# Patient Record
Sex: MALE | Race: WHITE | Employment: OTHER | URBAN - NONMETROPOLITAN AREA
[De-identification: names, ages, dates, MRNs, and addresses within clinical notes are randomized per-mention and may not be internally consistent; named-entity substitution may affect disease eponyms.]

---

## 2017-01-09 ENCOUNTER — OFFICE VISIT (OUTPATIENT)
Dept: CARDIOLOGY | Age: 72
End: 2017-01-09

## 2017-01-09 VITALS
HEIGHT: 62 IN | SYSTOLIC BLOOD PRESSURE: 120 MMHG | HEART RATE: 71 BPM | WEIGHT: 170 LBS | BODY MASS INDEX: 31.28 KG/M2 | DIASTOLIC BLOOD PRESSURE: 68 MMHG

## 2017-01-09 DIAGNOSIS — I10 ESSENTIAL HYPERTENSION: ICD-10-CM

## 2017-01-09 DIAGNOSIS — Z95.1 S/P CABG X 4: Primary | ICD-10-CM

## 2017-01-09 DIAGNOSIS — E78.01 FAMILIAL HYPERCHOLESTEROLEMIA: ICD-10-CM

## 2017-01-09 PROCEDURE — 99213 OFFICE O/P EST LOW 20 MIN: CPT | Performed by: NUCLEAR MEDICINE

## 2017-01-09 PROCEDURE — 93000 ELECTROCARDIOGRAM COMPLETE: CPT | Performed by: NUCLEAR MEDICINE

## 2017-02-07 ENCOUNTER — NURSE TRIAGE (OUTPATIENT)
Dept: ADMINISTRATIVE | Age: 72
End: 2017-02-07

## 2017-02-09 ENCOUNTER — OFFICE VISIT (OUTPATIENT)
Dept: UROLOGY | Age: 72
End: 2017-02-09

## 2017-02-09 ENCOUNTER — TELEPHONE (OUTPATIENT)
Dept: UROLOGY | Age: 72
End: 2017-02-09

## 2017-02-09 VITALS
SYSTOLIC BLOOD PRESSURE: 138 MMHG | DIASTOLIC BLOOD PRESSURE: 82 MMHG | BODY MASS INDEX: 29.02 KG/M2 | HEIGHT: 64 IN | WEIGHT: 170 LBS

## 2017-02-09 DIAGNOSIS — C61 PROSTATE CANCER (HCC): ICD-10-CM

## 2017-02-09 DIAGNOSIS — N35.9 URETHRAL STRICTURE, UNSPECIFIED STRICTURE TYPE: ICD-10-CM

## 2017-02-09 DIAGNOSIS — N39.41 URGENCY INCONTINENCE: Primary | ICD-10-CM

## 2017-02-09 DIAGNOSIS — R31.9 HEMATURIA: ICD-10-CM

## 2017-02-09 LAB
BILIRUBIN URINE: NEGATIVE
BLOOD URINE, POC: NEGATIVE
CHARACTER, URINE: CLEAR
COLOR, URINE: YELLOW
GLUCOSE URINE: NEGATIVE MG/DL
KETONES, URINE: NEGATIVE
LEUKOCYTE CLUMPS, URINE: NEGATIVE
NITRITE, URINE: NEGATIVE
PH, URINE: 7
POST VOID RESIDUAL (PVR): 153 ML
PROTEIN, URINE: NEGATIVE MG/DL
SPECIFIC GRAVITY, URINE: 1.02 (ref 1–1.03)
UROBILINOGEN, URINE: 0.2 EU/DL

## 2017-02-09 PROCEDURE — 99214 OFFICE O/P EST MOD 30 MIN: CPT | Performed by: UROLOGY

## 2017-02-09 PROCEDURE — 81003 URINALYSIS AUTO W/O SCOPE: CPT | Performed by: UROLOGY

## 2017-02-09 PROCEDURE — 51798 US URINE CAPACITY MEASURE: CPT | Performed by: UROLOGY

## 2017-02-09 ASSESSMENT — ENCOUNTER SYMPTOMS
VOMITING: 0
CHEST TIGHTNESS: 0
CONSTIPATION: 0
SHORTNESS OF BREATH: 0
BACK PAIN: 1
EYE PAIN: 0
COLOR CHANGE: 0
EYE REDNESS: 0
FACIAL SWELLING: 0

## 2017-02-09 ASSESSMENT — LIFESTYLE VARIABLES: TOBACCO_USE: 0

## 2017-02-13 PROBLEM — I21.4 NSTEMI (NON-ST ELEVATED MYOCARDIAL INFARCTION) (HCC): Status: ACTIVE | Noted: 2017-02-13

## 2017-02-15 PROBLEM — T82.218A FAILED CORONARY ARTERY BYPASS GRAFT: Status: ACTIVE | Noted: 2017-02-15

## 2017-03-06 ENCOUNTER — OFFICE VISIT (OUTPATIENT)
Dept: CARDIOLOGY | Age: 72
End: 2017-03-06

## 2017-03-06 VITALS
HEART RATE: 68 BPM | DIASTOLIC BLOOD PRESSURE: 70 MMHG | WEIGHT: 169.2 LBS | HEIGHT: 64 IN | BODY MASS INDEX: 28.89 KG/M2 | SYSTOLIC BLOOD PRESSURE: 130 MMHG

## 2017-03-06 DIAGNOSIS — Z98.61 S/P PTCA (PERCUTANEOUS TRANSLUMINAL CORONARY ANGIOPLASTY): ICD-10-CM

## 2017-03-06 DIAGNOSIS — I25.810 CORONARY ARTERY DISEASE INVOLVING CORONARY BYPASS GRAFT OF NATIVE HEART WITHOUT ANGINA PECTORIS: ICD-10-CM

## 2017-03-06 DIAGNOSIS — R06.02 SOB (SHORTNESS OF BREATH): Primary | ICD-10-CM

## 2017-03-06 DIAGNOSIS — I10 ESSENTIAL HYPERTENSION: ICD-10-CM

## 2017-03-06 PROCEDURE — 99214 OFFICE O/P EST MOD 30 MIN: CPT | Performed by: NUCLEAR MEDICINE

## 2017-03-06 PROCEDURE — 93000 ELECTROCARDIOGRAM COMPLETE: CPT | Performed by: NUCLEAR MEDICINE

## 2017-03-06 RX ORDER — ATORVASTATIN CALCIUM 80 MG/1
80 TABLET, FILM COATED ORAL DAILY
Qty: 90 TABLET | Refills: 0 | Status: SHIPPED | OUTPATIENT
Start: 2017-03-06

## 2017-03-07 ENCOUNTER — TELEPHONE (OUTPATIENT)
Dept: CARDIOLOGY | Age: 72
End: 2017-03-07

## 2017-03-22 ENCOUNTER — TELEPHONE (OUTPATIENT)
Dept: CARDIOLOGY | Age: 72
End: 2017-03-22

## 2017-03-31 ENCOUNTER — PROCEDURE VISIT (OUTPATIENT)
Dept: CARDIOLOGY | Age: 72
End: 2017-03-31

## 2017-03-31 DIAGNOSIS — I73.9 CLAUDICATION (HCC): Primary | ICD-10-CM

## 2017-04-05 ENCOUNTER — TELEPHONE (OUTPATIENT)
Dept: CARDIOLOGY | Age: 72
End: 2017-04-05

## 2017-04-05 DIAGNOSIS — I73.9 CLAUDICATION (HCC): Primary | ICD-10-CM

## 2017-04-10 RX ORDER — LISINOPRIL 2.5 MG/1
2.5 TABLET ORAL DAILY
Qty: 90 TABLET | Refills: 0 | Status: SHIPPED | OUTPATIENT
Start: 2017-04-10 | End: 2017-04-25 | Stop reason: SDUPTHER

## 2017-04-10 RX ORDER — FUROSEMIDE 40 MG/1
40 TABLET ORAL DAILY
Qty: 90 TABLET | Refills: 0 | Status: SHIPPED | OUTPATIENT
Start: 2017-04-10 | End: 2017-04-25 | Stop reason: SDUPTHER

## 2017-04-10 RX ORDER — PANTOPRAZOLE SODIUM 40 MG/1
40 TABLET, DELAYED RELEASE ORAL DAILY
Qty: 90 TABLET | Refills: 0 | Status: ON HOLD | OUTPATIENT
Start: 2017-04-10 | End: 2018-09-05 | Stop reason: ALTCHOICE

## 2017-04-11 ENCOUNTER — TELEPHONE (OUTPATIENT)
Dept: CARDIOLOGY | Age: 72
End: 2017-04-11

## 2017-04-20 ENCOUNTER — OFFICE VISIT (OUTPATIENT)
Dept: CARDIOLOGY | Age: 72
End: 2017-04-20

## 2017-04-20 VITALS
HEIGHT: 64 IN | DIASTOLIC BLOOD PRESSURE: 76 MMHG | SYSTOLIC BLOOD PRESSURE: 110 MMHG | HEART RATE: 66 BPM | BODY MASS INDEX: 28.34 KG/M2 | WEIGHT: 166 LBS

## 2017-04-20 DIAGNOSIS — I73.9 CLAUDICATION (HCC): Primary | ICD-10-CM

## 2017-04-20 DIAGNOSIS — Z91.89 CARDIOVASCULAR RISK FACTOR: ICD-10-CM

## 2017-04-20 DIAGNOSIS — R94.30 ABNORMAL CARDIOVASCULAR FUNCTION: ICD-10-CM

## 2017-04-20 PROCEDURE — 99213 OFFICE O/P EST LOW 20 MIN: CPT | Performed by: INTERNAL MEDICINE

## 2017-04-25 RX ORDER — LISINOPRIL 2.5 MG/1
2.5 TABLET ORAL DAILY
Qty: 90 TABLET | Refills: 3 | Status: SHIPPED | OUTPATIENT
Start: 2017-04-25 | End: 2017-07-02 | Stop reason: SDUPTHER

## 2017-04-25 RX ORDER — FUROSEMIDE 40 MG/1
40 TABLET ORAL DAILY
Qty: 90 TABLET | Refills: 3 | Status: ON HOLD | OUTPATIENT
Start: 2017-04-25 | End: 2017-05-02 | Stop reason: ALTCHOICE

## 2017-05-02 DIAGNOSIS — M47.26 OSTEOARTHRITIS OF SPINE WITH RADICULOPATHY, LUMBAR REGION: Primary | ICD-10-CM

## 2017-05-04 ENCOUNTER — TELEPHONE (OUTPATIENT)
Dept: CARDIOLOGY | Age: 72
End: 2017-05-04

## 2017-05-10 ENCOUNTER — TELEPHONE (OUTPATIENT)
Dept: CARDIOLOGY | Age: 72
End: 2017-05-10

## 2017-05-17 ENCOUNTER — OFFICE VISIT (OUTPATIENT)
Dept: CARDIOLOGY | Age: 72
End: 2017-05-17

## 2017-05-17 VITALS
DIASTOLIC BLOOD PRESSURE: 78 MMHG | WEIGHT: 168 LBS | HEART RATE: 64 BPM | SYSTOLIC BLOOD PRESSURE: 130 MMHG | BODY MASS INDEX: 28.84 KG/M2

## 2017-05-17 DIAGNOSIS — M47.26 OSTEOARTHRITIS OF SPINE WITH RADICULOPATHY, LUMBAR REGION: Primary | ICD-10-CM

## 2017-05-17 DIAGNOSIS — I73.9 PVD (PERIPHERAL VASCULAR DISEASE) (HCC): ICD-10-CM

## 2017-05-17 DIAGNOSIS — I20.8 STABLE ANGINA (HCC): ICD-10-CM

## 2017-05-17 PROCEDURE — 99213 OFFICE O/P EST LOW 20 MIN: CPT | Performed by: INTERNAL MEDICINE

## 2017-06-12 ENCOUNTER — OFFICE VISIT (OUTPATIENT)
Dept: CARDIOLOGY | Age: 72
End: 2017-06-12

## 2017-06-12 ENCOUNTER — TELEPHONE (OUTPATIENT)
Dept: CARDIOLOGY | Age: 72
End: 2017-06-12

## 2017-06-12 VITALS
DIASTOLIC BLOOD PRESSURE: 84 MMHG | HEIGHT: 64 IN | WEIGHT: 161 LBS | HEART RATE: 68 BPM | SYSTOLIC BLOOD PRESSURE: 136 MMHG | BODY MASS INDEX: 27.49 KG/M2

## 2017-06-12 DIAGNOSIS — E78.01 FAMILIAL HYPERCHOLESTEROLEMIA: ICD-10-CM

## 2017-06-12 DIAGNOSIS — R06.09 DYSPNEA ON EXERTION: ICD-10-CM

## 2017-06-12 DIAGNOSIS — I10 ESSENTIAL HYPERTENSION: Primary | ICD-10-CM

## 2017-06-12 DIAGNOSIS — I73.9 PVD (PERIPHERAL VASCULAR DISEASE) (HCC): ICD-10-CM

## 2017-06-12 PROCEDURE — 99214 OFFICE O/P EST MOD 30 MIN: CPT | Performed by: NUCLEAR MEDICINE

## 2017-07-03 RX ORDER — LISINOPRIL 2.5 MG/1
TABLET ORAL
Qty: 90 TABLET | Refills: 1 | Status: SHIPPED | OUTPATIENT
Start: 2017-07-03 | End: 2018-01-21 | Stop reason: SDUPTHER

## 2017-07-25 ENCOUNTER — TELEPHONE (OUTPATIENT)
Dept: CARDIOLOGY CLINIC | Age: 72
End: 2017-07-25

## 2017-08-09 ENCOUNTER — TELEPHONE (OUTPATIENT)
Dept: UROLOGY | Age: 72
End: 2017-08-09

## 2017-08-24 ENCOUNTER — APPOINTMENT (OUTPATIENT)
Dept: GENERAL RADIOLOGY | Age: 72
End: 2017-08-24
Payer: MEDICARE

## 2017-08-24 ENCOUNTER — HOSPITAL ENCOUNTER (EMERGENCY)
Age: 72
Discharge: HOME | End: 2017-08-24
Attending: FAMILY MEDICINE
Payer: MEDICARE

## 2017-08-24 VITALS
TEMPERATURE: 98 F | HEART RATE: 77 BPM | WEIGHT: 169 LBS | SYSTOLIC BLOOD PRESSURE: 108 MMHG | BODY MASS INDEX: 28.85 KG/M2 | RESPIRATION RATE: 18 BRPM | OXYGEN SATURATION: 98 % | HEIGHT: 64 IN | DIASTOLIC BLOOD PRESSURE: 63 MMHG

## 2017-08-24 DIAGNOSIS — R06.09 DYSPNEA ON EXERTION: Primary | ICD-10-CM

## 2017-08-24 LAB
ALBUMIN SERPL-MCNC: 4.3 G/DL (ref 3.5–5.1)
ALP BLD-CCNC: 97 U/L (ref 38–126)
ALT SERPL-CCNC: 21 U/L (ref 11–66)
ANION GAP SERPL CALCULATED.3IONS-SCNC: 15 MEQ/L (ref 8–16)
APTT: 31.5 SECONDS (ref 22–38)
AST SERPL-CCNC: 21 U/L (ref 5–40)
BASOPHILS # BLD: 0.8 %
BASOPHILS ABSOLUTE: 0 THOU/MM3 (ref 0–0.1)
BILIRUB SERPL-MCNC: 0.5 MG/DL (ref 0.3–1.2)
BILIRUBIN DIRECT: < 0.2 MG/DL (ref 0–0.3)
BUN BLDV-MCNC: 19 MG/DL (ref 7–22)
CALCIUM SERPL-MCNC: 9.3 MG/DL (ref 8.5–10.5)
CHLORIDE BLD-SCNC: 100 MEQ/L (ref 98–111)
CO2: 28 MEQ/L (ref 23–33)
CREAT SERPL-MCNC: 0.7 MG/DL (ref 0.4–1.2)
D-DIMER QUANTITATIVE: 350 NG/ML FEU (ref 0–500)
EKG ATRIAL RATE: 66 BPM
EKG P AXIS: 52 DEGREES
EKG P-R INTERVAL: 166 MS
EKG Q-T INTERVAL: 404 MS
EKG QRS DURATION: 84 MS
EKG QTC CALCULATION (BAZETT): 423 MS
EKG R AXIS: 26 DEGREES
EKG T AXIS: 36 DEGREES
EKG VENTRICULAR RATE: 66 BPM
EOSINOPHIL # BLD: 3.5 %
EOSINOPHILS ABSOLUTE: 0.2 THOU/MM3 (ref 0–0.4)
GFR SERPL CREATININE-BSD FRML MDRD: > 90 ML/MIN/1.73M2
GLUCOSE BLD-MCNC: 118 MG/DL (ref 70–108)
HCT VFR BLD CALC: 39.4 % (ref 42–52)
HEMOCCULT STL QL: NEGATIVE
HEMOGLOBIN: 13.5 GM/DL (ref 14–18)
INR BLD: 0.89 (ref 0.85–1.13)
LIPASE: 20.4 U/L (ref 5.6–51.3)
LYMPHOCYTES # BLD: 14.8 %
LYMPHOCYTES ABSOLUTE: 0.8 THOU/MM3 (ref 1–4.8)
MCH RBC QN AUTO: 31.6 PG (ref 27–31)
MCHC RBC AUTO-ENTMCNC: 34.2 GM/DL (ref 33–37)
MCV RBC AUTO: 92.5 FL (ref 80–94)
MONOCYTES # BLD: 9.9 %
MONOCYTES ABSOLUTE: 0.6 THOU/MM3 (ref 0.4–1.3)
NUCLEATED RED BLOOD CELLS: 0 /100 WBC
OSMOLALITY CALCULATION: 288.3 MOSMOL/KG (ref 275–300)
PDW BLD-RTO: 13.4 % (ref 11.5–14.5)
PLATELET # BLD: 195 THOU/MM3 (ref 130–400)
PMV BLD AUTO: 7.3 MCM (ref 7.4–10.4)
POTASSIUM SERPL-SCNC: 4.6 MEQ/L (ref 3.5–5.2)
PRO-BNP: 119.4 PG/ML (ref 0–900)
RBC # BLD: 4.26 MILL/MM3 (ref 4.7–6.1)
RBC # BLD: NORMAL 10*6/UL
SEG NEUTROPHILS: 71 %
SEGMENTED NEUTROPHILS ABSOLUTE COUNT: 4 THOU/MM3 (ref 1.8–7.7)
SODIUM BLD-SCNC: 143 MEQ/L (ref 135–145)
TOTAL PROTEIN: 6.8 G/DL (ref 6.1–8)
TROPONIN T: < 0.01 NG/ML
WBC # BLD: 5.7 THOU/MM3 (ref 4.8–10.8)

## 2017-08-24 PROCEDURE — 99285 EMERGENCY DEPT VISIT HI MDM: CPT

## 2017-08-24 PROCEDURE — 85025 COMPLETE CBC W/AUTO DIFF WBC: CPT

## 2017-08-24 PROCEDURE — 83690 ASSAY OF LIPASE: CPT

## 2017-08-24 PROCEDURE — 80053 COMPREHEN METABOLIC PANEL: CPT

## 2017-08-24 PROCEDURE — 71010 XR CHEST PORTABLE: CPT

## 2017-08-24 PROCEDURE — 85610 PROTHROMBIN TIME: CPT

## 2017-08-24 PROCEDURE — 84484 ASSAY OF TROPONIN QUANT: CPT

## 2017-08-24 PROCEDURE — 85730 THROMBOPLASTIN TIME PARTIAL: CPT

## 2017-08-24 PROCEDURE — 93005 ELECTROCARDIOGRAM TRACING: CPT

## 2017-08-24 PROCEDURE — 85379 FIBRIN DEGRADATION QUANT: CPT

## 2017-08-24 PROCEDURE — 82248 BILIRUBIN DIRECT: CPT

## 2017-08-24 PROCEDURE — 82272 OCCULT BLD FECES 1-3 TESTS: CPT

## 2017-08-24 PROCEDURE — 83880 ASSAY OF NATRIURETIC PEPTIDE: CPT

## 2017-08-24 PROCEDURE — 36415 COLL VENOUS BLD VENIPUNCTURE: CPT

## 2017-08-24 ASSESSMENT — ENCOUNTER SYMPTOMS
SHORTNESS OF BREATH: 1
BACK PAIN: 1
ABDOMINAL PAIN: 0
BLOOD IN STOOL: 0
COUGH: 0

## 2017-12-11 ENCOUNTER — OFFICE VISIT (OUTPATIENT)
Dept: CARDIOLOGY CLINIC | Age: 72
End: 2017-12-11
Payer: MEDICARE

## 2017-12-11 VITALS
SYSTOLIC BLOOD PRESSURE: 146 MMHG | BODY MASS INDEX: 29.19 KG/M2 | HEIGHT: 64 IN | HEART RATE: 84 BPM | DIASTOLIC BLOOD PRESSURE: 80 MMHG | WEIGHT: 171 LBS

## 2017-12-11 DIAGNOSIS — E78.01 FAMILIAL HYPERCHOLESTEROLEMIA: ICD-10-CM

## 2017-12-11 DIAGNOSIS — I25.810 CORONARY ARTERY DISEASE INVOLVING CORONARY BYPASS GRAFT OF NATIVE HEART WITHOUT ANGINA PECTORIS: Primary | ICD-10-CM

## 2017-12-11 DIAGNOSIS — I10 ESSENTIAL HYPERTENSION: ICD-10-CM

## 2017-12-11 PROCEDURE — G8417 CALC BMI ABV UP PARAM F/U: HCPCS | Performed by: NUCLEAR MEDICINE

## 2017-12-11 PROCEDURE — 1123F ACP DISCUSS/DSCN MKR DOCD: CPT | Performed by: NUCLEAR MEDICINE

## 2017-12-11 PROCEDURE — G8484 FLU IMMUNIZE NO ADMIN: HCPCS | Performed by: NUCLEAR MEDICINE

## 2017-12-11 PROCEDURE — 99213 OFFICE O/P EST LOW 20 MIN: CPT | Performed by: NUCLEAR MEDICINE

## 2017-12-11 PROCEDURE — 3017F COLORECTAL CA SCREEN DOC REV: CPT | Performed by: NUCLEAR MEDICINE

## 2017-12-11 PROCEDURE — 4040F PNEUMOC VAC/ADMIN/RCVD: CPT | Performed by: NUCLEAR MEDICINE

## 2017-12-11 PROCEDURE — 1036F TOBACCO NON-USER: CPT | Performed by: NUCLEAR MEDICINE

## 2017-12-11 PROCEDURE — G8427 DOCREV CUR MEDS BY ELIG CLIN: HCPCS | Performed by: NUCLEAR MEDICINE

## 2017-12-11 PROCEDURE — G8598 ASA/ANTIPLAT THER USED: HCPCS | Performed by: NUCLEAR MEDICINE

## 2017-12-11 NOTE — PROGRESS NOTES
3    pantoprazole (PROTONIX) 40 MG tablet Take 1 tablet by mouth daily With breakfast 90 tablet 0    ticagrelor (BRILINTA) 90 MG TABS tablet Take 1 tablet by mouth 2 times daily 180 tablet 3    aspirin 81 MG tablet Take 81 mg by mouth daily      atorvastatin (LIPITOR) 80 MG tablet Take 1 tablet by mouth daily 90 tablet 0    nitroGLYCERIN (NITROSTAT) 0.4 MG SL tablet Place 1 tablet under the tongue every 5 minutes as needed for Chest pain up to max of 3 total doses. If no relief after 1 dose, call 911. 25 tablet 3    Multiple Vitamin (MULTI VITAMIN PO) Take 1 tablet by mouth daily      sertraline (ZOLOFT) 100 MG tablet Take 1 tablet by mouth daily 90 tablet 3     No current facility-administered medications for this visit. Allergies   Allergen Reactions    Pcn [Penicillins] Shortness Of Breath    Tetanus Toxoids Shortness Of Breath    Franki-1 [Lidocaine Hcl]      New reaction today  jenn 07-02-16 pt states only reaction was to numbing drops at Dr Diana Yang office- swelling and trouble breathing. . But has numbing at Dentist without reaction after the eye drops without any reaction at all.     Pletal [Cilostazol]      Health Maintenance   Topic Date Due    Colon cancer screen colonoscopy  04/15/1993    Zostavax vaccine  04/15/2003    Pneumococcal low/med risk (2 of 2 - PPSV23) 12/11/2015    Flu vaccine (1) 09/01/2017    Lipid screen  02/15/2022       Subjective:  Review of Systems  General:   No fever, no chills, some fatigue or weight loss  Pulmonary:    some dyspnea, no wheezing  Cardiac:    Denies recent chest pain,   GI:     No nausea or vomiting, no abdominal pain  Neuro:     No dizziness or light headedness,   Musculoskeletal:  No recent active issues  Extremities:   No edema, good peripheral pulses      Objective:  Physical Exam  BP (!) 146/80   Pulse 84   Ht 5' 4\" (1.626 m)   Wt 171 lb (77.6 kg)   BMI 29.35 kg/m²   General:   Well developed, well nourished  Lungs:    Clear to

## 2017-12-22 ENCOUNTER — APPOINTMENT (OUTPATIENT)
Dept: GENERAL RADIOLOGY | Age: 72
End: 2017-12-22
Payer: MEDICARE

## 2017-12-22 ENCOUNTER — HOSPITAL ENCOUNTER (OUTPATIENT)
Age: 72
Setting detail: OBSERVATION
Discharge: HOME | End: 2017-12-23
Attending: FAMILY MEDICINE | Admitting: INTERNAL MEDICINE
Payer: MEDICARE

## 2017-12-22 ENCOUNTER — APPOINTMENT (OUTPATIENT)
Dept: CT IMAGING | Age: 72
End: 2017-12-22
Payer: MEDICARE

## 2017-12-22 DIAGNOSIS — R41.0 EPISODE OF CONFUSION: ICD-10-CM

## 2017-12-22 DIAGNOSIS — G45.9 TRANSIENT CEREBRAL ISCHEMIA, UNSPECIFIED TYPE: ICD-10-CM

## 2017-12-22 DIAGNOSIS — Z86.73 HISTORY OF CVA (CEREBROVASCULAR ACCIDENT): ICD-10-CM

## 2017-12-22 DIAGNOSIS — R07.9 CHEST PAIN, UNSPECIFIED TYPE: Primary | ICD-10-CM

## 2017-12-22 LAB
ALBUMIN SERPL-MCNC: 5 G/DL (ref 3.5–5.1)
ALP BLD-CCNC: 111 U/L (ref 38–126)
ALT SERPL-CCNC: 22 U/L (ref 11–66)
ANION GAP SERPL CALCULATED.3IONS-SCNC: 17 MEQ/L (ref 8–16)
AST SERPL-CCNC: 23 U/L (ref 5–40)
BASOPHILS # BLD: 0.2 %
BASOPHILS ABSOLUTE: 0 THOU/MM3 (ref 0–0.1)
BILIRUB SERPL-MCNC: 0.7 MG/DL (ref 0.3–1.2)
BILIRUBIN DIRECT: < 0.2 MG/DL (ref 0–0.3)
BUN BLDV-MCNC: 13 MG/DL (ref 7–22)
CALCIUM SERPL-MCNC: 9.8 MG/DL (ref 8.5–10.5)
CHLORIDE BLD-SCNC: 96 MEQ/L (ref 98–111)
CO2: 25 MEQ/L (ref 23–33)
CREAT SERPL-MCNC: 0.9 MG/DL (ref 0.4–1.2)
EKG ATRIAL RATE: 82 BPM
EKG P AXIS: 50 DEGREES
EKG P-R INTERVAL: 166 MS
EKG Q-T INTERVAL: 390 MS
EKG QRS DURATION: 82 MS
EKG QTC CALCULATION (BAZETT): 455 MS
EKG R AXIS: 26 DEGREES
EKG T AXIS: 21 DEGREES
EKG VENTRICULAR RATE: 82 BPM
EOSINOPHIL # BLD: 2.5 %
EOSINOPHILS ABSOLUTE: 0.2 THOU/MM3 (ref 0–0.4)
GFR SERPL CREATININE-BSD FRML MDRD: 82 ML/MIN/1.73M2
GLUCOSE BLD-MCNC: 129 MG/DL (ref 70–108)
GLUCOSE BLD-MCNC: 143 MG/DL (ref 70–108)
HCT VFR BLD CALC: 42.2 % (ref 42–52)
HEMOGLOBIN: 14.3 GM/DL (ref 14–18)
LYMPHOCYTES # BLD: 11.8 %
LYMPHOCYTES ABSOLUTE: 0.9 THOU/MM3 (ref 1–4.8)
MAGNESIUM: 2 MG/DL (ref 1.6–2.4)
MCH RBC QN AUTO: 31 PG (ref 27–31)
MCHC RBC AUTO-ENTMCNC: 33.9 GM/DL (ref 33–37)
MCV RBC AUTO: 91.3 FL (ref 80–94)
MONOCYTES # BLD: 4.9 %
MONOCYTES ABSOLUTE: 0.4 THOU/MM3 (ref 0.4–1.3)
NUCLEATED RED BLOOD CELLS: 0 /100 WBC
OSMOLALITY CALCULATION: 278.3 MOSMOL/KG (ref 275–300)
PDW BLD-RTO: 14.2 % (ref 11.5–14.5)
PLATELET # BLD: 269 THOU/MM3 (ref 130–400)
PMV BLD AUTO: 7.3 MCM (ref 7.4–10.4)
POTASSIUM SERPL-SCNC: 3.5 MEQ/L (ref 3.5–5.2)
PRO-BNP: 66.9 PG/ML (ref 0–900)
RBC # BLD: 4.62 MILL/MM3 (ref 4.7–6.1)
SEG NEUTROPHILS: 80.6 %
SEGMENTED NEUTROPHILS ABSOLUTE COUNT: 6.4 THOU/MM3 (ref 1.8–7.7)
SODIUM BLD-SCNC: 138 MEQ/L (ref 135–145)
TOTAL PROTEIN: 7.9 G/DL (ref 6.1–8)
TROPONIN T: < 0.01 NG/ML
WBC # BLD: 8 THOU/MM3 (ref 4.8–10.8)

## 2017-12-22 PROCEDURE — 71020 XR CHEST STANDARD TWO VW: CPT

## 2017-12-22 PROCEDURE — 83735 ASSAY OF MAGNESIUM: CPT

## 2017-12-22 PROCEDURE — G0378 HOSPITAL OBSERVATION PER HR: HCPCS

## 2017-12-22 PROCEDURE — 99222 1ST HOSP IP/OBS MODERATE 55: CPT | Performed by: INTERNAL MEDICINE

## 2017-12-22 PROCEDURE — 93005 ELECTROCARDIOGRAM TRACING: CPT

## 2017-12-22 PROCEDURE — 96374 THER/PROPH/DIAG INJ IV PUSH: CPT

## 2017-12-22 PROCEDURE — 2580000003 HC RX 258: Performed by: INTERNAL MEDICINE

## 2017-12-22 PROCEDURE — 82248 BILIRUBIN DIRECT: CPT

## 2017-12-22 PROCEDURE — 71275 CT ANGIOGRAPHY CHEST: CPT

## 2017-12-22 PROCEDURE — 82948 REAGENT STRIP/BLOOD GLUCOSE: CPT

## 2017-12-22 PROCEDURE — 83880 ASSAY OF NATRIURETIC PEPTIDE: CPT

## 2017-12-22 PROCEDURE — 70450 CT HEAD/BRAIN W/O DYE: CPT

## 2017-12-22 PROCEDURE — 85025 COMPLETE CBC W/AUTO DIFF WBC: CPT

## 2017-12-22 PROCEDURE — 84484 ASSAY OF TROPONIN QUANT: CPT

## 2017-12-22 PROCEDURE — S0028 INJECTION, FAMOTIDINE, 20 MG: HCPCS | Performed by: FAMILY MEDICINE

## 2017-12-22 PROCEDURE — 2500000003 HC RX 250 WO HCPCS: Performed by: FAMILY MEDICINE

## 2017-12-22 PROCEDURE — 6360000004 HC RX CONTRAST MEDICATION: Performed by: FAMILY MEDICINE

## 2017-12-22 PROCEDURE — 6370000000 HC RX 637 (ALT 250 FOR IP): Performed by: FAMILY MEDICINE

## 2017-12-22 PROCEDURE — 80053 COMPREHEN METABOLIC PANEL: CPT

## 2017-12-22 PROCEDURE — 36415 COLL VENOUS BLD VENIPUNCTURE: CPT

## 2017-12-22 PROCEDURE — 99285 EMERGENCY DEPT VISIT HI MDM: CPT

## 2017-12-22 RX ORDER — FUROSEMIDE 40 MG/1
40 TABLET ORAL DAILY
Status: DISCONTINUED | OUTPATIENT
Start: 2017-12-23 | End: 2017-12-23 | Stop reason: HOSPADM

## 2017-12-22 RX ORDER — SODIUM CHLORIDE 0.9 % (FLUSH) 0.9 %
10 SYRINGE (ML) INJECTION EVERY 12 HOURS SCHEDULED
Status: DISCONTINUED | OUTPATIENT
Start: 2017-12-22 | End: 2017-12-23 | Stop reason: HOSPADM

## 2017-12-22 RX ORDER — LISINOPRIL 2.5 MG/1
2.5 TABLET ORAL DAILY
Status: DISCONTINUED | OUTPATIENT
Start: 2017-12-22 | End: 2017-12-23 | Stop reason: HOSPADM

## 2017-12-22 RX ORDER — MAGNESIUM HYDROXIDE/ALUMINUM HYDROXICE/SIMETHICONE 120; 1200; 1200 MG/30ML; MG/30ML; MG/30ML
30 SUSPENSION ORAL ONCE
Status: DISCONTINUED | OUTPATIENT
Start: 2017-12-22 | End: 2017-12-23 | Stop reason: HOSPADM

## 2017-12-22 RX ORDER — ONDANSETRON 2 MG/ML
4 INJECTION INTRAMUSCULAR; INTRAVENOUS EVERY 6 HOURS PRN
Status: DISCONTINUED | OUTPATIENT
Start: 2017-12-22 | End: 2017-12-23 | Stop reason: HOSPADM

## 2017-12-22 RX ORDER — NITROGLYCERIN 0.4 MG/1
0.4 TABLET SUBLINGUAL EVERY 5 MIN PRN
Status: DISCONTINUED | OUTPATIENT
Start: 2017-12-22 | End: 2017-12-23 | Stop reason: HOSPADM

## 2017-12-22 RX ORDER — PANTOPRAZOLE SODIUM 40 MG/1
40 TABLET, DELAYED RELEASE ORAL DAILY
Status: DISCONTINUED | OUTPATIENT
Start: 2017-12-23 | End: 2017-12-23 | Stop reason: HOSPADM

## 2017-12-22 RX ORDER — ASPIRIN 81 MG/1
81 TABLET ORAL DAILY
Status: DISCONTINUED | OUTPATIENT
Start: 2017-12-23 | End: 2017-12-23 | Stop reason: HOSPADM

## 2017-12-22 RX ORDER — ASPIRIN 81 MG/1
324 TABLET, CHEWABLE ORAL ONCE
Status: COMPLETED | OUTPATIENT
Start: 2017-12-22 | End: 2017-12-22

## 2017-12-22 RX ORDER — NITROGLYCERIN 0.4 MG/1
0.4 TABLET SUBLINGUAL ONCE
Status: DISCONTINUED | OUTPATIENT
Start: 2017-12-22 | End: 2017-12-22

## 2017-12-22 RX ORDER — SERTRALINE HYDROCHLORIDE 100 MG/1
100 TABLET, FILM COATED ORAL DAILY
Status: DISCONTINUED | OUTPATIENT
Start: 2017-12-22 | End: 2017-12-23 | Stop reason: HOSPADM

## 2017-12-22 RX ORDER — SODIUM CHLORIDE 0.9 % (FLUSH) 0.9 %
10 SYRINGE (ML) INJECTION PRN
Status: DISCONTINUED | OUTPATIENT
Start: 2017-12-22 | End: 2017-12-23 | Stop reason: HOSPADM

## 2017-12-22 RX ORDER — ATORVASTATIN CALCIUM 80 MG/1
80 TABLET, FILM COATED ORAL NIGHTLY
Status: DISCONTINUED | OUTPATIENT
Start: 2017-12-22 | End: 2017-12-23 | Stop reason: HOSPADM

## 2017-12-22 RX ADMIN — ASPIRIN 81 MG 324 MG: 81 TABLET ORAL at 15:11

## 2017-12-22 RX ADMIN — IOPAMIDOL 80 ML: 755 INJECTION, SOLUTION INTRAVENOUS at 15:21

## 2017-12-22 RX ADMIN — FAMOTIDINE 20 MG: 10 INJECTION, SOLUTION INTRAVENOUS at 15:11

## 2017-12-22 RX ADMIN — Medication 10 ML: at 21:00

## 2017-12-22 ASSESSMENT — ENCOUNTER SYMPTOMS
NAUSEA: 0
ABDOMINAL PAIN: 0
VOMITING: 0
SORE THROAT: 0
DIARRHEA: 0
COUGH: 0
EYE DISCHARGE: 0
RHINORRHEA: 0
SHORTNESS OF BREATH: 1
EYE REDNESS: 0
BACK PAIN: 0
WHEEZING: 0

## 2017-12-22 ASSESSMENT — HEART SCORE: ECG: 0

## 2017-12-22 ASSESSMENT — PAIN DESCRIPTION - DESCRIPTORS: DESCRIPTORS: BURNING

## 2017-12-22 ASSESSMENT — PAIN DESCRIPTION - ORIENTATION: ORIENTATION: MID

## 2017-12-22 ASSESSMENT — PAIN SCALES - GENERAL: PAINLEVEL_OUTOF10: 5

## 2017-12-22 ASSESSMENT — PAIN DESCRIPTION - LOCATION: LOCATION: CHEST

## 2017-12-22 NOTE — ED PROVIDER NOTES
Skin: Negative for pallor and rash. Allergic/Immunologic: Negative for environmental allergies. Neurological: Positive for tremors. Negative for dizziness, syncope, weakness, light-headedness and headaches. Hematological: Negative for adenopathy. Psychiatric/Behavioral: Negative for agitation, confusion, dysphoric mood and suicidal ideas. The patient is not nervous/anxious. PAST MEDICAL HISTORY    has a past medical history of Alcohol abuse; Back pain; CAD (coronary artery disease); Cancer of prostate ([facility] Cerebral artery occlusion with cerebral infarction ([facility] History of blood transfusion; Hyperlipidemia; Hypertension; Major depression in remission ([facility] Other disorders of kidney and ureter in diseases classified elsewhere; S/P CABG x 4; S/P CABG x 4; Simple chronic bronchitis ([facility] TIA (transient ischemic attack); and Uncomplicated alcohol dependence ([facility] SURGICAL HISTORY      has a past surgical history that includes Appendectomy; ostate surgery; Arm Surgery; Coronary artery bypass graft (12/14/2016); Diagnostic Cardiac Cath Lab Procedure; Cardiac surgery; and fracture surgery.     CURRENT MEDICATIONS       Current Discharge Medication List      CONTINUE these medications which have NOT CHANGED    Details   lisinopril (PRINIVIL;ZESTRIL) 2.5 MG tablet TAKE 1 TABLET EVERY DAY  Qty: 90 tablet, Refills: 1      furosemide (LASIX) 40 MG tablet Take 40 mg by mouth daily      metoprolol tartrate (LOPRESSOR) 25 MG tablet Take 1 tablet by mouth 2 times daily  Qty: 180 tablet, Refills: 3      pantoprazole (PROTONIX) 40 MG tablet Take 1 tablet by mouth daily With breakfast  Qty: 90 tablet, Refills: 0      aspirin 81 MG tablet Take 81 mg by mouth daily      atorvastatin (LIPITOR) 80 MG tablet Take 1 tablet by mouth daily  Qty: 90 tablet, Refills: 0      Multiple Vitamin (MULTI VITAMIN PO) Take 1 tablet by mouth daily      sertraline (ZOLOFT) 100 MG tablet Take 1 tablet by mouth daily  Qty: 90 tablet, Refills: 3    Associated Diagnoses: Major depressive disorder, single episode, in full remission (HCC)      ticagrelor (BRILINTA) 90 MG TABS tablet Take 1 tablet by mouth 2 times daily  Qty: 180 tablet, Refills: 3      nitroGLYCERIN (NITROSTAT) 0.4 MG SL tablet Place 1 tablet under the tongue every 5 minutes as needed for Chest pain up to max of 3 total doses. If no relief after 1 dose, call 911. Qty: 25 tablet, Refills: 3             ALLERGIES     is allergic to pcn [penicillins]; tetanus toxoids; devon-1 [lidocaine hcl]; and pletal [cilostazol]. FAMILY HISTORY     indicated that his mother is . He indicated that his father is . He indicated that his sister is alive. family history includes Cancer in his father and sister; Heart Disease in his father; Other in his mother. SOCIAL HISTORY      reports that he has never smoked. He has never used smokeless tobacco. He reports that he drinks about 1.8 oz of alcohol per week . He reports that he does not use drugs. PHYSICAL EXAM     INITIAL VITALS:  height is 5' 4\" (1.626 m) and weight is 160 lb (72.6 kg). His oral temperature is 97.4 °F (36.3 °C). His blood pressure is 148/76 (abnormal) and his pulse is 70. His respiration is 12 and oxygen saturation is 96%. Physical Exam   Constitutional: He is oriented to person, place, and time. He appears well-developed and well-nourished. HENT:   Head: Normocephalic and atraumatic. Right Ear: External ear normal.   Left Ear: External ear normal.   Eyes: Conjunctivae are normal. Right eye exhibits no discharge. Left eye exhibits no discharge. No scleral icterus. Neck: Normal range of motion. Neck supple. No JVD present. Cardiovascular: Normal rate, regular rhythm and normal heart sounds. Pulmonary/Chest: Effort normal and breath sounds normal. No stridor. No respiratory distress. Abdominal: Soft. He exhibits no distension. Musculoskeletal: Normal range of motion.  He exhibits no edema. Neurological: He is alert and oriented to person, place, and time. He displays tremor. He exhibits normal muscle tone. GCS eye subscore is 4. GCS verbal subscore is 5. GCS motor subscore is 6. Skin: Skin is warm and dry. He is not diaphoretic. No erythema. Psychiatric: He has a normal mood and affect. His behavior is normal.   Nursing note and vitals reviewed. DIFFERENTIAL DIAGNOSIS:   Differential including, but not limited to ACS, PE, aortic dissection, pneumonia, bronchitis, costochondritis. DIAGNOSTIC RESULTS     EKG: All EKG's are interpreted by the Emergency Department Physician who either signs or Co-signs this chart in the absence of a cardiologist.  Sinus rhythm. No STEMI. RADIOLOGY: non-plain film images(s) such as CT, Ultrasound and MRI are read by the radiologist.    CTA Chest W WO Contrast   Final Result   1. No acute intrathoracic abnormalities. No CT evidence of pulmonary embolism. 2. Bilateral lower lobe pulmonary nodules are demonstrated. The left lower lobe pulmonary nodule appears new from the prior examination. However the right lower lobe pulmonary nodule is unchanged. In the absence of a known primary malignancy, recommend 3    month follow-up CT evaluation to document stability. **This report has been created using voice recognition software. It may contain minor errors which are inherent in voice recognition technology. **          Final report electronically signed by Dr. Roxanne Quinn on 12/22/2017 3:53 PM      CT HEAD WO CONTRAST   Final Result   1. No acute intracranial hemorrhage or mass effect. Chronic changes as above. **This report has been created using voice recognition software. It may contain minor errors which are inherent in voice recognition technology. **      Final report electronically signed by Dr. Roxanne Quinn on 12/22/2017 3:43 PM      XR CHEST STANDARD (2 VW)   Final Result   1. Poor inflation lungs.  Kyphotic positioning of negative. 4:15 PM: Dr. Jensen Jameson (Internal Medicine) was consulted about the patient's condition. He graciously agreed to admit the patient. She was seen and evaluated in the emergency for chest pain. Patient has history of CABG. Which was the bending in last year. CABG by 4 per cardiology note. Patient falls up with cardiology as an outpatient. Presented with chest pain. EKG normal, compared to previous EKG which did not show any STEMI. Troponin negative by one. Heart score 4.discussed case with hospitalist on call accepted patient for admission. CRITICAL CARE:   None     CONSULTS:  Dr. Martha Acevedo (Internal Medicine)  Dr. Jensen Jameson (Internal Medicine)    PROCEDURES:  None    FINAL IMPRESSION      1. Chest pain, unspecified type          DISPOSITION/PLAN   Admit    PATIENT REFERRED TO:  Shani Pinedo MD  [place] [address]          Shani Pinedo MD  [facility]  [phone]            DISCHARGE MEDICATIONS:  Current Discharge Medication List          (Please note that portions of this note were completed with a voice recognition program.  Efforts were made to edit the dictations but occasionally words are mis-transcribed.)    Scribe:  Signed by: Richard Mansfield, 12/22/17 2:40 PM Scribing for and in the presence of Vern Malik MD    Provider:  I personally performed the services described in the documentation, reviewed and edited the documentation which was dictated to the scribe in my presence, and it accurately records my words and actions.     Vern Malik MD 12/22/17 10:23 PM                      Vern Malik MD  12/22/17 1781

## 2017-12-22 NOTE — ED NOTES
Patient to room 18 per intake c/o left sided chest burning radiating to left arm, c/o left arm numbness and around mouth. patient shaking in pain, respirations easy and unlabored. Wife states patient was confused yesterday around noon and patient c/o blurred vision. Wife states it comes and goes, has been fine today. Extensive heart history, on cardiac monitor. Denies any nausea vomiting. C/o SOB for the past week.       Dinorah Ferrer RN  12/22/17 9117

## 2017-12-22 NOTE — H&P
History & Physical        Patient:  Key Du  YOB: 1945    MRN: [id number]     Acct: [de-identified]    PCP: Cate Al MD    Date of Admission: 12/22/2017    Date of Service: Pt seen/examined on 12/22/17 and Admitted to Inpatient with expected LOS greater than two midnights due to medical therapy. Placed in Observation. Chief Complaint:  Mid chest pain. History Of Present Illness:      74 y.o. male who has known coronary artery disease diagnosed in 1999 when he presented with chest pain, subsequently undergoing single vessel coronary artery bypass grafting. Last December 2016 patient suffered myocardial infarction (MI), and underwent reoperation, 4 vessels being bypassed. In February of this year patient develop symptoms and required further evaluation which revealed occlusion of the recently bypassed vessels and it seems a singular stent was placed. Yesterday, patient developed a burning chest pain which subsequently resolved. The pain returned this morning so patient came to the emergency department. The pain was similar to what he experienced when he had the myocardial infarction last December 2016. Patient's vital signs were stable in the emergency department but blood pressure was elevated 146/96. His oxygen saturation was normal as well. All other labs were also normal, including serum troponin. Chest x-ray revealed bilateral lower lobe pulmonary nodules. One of these nodules was considered a new finding. In the emergency department patient appeared anxious, and complained of being short of breath, experiencing some intermittent blurry vision, and some tremor. These symptoms subsequently resolved. Patient endorses a history of multiple TIAs. He is admitted to further trend the serum troponin. Cardiology, Dr. Thais Shah, has been consulted.     Past Medical History:          Diagnosis Date    Alcohol abuse     6-10 beers per day    Back pain  CAD (coronary artery disease)     Cancer [facility] prostate ([facility]     Cerebral artery occlusion with cerebral infarction ([facility]     History of blood transfusion     Hyperlipidemia     Hypertension     [facility] 12/11/2014    Other disorders of kidney and ureter in diseases classified elsewhere     S/P CABG x 4 12/14/2016    S/P CABG x 4 1999    Simple chronic bronchitis ([facility] 10/12/2016    TIA (transient ischemic attack)     Uncomplicated alcohol dependence ([facility] 7/5/2016       Past Surgical History:          Procedure Laterality Date    APPENDECTOMY      ARM SURGERY      CARDIAC SURGERY      CORONARY ARTERY BYPASS GRAFT  12/14/2016    Redo of prior CABG, Dr. Nikolas Mayfield.  DIAGNOSTIC CARDIAC CATH LAB PROCEDURE      FRACTURE SURGERY      PROSTATE SURGERY         Medications Prior to Admission:      Prior to Admission medications    Medication Sig Start Date End Date Taking?  Authorizing Provider   lisinopril (PRINIVIL;ZESTRIL) 2.5 MG tablet TAKE 1 TABLET EVERY DAY 7/3/17  Yes Edi Talavera MD   furosemide (LASIX) 40 MG tablet Take 40 mg by mouth daily   Yes Historical Provider, MD   metoprolol tartrate (LOPRESSOR) 25 MG tablet Take 1 tablet by mouth 2 times daily 4/25/17  Yes Edi Talavera MD   pantoprazole (PROTONIX) 40 MG tablet Take 1 tablet by mouth daily With breakfast 4/10/17  Yes Edi Talavera MD   aspirin 81 MG tablet Take 81 mg by mouth daily   Yes Historical Provider, MD   atorvastatin (LIPITOR) 80 MG tablet Take 1 tablet by mouth daily 3/6/17  Yes Edi Talavera MD   Multiple Vitamin (MULTI VITAMIN PO) Take 1 tablet by mouth daily   Yes Historical Provider, MD   sertraline (ZOLOFT) 100 MG tablet Take 1 tablet by mouth daily 1/4/16  Yes Kelli Quispe MD   ticagrelor (BRILINTA) 90 MG TABS tablet Take 1 tablet by mouth 2 times daily 4/4/17 12/11/17  Edi Talavera MD   nitroGLYCERIN (NITROSTAT) 0.4 MG SL tablet Place 1 tablet under the tongue every 5 minutes as needed for Chest pain up to max of 3 total doses. If no relief after 1 dose, call 911. 2/18/17   Yessica Luna MD       Allergies:  Pcn [penicillins]; Tetanus toxoids; Franki-1 [lidocaine hcl]; and Pletal [cilostazol]    Social History:      The patient currently lives at home. TOBACCO:   reports that he has never smoked. He has never used smokeless tobacco.  ETOH:   reports that he drinks about 1.8 oz of alcohol per week . Family History:      Positive as follows:        Problem Relation Age of Onset    Other Mother      artery disease    Heart Disease Father     Cancer Father     Cancer Sister      breast         Diet:  DIET GENERAL; No Added Salt (3-4 GM)    REVIEW OF SYSTEMS:   Pertinent positives as noted in the HPI. All other systems reviewed and negative. PHYSICAL EXAM:    BP (!) 148/76   Pulse 70   Temp 97.4 °F (36.3 °C) (Oral)   Resp 12   Ht 5' 4\" (1.626 m)   Wt 160 lb (72.6 kg)   SpO2 96%   BMI 27.46 kg/m²     General appearance:  No apparent distress, appears stated age and cooperative. HEENT:  Normal cephalic, atraumatic without obvious deformity. Pupils equal, round, and reactive to light. Extra ocular muscles intact. Conjunctivae/corneas clear. Neck: Supple, with full range of motion. No jugular venous distention. Trachea midline. Respiratory:  Normal respiratory effort. Clear to auscultation, bilaterally without Rales/Wheezes/Rhonchi. Cardiovascular:  Regular rate and rhythm with normal S1/S2 without murmurs, rubs or gallops. Healed median sternotomy scar. Abdomen: Soft, non-tender, non-distended with normal bowel sounds. Musculoskeletal:  No clubbing, cyanosis or edema bilaterally. Full range of motion without deformity. Skin: Skin color, texture, turgor normal.  No rashes or lesions. Neurologic:  Neurovascularly intact without any focal sensory/motor deficits.  Cranial nerves: II-XII intact, grossly non-focal.  Psychiatric:  Alert and Poor inflation lungs. Kyphotic positioning of the patient. Normal heart size. Metallic sternotomy sutures. 2. No acute infiltrates or effusions are seen. . Minimal fibrotic stranding left midlung. 3. There are a few old rib fractures on the left side which have been present since at least February 2017. **This report has been created using voice recognition software. It may contain minor errors which are inherent in voice recognition technology. **      Final report electronically signed by Dr. 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 He is admitted to trend his troponins, and undergo further evaluation by cardiology. Patient reporting symptoms suggestive of TIA, which have since resolved we will also consult neurology. Thank you Edmond Jimenez MD for the opportunity to be involved in this patient's care.     Electronically signed by Hannah Herrera MD on 12/22/2017 at 11:16 PM

## 2017-12-23 ENCOUNTER — APPOINTMENT (OUTPATIENT)
Dept: MRI IMAGING | Age: 72
End: 2017-12-23
Payer: MEDICARE

## 2017-12-23 VITALS
HEART RATE: 71 BPM | SYSTOLIC BLOOD PRESSURE: 132 MMHG | DIASTOLIC BLOOD PRESSURE: 64 MMHG | BODY MASS INDEX: 27.31 KG/M2 | TEMPERATURE: 98.2 F | WEIGHT: 160 LBS | HEIGHT: 64 IN | OXYGEN SATURATION: 96 % | RESPIRATION RATE: 16 BRPM

## 2017-12-23 LAB — TROPONIN T: < 0.01 NG/ML

## 2017-12-23 PROCEDURE — 99220 PR INITIAL OBSERVATION CARE/DAY 70 MINUTES: CPT | Performed by: PSYCHIATRY & NEUROLOGY

## 2017-12-23 PROCEDURE — 2580000003 HC RX 258: Performed by: INTERNAL MEDICINE

## 2017-12-23 PROCEDURE — G0378 HOSPITAL OBSERVATION PER HR: HCPCS

## 2017-12-23 PROCEDURE — 93225 XTRNL ECG REC<48 HRS REC: CPT

## 2017-12-23 PROCEDURE — 70551 MRI BRAIN STEM W/O DYE: CPT

## 2017-12-23 PROCEDURE — 99217 PR OBSERVATION CARE DISCHARGE MANAGEMENT: CPT | Performed by: FAMILY MEDICINE

## 2017-12-23 PROCEDURE — 99222 1ST HOSP IP/OBS MODERATE 55: CPT | Performed by: INTERNAL MEDICINE

## 2017-12-23 PROCEDURE — 93226 XTRNL ECG REC<48 HR SCAN A/R: CPT

## 2017-12-23 PROCEDURE — 6370000000 HC RX 637 (ALT 250 FOR IP): Performed by: INTERNAL MEDICINE

## 2017-12-23 RX ADMIN — FUROSEMIDE 40 MG: 40 TABLET ORAL at 09:19

## 2017-12-23 RX ADMIN — PANTOPRAZOLE SODIUM 40 MG: 40 TABLET, DELAYED RELEASE ORAL at 09:20

## 2017-12-23 RX ADMIN — Medication 10 ML: at 09:22

## 2017-12-23 RX ADMIN — ASPIRIN 81 MG: 81 TABLET ORAL at 09:17

## 2017-12-23 RX ADMIN — TICAGRELOR 90 MG: 90 TABLET ORAL at 09:21

## 2017-12-23 RX ADMIN — LISINOPRIL 2.5 MG: 2.5 TABLET ORAL at 09:18

## 2017-12-23 RX ADMIN — METOPROLOL TARTRATE 25 MG: 25 TABLET, FILM COATED ORAL at 09:20

## 2017-12-23 RX ADMIN — SERTRALINE 100 MG: 100 TABLET, FILM COATED ORAL at 09:21

## 2017-12-23 NOTE — PLAN OF CARE
Problem: Discharge Planning:  Goal: Discharged to appropriate level of care  Discharged to appropriate level of care  Outcome: Ongoing  Patient plans to return home at discharge. Problem: Tissue Perfusion - Cardiopulmonary, Altered:  Goal: Absence of angina  Absence of angina  Outcome: Ongoing  Monitoring for signs of chest pain. PRN medications given as ordered. JUAN CARLOS used as needed. Goal: Hemodynamic stability will improve  Hemodynamic stability will improve  Outcome: Ongoing  Vitals within normal limits and assessed frequently. Comments: Care plan reviewed with patient. Patient verbalize understanding of the plan of care and contribute to goal setting.

## 2017-12-23 NOTE — PROGRESS NOTES
Dr Ozzy Andino called and stated that the patient can be discharged from his stand point. He would like him to have an EEG as outpatient and have a follow up appointment. He would also like the patient to not drive until cleared. Dr Katelynn Amaya notified at [phone].

## 2017-12-23 NOTE — DISCHARGE SUMMARY
symptoms. He has history of TIA in the past. He is on antiplatelets. He denied any speech difficulty, no numbness, no balance trouble, no falls, no hemiparesis with the symptoms. CT of the head was performed through the emergency department that showed no evidence of a bleed, or subacute infarct. \"     MRI was done that did not show any acute process. Neurology was consulted and reviewed CT and MRI head. Dr. Saeed Choudhary recommended outpt EEG and no driving at d/c until he follows up in 2 weeks with EEG. Cardiology was also consulted with chest pain that was concerning for angina as he has hx of CABG x 2 with last being failed CABG. He had last heart cath 2/2017 as follows:   CABG -failed 2 times. RCA -   SVG to RCA is patent  LAD - 90% diffuse multi focal ds from proximal to distal segments. LIMA to LAD -   High risk PCI to LAD today. S/P successful PCI of the ostial to distal Left Anterior Descending Coronary Artery with 3.5-15 mm KAILASH, 3.5-38 mm  KAILASH and 3.5-38 mm drug eluting stent, post dilated from 3.5 to 3.7 mm. Dr. Sergio Tran consulted and recommended pt undergo a stress test - per pt and wife states \"it will kill him\" - they state he cannot do the exercise test and that he had a reaction to \"the medication that reverses the chemical kind. \"  His troponins were (-). CTA of chest was (-) for PE. There was a new small 5 mm lung nodule in LLL and stable 5 mm nodule RLL. Instructed pt to f/u with PCP for further monitoring. He was ambulating ruff, yonny po, no further abn chest pain, no further tremor. Thus he was cleared by d/c by cardiology and neurology with outpt EEG. A 48 hr holter was placed also at d/c. He was HD stable and back to baseline thus d/c home in stable condition with close follow up with Dr. Saeed Choudhary and Dr. Philip Javed. Pt instructed on no driving until f/u with neurology.       Discharge Medications:   Russell Arriaga Medication Instructions [facility]:[id number]    Printed on: 12/23/17 1608   Medication Information                      aspirin 81 MG tablet  Take 81 mg by mouth daily             atorvastatin (LIPITOR) 80 MG tablet  Take 1 tablet by mouth daily             furosemide (LASIX) 40 MG tablet  Take 40 mg by mouth daily             lisinopril (PRINIVIL;ZESTRIL) 2.5 MG tablet  TAKE 1 TABLET EVERY DAY             metoprolol tartrate (LOPRESSOR) 25 MG tablet  Take 1 tablet by mouth 2 times daily             Multiple Vitamin (MULTI VITAMIN PO)  Take 1 tablet by mouth daily             nitroGLYCERIN (NITROSTAT) 0.4 MG SL tablet  Place 1 tablet under the tongue every 5 minutes as needed for Chest pain up to max of 3 total doses. If no relief after 1 dose, call 911.              pantoprazole (PROTONIX) 40 MG tablet  Take 1 tablet by mouth daily With breakfast             sertraline (ZOLOFT) 100 MG tablet  Take 1 tablet by mouth daily             ticagrelor (BRILINTA) 90 MG TABS tablet  Take 1 tablet by mouth 2 times daily                 Patient Instructions:    Discharge lab work: none -- 48 hr holter and EEG as outpt  Activity: activity as tolerated and no driving until f/u with Dr. Denisha Hopson: DIET GENERAL; No Added Salt (3-4 GM)    Code Status: Full Code    Follow-up visits:   Vira Metzger MD  [address]  [phone]  -- 1 week    Dr. Maria Fernanda Blanco per his recs    Dr. Dipika Mitchell 2-3 weeks       Procedures: none    Consults:   IP CONSULT TO NEUROLOGY  IP CONSULT TO CARDIOLOGY      Examination:  Vitals:  Vitals:    12/23/17 0000 12/23/17 0430 12/23/17 0915 12/23/17 1143   BP: 116/68 130/66 (!) 156/79 114/62   Pulse: 66 67 72 67   Resp: 12 12 16 16   Temp: 98 °F (36.7 °C) 98.6 °F (37 °C) 98 °F (36.7 °C) 98.5 °F (36.9 °C)   TempSrc: Oral Oral Oral Oral   SpO2: 95% 96% 97% 95%   Weight:       Height:         Weight: Weight: 160 lb (72.6 kg)     24 hour intake/output:  Intake/Output Summary (Last 24 hours) at 12/23/17 1608  Last data filed at 12/23/17

## 2017-12-23 NOTE — FLOWSHEET NOTE
12/23/17 0902   Provider Notification   Reason for Communication New orders   Provider Name Dr Fausto Pena   Provider Notification Physician   Method of Communication Face to face   Response See orders   Notification Time 0902     Dr Fausto Pena discontinued Stress Test due to patient refusing.

## 2017-12-23 NOTE — CONSULTS
MAGNESIA) 400 MG/5ML suspension 30 mL Daily PRN   ondansetron (ZOFRAN) injection 4 mg Q6H PRN        Social History:  History   Smoking Status    Never Smoker   Smokeless Tobacco    Never Used     History   Alcohol Use    1.8 oz/week    3 Cans of beer per week     Comment: 3 cans per day     History   Drug Use No         Family History:       Problem Relation Age of Onset    Other Mother      artery disease    Heart Disease Father     Cancer Father     Cancer Sister      breast       Review of Systems:  All systems reviewed are negative except what is mentioned in history of present illness. Physical Exam:  /62   Pulse 67   Temp 98.5 °F (36.9 °C) (Oral)   Resp 16   Ht 5' 4\" (1.626 m)   Wt 160 lb (72.6 kg)   SpO2 95%   BMI 27.46 kg/m²  I Body mass index is 27.46 kg/m². I Wt Readings from Last 1 Encounters:   12/22/17 160 lb (72.6 kg)           General appearance - alert, well appearing, and in no distress, oriented to person, place, and time and normal weight, he is sitting at the side of bed watching TV. There is no injury noted on exam.   Mental status -Level of Alertness: awake  Orientation: person, place, time  Memory: normal  Fund of Knowledge: normal  Attention/Concentration: normal  Language: normal. Mood is normal  Neck - supple, no significant adenopathy, carotids upstroke normal bilaterally, no carotid bruits. There is no LAP in the neck. There is no thyroid enlargement.    Neurological -   Cranial Hkjwte-BK-VKO:   Cranial nerve II: Normal. There is full visual fields  Cranial nerve III: Pupils: equal, round, reactive to light   Cranial nerves III, IV, VI: Extraocular Movements: intact   Cranial nerve V: Facial sensation: intact   Cranial nerve VII:Facial strength: intact   Cranial nerve VIII: Hearing: intact   Cranial nerve IX: Palate Elevation intact bilaterally  Cranial nerve XI: Shoulder shrug intact bilaterally  Cranial nerve XII: Tongue midline   neck supple without rigidity  DTR's are decreased distal and symmetric  Babinski sign is negative on bilaterally. Motor exam is 5/5 in the upper and lower extremities. Normal muscle tone . There is no muscle atrophy. There is no muscle fasciculation . Drift  none. Sensory is intact forlight touch and cortical sensation. Coordination: finger to nose intact. Gait and station intact. Abnormal movement none. vibration normal, proprioception normal   Skin - no rashes or lesions  Superficial temporal artery pulses are normal.   Musculoskeletal: Has no hand arthritis, no limitation of ROM in any of the four extremities. no joint tenderness, deformity or swelling. There is no leg edema. The Heart was regular in rate and rhythm. No heart murmur  Chest- Clear  Abdomen: Soft, Intact bowel sounds. Labs:    CBC: Recent Labs      12/22/17   1430   WBC  8.0   HGB  14.3   PLT  269   MCV  91.3   MCH  31.0   MCHC  33.9   RDW  14.2     CMP:  Recent Labs      12/22/17   1430   NA  138   K  3.5   CL  96*   CO2  25   BUN  13   CREATININE  0.9   LABGLOM  82*   GLUCOSE  143*   CALCIUM  9.8     Liver: Recent Labs      12/22/17   1430   AST  23   ALT  22   ALKPHOS  111   PROT  7.9   LABALBU  5.0   BILITOT  0.7     MRI BRAIN:  12/23/2017, I reviewed the MRI brain and agree with interpretation. Impression        Senescent changes are present without acute intracranial abnormality identified. There is chronic mucosal thickening within the paranasal sinuses which appears slightly worse compared to the prior exam.             CT Head: 12/22/2017:   I reviewed the CT Brain and agree with interpretation. FINDINGS:         No acute intracranial hemorrhage or mass effect is seen. There is no midline shift.        There is normal differentiation of the gray-white matter junction. The basal cisterns appear within normal limits. The posterior fossa appears normal. No extra-axial fluid collections are seen.  The visualized globes and orbits appear grossly intact. No    abnormalities of the calvarium are identified.        There is moderate mucosal thickening within the right maxillary sinus along with moderate mucosal thickening within the ethmoid sinus region. There is also partial opacification of the left sphenoid cellule due to moderate thecal sac thickening. The    mastoid air cells appear clear. Parasellar carotid and vertebral artery calcification is noted. There is mild diffuse atrophy.           Impression   1. No acute intracranial hemorrhage or mass effect. Chronic changes as above.                 We reviewed the patient records and available information in the EHR       Impression:    Active Problems:    Hypertension    TIA (transient ischemic attack)    Mixed hyperlipidemia    S/P CABG (coronary artery bypass graft)    History of CVA (cerebrovascular accident)    Failed coronary artery bypass graft    Chest pain at rest    This is a 75-year-old male who presents with episode of syncope, confusion, tremor on the left side, headache. Symptoms were transient and resolved, there is no injury noted on exam.  His exam is nonfocal.  His mental status is intact currently. The patient reports chest pain, at onset of symptoms. He denies any anxiety. He does report feeling similar to this at the time when he had his heart attack 1 year ago. He denies any history of seizure, head trauma, or substance abuse history. His cardiac workup is pending with the internal medicine/cardiology services. Patient will need to have workup from neurologic standpoint include MRI brain, EEG, carotid ultrasound, he may also benefit from a tilt table test, or Holter monitor. Depending on his cardiac workup that is in progress. Case was discussed with the primary service, recommendations are as follows:    Recommendations:                                              Antiplatelets. MRI brain without contrast with diffusion sequence reviewed.   Fasting lipid

## 2017-12-23 NOTE — PROGRESS NOTES
MRI screen completed. Called MRI and spoke with Melania Jorgensen, notified of answers to MRI screen. Melania Jorgensen stated she will send for patient.

## 2017-12-23 NOTE — PROGRESS NOTES
Call placed to central scheduling and message left to call patient to schedule the EEG prior to his appointment with Dr Salima Reynaga in approximately 2 weeks. Order faxed to central scheduling.

## 2017-12-23 NOTE — CONSULTS
[facility]  Cardiology Consult      Patient:  Heidi Srinivasan  YOB: 1945    MRN: [id number]   Acct: [de-identified]     Primary Care Physician: Jj Bay MD        REASON FOR CONSULT: CP      CHIEF COMPLAINT:  Mid chest pain. Redo CABG 12/2016 . \"Failed\" PCI 2/2017 . Came in for 'confusion' which has resolved - h/o CVA. All labs, EKG's, diagnostic testing and images as well as cardiac cath, stress testing   were reviewed during this encounter      PREVIOUS CARDIAC TESTING    Ekg:     Echo:    Str. Test:    Cath:      Past Medical History:    Past Medical History:   Diagnosis Date    Alcohol abuse     6-10 beers per day    Back pain     CAD (coronary artery disease)     Cancer of prostate ([address]     Cerebral artery occlusion with cerebral infarction ([address]     History of blood transfusion     Hyperlipidemia     Hypertension     Major depression in remission ([address] 12/11/2014    Other disorders of kidney and ureter in diseases classified elsewhere     S/P CABG x 4 12/14/2016    S/P CABG x 4 1999    Simple chronic bronchitis ([address] 10/12/2016    TIA (transient ischemic attack)     Uncomplicated alcohol dependence ([address] 7/5/2016       Past Surgical History:    Past Surgical History:   Procedure Laterality Date    APPENDECTOMY      ARM SURGERY      CARDIAC SURGERY      CORONARY ARTERY BYPASS GRAFT  12/14/2016    Redo of prior CABG, Dr. Julia Charles.     DIAGNOSTIC CARDIAC CATH LAB PROCEDURE      FRACTURE SURGERY      PROSTATE SURGERY         Medications Prior to Admission:    Prescriptions Prior to Admission: lisinopril (PRINIVIL;ZESTRIL) 2.5 MG tablet, TAKE 1 TABLET EVERY DAY  furosemide (LASIX) 40 MG tablet, Take 40 mg by mouth daily  metoprolol tartrate (LOPRESSOR) 25 MG tablet, Take 1 tablet by mouth 2 times daily  pantoprazole (PROTONIX) 40 MG tablet, Take 1 tablet by mouth daily With breakfast  aspirin 81 MG tablet, Take 81 mg by mouth daily  atorvastatin (LIPITOR) 80 MG tablet, Take 1 tablet by mouth daily  Multiple Vitamin (MULTI VITAMIN PO), Take 1 tablet by mouth daily  sertraline (ZOLOFT) 100 MG tablet, Take 1 tablet by mouth daily  ticagrelor (BRILINTA) 90 MG TABS tablet, Take 1 tablet by mouth 2 times daily  nitroGLYCERIN (NITROSTAT) 0.4 MG SL tablet, Place 1 tablet under the tongue every 5 minutes as needed for Chest pain up to max of 3 total doses. If no relief after 1 dose, call 911. Allergies:    Pcn [penicillins]; Tetanus toxoids; Franki-1 [lidocaine hcl]; and Pletal [cilostazol]    Social History:    reports that he has never smoked. He has never used smokeless tobacco. He reports that he drinks about 1.8 oz of alcohol per week . He reports that he does not use drugs. Family History:   family history includes Cancer in his father and sister; Heart Disease in his father; Other in his mother. REVIEW OF SYSTEMS:    Constitutional: negative for anorexia, chills and fevers,weight change  Respiratory: negative for cough, dyspnea on exertion, hemoptysis, shortness of breath and wheezing  Cardiovascular: negative for chest pain, orthopnea, palpitations and syncope  Gastrointestinal: negative for abdominal pain,nausea , vomiting, constipation, diarrhea.   Hematologic/lymphatic: negative for bruising,prolonged bleeding,blood clots  Musculoskeletal:negative for muscle weakness, myalgias,wasting  Neurological: negative for coordination problems, dizziness, gait problems and vertigo  Behavioral/Psych:negative for mood/sleep disturbance      PHYSICAL EXAM:  Vitals:Patient Vitals for the past 24 hrs:   BP Temp Temp src Pulse Resp SpO2 Height Weight   12/23/17 0430 130/66 98.6 °F (37 °C) Oral 67 12 96 % - -   12/23/17 0000 116/68 98 °F (36.7 °C) Oral 66 12 95 % - -   12/22/17 2045 (!) 148/76 97.4 °F (36.3 °C) Oral 70 12 96 % - -   12/22/17 1753 (!) 149/71 98.2 °F (36.8 °C) Oral 74 18 98 % - 160 lb (72.6 kg)   12/22/17 1711 138/67 - - 64 17 100 Component Value Date     12/22/2017    K 3.5 12/22/2017    CL 96 12/22/2017    CO2 25 12/22/2017    BUN 13 12/22/2017    LABALBU 5.0 12/22/2017    CREATININE 0.9 12/22/2017    CALCIUM 9.8 12/22/2017    LABGLOM 82 12/22/2017    GLUCOSE 143 12/22/2017    GLUCOSE 105 07/25/2011     Hepatic Function Panel:  Lab Results   Component Value Date    ALKPHOS 111 12/22/2017    ALT 22 12/22/2017    AST 23 12/22/2017    PROT 7.9 12/22/2017    BILITOT 0.7 12/22/2017    BILIDIR <0.2 12/22/2017    LABALBU 5.0 12/22/2017     Magnesium:    Lab Results   Component Value Date    MG 2.0 12/22/2017     Warfarin PT/INR:  No components found for: PTPATWAR, PTINRWAR  HgBA1c:    Lab Results   Component Value Date    LABA1C 5.4 12/13/2016     FLP:  Lab Results   Component Value Date    TRIG 283 02/15/2017    HDL 45 02/15/2017    LDLCALC 81 02/15/2017     TSH:    Lab Results   Component Value Date    TSH 1.590 12/11/2014     BNP: No results found for: BNP      ASSESSMENT/PLAN:  1. \" Failed\" PCI  - 2/2017 - DECLINED stress test. NOT MI now. 2. CVA - adm. For confusion - pt.  Wants discharge      Alla Rivera MD FACRAHEEM,KOBE,FSVM,FSCAI,FASNC,BILLY,FACP.  8:44 AM  12/23/2017

## 2017-12-24 NOTE — PROGRESS NOTES
Discharge teaching and instructions for diagnosis/procedure of hypertension completed with patient using teachback method. AVS reviewed. Printed prescriptions given to patient. Patient voiced understanding regarding prescriptions, follow up appointments, and care of self at home.  Discharged ambulatory to  home with support per family

## 2018-01-09 ENCOUNTER — HOSPITAL ENCOUNTER (OUTPATIENT)
Dept: NEUROLOGY | Age: 73
Discharge: HOME | End: 2018-01-09
Payer: MEDICARE

## 2018-01-09 PROCEDURE — 95819 EEG AWAKE AND ASLEEP: CPT

## 2018-01-09 NOTE — PROGRESS NOTES
[facility] Laboratory Technician worksheet       EEG Date: 2018    Name: Mahamed Haji   :    Age: 74 y.o. SEX: male    Room: op    MRN: [id number]     CSN: [id number]    Ordering Provider: Andrey Rosales  EEG Number: [id number] Time of Test:  4220    Hand: Right   Sedation: No    H.V. Done: No NOT DONE Photic: Yes    Sleep: No   Drowsy: Yes   Sleep Deprived: No    Seizures observed: no    Technician impression:2    Mentality: alert       Clinical History: CONFUSION EPISODE LESS THEN 24 HOURS    Past Medical History:       Diagnosis Date    Alcohol abuse     6-10 beers per day    Back pain     CAD (coronary artery disease)     [facility]     Cerebral artery occlusion with cerebral infarction ([facility]     History of blood transfusion     Hyperlipidemia     Hypertension     Major depression in remission ([facility] 2014    Other disorders of kidney and ureter in diseases classified elsewhere     S/P CABG x 4 2016    S/P CABG x 4     Simple chronic bronchitis ([facility] 10/12/2016    TIA (transient ischemic attack)     Uncomplicated alcohol dependence ([facility] 2016         Prior to Admission medications    Medication Sig Start Date End Date Taking?  Authorizing Provider   lisinopril (PRINIVIL;ZESTRIL) 2.5 MG tablet TAKE 1 TABLET EVERY DAY 7/3/17   Betty Regalado MD   furosemide (LASIX) 40 MG tablet Take 40 mg by mouth daily    Historical Provider, MD   metoprolol tartrate (LOPRESSOR) 25 MG tablet Take 1 tablet by mouth 2 times daily 17   Martha Garcia MD   pantoprazole (PROTONIX) 40 MG tablet Take 1 tablet by mouth daily With breakfast 4/10/17   Martha Garcia MD   ticagrelor (BRILINTA) 90 MG TABS tablet Take 1 tablet by mouth 2 times daily 17  Martha Garcia MD   aspirin 81 MG tablet Take 81 mg by mouth daily    Historical Provider, MD   atorvastatin (LIPITOR) 80 MG tablet Take 1 tablet by mouth daily

## 2018-01-10 NOTE — PROCEDURES
[address] [place] [address]                            ELECTROENCEPHALOGRAM REPORT    PATIENT NAME: Topher Oneill                   :        1945  MED REC NO:   [id number]                           ROOM:  ACCOUNT NO:   [de-identified]                           ADMIT DATE: 2018  PROVIDER:     Dayron Bullock. Bob Herzog MD    DATE OF EE2018    REFERRING PROVIDER:  Jodi Laguerre M.D. CLINICAL HISTORY:  This is a 68-year-old male with confusion episodes. CLINICAL INTERPRETATION:  This is a 17-channel EEG performed without sleep  deprivation. Hyperventilation was not performed. Photic stimulation was  performed. The patient is described as alert. The background rhythm activity is noted to be 9 Hz in the posterior  parietal area, symmetric, attenuates with eye opening. The patient felt to  be drowsy during parts of recording. Hyperventilation was not performed. Photic stimulation was performed without abnormality. IMPRESSION:  This is a normal EEG. There was no evidence of epileptiform  activity appreciated.         Jenniffer Cortes MD    D: 01/10/2018 17:54:47       T: 01/10/2018 17:55:31     [email or web address]  Job#: [id number]     Doc#: [id number]    CC:

## 2018-01-22 RX ORDER — LISINOPRIL 2.5 MG/1
TABLET ORAL
Qty: 90 TABLET | Refills: 1 | Status: SHIPPED | OUTPATIENT
Start: 2018-01-22 | End: 2018-07-31 | Stop reason: SDUPTHER

## 2018-03-14 ENCOUNTER — OFFICE VISIT (OUTPATIENT)
Dept: NEUROLOGY | Age: 73
End: 2018-03-14
Payer: MEDICARE

## 2018-03-14 VITALS
HEIGHT: 64 IN | BODY MASS INDEX: 27.1 KG/M2 | HEART RATE: 62 BPM | SYSTOLIC BLOOD PRESSURE: 130 MMHG | WEIGHT: 158.73 LBS | DIASTOLIC BLOOD PRESSURE: 80 MMHG

## 2018-03-14 DIAGNOSIS — R41.0 EPISODE OF CONFUSION: Primary | ICD-10-CM

## 2018-03-14 DIAGNOSIS — R25.1 TREMOR OF LEFT HAND: ICD-10-CM

## 2018-03-14 PROCEDURE — 1123F ACP DISCUSS/DSCN MKR DOCD: CPT | Performed by: PSYCHIATRY & NEUROLOGY

## 2018-03-14 PROCEDURE — G8598 ASA/ANTIPLAT THER USED: HCPCS | Performed by: PSYCHIATRY & NEUROLOGY

## 2018-03-14 PROCEDURE — G8427 DOCREV CUR MEDS BY ELIG CLIN: HCPCS | Performed by: PSYCHIATRY & NEUROLOGY

## 2018-03-14 PROCEDURE — G8417 CALC BMI ABV UP PARAM F/U: HCPCS | Performed by: PSYCHIATRY & NEUROLOGY

## 2018-03-14 PROCEDURE — 99213 OFFICE O/P EST LOW 20 MIN: CPT | Performed by: PSYCHIATRY & NEUROLOGY

## 2018-03-14 PROCEDURE — 3017F COLORECTAL CA SCREEN DOC REV: CPT | Performed by: PSYCHIATRY & NEUROLOGY

## 2018-03-14 PROCEDURE — G8484 FLU IMMUNIZE NO ADMIN: HCPCS | Performed by: PSYCHIATRY & NEUROLOGY

## 2018-03-14 PROCEDURE — 4040F PNEUMOC VAC/ADMIN/RCVD: CPT | Performed by: PSYCHIATRY & NEUROLOGY

## 2018-03-14 PROCEDURE — 1036F TOBACCO NON-USER: CPT | Performed by: PSYCHIATRY & NEUROLOGY

## 2018-03-14 NOTE — PROGRESS NOTES
NEUROLOGY OUT PATIENT FOLLOW UP NOTE:  3/14/09849:06 PM    Farhat Konwles is here as hospital follow up for episode of confusion. He is doing well. No more episodes reported. He denies headache. He has mild left side action tremor. He denies any falls. No dysphagia. ROS:    Cardiac: no chest pain. No palpitations. Renal : no flank pain, no hematuria    Skin: no rash  Reviewed labs since last evaluation and discussed with patient. Allergies   Allergen Reactions    Pcn [Penicillins] Shortness Of Breath    Tetanus Toxoids Shortness Of Breath    Franki-1 [Lidocaine Hcl]      New reaction today  [facility] 07-02-16 pt states only reaction was to numbing drops at Dr Danita Castillo office- swelling and trouble breathing. . But has numbing at Dentist without reaction after the eye drops without any reaction at all.  Pletal [Cilostazol]        Current Outpatient Prescriptions:     lisinopril (PRINIVIL;ZESTRIL) 2.5 MG tablet, TAKE 1 TABLET EVERY DAY, Disp: 90 tablet, Rfl: 1    furosemide (LASIX) 40 MG tablet, Take 40 mg by mouth daily, Disp: , Rfl:     metoprolol tartrate (LOPRESSOR) 25 MG tablet, Take 1 tablet by mouth 2 times daily, Disp: 180 tablet, Rfl: 3    pantoprazole (PROTONIX) 40 MG tablet, Take 1 tablet by mouth daily With breakfast, Disp: 90 tablet, Rfl: 0    ticagrelor (BRILINTA) 90 MG TABS tablet, Take 1 tablet by mouth 2 times daily, Disp: 180 tablet, Rfl: 3    aspirin 81 MG tablet, Take 81 mg by mouth daily, Disp: , Rfl:     atorvastatin (LIPITOR) 80 MG tablet, Take 1 tablet by mouth daily, Disp: 90 tablet, Rfl: 0    nitroGLYCERIN (NITROSTAT) 0.4 MG SL tablet, Place 1 tablet under the tongue every 5 minutes as needed for Chest pain up to max of 3 total doses.  If no relief after 1 dose, call 911., Disp: 25 tablet, Rfl: 3    Multiple Vitamin (MULTI VITAMIN PO), Take 1 tablet by mouth daily, Disp: , Rfl:     sertraline (ZOLOFT) 100 MG tablet, Take 1 tablet by mouth daily, Disp: 90 tablet, Rfl: 3      PE:   Vitals:    03/14/18 1337   BP: 130/80   Site: Left Arm   Position: Sitting   Pulse: 62   Weight: 158 lb 11.7 oz (72 kg)   Height: 5' 4\" (1.626 m)     General Appearance:  alert and cooperative  Skin:  Skin color, texture, turgor normal. No rashes or lesions. Gen: AO3, NAD, Language is Intact. Head: PERRL, EOMI, no icterus  Neck: There is no carotid bruits. The Neck is supple. Neuro: CN 2-12 grossly intact with no focal deficits. Power 5/5 Throughout symmetric, Reflexes are decreased and symmetric. Long tracts are intact. Cerebellar exam is Intact. Sensory exam is intact to light touch. Gait is intact. There is mild action tremor on the left. No voice tremor, no bradykinesia. Musculoskeletal:  Has no hand arthritis, no limitation of ROM in any of the four extremities.   Lower extremities no edema        DATA:  Results for orders placed or performed during the hospital encounter of 12/22/17   CBC auto differential   Result Value Ref Range    WBC 8.0 4.8 - 10.8 thou/mm3    RBC 4.62 (L) 4.70 - 6.10 mill/mm3    Hemoglobin 14.3 14.0 - 18.0 gm/dl    Hematocrit 42.2 42.0 - 52.0 %    MCV 91.3 80.0 - 94.0 fL    MCH 31.0 27.0 - 31.0 pg    MCHC 33.9 33.0 - 37.0 gm/dl    RDW 14.2 11.5 - 14.5 %    Platelets 451 945 - 942 thou/mm3    MPV 7.3 (L) 7.4 - 10.4 mcm    Seg Neutrophils 80.6 %    Lymphocytes 11.8 %    Monocytes 4.9 %    Eosinophils 2.5 %    Basophils 0.2 %    nRBC 0 /100 wbc    Segs Absolute 6.4 1.8 - 7.7 thou/mm3    Lymphocytes # 0.9 (L) 1.0 - 4.8 thou/mm3    Monocytes # 0.4 0.4 - 1.3 thou/mm3    Eosinophils # 0.2 0.0 - 0.4 thou/mm3    Basophils # 0.0 0.0 - 0.1 thou/mm3   Comprehensive Metabolic Panel   Result Value Ref Range    Glucose 143 (H) 70 - 108 mg/dL    CREATININE 0.9 0.4 - 1.2 mg/dL    BUN 13 7 - 22 mg/dL    Sodium 138 135 - 145 meq/L    Potassium 3.5 3.5 - 5.2 meq/L    Chloride 96 (L) 98 - 111 meq/L    CO2 25 23 - 33 meq/L    Calcium 9.8 8.5 - 10.5 mg/dL    AST 23 5 - 40 U/L    Alkaline is not interested in medications. We reviewed his MRI brain, groove interpretation. The patient was counseled about the results of his studies. He asked questions reflecting understanding and agreed with the following plan        Plan:  1. Follow up as needed. 2. Call if any questions or concerns. Please call if any questions.      Claudio Ronquillo MD

## 2018-04-27 RX ORDER — TICAGRELOR 90 MG/1
TABLET ORAL
Qty: 180 TABLET | Refills: 0 | Status: SHIPPED | OUTPATIENT
Start: 2018-04-27 | End: 2018-07-31 | Stop reason: SDUPTHER

## 2018-06-11 ENCOUNTER — OFFICE VISIT (OUTPATIENT)
Dept: CARDIOLOGY CLINIC | Age: 73
End: 2018-06-11
Payer: MEDICARE

## 2018-06-11 VITALS
DIASTOLIC BLOOD PRESSURE: 84 MMHG | WEIGHT: 165 LBS | BODY MASS INDEX: 29.23 KG/M2 | HEART RATE: 78 BPM | SYSTOLIC BLOOD PRESSURE: 144 MMHG | HEIGHT: 63 IN

## 2018-06-11 DIAGNOSIS — I25.708 CORONARY ARTERY DISEASE OF BYPASS GRAFT OF NATIVE HEART WITH STABLE ANGINA PECTORIS (HCC): Primary | ICD-10-CM

## 2018-06-11 DIAGNOSIS — I10 ESSENTIAL HYPERTENSION: ICD-10-CM

## 2018-06-11 DIAGNOSIS — E78.01 FAMILIAL HYPERCHOLESTEROLEMIA: ICD-10-CM

## 2018-06-11 PROCEDURE — G8427 DOCREV CUR MEDS BY ELIG CLIN: HCPCS | Performed by: NUCLEAR MEDICINE

## 2018-06-11 PROCEDURE — 4040F PNEUMOC VAC/ADMIN/RCVD: CPT | Performed by: NUCLEAR MEDICINE

## 2018-06-11 PROCEDURE — 1123F ACP DISCUSS/DSCN MKR DOCD: CPT | Performed by: NUCLEAR MEDICINE

## 2018-06-11 PROCEDURE — 99214 OFFICE O/P EST MOD 30 MIN: CPT | Performed by: NUCLEAR MEDICINE

## 2018-06-11 PROCEDURE — 3017F COLORECTAL CA SCREEN DOC REV: CPT | Performed by: NUCLEAR MEDICINE

## 2018-06-11 PROCEDURE — G8417 CALC BMI ABV UP PARAM F/U: HCPCS | Performed by: NUCLEAR MEDICINE

## 2018-06-11 PROCEDURE — 1036F TOBACCO NON-USER: CPT | Performed by: NUCLEAR MEDICINE

## 2018-06-11 PROCEDURE — G8598 ASA/ANTIPLAT THER USED: HCPCS | Performed by: NUCLEAR MEDICINE

## 2018-06-13 ENCOUNTER — APPOINTMENT (OUTPATIENT)
Dept: GENERAL RADIOLOGY | Age: 73
End: 2018-06-13
Payer: MEDICARE

## 2018-06-13 ENCOUNTER — APPOINTMENT (OUTPATIENT)
Dept: CT IMAGING | Age: 73
End: 2018-06-13
Payer: MEDICARE

## 2018-06-13 ENCOUNTER — HOSPITAL ENCOUNTER (EMERGENCY)
Age: 73
Discharge: HOME | End: 2018-06-13
Payer: MEDICARE

## 2018-06-13 VITALS
HEIGHT: 63 IN | BODY MASS INDEX: 28.35 KG/M2 | RESPIRATION RATE: 20 BRPM | WEIGHT: 160 LBS | TEMPERATURE: 98.7 F | HEART RATE: 71 BPM | SYSTOLIC BLOOD PRESSURE: 116 MMHG | OXYGEN SATURATION: 96 % | DIASTOLIC BLOOD PRESSURE: 61 MMHG

## 2018-06-13 DIAGNOSIS — F10.920 ACUTE ALCOHOLIC INTOXICATION WITHOUT COMPLICATION (HCC): Primary | ICD-10-CM

## 2018-06-13 LAB
ALBUMIN SERPL-MCNC: 3.6 G/DL (ref 3.5–5.1)
ALP BLD-CCNC: 106 U/L (ref 38–126)
ALT SERPL-CCNC: 15 U/L (ref 11–66)
AMPHETAMINE+METHAMPHETAMINE URINE SCREEN: NEGATIVE
ANION GAP SERPL CALCULATED.3IONS-SCNC: 13 MEQ/L (ref 8–16)
AST SERPL-CCNC: 16 U/L (ref 5–40)
BARBITURATE QUANTITATIVE URINE: NEGATIVE
BASOPHILS # BLD: 0.7 %
BASOPHILS ABSOLUTE: 0 THOU/MM3 (ref 0–0.1)
BENZODIAZEPINE QUANTITATIVE URINE: NEGATIVE
BILIRUB SERPL-MCNC: 0.2 MG/DL (ref 0.3–1.2)
BILIRUBIN DIRECT: < 0.2 MG/DL (ref 0–0.3)
BILIRUBIN URINE: NEGATIVE
BLOOD, URINE: NEGATIVE
BUN BLDV-MCNC: 8 MG/DL (ref 7–22)
CALCIUM SERPL-MCNC: 8.4 MG/DL (ref 8.5–10.5)
CANNABINOID QUANTITATIVE URINE: NEGATIVE
CHARACTER, URINE: CLEAR
CHLORIDE BLD-SCNC: 104 MEQ/L (ref 98–111)
CO2: 23 MEQ/L (ref 23–33)
COCAINE METABOLITE QUANTITATIVE URINE: NEGATIVE
COLOR: YELLOW
CREAT SERPL-MCNC: 0.8 MG/DL (ref 0.4–1.2)
EKG ATRIAL RATE: 73 BPM
EKG P AXIS: 56 DEGREES
EKG P-R INTERVAL: 180 MS
EKG Q-T INTERVAL: 434 MS
EKG QRS DURATION: 140 MS
EKG QTC CALCULATION (BAZETT): 478 MS
EKG R AXIS: 58 DEGREES
EKG T AXIS: 36 DEGREES
EKG VENTRICULAR RATE: 73 BPM
EOSINOPHIL # BLD: 5.6 %
EOSINOPHILS ABSOLUTE: 0.4 THOU/MM3 (ref 0–0.4)
ETHYL ALCOHOL, SERUM: 0.24 %
GFR SERPL CREATININE-BSD FRML MDRD: > 90 ML/MIN/1.73M2
GLUCOSE BLD-MCNC: 82 MG/DL (ref 70–108)
GLUCOSE URINE: NEGATIVE MG/DL
HCT VFR BLD CALC: 33.6 % (ref 42–52)
HEMOGLOBIN: 11.6 GM/DL (ref 14–18)
KETONES, URINE: NEGATIVE
LACTIC ACID: 3 MMOL/L (ref 0.5–2.2)
LEUKOCYTE ESTERASE, URINE: NEGATIVE
LIPASE: 20.2 U/L (ref 5.6–51.3)
LYMPHOCYTES # BLD: 15.3 %
LYMPHOCYTES ABSOLUTE: 1 THOU/MM3 (ref 1–4.8)
MCH RBC QN AUTO: 32 PG (ref 27–31)
MCHC RBC AUTO-ENTMCNC: 34.7 GM/DL (ref 33–37)
MCV RBC AUTO: 92.3 FL (ref 80–94)
MONOCYTES # BLD: 9.2 %
MONOCYTES ABSOLUTE: 0.6 THOU/MM3 (ref 0.4–1.3)
NITRITE, URINE: NEGATIVE
NUCLEATED RED BLOOD CELLS: 0 /100 WBC
OPIATES, URINE: NEGATIVE
OSMOLALITY CALCULATION: 276.8 MOSMOL/KG (ref 275–300)
OXYCODONE: NEGATIVE
PDW BLD-RTO: 14.4 % (ref 11.5–14.5)
PH UA: 5.5
PHENCYCLIDINE QUANTITATIVE URINE: NEGATIVE
PLATELET # BLD: 313 THOU/MM3 (ref 130–400)
PMV BLD AUTO: 6.5 FL (ref 7.4–10.4)
POTASSIUM SERPL-SCNC: 4.3 MEQ/L (ref 3.5–5.2)
PRO-BNP: 185 PG/ML (ref 0–1800)
PROTEIN UA: NEGATIVE
RBC # BLD: 3.64 MILL/MM3 (ref 4.7–6.1)
SEG NEUTROPHILS: 69.2 %
SEGMENTED NEUTROPHILS ABSOLUTE COUNT: 4.4 THOU/MM3 (ref 1.8–7.7)
SODIUM BLD-SCNC: 140 MEQ/L (ref 135–145)
SPECIFIC GRAVITY, URINE: 1 (ref 1–1.03)
TOTAL PROTEIN: 6 G/DL (ref 6.1–8)
TROPONIN T: < 0.01 NG/ML
UROBILINOGEN, URINE: 0.2 EU/DL
WBC # BLD: 6.3 THOU/MM3 (ref 4.8–10.8)

## 2018-06-13 PROCEDURE — 93010 ELECTROCARDIOGRAM REPORT: CPT | Performed by: INTERNAL MEDICINE

## 2018-06-13 PROCEDURE — 81003 URINALYSIS AUTO W/O SCOPE: CPT

## 2018-06-13 PROCEDURE — 73010 X-RAY EXAM OF SHOULDER BLADE: CPT

## 2018-06-13 PROCEDURE — 80053 COMPREHEN METABOLIC PANEL: CPT

## 2018-06-13 PROCEDURE — 80307 DRUG TEST PRSMV CHEM ANLYZR: CPT

## 2018-06-13 PROCEDURE — 83605 ASSAY OF LACTIC ACID: CPT

## 2018-06-13 PROCEDURE — 84484 ASSAY OF TROPONIN QUANT: CPT

## 2018-06-13 PROCEDURE — 96365 THER/PROPH/DIAG IV INF INIT: CPT

## 2018-06-13 PROCEDURE — 82248 BILIRUBIN DIRECT: CPT

## 2018-06-13 PROCEDURE — 70450 CT HEAD/BRAIN W/O DYE: CPT

## 2018-06-13 PROCEDURE — 36415 COLL VENOUS BLD VENIPUNCTURE: CPT

## 2018-06-13 PROCEDURE — 72125 CT NECK SPINE W/O DYE: CPT

## 2018-06-13 PROCEDURE — G0480 DRUG TEST DEF 1-7 CLASSES: HCPCS

## 2018-06-13 PROCEDURE — 83690 ASSAY OF LIPASE: CPT

## 2018-06-13 PROCEDURE — 2580000003 HC RX 258: Performed by: PHYSICIAN ASSISTANT

## 2018-06-13 PROCEDURE — 2500000003 HC RX 250 WO HCPCS: Performed by: PHYSICIAN ASSISTANT

## 2018-06-13 PROCEDURE — 99285 EMERGENCY DEPT VISIT HI MDM: CPT

## 2018-06-13 PROCEDURE — 85025 COMPLETE CBC W/AUTO DIFF WBC: CPT

## 2018-06-13 PROCEDURE — 93005 ELECTROCARDIOGRAM TRACING: CPT | Performed by: PHYSICIAN ASSISTANT

## 2018-06-13 PROCEDURE — 96375 TX/PRO/DX INJ NEW DRUG ADDON: CPT

## 2018-06-13 PROCEDURE — 71045 X-RAY EXAM CHEST 1 VIEW: CPT

## 2018-06-13 PROCEDURE — 83880 ASSAY OF NATRIURETIC PEPTIDE: CPT

## 2018-06-13 PROCEDURE — 6360000002 HC RX W HCPCS: Performed by: PHYSICIAN ASSISTANT

## 2018-06-13 RX ORDER — THIAMINE HYDROCHLORIDE 100 MG/ML
100 INJECTION, SOLUTION INTRAMUSCULAR; INTRAVENOUS DAILY
Status: DISCONTINUED | OUTPATIENT
Start: 2018-06-13 | End: 2018-06-14 | Stop reason: HOSPADM

## 2018-06-13 RX ORDER — 0.9 % SODIUM CHLORIDE 0.9 %
500 INTRAVENOUS SOLUTION INTRAVENOUS ONCE
Status: COMPLETED | OUTPATIENT
Start: 2018-06-13 | End: 2018-06-13

## 2018-06-13 RX ADMIN — FOLIC ACID 1 MG: 5 INJECTION, SOLUTION INTRAMUSCULAR; INTRAVENOUS; SUBCUTANEOUS at 20:59

## 2018-06-13 RX ADMIN — THIAMINE HYDROCHLORIDE 100 MG: 100 INJECTION, SOLUTION INTRAMUSCULAR; INTRAVENOUS at 20:53

## 2018-06-13 RX ADMIN — SODIUM CHLORIDE 500 ML: 9 INJECTION, SOLUTION INTRAVENOUS at 19:30

## 2018-06-13 ASSESSMENT — ENCOUNTER SYMPTOMS
SORE THROAT: 0
COUGH: 0
BACK PAIN: 1
VOMITING: 0
SHORTNESS OF BREATH: 1
NAUSEA: 0
DIARRHEA: 0
WHEEZING: 0
RHINORRHEA: 0
ABDOMINAL PAIN: 0
EYE REDNESS: 0
EYE DISCHARGE: 0

## 2018-07-31 RX ORDER — LISINOPRIL 2.5 MG/1
TABLET ORAL
Qty: 90 TABLET | Refills: 3 | Status: SHIPPED | OUTPATIENT
Start: 2018-07-31 | End: 2019-09-17 | Stop reason: SDUPTHER

## 2018-07-31 RX ORDER — TICAGRELOR 90 MG/1
TABLET ORAL
Qty: 180 TABLET | Refills: 3 | Status: SHIPPED | OUTPATIENT
Start: 2018-07-31 | End: 2019-09-25 | Stop reason: SDUPTHER

## 2018-08-29 ENCOUNTER — APPOINTMENT (OUTPATIENT)
Dept: CT IMAGING | Age: 73
DRG: 041 | End: 2018-08-29
Payer: MEDICARE

## 2018-08-29 ENCOUNTER — HOSPITAL ENCOUNTER (INPATIENT)
Age: 73
LOS: 12 days | Discharge: NURSING, REHAB OR LONG-TERM CARE | DRG: 041 | End: 2018-09-10
Attending: EMERGENCY MEDICINE | Admitting: SURGERY
Payer: MEDICARE

## 2018-08-29 ENCOUNTER — ANESTHESIA EVENT (OUTPATIENT)
Dept: OPERATING ROOM | Age: 73
DRG: 041 | End: 2018-08-29
Payer: MEDICARE

## 2018-08-29 ENCOUNTER — APPOINTMENT (OUTPATIENT)
Dept: GENERAL RADIOLOGY | Age: 73
DRG: 041 | End: 2018-08-29
Payer: MEDICARE

## 2018-08-29 ENCOUNTER — ANESTHESIA (OUTPATIENT)
Dept: OPERATING ROOM | Age: 73
DRG: 041 | End: 2018-08-29
Payer: MEDICARE

## 2018-08-29 VITALS
RESPIRATION RATE: 2 BRPM | DIASTOLIC BLOOD PRESSURE: 85 MMHG | TEMPERATURE: 95 F | SYSTOLIC BLOOD PRESSURE: 135 MMHG | OXYGEN SATURATION: 100 %

## 2018-08-29 DIAGNOSIS — R56.9 SEIZURE (HCC): ICD-10-CM

## 2018-08-29 DIAGNOSIS — S09.8XXA BLUNT HEAD TRAUMA, INITIAL ENCOUNTER: Primary | ICD-10-CM

## 2018-08-29 DIAGNOSIS — S06.5X1A TRAUMATIC SUBDURAL HEMORRHAGE WITH LOSS OF CONSCIOUSNESS OF 30 MINUTES OR LESS, INITIAL ENCOUNTER (HCC): ICD-10-CM

## 2018-08-29 DIAGNOSIS — S06.6X9A SUBARACHNOID HEMATOMA WITH LOSS OF CONSCIOUSNESS, INITIAL ENCOUNTER (HCC): ICD-10-CM

## 2018-08-29 DIAGNOSIS — S01.01XA LACERATION OF SCALP, INITIAL ENCOUNTER: ICD-10-CM

## 2018-08-29 PROBLEM — Z79.02 ANTIPLATELET OR ANTITHROMBOTIC LONG-TERM USE: Status: ACTIVE | Noted: 2018-08-29

## 2018-08-29 PROBLEM — S06.5XAA SUBDURAL HEMATOMA, ACUTE (HCC): Status: ACTIVE | Noted: 2018-08-29

## 2018-08-29 PROBLEM — I60.9 SUBARACHNOID HEMORRHAGE (HCC): Status: ACTIVE | Noted: 2018-08-29

## 2018-08-29 PROBLEM — S22.42XA MULTIPLE FRACTURES OF RIBS, LEFT SIDE, INITIAL ENCOUNTER FOR CLOSED FRACTURE: Status: ACTIVE | Noted: 2018-08-29

## 2018-08-29 LAB
ABO: NORMAL
ALBUMIN SERPL-MCNC: 3.6 G/DL (ref 3.5–5.1)
ALP BLD-CCNC: 79 U/L (ref 38–126)
ALT SERPL-CCNC: 17 U/L (ref 11–66)
AMYLASE: 39 U/L (ref 20–104)
ANGLE TEG: 67.3 DEG (ref 53–72)
ANION GAP SERPL CALCULATED.3IONS-SCNC: 23 MEQ/L (ref 8–16)
ANTIBODY SCREEN: NORMAL
APTT: 28.2 SECONDS (ref 22–38)
AST SERPL-CCNC: 18 U/L (ref 5–40)
BASOPHILS # BLD: 0.8 %
BASOPHILS ABSOLUTE: 0.1 THOU/MM3 (ref 0–0.1)
BILIRUB SERPL-MCNC: 0.3 MG/DL (ref 0.3–1.2)
BILIRUBIN DIRECT: < 0.2 MG/DL (ref 0–0.3)
BUN BLDV-MCNC: 12 MG/DL (ref 7–22)
CALCIUM SERPL-MCNC: 8.1 MG/DL (ref 8.5–10.5)
CHLORIDE BLD-SCNC: 97 MEQ/L (ref 98–111)
CO2: 14 MEQ/L (ref 23–33)
CREAT SERPL-MCNC: 1.4 MG/DL (ref 0.4–1.2)
EOSINOPHIL # BLD: 3.8 %
EOSINOPHILS ABSOLUTE: 0.4 THOU/MM3 (ref 0–0.4)
EPL-TEG: 0.4 %
ERYTHROCYTE [DISTWIDTH] IN BLOOD BY AUTOMATED COUNT: 13.4 % (ref 11.5–14.5)
ERYTHROCYTE [DISTWIDTH] IN BLOOD BY AUTOMATED COUNT: 45.4 FL (ref 35–45)
ETHYL ALCOHOL, SERUM: 0.23 %
GFR SERPL CREATININE-BSD FRML MDRD: 49 ML/MIN/1.73M2
GLUCOSE BLD-MCNC: 153 MG/DL (ref 70–108)
HCT VFR BLD CALC: 31 % (ref 42–52)
HEMOGLOBIN FINGERSTICK, POC: 11.6 G/DL (ref 14–18)
HEMOGLOBIN: 10.3 GM/DL (ref 14–18)
HEPARIN THERAPY: NO
IMMATURE GRANS (ABS): 0.06 THOU/MM3 (ref 0–0.07)
IMMATURE GRANULOCYTES: 0.6 %
INHIBITION AA TEG: 98.6 %
INHIBITION ADP TEG: 66 %
INR BLD: 0.97 (ref 0.85–1.13)
KINETICS TEG: 1.5 MINUTES (ref 1–3)
LIPASE: 20.3 U/L (ref 5.6–51.3)
LY30 (LYSIS) TEG: 0.4 % (ref 0–7.5)
LYMPHOCYTES # BLD: 19.7 %
LYMPHOCYTES ABSOLUTE: 2 THOU/MM3 (ref 1–4.8)
MA (MAX CLOT) TEG: 65.4 MM (ref 50–70)
MA(AA) TEG: 9.7 MM
MA(ACTIVATED) TEG: 8.9 MM
MA(ADP) TEG: 28.1 MM
MAGNESIUM: 2 MG/DL (ref 1.6–2.4)
MCH RBC QN AUTO: 30.9 PG (ref 26–33)
MCHC RBC AUTO-ENTMCNC: 33.2 GM/DL (ref 32.2–35.5)
MCV RBC AUTO: 93.1 FL (ref 80–94)
MONOCYTES # BLD: 6.1 %
MONOCYTES ABSOLUTE: 0.6 THOU/MM3 (ref 0.4–1.3)
NUCLEATED RED BLOOD CELLS: 0 /100 WBC
OSMOLALITY CALCULATION: 271 MOSMOL/KG (ref 275–300)
PLATELET # BLD: 274 THOU/MM3 (ref 130–400)
PMV BLD AUTO: 9.4 FL (ref 9.4–12.4)
POTASSIUM SERPL-SCNC: 3.5 MEQ/L (ref 3.5–5.2)
RBC # BLD: 3.33 MILL/MM3 (ref 4.7–6.1)
REACTION TIME TEG: 4.8 MINUTES (ref 5–10)
RH FACTOR: NORMAL
SEG NEUTROPHILS: 69 %
SEGMENTED NEUTROPHILS ABSOLUTE COUNT: 6.9 THOU/MM3 (ref 1.8–7.7)
SODIUM BLD-SCNC: 134 MEQ/L (ref 135–145)
TOTAL PROTEIN: 5.6 G/DL (ref 6.1–8)
TROPONIN T: < 0.01 NG/ML
TSH SERPL DL<=0.05 MIU/L-ACNC: 4.2 UIU/ML (ref 0.4–4.2)
WBC # BLD: 10 THOU/MM3 (ref 4.8–10.8)

## 2018-08-29 PROCEDURE — 6360000002 HC RX W HCPCS: Performed by: EMERGENCY MEDICINE

## 2018-08-29 PROCEDURE — 2500000003 HC RX 250 WO HCPCS: Performed by: EMERGENCY MEDICINE

## 2018-08-29 PROCEDURE — 76705 ECHO EXAM OF ABDOMEN: CPT

## 2018-08-29 PROCEDURE — 52000 CYSTOURETHROSCOPY: CPT | Performed by: UROLOGY

## 2018-08-29 PROCEDURE — 6360000004 HC RX CONTRAST MEDICATION: Performed by: EMERGENCY MEDICINE

## 2018-08-29 PROCEDURE — 6360000002 HC RX W HCPCS

## 2018-08-29 PROCEDURE — 74177 CT ABD & PELVIS W/CONTRAST: CPT

## 2018-08-29 PROCEDURE — 36415 COLL VENOUS BLD VENIPUNCTURE: CPT

## 2018-08-29 PROCEDURE — 85025 COMPLETE CBC W/AUTO DIFF WBC: CPT

## 2018-08-29 PROCEDURE — 85576 BLOOD PLATELET AGGREGATION: CPT

## 2018-08-29 PROCEDURE — 71260 CT THORAX DX C+: CPT

## 2018-08-29 PROCEDURE — 6360000002 HC RX W HCPCS: Performed by: PHYSICIAN ASSISTANT

## 2018-08-29 PROCEDURE — 2580000003 HC RX 258: Performed by: ANESTHESIOLOGY

## 2018-08-29 PROCEDURE — 2500000003 HC RX 250 WO HCPCS: Performed by: ANESTHESIOLOGY

## 2018-08-29 PROCEDURE — 99222 1ST HOSP IP/OBS MODERATE 55: CPT | Performed by: NEUROLOGICAL SURGERY

## 2018-08-29 PROCEDURE — 5A1935Z RESPIRATORY VENTILATION, LESS THAN 24 CONSECUTIVE HOURS: ICD-10-PCS | Performed by: EMERGENCY MEDICINE

## 2018-08-29 PROCEDURE — P9016 RBC LEUKOCYTES REDUCED: HCPCS

## 2018-08-29 PROCEDURE — 2709999900 HC NON-CHARGEABLE SUPPLY

## 2018-08-29 PROCEDURE — 82150 ASSAY OF AMYLASE: CPT

## 2018-08-29 PROCEDURE — 2500000003 HC RX 250 WO HCPCS: Performed by: SURGERY

## 2018-08-29 PROCEDURE — 2709999900 HC NON-CHARGEABLE SUPPLY: Performed by: SURGERY

## 2018-08-29 PROCEDURE — 86901 BLOOD TYPING SEROLOGIC RH(D): CPT

## 2018-08-29 PROCEDURE — 83690 ASSAY OF LIPASE: CPT

## 2018-08-29 PROCEDURE — 0T9B80Z DRAINAGE OF BLADDER WITH DRAINAGE DEVICE, VIA NATURAL OR ARTIFICIAL OPENING ENDOSCOPIC: ICD-10-PCS | Performed by: UROLOGY

## 2018-08-29 PROCEDURE — 36430 TRANSFUSION BLD/BLD COMPNT: CPT

## 2018-08-29 PROCEDURE — 0BH17EZ INSERTION OF ENDOTRACHEAL AIRWAY INTO TRACHEA, VIA NATURAL OR ARTIFICIAL OPENING: ICD-10-PCS | Performed by: EMERGENCY MEDICINE

## 2018-08-29 PROCEDURE — 3700000000 HC ANESTHESIA ATTENDED CARE: Performed by: SURGERY

## 2018-08-29 PROCEDURE — 96365 THER/PROPH/DIAG IV INF INIT: CPT

## 2018-08-29 PROCEDURE — 99223 1ST HOSP IP/OBS HIGH 75: CPT | Performed by: UROLOGY

## 2018-08-29 PROCEDURE — 96375 TX/PRO/DX INJ NEW DRUG ADDON: CPT

## 2018-08-29 PROCEDURE — 6820000003 HC L2 TRAUMA ALERT ACTIVATION

## 2018-08-29 PROCEDURE — 84443 ASSAY THYROID STIM HORMONE: CPT

## 2018-08-29 PROCEDURE — 85610 PROTHROMBIN TIME: CPT

## 2018-08-29 PROCEDURE — 99285 EMERGENCY DEPT VISIT HI MDM: CPT

## 2018-08-29 PROCEDURE — 96376 TX/PRO/DX INJ SAME DRUG ADON: CPT

## 2018-08-29 PROCEDURE — 85730 THROMBOPLASTIN TIME PARTIAL: CPT

## 2018-08-29 PROCEDURE — 84484 ASSAY OF TROPONIN QUANT: CPT

## 2018-08-29 PROCEDURE — 3600000003 HC SURGERY LEVEL 3 BASE: Performed by: SURGERY

## 2018-08-29 PROCEDURE — 3600000013 HC SURGERY LEVEL 3 ADDTL 15MIN: Performed by: SURGERY

## 2018-08-29 PROCEDURE — 0JQ00ZZ REPAIR SCALP SUBCUTANEOUS TISSUE AND FASCIA, OPEN APPROACH: ICD-10-PCS | Performed by: SURGERY

## 2018-08-29 PROCEDURE — 82248 BILIRUBIN DIRECT: CPT

## 2018-08-29 PROCEDURE — 70450 CT HEAD/BRAIN W/O DYE: CPT

## 2018-08-29 PROCEDURE — 0JC00ZZ EXTIRPATION OF MATTER FROM SCALP SUBCUTANEOUS TISSUE AND FASCIA, OPEN APPROACH: ICD-10-PCS | Performed by: SURGERY

## 2018-08-29 PROCEDURE — 3700000001 HC ADD 15 MINUTES (ANESTHESIA): Performed by: SURGERY

## 2018-08-29 PROCEDURE — 99223 1ST HOSP IP/OBS HIGH 75: CPT | Performed by: SURGERY

## 2018-08-29 PROCEDURE — 2000000000 HC ICU R&B

## 2018-08-29 PROCEDURE — 83735 ASSAY OF MAGNESIUM: CPT

## 2018-08-29 PROCEDURE — 12034 INTMD RPR S/TR/EXT 7.6-12.5: CPT | Performed by: SURGERY

## 2018-08-29 PROCEDURE — 2700000000 HC OXYGEN THERAPY PER DAY

## 2018-08-29 PROCEDURE — 86923 COMPATIBILITY TEST ELECTRIC: CPT

## 2018-08-29 PROCEDURE — G0480 DRUG TEST DEF 1-7 CLASSES: HCPCS

## 2018-08-29 PROCEDURE — 94002 VENT MGMT INPAT INIT DAY: CPT

## 2018-08-29 PROCEDURE — APPSS180 APP SPLIT SHARED TIME > 60 MINUTES: Performed by: PHYSICIAN ASSISTANT

## 2018-08-29 PROCEDURE — 86900 BLOOD TYPING SEROLOGIC ABO: CPT

## 2018-08-29 PROCEDURE — 99999 PR OFFICE/OUTPT VISIT,PROCEDURE ONLY: CPT | Performed by: UROLOGY

## 2018-08-29 PROCEDURE — 31500 INSERT EMERGENCY AIRWAY: CPT

## 2018-08-29 PROCEDURE — 0W300ZZ CONTROL BLEEDING IN HEAD, OPEN APPROACH: ICD-10-PCS | Performed by: SURGERY

## 2018-08-29 PROCEDURE — 80053 COMPREHEN METABOLIC PANEL: CPT

## 2018-08-29 PROCEDURE — 86850 RBC ANTIBODY SCREEN: CPT

## 2018-08-29 PROCEDURE — 85018 HEMOGLOBIN: CPT

## 2018-08-29 PROCEDURE — 6360000002 HC RX W HCPCS: Performed by: ANESTHESIOLOGY

## 2018-08-29 RX ORDER — GINSENG 100 MG
CAPSULE ORAL 3 TIMES DAILY
Status: DISCONTINUED | OUTPATIENT
Start: 2018-08-30 | End: 2018-09-10 | Stop reason: HOSPADM

## 2018-08-29 RX ORDER — BISACODYL 10 MG
10 SUPPOSITORY, RECTAL RECTAL DAILY PRN
Status: DISCONTINUED | OUTPATIENT
Start: 2018-08-29 | End: 2018-09-10 | Stop reason: HOSPADM

## 2018-08-29 RX ORDER — SODIUM CHLORIDE 9 MG/ML
INJECTION, SOLUTION INTRAVENOUS CONTINUOUS
Status: DISCONTINUED | OUTPATIENT
Start: 2018-08-30 | End: 2018-08-31

## 2018-08-29 RX ORDER — FENTANYL CITRATE 50 UG/ML
INJECTION, SOLUTION INTRAMUSCULAR; INTRAVENOUS PRN
Status: DISCONTINUED | OUTPATIENT
Start: 2018-08-29 | End: 2018-08-30 | Stop reason: SDUPTHER

## 2018-08-29 RX ORDER — MANNITOL 20 G/100ML
INJECTION, SOLUTION INTRAVENOUS
Status: DISPENSED
Start: 2018-08-29 | End: 2018-08-30

## 2018-08-29 RX ORDER — FENTANYL CITRATE 50 UG/ML
100 INJECTION, SOLUTION INTRAMUSCULAR; INTRAVENOUS ONCE
Status: COMPLETED | OUTPATIENT
Start: 2018-08-29 | End: 2018-08-29

## 2018-08-29 RX ORDER — ONDANSETRON 2 MG/ML
4 INJECTION INTRAMUSCULAR; INTRAVENOUS EVERY 6 HOURS PRN
Status: DISCONTINUED | OUTPATIENT
Start: 2018-08-29 | End: 2018-09-10 | Stop reason: HOSPADM

## 2018-08-29 RX ORDER — PROPOFOL 10 MG/ML
INJECTION, EMULSION INTRAVENOUS
Status: COMPLETED
Start: 2018-08-29 | End: 2018-08-29

## 2018-08-29 RX ORDER — MIDAZOLAM HYDROCHLORIDE 1 MG/ML
INJECTION INTRAMUSCULAR; INTRAVENOUS
Status: COMPLETED
Start: 2018-08-29 | End: 2018-08-29

## 2018-08-29 RX ORDER — MORPHINE SULFATE 4 MG/ML
4 INJECTION, SOLUTION INTRAMUSCULAR; INTRAVENOUS ONCE
Status: COMPLETED | OUTPATIENT
Start: 2018-08-30 | End: 2018-08-29

## 2018-08-29 RX ORDER — FENTANYL CITRATE 50 UG/ML
INJECTION, SOLUTION INTRAMUSCULAR; INTRAVENOUS DAILY PRN
Status: COMPLETED | OUTPATIENT
Start: 2018-08-29 | End: 2018-08-29

## 2018-08-29 RX ORDER — FENTANYL CITRATE 50 UG/ML
INJECTION, SOLUTION INTRAMUSCULAR; INTRAVENOUS
Status: DISPENSED
Start: 2018-08-29 | End: 2018-08-30

## 2018-08-29 RX ORDER — SODIUM CHLORIDE 0.9 % (FLUSH) 0.9 %
10 SYRINGE (ML) INJECTION PRN
Status: DISCONTINUED | OUTPATIENT
Start: 2018-08-29 | End: 2018-09-10 | Stop reason: HOSPADM

## 2018-08-29 RX ORDER — ROCURONIUM BROMIDE 10 MG/ML
INJECTION, SOLUTION INTRAVENOUS DAILY PRN
Status: COMPLETED | OUTPATIENT
Start: 2018-08-29 | End: 2018-08-29

## 2018-08-29 RX ORDER — SODIUM CHLORIDE 0.9 % (FLUSH) 0.9 %
10 SYRINGE (ML) INJECTION EVERY 12 HOURS SCHEDULED
Status: DISCONTINUED | OUTPATIENT
Start: 2018-08-30 | End: 2018-09-10 | Stop reason: HOSPADM

## 2018-08-29 RX ORDER — MANNITOL 20 G/100ML
50 INJECTION, SOLUTION INTRAVENOUS ONCE
Status: COMPLETED | OUTPATIENT
Start: 2018-08-29 | End: 2018-08-29

## 2018-08-29 RX ORDER — ACETAMINOPHEN 325 MG/1
650 TABLET ORAL EVERY 4 HOURS PRN
Status: DISCONTINUED | OUTPATIENT
Start: 2018-08-29 | End: 2018-09-10 | Stop reason: HOSPADM

## 2018-08-29 RX ORDER — PROPOFOL 10 MG/ML
INJECTION, EMULSION INTRAVENOUS
Status: COMPLETED
Start: 2018-08-29 | End: 2018-08-30

## 2018-08-29 RX ORDER — PROPOFOL 10 MG/ML
INJECTION, EMULSION INTRAVENOUS CONTINUOUS PRN
Status: COMPLETED | OUTPATIENT
Start: 2018-08-29 | End: 2018-08-29

## 2018-08-29 RX ORDER — MORPHINE SULFATE 4 MG/ML
INJECTION, SOLUTION INTRAMUSCULAR; INTRAVENOUS
Status: COMPLETED
Start: 2018-08-29 | End: 2018-08-29

## 2018-08-29 RX ORDER — TRANEXAMIC ACID 100 MG/ML
INJECTION, SOLUTION INTRAVENOUS
Status: DISPENSED
Start: 2018-08-29 | End: 2018-08-30

## 2018-08-29 RX ORDER — MIDAZOLAM HYDROCHLORIDE 1 MG/ML
2 INJECTION INTRAMUSCULAR; INTRAVENOUS ONCE
Status: COMPLETED | OUTPATIENT
Start: 2018-08-30 | End: 2018-08-29

## 2018-08-29 RX ADMIN — FENTANYL CITRATE 100 MCG: 50 INJECTION INTRAMUSCULAR; INTRAVENOUS at 22:20

## 2018-08-29 RX ADMIN — MANNITOL 50 G: 20 INJECTION, SOLUTION INTRAVENOUS at 21:46

## 2018-08-29 RX ADMIN — MIDAZOLAM HYDROCHLORIDE 2 MG: 1 INJECTION INTRAMUSCULAR; INTRAVENOUS at 23:55

## 2018-08-29 RX ADMIN — MORPHINE SULFATE 4 MG: 4 INJECTION, SOLUTION INTRAMUSCULAR; INTRAVENOUS at 23:55

## 2018-08-29 RX ADMIN — PROPOFOL 50 MG: 10 INJECTION, EMULSION INTRAVENOUS at 21:24

## 2018-08-29 RX ADMIN — FENTANYL CITRATE 150 MCG: 50 INJECTION INTRAMUSCULAR; INTRAVENOUS at 23:25

## 2018-08-29 RX ADMIN — IOPAMIDOL 80 ML: 755 INJECTION, SOLUTION INTRAVENOUS at 22:18

## 2018-08-29 RX ADMIN — Medication 50 MEQ: at 23:37

## 2018-08-29 RX ADMIN — CEFAZOLIN 1 G: 1 INJECTION, POWDER, FOR SOLUTION INTRAMUSCULAR; INTRAVENOUS at 22:57

## 2018-08-29 RX ADMIN — MORPHINE SULFATE 4 MG: 4 INJECTION INTRAVENOUS at 23:55

## 2018-08-29 RX ADMIN — MIDAZOLAM 2 MG: 1 INJECTION INTRAMUSCULAR; INTRAVENOUS at 23:55

## 2018-08-29 RX ADMIN — PROPOFOL 10 MCG/KG/MIN: 10 INJECTION, EMULSION INTRAVENOUS at 21:36

## 2018-08-29 RX ADMIN — FENTANYL CITRATE 100 MCG: 50 INJECTION INTRAMUSCULAR; INTRAVENOUS at 23:11

## 2018-08-29 RX ADMIN — Medication 50 MEQ: at 23:35

## 2018-08-29 RX ADMIN — FENTANYL CITRATE 50 MCG: 50 INJECTION INTRAMUSCULAR; INTRAVENOUS at 21:21

## 2018-08-29 RX ADMIN — ROCURONIUM BROMIDE 20 MG: 10 INJECTION INTRAVENOUS at 21:25

## 2018-08-29 ASSESSMENT — PULMONARY FUNCTION TESTS
PIF_VALUE: 36
PIF_VALUE: 28
PIF_VALUE: 35
PIF_VALUE: 28
PIF_VALUE: 21
PIF_VALUE: 0
PIF_VALUE: 21
PIF_VALUE: 31
PIF_VALUE: 22
PIF_VALUE: 31
PIF_VALUE: 32
PIF_VALUE: 1
PIF_VALUE: 32
PIF_VALUE: 22
PIF_VALUE: 25
PIF_VALUE: 26
PIF_VALUE: 27
PIF_VALUE: 30
PIF_VALUE: 30
PIF_VALUE: 27
PIF_VALUE: 35
PIF_VALUE: 35
PIF_VALUE: 6
PIF_VALUE: 28
PIF_VALUE: 24
PIF_VALUE: 26
PIF_VALUE: 31
PIF_VALUE: 36
PIF_VALUE: 36
PIF_VALUE: 1
PIF_VALUE: 35
PIF_VALUE: 22
PIF_VALUE: 28
PIF_VALUE: 0
PIF_VALUE: 23
PIF_VALUE: 0
PIF_VALUE: 23
PIF_VALUE: 31
PIF_VALUE: 28
PIF_VALUE: 28
PIF_VALUE: 1
PIF_VALUE: 0
PIF_VALUE: 34
PIF_VALUE: 0
PIF_VALUE: 25
PIF_VALUE: 25
PIF_VALUE: 27
PIF_VALUE: 32
PIF_VALUE: 28
PIF_VALUE: 32
PIF_VALUE: 0
PIF_VALUE: 9
PIF_VALUE: 24
PIF_VALUE: 28
PIF_VALUE: 22
PIF_VALUE: 25
PIF_VALUE: 0
PIF_VALUE: 21
PIF_VALUE: 23
PIF_VALUE: 26
PIF_VALUE: 21
PIF_VALUE: 0
PIF_VALUE: 1
PIF_VALUE: 34
PIF_VALUE: 1
PIF_VALUE: 10
PIF_VALUE: 27
PIF_VALUE: 36
PIF_VALUE: 1
PIF_VALUE: 23
PIF_VALUE: 0
PIF_VALUE: 28
PIF_VALUE: 29
PIF_VALUE: 30
PIF_VALUE: 0
PIF_VALUE: 24
PIF_VALUE: 29
PIF_VALUE: 33
PIF_VALUE: 22
PIF_VALUE: 31
PIF_VALUE: 22

## 2018-08-30 ENCOUNTER — APPOINTMENT (OUTPATIENT)
Dept: CT IMAGING | Age: 73
DRG: 041 | End: 2018-08-30
Payer: MEDICARE

## 2018-08-30 ENCOUNTER — APPOINTMENT (OUTPATIENT)
Dept: GENERAL RADIOLOGY | Age: 73
DRG: 041 | End: 2018-08-30
Payer: MEDICARE

## 2018-08-30 LAB
ALBUMIN SERPL-MCNC: 2.6 G/DL (ref 3.5–5.1)
ALLEN TEST: ABNORMAL
ALLEN TEST: ABNORMAL
ALP BLD-CCNC: 54 U/L (ref 38–126)
ALT SERPL-CCNC: 13 U/L (ref 11–66)
AMORPHOUS: ABNORMAL
AMPHETAMINE+METHAMPHETAMINE URINE SCREEN: NEGATIVE
ANION GAP SERPL CALCULATED.3IONS-SCNC: 16 MEQ/L (ref 8–16)
APTT: 27.9 SECONDS (ref 22–38)
AST SERPL-CCNC: 14 U/L (ref 5–40)
BACTERIA: ABNORMAL /HPF
BARBITURATE QUANTITATIVE URINE: NEGATIVE
BASE EXCESS (CALCULATED): -7.2 MMOL/L (ref -2.5–2.5)
BASE EXCESS (CALCULATED): -7.5 MMOL/L (ref -2.5–2.5)
BASOPHILS # BLD: 0.2 %
BASOPHILS ABSOLUTE: 0 THOU/MM3 (ref 0–0.1)
BENZODIAZEPINE QUANTITATIVE URINE: POSITIVE
BILIRUB SERPL-MCNC: 0.5 MG/DL (ref 0.3–1.2)
BILIRUBIN URINE: NEGATIVE
BLOOD, URINE: ABNORMAL
BUN BLDV-MCNC: 10 MG/DL (ref 7–22)
CALCIUM IONIZED: 0.92 MMOL/L (ref 1.12–1.32)
CALCIUM IONIZED: 1.05 MMOL/L (ref 1.12–1.32)
CALCIUM SERPL-MCNC: 6.1 MG/DL (ref 8.5–10.5)
CANNABINOID QUANTITATIVE URINE: NEGATIVE
CASTS UA: ABNORMAL /LPF
CHARACTER, URINE: ABNORMAL
CHLORIDE BLD-SCNC: 108 MEQ/L (ref 98–111)
CO2: 14 MEQ/L (ref 23–33)
COCAINE METABOLITE QUANTITATIVE URINE: NEGATIVE
COLLECTED BY:: ABNORMAL
COLLECTED BY:: ABNORMAL
COLOR: ABNORMAL
CREAT SERPL-MCNC: 0.8 MG/DL (ref 0.4–1.2)
CRYSTALS, UA: ABNORMAL
DEVICE: ABNORMAL
DEVICE: ABNORMAL
EOSINOPHIL # BLD: 0.1 %
EOSINOPHILS ABSOLUTE: 0 THOU/MM3 (ref 0–0.4)
EPITHELIAL CELLS, UA: ABNORMAL /HPF
ERYTHROCYTE [DISTWIDTH] IN BLOOD BY AUTOMATED COUNT: 14.2 % (ref 11.5–14.5)
ERYTHROCYTE [DISTWIDTH] IN BLOOD BY AUTOMATED COUNT: 45.6 FL (ref 35–45)
GFR SERPL CREATININE-BSD FRML MDRD: > 90 ML/MIN/1.73M2
GLUCOSE BLD-MCNC: 107 MG/DL (ref 70–108)
GLUCOSE URINE: NEGATIVE MG/DL
HCO3: 15 MMOL/L (ref 23–28)
HCO3: 18 MMOL/L (ref 23–28)
HCT VFR BLD CALC: 28.7 % (ref 42–52)
HEMOGLOBIN: 10 GM/DL (ref 14–18)
IFIO2: 30
IFIO2: 60
IMMATURE GRANS (ABS): 0.05 THOU/MM3 (ref 0–0.07)
IMMATURE GRANULOCYTES: 0.5 %
INR BLD: 1.1 (ref 0.85–1.13)
KETONES, URINE: NEGATIVE
LEUKOCYTE ESTERASE, URINE: NEGATIVE
LYMPHOCYTES # BLD: 6.2 %
LYMPHOCYTES ABSOLUTE: 0.6 THOU/MM3 (ref 1–4.8)
MCH RBC QN AUTO: 30.6 PG (ref 26–33)
MCHC RBC AUTO-ENTMCNC: 34.8 GM/DL (ref 32.2–35.5)
MCV RBC AUTO: 87.8 FL (ref 80–94)
MODE: AC
MODE: AC
MONOCYTES # BLD: 6.3 %
MONOCYTES ABSOLUTE: 0.6 THOU/MM3 (ref 0.4–1.3)
MRSA SCREEN RT-PCR: NEGATIVE
MUCUS: ABNORMAL
NITRITE, URINE: NEGATIVE
NUCLEATED RED BLOOD CELLS: 0 /100 WBC
O2 SATURATION: 100 %
O2 SATURATION: 98 %
OPIATES, URINE: POSITIVE
OXYCODONE: NEGATIVE
PCO2: 21 MMHG (ref 35–45)
PCO2: 37 MMHG (ref 35–45)
PH BLOOD GAS: 7.31 (ref 7.35–7.45)
PH BLOOD GAS: 7.46 (ref 7.35–7.45)
PH UA: 5.5
PHENCYCLIDINE QUANTITATIVE URINE: NEGATIVE
PLATELET # BLD: 122 THOU/MM3 (ref 130–400)
PMV BLD AUTO: 9.5 FL (ref 9.4–12.4)
PO2: 280 MMHG (ref 71–104)
PO2: 94 MMHG (ref 71–104)
POTASSIUM REFLEX MAGNESIUM: 3.6 MEQ/L (ref 3.5–5.2)
POTASSIUM SERPL-SCNC: 3.6 MEQ/L (ref 3.5–5.2)
PROTEIN UA: NEGATIVE
RBC # BLD: 3.27 MILL/MM3 (ref 4.7–6.1)
RBC URINE: > 200 /HPF
RENAL EPITHELIAL, UA: ABNORMAL
SEG NEUTROPHILS: 86.7 %
SEGMENTED NEUTROPHILS ABSOLUTE COUNT: 8.5 THOU/MM3 (ref 1.8–7.7)
SET PEEP: 5 MMHG
SET PEEP: 5 MMHG
SET RESPIRATORY RATE: 18 BPM
SET RESPIRATORY RATE: 20 BPM
SET TIDAL VOLUME: 500 ML
SET TIDAL VOLUME: 500 ML
SODIUM BLD-SCNC: 138 MEQ/L (ref 135–145)
SOURCE, BLOOD GAS: ABNORMAL
SOURCE, BLOOD GAS: ABNORMAL
SPECIFIC GRAVITY, URINE: <= 1.005 (ref 1–1.03)
TOTAL PROTEIN: 3.9 G/DL (ref 6.1–8)
TROPONIN T: < 0.01 NG/ML
UROBILINOGEN, URINE: 0.2 EU/DL
WBC # BLD: 9.8 THOU/MM3 (ref 4.8–10.8)
WBC UA: ABNORMAL /HPF
YEAST: ABNORMAL

## 2018-08-30 PROCEDURE — 37799 UNLISTED PX VASCULAR SURGERY: CPT

## 2018-08-30 PROCEDURE — 36430 TRANSFUSION BLD/BLD COMPNT: CPT

## 2018-08-30 PROCEDURE — 80053 COMPREHEN METABOLIC PANEL: CPT

## 2018-08-30 PROCEDURE — 2700000000 HC OXYGEN THERAPY PER DAY

## 2018-08-30 PROCEDURE — 84484 ASSAY OF TROPONIN QUANT: CPT

## 2018-08-30 PROCEDURE — 36592 COLLECT BLOOD FROM PICC: CPT

## 2018-08-30 PROCEDURE — 82330 ASSAY OF CALCIUM: CPT

## 2018-08-30 PROCEDURE — 6360000002 HC RX W HCPCS

## 2018-08-30 PROCEDURE — 93005 ELECTROCARDIOGRAM TRACING: CPT | Performed by: INTERNAL MEDICINE

## 2018-08-30 PROCEDURE — P9035 PLATELET PHERES LEUKOREDUCED: HCPCS

## 2018-08-30 PROCEDURE — 87641 MR-STAPH DNA AMP PROBE: CPT

## 2018-08-30 PROCEDURE — 87081 CULTURE SCREEN ONLY: CPT

## 2018-08-30 PROCEDURE — 36415 COLL VENOUS BLD VENIPUNCTURE: CPT

## 2018-08-30 PROCEDURE — 2000000000 HC ICU R&B

## 2018-08-30 PROCEDURE — APPSS60 APP SPLIT SHARED TIME 46-60 MINUTES: Performed by: NURSE PRACTITIONER

## 2018-08-30 PROCEDURE — 6360000002 HC RX W HCPCS: Performed by: INTERNAL MEDICINE

## 2018-08-30 PROCEDURE — 80307 DRUG TEST PRSMV CHEM ANLYZR: CPT

## 2018-08-30 PROCEDURE — 99231 SBSQ HOSP IP/OBS SF/LOW 25: CPT | Performed by: NEUROLOGICAL SURGERY

## 2018-08-30 PROCEDURE — 70450 CT HEAD/BRAIN W/O DYE: CPT

## 2018-08-30 PROCEDURE — 82803 BLOOD GASES ANY COMBINATION: CPT

## 2018-08-30 PROCEDURE — 6370000000 HC RX 637 (ALT 250 FOR IP): Performed by: INTERNAL MEDICINE

## 2018-08-30 PROCEDURE — 85610 PROTHROMBIN TIME: CPT

## 2018-08-30 PROCEDURE — 2580000003 HC RX 258: Performed by: NEUROLOGICAL SURGERY

## 2018-08-30 PROCEDURE — 6360000002 HC RX W HCPCS: Performed by: NEUROLOGICAL SURGERY

## 2018-08-30 PROCEDURE — 2580000003 HC RX 258: Performed by: PHYSICIAN ASSISTANT

## 2018-08-30 PROCEDURE — 2580000003 HC RX 258: Performed by: INTERNAL MEDICINE

## 2018-08-30 PROCEDURE — C9113 INJ PANTOPRAZOLE SODIUM, VIA: HCPCS | Performed by: INTERNAL MEDICINE

## 2018-08-30 PROCEDURE — 80048 BASIC METABOLIC PNL TOTAL CA: CPT

## 2018-08-30 PROCEDURE — 99223 1ST HOSP IP/OBS HIGH 75: CPT | Performed by: INTERNAL MEDICINE

## 2018-08-30 PROCEDURE — 99291 CRITICAL CARE FIRST HOUR: CPT | Performed by: INTERNAL MEDICINE

## 2018-08-30 PROCEDURE — 81001 URINALYSIS AUTO W/SCOPE: CPT

## 2018-08-30 PROCEDURE — 70496 CT ANGIOGRAPHY HEAD: CPT

## 2018-08-30 PROCEDURE — 71045 X-RAY EXAM CHEST 1 VIEW: CPT

## 2018-08-30 PROCEDURE — 6370000000 HC RX 637 (ALT 250 FOR IP): Performed by: SURGERY

## 2018-08-30 PROCEDURE — 94003 VENT MGMT INPAT SUBQ DAY: CPT

## 2018-08-30 PROCEDURE — 70498 CT ANGIOGRAPHY NECK: CPT

## 2018-08-30 PROCEDURE — 6360000002 HC RX W HCPCS: Performed by: PHYSICIAN ASSISTANT

## 2018-08-30 PROCEDURE — 6370000000 HC RX 637 (ALT 250 FOR IP): Performed by: NURSE PRACTITIONER

## 2018-08-30 PROCEDURE — 6360000004 HC RX CONTRAST MEDICATION: Performed by: NEUROLOGICAL SURGERY

## 2018-08-30 PROCEDURE — 87086 URINE CULTURE/COLONY COUNT: CPT

## 2018-08-30 PROCEDURE — 85730 THROMBOPLASTIN TIME PARTIAL: CPT

## 2018-08-30 PROCEDURE — 85025 COMPLETE CBC W/AUTO DIFF WBC: CPT

## 2018-08-30 PROCEDURE — 6370000000 HC RX 637 (ALT 250 FOR IP): Performed by: PHYSICIAN ASSISTANT

## 2018-08-30 PROCEDURE — 2709999900 HC NON-CHARGEABLE SUPPLY

## 2018-08-30 RX ORDER — HYDROCODONE BITARTRATE AND ACETAMINOPHEN 5; 325 MG/1; MG/1
2 TABLET ORAL EVERY 4 HOURS PRN
Status: DISCONTINUED | OUTPATIENT
Start: 2018-08-30 | End: 2018-09-10 | Stop reason: HOSPADM

## 2018-08-30 RX ORDER — PANTOPRAZOLE SODIUM 40 MG/10ML
40 INJECTION, POWDER, LYOPHILIZED, FOR SOLUTION INTRAVENOUS DAILY
Status: DISCONTINUED | OUTPATIENT
Start: 2018-08-30 | End: 2018-08-30

## 2018-08-30 RX ORDER — CYCLOBENZAPRINE HCL 10 MG
10 TABLET ORAL 3 TIMES DAILY PRN
Status: DISCONTINUED | OUTPATIENT
Start: 2018-08-30 | End: 2018-09-10 | Stop reason: HOSPADM

## 2018-08-30 RX ORDER — HYDROCODONE BITARTRATE AND ACETAMINOPHEN 5; 325 MG/1; MG/1
1 TABLET ORAL EVERY 4 HOURS PRN
Status: DISCONTINUED | OUTPATIENT
Start: 2018-08-30 | End: 2018-09-10 | Stop reason: HOSPADM

## 2018-08-30 RX ORDER — ATORVASTATIN CALCIUM 80 MG/1
80 TABLET, FILM COATED ORAL NIGHTLY
Status: DISCONTINUED | OUTPATIENT
Start: 2018-08-30 | End: 2018-09-10 | Stop reason: HOSPADM

## 2018-08-30 RX ORDER — POTASSIUM CHLORIDE 20MEQ/15ML
20 LIQUID (ML) ORAL DAILY
Status: DISCONTINUED | OUTPATIENT
Start: 2018-08-30 | End: 2018-09-10 | Stop reason: HOSPADM

## 2018-08-30 RX ORDER — SERTRALINE HYDROCHLORIDE 100 MG/1
100 TABLET, FILM COATED ORAL DAILY
Status: DISCONTINUED | OUTPATIENT
Start: 2018-08-31 | End: 2018-09-10 | Stop reason: HOSPADM

## 2018-08-30 RX ORDER — FENTANYL CITRATE 50 UG/ML
50 INJECTION, SOLUTION INTRAMUSCULAR; INTRAVENOUS EVERY 4 HOURS PRN
Status: DISCONTINUED | OUTPATIENT
Start: 2018-08-30 | End: 2018-09-10 | Stop reason: HOSPADM

## 2018-08-30 RX ORDER — LISINOPRIL 5 MG/1
5 TABLET ORAL DAILY
Status: DISCONTINUED | OUTPATIENT
Start: 2018-08-31 | End: 2018-09-09

## 2018-08-30 RX ORDER — FOLIC ACID 1 MG/1
1 TABLET ORAL DAILY
Status: DISCONTINUED | OUTPATIENT
Start: 2018-08-30 | End: 2018-09-10 | Stop reason: HOSPADM

## 2018-08-30 RX ORDER — 0.9 % SODIUM CHLORIDE 0.9 %
2000 INTRAVENOUS SOLUTION INTRAVENOUS ONCE
Status: COMPLETED | OUTPATIENT
Start: 2018-08-30 | End: 2018-08-30

## 2018-08-30 RX ORDER — THIAMINE MONONITRATE (VIT B1) 100 MG
100 TABLET ORAL DAILY
Status: DISCONTINUED | OUTPATIENT
Start: 2018-08-30 | End: 2018-09-10 | Stop reason: HOSPADM

## 2018-08-30 RX ORDER — 0.9 % SODIUM CHLORIDE 0.9 %
250 INTRAVENOUS SOLUTION INTRAVENOUS ONCE
Status: DISCONTINUED | OUTPATIENT
Start: 2018-08-30 | End: 2018-09-10 | Stop reason: HOSPADM

## 2018-08-30 RX ORDER — 0.9 % SODIUM CHLORIDE 0.9 %
1000 INTRAVENOUS SOLUTION INTRAVENOUS ONCE
Status: COMPLETED | OUTPATIENT
Start: 2018-08-30 | End: 2018-08-30

## 2018-08-30 RX ORDER — PANTOPRAZOLE SODIUM 40 MG/1
40 TABLET, DELAYED RELEASE ORAL
Status: DISCONTINUED | OUTPATIENT
Start: 2018-08-31 | End: 2018-09-10 | Stop reason: HOSPADM

## 2018-08-30 RX ORDER — LIDOCAINE 50 MG/G
2 PATCH TOPICAL DAILY
Status: DISPENSED | OUTPATIENT
Start: 2018-08-30 | End: 2018-09-04

## 2018-08-30 RX ORDER — DOCUSATE SODIUM 100 MG/1
100 CAPSULE, LIQUID FILLED ORAL 2 TIMES DAILY
Status: DISCONTINUED | OUTPATIENT
Start: 2018-08-30 | End: 2018-09-10 | Stop reason: HOSPADM

## 2018-08-30 RX ORDER — MULTIVITAMIN WITH FOLIC ACID 400 MCG
1 TABLET ORAL DAILY
Status: DISCONTINUED | OUTPATIENT
Start: 2018-08-30 | End: 2018-09-10 | Stop reason: HOSPADM

## 2018-08-30 RX ORDER — PROPOFOL 10 MG/ML
10 INJECTION, EMULSION INTRAVENOUS
Status: DISCONTINUED | OUTPATIENT
Start: 2018-08-30 | End: 2018-08-30

## 2018-08-30 RX ADMIN — PANTOPRAZOLE SODIUM 40 MG: 40 INJECTION, POWDER, FOR SOLUTION INTRAVENOUS at 03:04

## 2018-08-30 RX ADMIN — Medication 1.5 G: at 08:58

## 2018-08-30 RX ADMIN — SODIUM CHLORIDE: 9 INJECTION, SOLUTION INTRAVENOUS at 19:01

## 2018-08-30 RX ADMIN — SODIUM CHLORIDE: 9 INJECTION, SOLUTION INTRAVENOUS at 01:25

## 2018-08-30 RX ADMIN — BACITRACIN: 500 OINTMENT TOPICAL at 09:03

## 2018-08-30 RX ADMIN — HYDROCODONE BITARTRATE AND ACETAMINOPHEN 1 TABLET: 5; 325 TABLET ORAL at 22:28

## 2018-08-30 RX ADMIN — SODIUM CHLORIDE 2000 ML: 9 INJECTION, SOLUTION INTRAVENOUS at 00:35

## 2018-08-30 RX ADMIN — LEVETIRACETAM 1000 MG: 100 INJECTION, SOLUTION INTRAVENOUS at 02:12

## 2018-08-30 RX ADMIN — PROPOFOL 15 MCG/KG/MIN: 10 INJECTION, EMULSION INTRAVENOUS at 00:35

## 2018-08-30 RX ADMIN — BACITRACIN: 500 OINTMENT TOPICAL at 20:53

## 2018-08-30 RX ADMIN — Medication 100 MG: at 14:25

## 2018-08-30 RX ADMIN — ACETAMINOPHEN 650 MG: 325 TABLET ORAL at 10:34

## 2018-08-30 RX ADMIN — THERA TABS 1 TABLET: TAB at 14:25

## 2018-08-30 RX ADMIN — LEVETIRACETAM 500 MG: 100 INJECTION, SOLUTION INTRAVENOUS at 14:20

## 2018-08-30 RX ADMIN — BACITRACIN: 500 OINTMENT TOPICAL at 14:20

## 2018-08-30 RX ADMIN — FOLIC ACID 1 MG: 1 TABLET ORAL at 14:25

## 2018-08-30 RX ADMIN — POTASSIUM CHLORIDE 20 MEQ: 40 SOLUTION ORAL at 02:43

## 2018-08-30 RX ADMIN — SODIUM CHLORIDE 1000 ML: 9 INJECTION, SOLUTION INTRAVENOUS at 02:03

## 2018-08-30 RX ADMIN — IOPAMIDOL 80 ML: 755 INJECTION, SOLUTION INTRAVENOUS at 08:58

## 2018-08-30 RX ADMIN — Medication 10 ML: at 09:03

## 2018-08-30 RX ADMIN — SODIUM CHLORIDE: 9 INJECTION, SOLUTION INTRAVENOUS at 08:57

## 2018-08-30 ASSESSMENT — PULMONARY FUNCTION TESTS
PIF_VALUE: 19
PIF_VALUE: 21

## 2018-08-30 ASSESSMENT — PAIN DESCRIPTION - DESCRIPTORS: DESCRIPTORS: ACHING

## 2018-08-30 ASSESSMENT — PAIN SCALES - GENERAL
PAINLEVEL_OUTOF10: 1
PAINLEVEL_OUTOF10: 0
PAINLEVEL_OUTOF10: 5
PAINLEVEL_OUTOF10: 3

## 2018-08-30 ASSESSMENT — PAIN DESCRIPTION - LOCATION: LOCATION: HEAD

## 2018-08-30 ASSESSMENT — PAIN DESCRIPTION - PAIN TYPE: TYPE: ACUTE PAIN
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
## 2018-08-30 NOTE — ED NOTES
Pt posturing decorticate, seizure like activity visualized. Pt incontinent of stool.        Rocklin Spatz, RN  08/29/18 [id number]

## 2018-08-30 NOTE — CONSULTS
(LOPRESSOR) 25 MG tablet Take 1 tablet by mouth 2 times daily 4/25/17   [address], MD   pantoprazole (PROTONIX) 40 MG tablet Take 1 tablet by mouth daily With breakfast 4/10/17   [address], MD   aspirin 81 MG tablet Take 81 mg by mouth daily    Historical Provider, MD   atorvastatin (LIPITOR) 80 MG tablet Take 1 tablet by mouth daily 3/6/17   [address], MD   nitroGLYCERIN (NITROSTAT) 0.4 MG SL tablet Place 1 tablet under the tongue every 5 minutes as needed for Chest pain up to max of 3 total doses.  If no relief after 1 dose, call 911. 2/18/17   Flori Lopez MD   Multiple Vitamin (MULTI VITAMIN PO) Take 1 tablet by mouth daily    Historical Provider, MD   sertraline (ZOLOFT) 100 MG tablet Take 1 tablet by mouth daily 1/4/16   Beau Rea MD       Current Facility-Administered Medications   Medication Dose Route Frequency Provider Last Rate Last Dose    fentaNYL (SUBLIMAZE) injection   Intravenous Daily PRN [place] Innocent, DO   50 mcg at 08/29/18 2121    fentaNYL (SUBLIMAZE) 100 MCG/2ML injection             propofol injection   Intravenous Continuous PRN Roxanne Innocent, DO   50 mg at 08/29/18 2124    rocuronium (ZEMURON) injection   Intravenous Daily PRN Roxanne Innocent, DO   20 mg at 08/29/18 2125    mannitol 20 % IVPB             tranexamic acid (CYKLOKAPRON) 1000 MG/10ML injection             iopamidol (ISOVUE-370) 76 % injection 80 mL  80 mL Intravenous ONCE PRN Roxanne Innocent, DO         Current Outpatient Prescriptions   Medication Sig Dispense Refill    lisinopril (PRINIVIL;ZESTRIL) 2.5 MG tablet TAKE 1 TABLET EVERY DAY 90 tablet 3    BRILINTA 90 MG TABS tablet TAKE 1 TABLET TWICE DAILY 180 tablet 3    metoprolol tartrate (LOPRESSOR) 25 MG tablet Take 1 tablet by mouth 2 times daily 180 tablet 3    pantoprazole (PROTONIX) 40 MG tablet Take 1 tablet by mouth daily With breakfast 90 tablet 0    aspirin 81 MG tablet Take 81 mg by mouth daily      atorvastatin
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
## 2018-08-30 NOTE — PROGRESS NOTES
abdominal surgery    [] Surgery in COPD patients  [] Atelectasis, reduced lung volume on X-ray    [] CPAP indications are seen  [] Restrictive pulmonary or neuromuscular disorder   [] No indications  Inhaled Medications Goal: Improve respiratory functions in patients with airway disease and decrease WOB  [] Wheezing, diminished, or absent breath sounds associated with a pulmonary disorder  [] Known COPD  [] Peak flow < 80% predicted or personal best for known asthma patients  [] Documented obstructive defect on pulmonary function testing which is reversible   [] With a mucolytic to prevent bronchospasm  [] Home regimen  [x] No indications  Oxygenation  Goal: Reverse hypoxemia and improve tissue oxygenation  [x] SpO2 < 92%        [] Home oxygen   [] Severe trauma        [] Acute MI / chest pain  [] Significant clinical signs of hypoxemia     [] Post anesthesia short term  [] Hgb disorder        [] No indications    THERAPIES SELECTED BASED ON ALGORITHMS  Bronchial Hygiene  []Vest  []PD&P []Suction or Paz cough   []Acapella []Metaneb [x]No therapy recommended  Volume expansion  [x]Incentive Spirometry  []EZPAP    []Metaneb  []CPAP   []No therapy recommended  Inhaled Medication  []HHN  []MDI  []Two hour continuous  []Metaneb   [x] No therapy recommended  Oxygenation  [x]Nasal cannula  []Mask    []CPAP  []High flow   [] No therapy recommended      BRONCHODILATOR ASSESSMENT SCORE  Score 0 1 2 3 4   Breath Sounds []  Normal or no wheezing with diminished bases []  End expiratory wheeze or slightly diminished []  Pronounced exp. wheeze []  Insp. & Exp. wheeze []  Absent or near absent   Dyspnea and level of distress   []  None, respiratory rate   12-20, regular pattern []  Only on exertion or increased respiratory rate 21-25 []  Mild dyspnea at rest, irregular breathing pattern respiratory rate 26-30 []  Moderate  use of accessory muscles, prolonged expiration respiratory rate 31-35 []  Severe    use of accessory muscles,
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
## 2018-08-31 ENCOUNTER — APPOINTMENT (OUTPATIENT)
Dept: GENERAL RADIOLOGY | Age: 73
DRG: 041 | End: 2018-08-31
Payer: MEDICARE

## 2018-08-31 LAB
ANGLE TEG: 68.7 DEG (ref 53–72)
ANION GAP SERPL CALCULATED.3IONS-SCNC: 9 MEQ/L (ref 8–16)
BUN BLDV-MCNC: 7 MG/DL (ref 7–22)
CALCIUM SERPL-MCNC: 7.6 MG/DL (ref 8.5–10.5)
CHLORIDE BLD-SCNC: 112 MEQ/L (ref 98–111)
CO2: 21 MEQ/L (ref 23–33)
CREAT SERPL-MCNC: 0.7 MG/DL (ref 0.4–1.2)
EKG ATRIAL RATE: 100 BPM
EKG P AXIS: 49 DEGREES
EKG P-R INTERVAL: 180 MS
EKG Q-T INTERVAL: 384 MS
EKG QRS DURATION: 138 MS
EKG QTC CALCULATION (BAZETT): 495 MS
EKG R AXIS: 23 DEGREES
EKG T AXIS: 29 DEGREES
EKG VENTRICULAR RATE: 100 BPM
EPL-TEG: 0.8 %
ERYTHROCYTE [DISTWIDTH] IN BLOOD BY AUTOMATED COUNT: 15.1 % (ref 11.5–14.5)
ERYTHROCYTE [DISTWIDTH] IN BLOOD BY AUTOMATED COUNT: 51.8 FL (ref 35–45)
GFR SERPL CREATININE-BSD FRML MDRD: > 90 ML/MIN/1.73M2
GLUCOSE BLD-MCNC: 116 MG/DL (ref 70–108)
HCT VFR BLD CALC: 27.6 % (ref 42–52)
HEMOGLOBIN: 9 GM/DL (ref 14–18)
HEPARIN THERAPY: NO
INHIBITION AA TEG: 66.7 %
INHIBITION ADP TEG: 23 %
KINETICS TEG: 1.5 MINUTES (ref 1–3)
LY30 (LYSIS) TEG: 0.8 % (ref 0–7.5)
MA (MAX CLOT) TEG: 60.1 MM (ref 50–70)
MA(AA) TEG: 33.1 MM
MA(ACTIVATED) TEG: 19.6 MM
MA(ADP) TEG: 50.8 MM
MAGNESIUM: 2 MG/DL (ref 1.6–2.4)
MCH RBC QN AUTO: 30.3 PG (ref 26–33)
MCHC RBC AUTO-ENTMCNC: 32.6 GM/DL (ref 32.2–35.5)
MCV RBC AUTO: 92.9 FL (ref 80–94)
PLATELET # BLD: 104 THOU/MM3 (ref 130–400)
PMV BLD AUTO: 9.5 FL (ref 9.4–12.4)
POTASSIUM SERPL-SCNC: 3.3 MEQ/L (ref 3.5–5.2)
RBC # BLD: 2.97 MILL/MM3 (ref 4.7–6.1)
REACTION TIME TEG: 4.4 MINUTES (ref 5–10)
SODIUM BLD-SCNC: 142 MEQ/L (ref 135–145)
TROPONIN T: < 0.01 NG/ML
WBC # BLD: 6.5 THOU/MM3 (ref 4.8–10.8)

## 2018-08-31 PROCEDURE — 6370000000 HC RX 637 (ALT 250 FOR IP): Performed by: SURGERY

## 2018-08-31 PROCEDURE — 99231 SBSQ HOSP IP/OBS SF/LOW 25: CPT | Performed by: NEUROLOGICAL SURGERY

## 2018-08-31 PROCEDURE — 2580000003 HC RX 258: Performed by: NEUROLOGICAL SURGERY

## 2018-08-31 PROCEDURE — 84484 ASSAY OF TROPONIN QUANT: CPT

## 2018-08-31 PROCEDURE — 6370000000 HC RX 637 (ALT 250 FOR IP): Performed by: NURSE PRACTITIONER

## 2018-08-31 PROCEDURE — 99232 SBSQ HOSP IP/OBS MODERATE 35: CPT | Performed by: SURGERY

## 2018-08-31 PROCEDURE — 6370000000 HC RX 637 (ALT 250 FOR IP): Performed by: INTERNAL MEDICINE

## 2018-08-31 PROCEDURE — 2580000003 HC RX 258: Performed by: PHYSICIAN ASSISTANT

## 2018-08-31 PROCEDURE — APPSS60 APP SPLIT SHARED TIME 46-60 MINUTES: Performed by: PHYSICIAN ASSISTANT

## 2018-08-31 PROCEDURE — 92610 EVALUATE SWALLOWING FUNCTION: CPT

## 2018-08-31 PROCEDURE — 83735 ASSAY OF MAGNESIUM: CPT

## 2018-08-31 PROCEDURE — 71045 X-RAY EXAM CHEST 1 VIEW: CPT

## 2018-08-31 PROCEDURE — 6360000002 HC RX W HCPCS: Performed by: NEUROLOGICAL SURGERY

## 2018-08-31 PROCEDURE — 93010 ELECTROCARDIOGRAM REPORT: CPT | Performed by: INTERNAL MEDICINE

## 2018-08-31 PROCEDURE — 36415 COLL VENOUS BLD VENIPUNCTURE: CPT

## 2018-08-31 PROCEDURE — 2060000000 HC ICU INTERMEDIATE R&B

## 2018-08-31 PROCEDURE — 85027 COMPLETE CBC AUTOMATED: CPT

## 2018-08-31 PROCEDURE — 2709999900 HC NON-CHARGEABLE SUPPLY

## 2018-08-31 PROCEDURE — 80048 BASIC METABOLIC PNL TOTAL CA: CPT

## 2018-08-31 PROCEDURE — 94010 BREATHING CAPACITY TEST: CPT

## 2018-08-31 PROCEDURE — 85576 BLOOD PLATELET AGGREGATION: CPT

## 2018-08-31 PROCEDURE — 73130 X-RAY EXAM OF HAND: CPT

## 2018-08-31 RX ORDER — POTASSIUM CHLORIDE 20 MEQ/1
20 TABLET, EXTENDED RELEASE ORAL ONCE
Status: COMPLETED | OUTPATIENT
Start: 2018-08-31 | End: 2018-08-31

## 2018-08-31 RX ADMIN — HYDROCODONE BITARTRATE AND ACETAMINOPHEN 1 TABLET: 5; 325 TABLET ORAL at 14:17

## 2018-08-31 RX ADMIN — ATORVASTATIN CALCIUM 80 MG: 80 TABLET, FILM COATED ORAL at 00:37

## 2018-08-31 RX ADMIN — BACITRACIN: 500 OINTMENT TOPICAL at 19:59

## 2018-08-31 RX ADMIN — BACITRACIN: 500 OINTMENT TOPICAL at 08:48

## 2018-08-31 RX ADMIN — FOLIC ACID 1 MG: 1 TABLET ORAL at 08:41

## 2018-08-31 RX ADMIN — LISINOPRIL 5 MG: 5 TABLET ORAL at 08:41

## 2018-08-31 RX ADMIN — DOCUSATE SODIUM 100 MG: 100 CAPSULE, LIQUID FILLED ORAL at 19:59

## 2018-08-31 RX ADMIN — Medication 100 MG: at 08:41

## 2018-08-31 RX ADMIN — Medication 10 ML: at 08:43

## 2018-08-31 RX ADMIN — LEVETIRACETAM 500 MG: 100 INJECTION, SOLUTION INTRAVENOUS at 16:00

## 2018-08-31 RX ADMIN — PANTOPRAZOLE SODIUM 40 MG: 40 TABLET, DELAYED RELEASE ORAL at 08:42

## 2018-08-31 RX ADMIN — POTASSIUM CHLORIDE 20 MEQ: 20 TABLET, EXTENDED RELEASE ORAL at 10:30

## 2018-08-31 RX ADMIN — POTASSIUM CHLORIDE 20 MEQ: 40 SOLUTION ORAL at 08:41

## 2018-08-31 RX ADMIN — METOPROLOL TARTRATE 25 MG: 25 TABLET ORAL at 08:41

## 2018-08-31 RX ADMIN — THERA TABS 1 TABLET: TAB at 08:41

## 2018-08-31 RX ADMIN — METOPROLOL TARTRATE 25 MG: 25 TABLET ORAL at 19:59

## 2018-08-31 RX ADMIN — HYDROCODONE BITARTRATE AND ACETAMINOPHEN 2 TABLET: 5; 325 TABLET ORAL at 19:29

## 2018-08-31 RX ADMIN — DOCUSATE SODIUM 100 MG: 100 CAPSULE, LIQUID FILLED ORAL at 08:42

## 2018-08-31 RX ADMIN — SODIUM CHLORIDE: 9 INJECTION, SOLUTION INTRAVENOUS at 03:17

## 2018-08-31 RX ADMIN — LEVETIRACETAM 500 MG: 100 INJECTION, SOLUTION INTRAVENOUS at 03:03

## 2018-08-31 RX ADMIN — METOPROLOL TARTRATE 25 MG: 25 TABLET ORAL at 00:37

## 2018-08-31 RX ADMIN — Medication 10 ML: at 20:00

## 2018-08-31 RX ADMIN — SERTRALINE 100 MG: 100 TABLET, FILM COATED ORAL at 08:41

## 2018-08-31 RX ADMIN — ATORVASTATIN CALCIUM 80 MG: 80 TABLET, FILM COATED ORAL at 19:59

## 2018-08-31 ASSESSMENT — PAIN DESCRIPTION - DESCRIPTORS: DESCRIPTORS: ACHING;DISCOMFORT

## 2018-08-31 ASSESSMENT — PAIN DESCRIPTION - LOCATION: LOCATION: HAND

## 2018-08-31 ASSESSMENT — PAIN DESCRIPTION - ORIENTATION: ORIENTATION: LEFT

## 2018-08-31 ASSESSMENT — PAIN DESCRIPTION - PAIN TYPE: TYPE: ACUTE PAIN

## 2018-08-31 ASSESSMENT — PAIN SCALES - GENERAL
PAINLEVEL_OUTOF10: 0
PAINLEVEL_OUTOF10: 5
PAINLEVEL_OUTOF10: 9
PAINLEVEL_OUTOF10: 5
PAINLEVEL_OUTOF10: 5

## 2018-08-31 NOTE — PROGRESS NOTES
Respiratory Care is following the rib fracture order set. Patient's position when testing was done was fowlers. A Negative Inspiratory Force (NIF) was performed with patient achieving a NIF of >-40 cm H2O. The NIF was greater than 25 cm H2O. A Forced Vital Capacity (FVC) was obtained with patient achieving an FVC of 1.71 liters. The patient's calculated ideal body weight, (IBW) is  56.9 kg. 0.020 liters/kg of the patient's IBW is 1.138 liters. The patient's FVC was greater than 0.020 liters/kg of IBW. Based on the spirometry measurement alone, patient does not meet ICU admission criteria. Previous FVC was 2.36 liters and previous NIF was -30 cm H2O. Patient's NIF and FVC show no change from previous screening(s). Last pain medication was given on  8/31 @ 1417. Physician was not called regarding spirometry measurement.

## 2018-08-31 NOTE — PROGRESS NOTES
Labs    See Adult Nutrition Doc Flowsheet for more detail.      Electronically signed by Joi Webb RD, DARCI on 8/31/18 at 12:11 PM    Contact Number: [phone]
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
## 2018-08-31 NOTE — PROGRESS NOTES
80- Dr. Tosin Arora at bedside to evaluate patient. Orders received for STAT head CT.    2020- To CT scan with this RN, Dr. Tosin Arora, and Fátima Martinez RN. Pt attached to potable monitor and emergency backpack at bedside. 2027- Assessment completed. Pt is slightly weaker than the right, and pt complained of doudle vision. Dr. Tosin Arora updated. Dr. Tosin Arora states, \"Ok. Let me call radiology and see what they say\"    2029- Also updated Dr. Tosin Arora of questionable seziure-like activity. Pt has jerking eye movements and twitching in bilat upper extremities. Neuro assessment completed. Pt completely alert and oriented, follows commands, not post-ictal.      2044- Received call from Dr. Hanna Schaeffer with CT scan results. Radiologist states that there is a small amount of subarachnoid blood on the right side, unchanged from previous. 2044- Updated Dr. Tosin Arora of updates from Dr. Hanna Schaeffer. 2102- This RN asked Dr. Antonio Thompson to restart home medications. 2130- Dr Tosin Arora states, Fabian Mauro". Also received orders from Dr. Tosin Arora to increase beer intake to 2 beers with meals and at bedtime. 2145- ST changes alarming on monitor. STAT EKG ordered. 2154- Received orders from Dr. Antonio Thompson to resume home medications aside from ASA and Brilinta. 2200- EKG received. ST depression observed on leads v2-v6 and t wave inversion in lead III.    2306- Received orders from Dr. Kristi Moreau for trops Q6 x3. Results for Linn Carlos (MRN [id number]) as of 8/31/2018 01:16   Ref. Range 8/30/2018 23:17   Troponin T Latest Units: ng/ml < 0.010     0528- Results for Linn Carlos (MRN [id number]) as of 8/31/2018 07:40   Ref. Range 8/31/2018 05:28   Troponin T Latest Units: ng/ml < 0.010     0645- Spoke with Dr. Antonio Thompson regarding potassium 3.3. Orders received potassium replacement protocol. 46- Updated Dr. Jl Shahid of AM TEG results. 46- Report given to MARIE Stark and Val Carlson RN.
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
## 2018-08-31 NOTE — PROGRESS NOTES
oxygenation  [] SpO2 < 92%        [] Home oxygen   [] Severe trauma        [] Acute MI / chest pain  [] Significant clinical signs of hypoxemia     [] Post anesthesia short term  [] Hgb disorder        [x] No indications    THERAPIES SELECTED BASED ON ALGORITHMS  Bronchial Hygiene  []Vest  []PD&P []Suction or Paz cough   []Acapella []Metaneb [x]No therapy recommended  Volume expansion  [x]Incentive Spirometry  []EZPAP    []Metaneb  []CPAP   []No therapy recommended  Inhaled Medication  []HHN  []MDI  []Two hour continuous  []Metaneb   [x] No therapy recommended  Oxygenation  []Nasal cannula  []Mask    []CPAP  []High flow   [x] No therapy recommended      BRONCHODILATOR ASSESSMENT SCORE  Score 0 1 2 3 4   Breath Sounds []  Normal or no wheezing with diminished bases []  End expiratory wheeze or slightly diminished []  Pronounced exp. wheeze []  Insp. & Exp. wheeze []  Absent or near absent   Dyspnea and level of distress   []  None, respiratory rate   12-20, regular pattern []  Only on exertion or increased respiratory rate 21-25 []  Mild dyspnea at rest, irregular breathing pattern respiratory rate 26-30 []  Moderate  use of accessory muscles, prolonged expiration respiratory rate 31-35 []  Severe    use of accessory muscles, respiratory rate   > 35   Peak flow []  > 80% []  70-80% []  60-69% []  50-59% []  <50%  or unable to perform   Total Score []  0-1 []  2-5 []  6-8 []  9-10 []  11-12   Frequency  Every 4 hours PRN for wheezing or increased respiratory distress Three times a day and Q4 PRN for wheezing or increased   respiratory distress Four times a day and Q 4 PRN  for wheezing or increased   respiratory distress Q 4 hours  Contact physician for a continuous treatment and  Iprotropium Bromide if indicated   Reassess Score No need  Every day Every day Every day  After treatment     ASSESSMENT OUTCOMES   Bronchial Hygiene   []Sputum production > 25 ml/day       [] Improved chest x-ray  []Patients subjective

## 2018-08-31 NOTE — PROGRESS NOTES
personally viewed and interpreted unless otherwise noted)              Labs were reviewed            CT of Head reviewed            Plan reviewed with Dr. Sosa Pereira  Patient will be transferred to     DEMOND Chandler MSN, APRN-CNP, CCRN-[facility]

## 2018-09-01 LAB
MRSA SCREEN: NORMAL
URINE CULTURE, ROUTINE: NORMAL
VRE CULTURE: NORMAL

## 2018-09-01 PROCEDURE — 2580000003 HC RX 258: Performed by: PHYSICIAN ASSISTANT

## 2018-09-01 PROCEDURE — 6370000000 HC RX 637 (ALT 250 FOR IP): Performed by: NURSE PRACTITIONER

## 2018-09-01 PROCEDURE — 6370000000 HC RX 637 (ALT 250 FOR IP): Performed by: SURGERY

## 2018-09-01 PROCEDURE — 97110 THERAPEUTIC EXERCISES: CPT

## 2018-09-01 PROCEDURE — 2580000003 HC RX 258: Performed by: NEUROLOGICAL SURGERY

## 2018-09-01 PROCEDURE — 99232 SBSQ HOSP IP/OBS MODERATE 35: CPT | Performed by: SURGERY

## 2018-09-01 PROCEDURE — G8987 SELF CARE CURRENT STATUS: HCPCS

## 2018-09-01 PROCEDURE — 94010 BREATHING CAPACITY TEST: CPT

## 2018-09-01 PROCEDURE — 99231 SBSQ HOSP IP/OBS SF/LOW 25: CPT | Performed by: NEUROLOGICAL SURGERY

## 2018-09-01 PROCEDURE — G8978 MOBILITY CURRENT STATUS: HCPCS

## 2018-09-01 PROCEDURE — G8988 SELF CARE GOAL STATUS: HCPCS

## 2018-09-01 PROCEDURE — 6360000002 HC RX W HCPCS: Performed by: NEUROLOGICAL SURGERY

## 2018-09-01 PROCEDURE — 6370000000 HC RX 637 (ALT 250 FOR IP): Performed by: INTERNAL MEDICINE

## 2018-09-01 PROCEDURE — 97162 PT EVAL MOD COMPLEX 30 MIN: CPT

## 2018-09-01 PROCEDURE — 2060000000 HC ICU INTERMEDIATE R&B

## 2018-09-01 PROCEDURE — 2709999900 HC NON-CHARGEABLE SUPPLY

## 2018-09-01 PROCEDURE — G8979 MOBILITY GOAL STATUS: HCPCS

## 2018-09-01 PROCEDURE — 97166 OT EVAL MOD COMPLEX 45 MIN: CPT

## 2018-09-01 RX ADMIN — THERA TABS 1 TABLET: TAB at 08:41

## 2018-09-01 RX ADMIN — METOPROLOL TARTRATE 25 MG: 25 TABLET ORAL at 21:44

## 2018-09-01 RX ADMIN — Medication 100 MG: at 08:41

## 2018-09-01 RX ADMIN — HYDROCODONE BITARTRATE AND ACETAMINOPHEN 1 TABLET: 5; 325 TABLET ORAL at 12:30

## 2018-09-01 RX ADMIN — FOLIC ACID 1 MG: 1 TABLET ORAL at 08:41

## 2018-09-01 RX ADMIN — Medication 10 ML: at 21:44

## 2018-09-01 RX ADMIN — CYCLOBENZAPRINE 10 MG: 10 TABLET, FILM COATED ORAL at 12:30

## 2018-09-01 RX ADMIN — BACITRACIN: 500 OINTMENT TOPICAL at 16:48

## 2018-09-01 RX ADMIN — DOCUSATE SODIUM 100 MG: 100 CAPSULE, LIQUID FILLED ORAL at 08:41

## 2018-09-01 RX ADMIN — PANTOPRAZOLE SODIUM 40 MG: 40 TABLET, DELAYED RELEASE ORAL at 08:41

## 2018-09-01 RX ADMIN — Medication 10 ML: at 08:42

## 2018-09-01 RX ADMIN — BACITRACIN: 500 OINTMENT TOPICAL at 21:44

## 2018-09-01 RX ADMIN — ATORVASTATIN CALCIUM 80 MG: 80 TABLET, FILM COATED ORAL at 21:44

## 2018-09-01 RX ADMIN — BACITRACIN: 500 OINTMENT TOPICAL at 08:41

## 2018-09-01 RX ADMIN — HYDROCODONE BITARTRATE AND ACETAMINOPHEN 2 TABLET: 5; 325 TABLET ORAL at 04:30

## 2018-09-01 RX ADMIN — LEVETIRACETAM 500 MG: 100 INJECTION, SOLUTION INTRAVENOUS at 04:19

## 2018-09-01 RX ADMIN — LEVETIRACETAM 500 MG: 100 INJECTION, SOLUTION INTRAVENOUS at 16:48

## 2018-09-01 RX ADMIN — HYDROCODONE BITARTRATE AND ACETAMINOPHEN 2 TABLET: 5; 325 TABLET ORAL at 00:23

## 2018-09-01 RX ADMIN — POTASSIUM CHLORIDE 20 MEQ: 40 SOLUTION ORAL at 08:41

## 2018-09-01 RX ADMIN — DOCUSATE SODIUM 100 MG: 100 CAPSULE, LIQUID FILLED ORAL at 21:44

## 2018-09-01 RX ADMIN — SERTRALINE 100 MG: 100 TABLET, FILM COATED ORAL at 08:41

## 2018-09-01 ASSESSMENT — PAIN SCALES - GENERAL
PAINLEVEL_OUTOF10: 7
PAINLEVEL_OUTOF10: 4
PAINLEVEL_OUTOF10: 0
PAINLEVEL_OUTOF10: 7
PAINLEVEL_OUTOF10: 0

## 2018-09-01 ASSESSMENT — PAIN DESCRIPTION - ORIENTATION
ORIENTATION: LEFT
ORIENTATION: LEFT

## 2018-09-01 ASSESSMENT — PAIN DESCRIPTION - ONSET
ONSET: ON-GOING
ONSET: ON-GOING

## 2018-09-01 ASSESSMENT — PAIN DESCRIPTION - LOCATION
LOCATION: HEAD
LOCATION: HAND

## 2018-09-01 ASSESSMENT — PAIN DESCRIPTION - PROGRESSION: CLINICAL_PROGRESSION: GRADUALLY IMPROVING

## 2018-09-01 ASSESSMENT — PAIN DESCRIPTION - DESCRIPTORS
DESCRIPTORS: ACHING;DISCOMFORT
DESCRIPTORS: ACHING;DISCOMFORT

## 2018-09-01 ASSESSMENT — PAIN DESCRIPTION - FREQUENCY
FREQUENCY: CONTINUOUS
FREQUENCY: CONTINUOUS

## 2018-09-01 ASSESSMENT — PAIN DESCRIPTION - PAIN TYPE
TYPE: ACUTE PAIN
TYPE: ACUTE PAIN

## 2018-09-01 NOTE — PROGRESS NOTES
[]Sputum production > 25 ml/day       [] Improved chest x-ray  []Patients subjective response (easier clearance of secretions)      [] Improved breath sounds  []Improvement in PaO2 or oxygen saturation       [] Return of patient to home regime   []Improvement in ventilator variables    []Other:     Volume Expansion  []Decrease respiratory rate   []Resolution of fever    []Normal pulse rate    []Improved breath sounds   []Normal chest x-ray     [] Improved arterial oxygen tension (PaO2) and p(A-a)O2  []Return of IC to 75% of preop/predicted goal or an increase to 15 ml/kg of ideal body weight   []Other:     Inhaled Medication  []Improved assessment score   []Return of patient to home regime  []Other:     Oxygenation  []SpO2 greater than or equal to 92%   []Reversed signs of hypoxemia and improvement in WOB  []Correction of Hgb disorder    []Return of patient to home regime  []Other:       Action Plan Based on Assessment:   [x]Continue plan as ordered   []Change therapy based on algorithm   []Consult with physician  []Discontinue therapy and RAP (indications no longer present)  []Not enough information available, reassess in 24 hours  []Other:     Comments: RCP encouraged pt to keep working on IS.
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
## 2018-09-01 NOTE — PROGRESS NOTES
Respiratory Care is following the rib fracture order set. Patient's position when testing was done was semi fowlers. A Negative Inspiratory Force (NIF) was not performed at this time, pt had moved from another unit and nif manometer did not come to the new room- will resume with NIF in the am. I had the patient do IS and he achieved around [id number] mls. A Forced Vital Capacity (FVC) was obtained with patient achieving an FVC of 1.94 liters. The patient's calculated ideal body weight, (IBW) is  56.9 kg. 0.020 liters/kg of the patient's IBW is 1.138 liters. The patient's FVC was greater than 0.020 liters/kg of IBW. Based on the spirometry measurement alone, patient does not meet ICU admission criteria. Previous FVC was 1.71 liters and previous NIF was >-40 cm H2O. Patient's  FVC is improving from previous screening(s). Last pain medication was given on  8/31 @ 1929. Physician was not called regarding spirometry measurement.
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
## 2018-09-02 ENCOUNTER — APPOINTMENT (OUTPATIENT)
Dept: GENERAL RADIOLOGY | Age: 73
DRG: 041 | End: 2018-09-02
Payer: MEDICARE

## 2018-09-02 LAB
BASOPHILS # BLD: 0.5 %
BASOPHILS ABSOLUTE: 0 THOU/MM3 (ref 0–0.1)
EOSINOPHIL # BLD: 4.2 %
EOSINOPHILS ABSOLUTE: 0.3 THOU/MM3 (ref 0–0.4)
ERYTHROCYTE [DISTWIDTH] IN BLOOD BY AUTOMATED COUNT: 14.2 % (ref 11.5–14.5)
ERYTHROCYTE [DISTWIDTH] IN BLOOD BY AUTOMATED COUNT: 47.7 FL (ref 35–45)
HCT VFR BLD CALC: 26.6 % (ref 42–52)
HEMOGLOBIN: 9 GM/DL (ref 14–18)
IMMATURE GRANS (ABS): 0.03 THOU/MM3 (ref 0–0.07)
IMMATURE GRANULOCYTES: 0.5 %
LYMPHOCYTES # BLD: 10.2 %
LYMPHOCYTES ABSOLUTE: 0.6 THOU/MM3 (ref 1–4.8)
MCH RBC QN AUTO: 30.9 PG (ref 26–33)
MCHC RBC AUTO-ENTMCNC: 33.8 GM/DL (ref 32.2–35.5)
MCV RBC AUTO: 91.4 FL (ref 80–94)
MONOCYTES # BLD: 9.2 %
MONOCYTES ABSOLUTE: 0.6 THOU/MM3 (ref 0.4–1.3)
NUCLEATED RED BLOOD CELLS: 0 /100 WBC
PLATELET # BLD: 135 THOU/MM3 (ref 130–400)
PMV BLD AUTO: 9.6 FL (ref 9.4–12.4)
PRO-BNP: 2048 PG/ML (ref 0–1800)
RBC # BLD: 2.91 MILL/MM3 (ref 4.7–6.1)
SEG NEUTROPHILS: 75.4 %
SEGMENTED NEUTROPHILS ABSOLUTE COUNT: 4.5 THOU/MM3 (ref 1.8–7.7)
WBC # BLD: 6 THOU/MM3 (ref 4.8–10.8)

## 2018-09-02 PROCEDURE — 92523 SPEECH SOUND LANG COMPREHEN: CPT | Performed by: SPEECH-LANGUAGE PATHOLOGIST

## 2018-09-02 PROCEDURE — 94010 BREATHING CAPACITY TEST: CPT

## 2018-09-02 PROCEDURE — 71046 X-RAY EXAM CHEST 2 VIEWS: CPT

## 2018-09-02 PROCEDURE — 6370000000 HC RX 637 (ALT 250 FOR IP): Performed by: NURSE PRACTITIONER

## 2018-09-02 PROCEDURE — 6370000000 HC RX 637 (ALT 250 FOR IP): Performed by: PHYSICIAN ASSISTANT

## 2018-09-02 PROCEDURE — APPSS45 APP SPLIT SHARED TIME 31-45 MINUTES: Performed by: PHYSICIAN ASSISTANT

## 2018-09-02 PROCEDURE — 6370000000 HC RX 637 (ALT 250 FOR IP): Performed by: INTERNAL MEDICINE

## 2018-09-02 PROCEDURE — 2709999900 HC NON-CHARGEABLE SUPPLY

## 2018-09-02 PROCEDURE — 6360000002 HC RX W HCPCS: Performed by: NEUROLOGICAL SURGERY

## 2018-09-02 PROCEDURE — 2580000003 HC RX 258: Performed by: NEUROLOGICAL SURGERY

## 2018-09-02 PROCEDURE — 36415 COLL VENOUS BLD VENIPUNCTURE: CPT

## 2018-09-02 PROCEDURE — 2060000000 HC ICU INTERMEDIATE R&B

## 2018-09-02 PROCEDURE — 2580000003 HC RX 258: Performed by: PHYSICIAN ASSISTANT

## 2018-09-02 PROCEDURE — 85025 COMPLETE CBC W/AUTO DIFF WBC: CPT

## 2018-09-02 PROCEDURE — 83880 ASSAY OF NATRIURETIC PEPTIDE: CPT

## 2018-09-02 PROCEDURE — 99232 SBSQ HOSP IP/OBS MODERATE 35: CPT | Performed by: SURGERY

## 2018-09-02 RX ADMIN — POTASSIUM CHLORIDE 20 MEQ: 40 SOLUTION ORAL at 09:12

## 2018-09-02 RX ADMIN — ATORVASTATIN CALCIUM 80 MG: 80 TABLET, FILM COATED ORAL at 20:27

## 2018-09-02 RX ADMIN — PANTOPRAZOLE SODIUM 40 MG: 40 TABLET, DELAYED RELEASE ORAL at 09:18

## 2018-09-02 RX ADMIN — Medication 100 MG: at 09:12

## 2018-09-02 RX ADMIN — BACITRACIN: 500 OINTMENT TOPICAL at 20:27

## 2018-09-02 RX ADMIN — LEVETIRACETAM 500 MG: 100 INJECTION, SOLUTION INTRAVENOUS at 14:03

## 2018-09-02 RX ADMIN — ACETAMINOPHEN 650 MG: 325 TABLET ORAL at 09:11

## 2018-09-02 RX ADMIN — ACETAMINOPHEN 650 MG: 325 TABLET ORAL at 17:22

## 2018-09-02 RX ADMIN — FOLIC ACID 1 MG: 1 TABLET ORAL at 09:12

## 2018-09-02 RX ADMIN — ACETAMINOPHEN 650 MG: 325 TABLET ORAL at 23:04

## 2018-09-02 RX ADMIN — BACITRACIN: 500 OINTMENT TOPICAL at 14:03

## 2018-09-02 RX ADMIN — THERA TABS 1 TABLET: TAB at 09:12

## 2018-09-02 RX ADMIN — METOPROLOL TARTRATE 25 MG: 25 TABLET ORAL at 20:27

## 2018-09-02 RX ADMIN — DOCUSATE SODIUM 100 MG: 100 CAPSULE, LIQUID FILLED ORAL at 09:11

## 2018-09-02 RX ADMIN — DOCUSATE SODIUM 100 MG: 100 CAPSULE, LIQUID FILLED ORAL at 20:27

## 2018-09-02 RX ADMIN — Medication 10 ML: at 20:37

## 2018-09-02 RX ADMIN — SERTRALINE 100 MG: 100 TABLET, FILM COATED ORAL at 09:13

## 2018-09-02 RX ADMIN — METOPROLOL TARTRATE 25 MG: 25 TABLET ORAL at 09:12

## 2018-09-02 RX ADMIN — BACITRACIN: 500 OINTMENT TOPICAL at 09:12

## 2018-09-02 RX ADMIN — LEVETIRACETAM 500 MG: 100 INJECTION, SOLUTION INTRAVENOUS at 03:50

## 2018-09-02 RX ADMIN — LISINOPRIL 5 MG: 5 TABLET ORAL at 09:12

## 2018-09-02 RX ADMIN — Medication 10 ML: at 09:18

## 2018-09-02 ASSESSMENT — PAIN SCALES - GENERAL
PAINLEVEL_OUTOF10: 3
PAINLEVEL_OUTOF10: 0
PAINLEVEL_OUTOF10: 0
PAINLEVEL_OUTOF10: 5
PAINLEVEL_OUTOF10: 2
PAINLEVEL_OUTOF10: 2
PAINLEVEL_OUTOF10: 0

## 2018-09-02 NOTE — PROGRESS NOTES
[facility] Dr. Harsh Thomson seeing for Dr. Guerrero Files  Daily Progress Note      Pt Name: Sh[facility] Record Number: [id number]  Date of Birth 1945   Today's Date: 9/2/2018    HD: # 4    CC: \"Still experiencing a mild H/A and some pain in my left hand\"     ASSESSMENT  1. Active Hospital Problems    Diagnosis Date Noted    Blunt head trauma [S09. 8XXA] 08/29/2018    Laceration of scalp with complication [F67.51EG] 61/08/7765    Subarachnoid hemorrhage (HCC) [I60.9] 08/29/2018    Multiple fractures of ribs, left side, initial encounter for closed fracture [S22.42XA] 08/29/2018    Antiplatelet or antithrombotic long-term use [Z79.02] 08/29/2018    Subdural hematoma, acute ([address] [S06.5X9A] 08/29/2018    Postprocedural bulbous urethral stricture [N99.111]     S/P CABG (coronary artery bypass graft) [Z95.1] 12/11/2014         PLAN  Patient continues on 4A    CTA blush finding   -Interventional neuro consult per Neurosurgery    -Concern regarding active bleed; right anterior temporal lobe   -repeat imaging per interventional neuro   -no notes documented per EMR    Subdural hemorrhage & Subarachnoid hemorrhage               -Neurosurgery managing   -Stable CT, Repeat tomorrow AM               -Routine NV checks               -Nonsurgical at this time     Antiplatelet long term use (Brilinta and Aspirin)              -TEG Platelet Mapping Assay 8/31    -AA 66.7, ADP 23    -Reaction time 4.4    - transfused 1 unit of platelets 2/01   -repeat labs ordered   -Platelets 951 this a.m., 104 on 8/31  Seizure               -Neurosurgery managing              -continue Keppra              -Monitor    Laceration of occipital region of scalp              -Attempted closure in ED, unsuccessful               -Scalp exploration and closure performed in the OR per Dr. Jl Shahid               -Hemostasis achieved with cautery, clipping and suture placement              -Wound left forearm with dry dressing in place. EXTREMITY: mild-to-moderate swelling of left hand appreciated on exam.passive range of motion fully intact. Active range of motion slightly reduced in first digit likely secondary to swelling. Moderate tenderness to palpation of MCP joint. NV intact in bilateral UEs and LEs. LABS    TEG Platelet Assay    Component Value Ref Range & Units Status Collected Lab   Heparin Therapy NO   Final 08/31/2018  5:28 AM [facility]   Reaction Time TEG 4.4   5.0 - 10.0 Minutes Final 08/31/2018  5:28 AM [facility]   This specimen was transported by pneumatic tube which may   shorten the R (Reaction) time by approximately 1.4 minutes. Kinetics TEG 1.5  1.0 - 3.0 minutes Final 08/31/2018  5:28 AM [facility]   Angle TEG 68.7  53.0 - 72.0 deg Final 08/31/2018  5:28 AM [facility]   MA (Max Clot) TEG 60.1  50.0 - 70.0 mm Final 08/31/2018  5:28 AM [facility]   LY30(Lysis) TEG 0.8  0.0 - 7.5 % Final 08/31/2018  5:28 AM [facility]   EPL-TEG 0.8  % Final 08/31/2018  5:28 AM [facility]   Inhibition ADP TEG 23.0  % Final 08/31/2018  5:28 AM [facility]   Inhibition AA TEG 66.7  % Final 08/31/2018  5:28 AM [facility]   MA(Activated) TEG 19.6  mm Final 08/31/2018  5:28 AM [facility]   MA(ADP) TEG 50.8  mm Final 08/31/2018  5:28 AM [facility]   MA(AA) TEG 33.1  mm Final 08/31/2018  5:28 AM        CBC :   Recent Labs      08/31/18   0555  09/02/18   0847   WBC  6.5  6.0   HGB  9.0*  9.0*   HCT  27.6*  26.6*   MCV  92.9  91.4   PLT  104*  135     BMP:   Recent Labs      08/31/18   0555   NA  142   K  3.3*   CL  112*   CO2  21*   BUN  7   CREATININE  0.7     COAGS:   No results for input(s): APTT, PROT, INR in the last 72 hours. Pancreas/HFP:    No results for input(s): LIPASE, AMYLASE in the last 72 hours.   No results for input(s): AST, ALT, BILIDIR,

## 2018-09-02 NOTE — PROGRESS NOTES
Respiratory Care is following the rib fracture order set. Patient's position when testing was done was semifowlers. A Negative Inspiratory Force (NIF) was performed with patient achieving a NIF of >-40 cm H2O. The NIF was greater than 25 cm H2O. A Forced Vital Capacity (FVC) was obtained with patient achieving an FVC of 2.17 liters. The patient's calculated ideal body weight, (IBW) is  56.9 kg. 0.020 liters/kg of the patient's IBW is 1.138 liters. The patient's FVC was greater than 0.020 liters/kg of IBW. Based on the spirometry measurement alone, patient does not meet ICU admission criteria. Previous FVC was 2.21 liters and previous NIF was >-40 cm H2O. Patient's NIF and FVC show no change from previous screening(s). Last pain medication was given on  9-1-18 @ 1230. Physician was not called regarding spirometry measurement.

## 2018-09-02 NOTE — PROGRESS NOTES
[facility] RESPIRATORY ASSESSMENT PROGRAM (RAP)  30 kg and over      Patient Name: Bre Drake Room#: 4A-03/003-A : 1945     Admitting diagnosis: Blunt head trauma, initial encounter [S09. 8XXA]      ASSESSMENT     Vitals  Pulse: 67   Resp: 16  BP: (!) 103/56  SpO2: 94 % on room air  Temp: 98.4 °F (36.9 °C)  Breath Sounds:clear      PEF   Predicted: na  Personal Best: na     Inspiratory Capacity:   Preoperative/predictive value: 2350 ml   33% of value: 775 ml      75% of value: 1762 ml   10 ml/kg of IBW: 569 ml   Patient's Actual Inspiratory Capacity: 1500 ml    CARE PLAN  All Care Plan selections must be based on the approved algorithms located on line in the Policy and Procedures under Respiratory Assessment Adult Protocol Handbook    INDICATIONS FOR THERAPY BASED ON HISTORY AND ASSESSMENT  Bronchial Hygiene Goal: Improvement in sputum mobilization in patients with ineffective airway clearance. Reverse the atelectasis. [] Atelectasis caused by mucus plugging     [] Chronic mucucillary clearance disorder  [] Improve cough effectiveness. Copious secretions unable to clear with cough or suctioning.    [x] No indications  Volume Expansion Goal: Prevent atelectasis, Absence or improvement in signs of atelectasis, improve respiratory muscle performance  [] Post Thoracic or upper abdominal surgery    [] Surgery in COPD patients  [] Atelectasis, reduced lung volume on X-ray    [] CPAP indications are seen  [] Restrictive pulmonary or neuromuscular disorder   [x] Rib Fractures  Inhaled Medications Goal: Improve respiratory functions in patients with airway disease and decrease WOB  [] Wheezing, diminished, or absent breath sounds associated with a pulmonary disorder  [] Known COPD  [] Peak flow < 80% predicted or personal best for known asthma patients  [] Documented obstructive defect on pulmonary function testing which is reversible   [] With a mucolytic to prevent bronchospasm  [] Home regimen  [x] No
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
## 2018-09-03 ENCOUNTER — APPOINTMENT (OUTPATIENT)
Dept: CT IMAGING | Age: 73
DRG: 041 | End: 2018-09-03
Payer: MEDICARE

## 2018-09-03 LAB
ANION GAP SERPL CALCULATED.3IONS-SCNC: 9 MEQ/L (ref 8–16)
BUN BLDV-MCNC: 11 MG/DL (ref 7–22)
CALCIUM SERPL-MCNC: 8.7 MG/DL (ref 8.5–10.5)
CHLORIDE BLD-SCNC: 105 MEQ/L (ref 98–111)
CO2: 28 MEQ/L (ref 23–33)
CREAT SERPL-MCNC: 0.8 MG/DL (ref 0.4–1.2)
GFR SERPL CREATININE-BSD FRML MDRD: > 90 ML/MIN/1.73M2
GLUCOSE BLD-MCNC: 177 MG/DL (ref 70–108)
POTASSIUM SERPL-SCNC: 4 MEQ/L (ref 3.5–5.2)
SODIUM BLD-SCNC: 142 MEQ/L (ref 135–145)

## 2018-09-03 PROCEDURE — 99231 SBSQ HOSP IP/OBS SF/LOW 25: CPT | Performed by: NEUROLOGICAL SURGERY

## 2018-09-03 PROCEDURE — 70496 CT ANGIOGRAPHY HEAD: CPT

## 2018-09-03 PROCEDURE — 99232 SBSQ HOSP IP/OBS MODERATE 35: CPT | Performed by: SURGERY

## 2018-09-03 PROCEDURE — 2580000003 HC RX 258: Performed by: PHYSICIAN ASSISTANT

## 2018-09-03 PROCEDURE — 2709999900 HC NON-CHARGEABLE SUPPLY

## 2018-09-03 PROCEDURE — 80048 BASIC METABOLIC PNL TOTAL CA: CPT

## 2018-09-03 PROCEDURE — 94010 BREATHING CAPACITY TEST: CPT

## 2018-09-03 PROCEDURE — 6370000000 HC RX 637 (ALT 250 FOR IP): Performed by: PHYSICIAN ASSISTANT

## 2018-09-03 PROCEDURE — 6360000004 HC RX CONTRAST MEDICATION: Performed by: PSYCHIATRY & NEUROLOGY

## 2018-09-03 PROCEDURE — 2580000003 HC RX 258: Performed by: NEUROLOGICAL SURGERY

## 2018-09-03 PROCEDURE — 6360000002 HC RX W HCPCS: Performed by: NEUROLOGICAL SURGERY

## 2018-09-03 PROCEDURE — 6370000000 HC RX 637 (ALT 250 FOR IP): Performed by: NEUROLOGICAL SURGERY

## 2018-09-03 PROCEDURE — APPSS60 APP SPLIT SHARED TIME 46-60 MINUTES: Performed by: PHYSICIAN ASSISTANT

## 2018-09-03 PROCEDURE — 6370000000 HC RX 637 (ALT 250 FOR IP): Performed by: NURSE PRACTITIONER

## 2018-09-03 PROCEDURE — 36415 COLL VENOUS BLD VENIPUNCTURE: CPT

## 2018-09-03 PROCEDURE — 70450 CT HEAD/BRAIN W/O DYE: CPT

## 2018-09-03 PROCEDURE — 6370000000 HC RX 637 (ALT 250 FOR IP): Performed by: INTERNAL MEDICINE

## 2018-09-03 PROCEDURE — 2060000000 HC ICU INTERMEDIATE R&B

## 2018-09-03 RX ORDER — LEVETIRACETAM 500 MG/1
500 TABLET ORAL 2 TIMES DAILY
Status: DISCONTINUED | OUTPATIENT
Start: 2018-09-03 | End: 2018-09-10 | Stop reason: HOSPADM

## 2018-09-03 RX ADMIN — ACETAMINOPHEN 650 MG: 325 TABLET ORAL at 14:58

## 2018-09-03 RX ADMIN — Medication 10 ML: at 08:30

## 2018-09-03 RX ADMIN — THERA TABS 1 TABLET: TAB at 08:30

## 2018-09-03 RX ADMIN — SERTRALINE 100 MG: 100 TABLET, FILM COATED ORAL at 08:30

## 2018-09-03 RX ADMIN — BACITRACIN: 500 OINTMENT TOPICAL at 20:18

## 2018-09-03 RX ADMIN — ATORVASTATIN CALCIUM 80 MG: 80 TABLET, FILM COATED ORAL at 20:17

## 2018-09-03 RX ADMIN — LEVETIRACETAM 500 MG: 100 INJECTION, SOLUTION INTRAVENOUS at 01:33

## 2018-09-03 RX ADMIN — DOCUSATE SODIUM 100 MG: 100 CAPSULE, LIQUID FILLED ORAL at 08:30

## 2018-09-03 RX ADMIN — DOCUSATE SODIUM 100 MG: 100 CAPSULE, LIQUID FILLED ORAL at 20:17

## 2018-09-03 RX ADMIN — Medication 100 MG: at 08:30

## 2018-09-03 RX ADMIN — METOPROLOL TARTRATE 25 MG: 25 TABLET ORAL at 08:30

## 2018-09-03 RX ADMIN — IOPAMIDOL 80 ML: 755 INJECTION, SOLUTION INTRAVENOUS at 11:13

## 2018-09-03 RX ADMIN — FOLIC ACID 1 MG: 1 TABLET ORAL at 08:30

## 2018-09-03 RX ADMIN — LEVETIRACETAM 500 MG: 500 TABLET, FILM COATED ORAL at 14:58

## 2018-09-03 RX ADMIN — Medication 10 ML: at 20:17

## 2018-09-03 RX ADMIN — ACETAMINOPHEN 650 MG: 325 TABLET ORAL at 09:13

## 2018-09-03 RX ADMIN — PANTOPRAZOLE SODIUM 40 MG: 40 TABLET, DELAYED RELEASE ORAL at 06:27

## 2018-09-03 RX ADMIN — POTASSIUM CHLORIDE 20 MEQ: 40 SOLUTION ORAL at 08:29

## 2018-09-03 RX ADMIN — LISINOPRIL 5 MG: 5 TABLET ORAL at 08:30

## 2018-09-03 RX ADMIN — METOPROLOL TARTRATE 25 MG: 25 TABLET ORAL at 20:17

## 2018-09-03 RX ADMIN — LEVETIRACETAM 500 MG: 500 TABLET, FILM COATED ORAL at 20:17

## 2018-09-03 RX ADMIN — BACITRACIN: 500 OINTMENT TOPICAL at 14:58

## 2018-09-03 RX ADMIN — BACITRACIN: 500 OINTMENT TOPICAL at 08:30

## 2018-09-03 ASSESSMENT — PAIN DESCRIPTION - LOCATION
LOCATION: HEAD
LOCATION: ARM

## 2018-09-03 ASSESSMENT — PAIN DESCRIPTION - PAIN TYPE
TYPE: ACUTE PAIN
TYPE: ACUTE PAIN

## 2018-09-03 ASSESSMENT — PAIN SCALES - GENERAL
PAINLEVEL_OUTOF10: 3
PAINLEVEL_OUTOF10: 3
PAINLEVEL_OUTOF10: 2

## 2018-09-03 ASSESSMENT — PAIN DESCRIPTION - ORIENTATION: ORIENTATION: RIGHT

## 2018-09-03 ASSESSMENT — PAIN DESCRIPTION - DESCRIPTORS: DESCRIPTORS: DISCOMFORT

## 2018-09-03 NOTE — CONSULTS
CLOSURE performed by Kacey Escamilla MD at [address] beds at the [facility]         No current facility-administered medications on file prior to encounter. Current Outpatient Prescriptions on File Prior to Encounter   Medication Sig Dispense Refill    lisinopril (PRINIVIL;ZESTRIL) 2.5 MG tablet TAKE 1 TABLET EVERY DAY 90 tablet 3    BRILINTA 90 MG TABS tablet TAKE 1 TABLET TWICE DAILY 180 tablet 3    metoprolol tartrate (LOPRESSOR) 25 MG tablet Take 1 tablet by mouth 2 times daily 180 tablet 3    pantoprazole (PROTONIX) 40 MG tablet Take 1 tablet by mouth daily With breakfast 90 tablet 0    aspirin 81 MG tablet Take 81 mg by mouth daily      atorvastatin (LIPITOR) 80 MG tablet Take 1 tablet by mouth daily 90 tablet 0    nitroGLYCERIN (NITROSTAT) 0.4 MG SL tablet Place 1 tablet under the tongue every 5 minutes as needed for Chest pain up to max of 3 total doses. If no relief after 1 dose, call 911. 25 tablet 3    Multiple Vitamin (MULTI VITAMIN PO) Take 1 tablet by mouth daily      sertraline (ZOLOFT) 100 MG tablet Take 1 tablet by mouth daily 90 tablet 3       Allergies   Allergen Reactions    Pcn [Penicillins] Shortness Of Breath    Tetanus Toxoids Shortness Of Breath    Franki-1 [Lidocaine Hcl]      New reaction today  [facility] 07-02-16 pt states only reaction was to numbing drops at Dr Thomas Christie office- swelling and trouble breathing. . But has numbing at Dentist without reaction after the eye drops without any reaction at all.  Pletal [Cilostazol]        Social History   Substance Use Topics    Smoking status: Never Smoker    Smokeless tobacco: Never Used    Alcohol use 1.8 oz/week     3 Cans of beer per week      Comment: 3 cans per day per patient, wife states \"alot more\"       Family History   Problem Relation Age of Onset    Other Mother         artery disease    Heart Disease Father     Cancer Father     Cancer Sister         breast       Review of Systems  See ED note.   Patient currently intubated and sedated. Physical Exam  Nursing note and vitals reviewed. Constitutional: Vital signs are normal. he appears well-developed and well-nourished. he is intubated and sedated. HENT:   Head: head laceration. Mouth/Throat: intubated. Eyes: closed. Neck: Trachea normal. ET tube in place. Cardiovascular: Normal rate, regular rhythm, on monitor. Pulses:       Unable to be well examined. Pulmonary/Chest: ventilator. Abdominal: normal   Musculoskeletal: sedated. Lymphadenopathy: not assessed  Neurological: he is sedated and intubated   Skin: Skin is warm and dry. Psychiatric: intubated and sedated. Labs  Recent Labs      09/02/18   0847  09/03/18   0209   WBC  6.0   --    HGB  9.0*   --    HCT  26.6*   --    PLT  135   --    NA   --   142   K   --   4.0   CL   --   105   CO2   --   28   BUN   --   11   CREATININE   --   0.8   CALCIUM   --   8.7         Radiology  The patient has had a CT of the abdomen and pelvis that I have reviewed along with its accompanying report. The study demonstrates no acute issues. Impression   1. Suspected remote fracture changes of the bilateral lower ribs when compared to prior studies. No acute fractures are present. 2. Rectal fecal impaction.           3. Negative CT for evidence of acute solid organ injury of the abdomen or pelvis.           4. Bibasilar atelectasis, small pulmonary contusions not excluded in the setting of trauma. Procedure note  Patient was under anesthesia. His penis was prepped and a UroJet was instilled in the urethra in order to try and open the urine channel. Attempts at coude catheter placement were unsuccessful. I then passed a flexible cystoscope. A proximal urethral false passage was found. I was able to get a wire into the bladder and then pass the scope over the wire, gently dilating a narrowing in the proximal urethra. This stricture was the likely cause of the false passage.   The scope was
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
## 2018-09-04 PROCEDURE — 94010 BREATHING CAPACITY TEST: CPT

## 2018-09-04 PROCEDURE — 2580000003 HC RX 258: Performed by: PHYSICIAN ASSISTANT

## 2018-09-04 PROCEDURE — 6370000000 HC RX 637 (ALT 250 FOR IP): Performed by: NURSE PRACTITIONER

## 2018-09-04 PROCEDURE — 97116 GAIT TRAINING THERAPY: CPT

## 2018-09-04 PROCEDURE — 6370000000 HC RX 637 (ALT 250 FOR IP): Performed by: NEUROLOGICAL SURGERY

## 2018-09-04 PROCEDURE — 6370000000 HC RX 637 (ALT 250 FOR IP): Performed by: PHYSICIAN ASSISTANT

## 2018-09-04 PROCEDURE — 2060000000 HC ICU INTERMEDIATE R&B

## 2018-09-04 PROCEDURE — 6370000000 HC RX 637 (ALT 250 FOR IP): Performed by: INTERNAL MEDICINE

## 2018-09-04 PROCEDURE — 99232 SBSQ HOSP IP/OBS MODERATE 35: CPT | Performed by: SURGERY

## 2018-09-04 PROCEDURE — APPSS60 APP SPLIT SHARED TIME 46-60 MINUTES: Performed by: NURSE PRACTITIONER

## 2018-09-04 PROCEDURE — 2709999900 HC NON-CHARGEABLE SUPPLY

## 2018-09-04 RX ADMIN — LEVETIRACETAM 500 MG: 500 TABLET, FILM COATED ORAL at 09:54

## 2018-09-04 RX ADMIN — LISINOPRIL 5 MG: 5 TABLET ORAL at 09:54

## 2018-09-04 RX ADMIN — ATORVASTATIN CALCIUM 80 MG: 80 TABLET, FILM COATED ORAL at 22:19

## 2018-09-04 RX ADMIN — DOCUSATE SODIUM 100 MG: 100 CAPSULE, LIQUID FILLED ORAL at 22:19

## 2018-09-04 RX ADMIN — METOPROLOL TARTRATE 25 MG: 25 TABLET ORAL at 09:54

## 2018-09-04 RX ADMIN — BACITRACIN: 500 OINTMENT TOPICAL at 16:05

## 2018-09-04 RX ADMIN — Medication 10 ML: at 09:57

## 2018-09-04 RX ADMIN — Medication 10 ML: at 22:20

## 2018-09-04 RX ADMIN — ACETAMINOPHEN 650 MG: 325 TABLET ORAL at 00:13

## 2018-09-04 RX ADMIN — PANTOPRAZOLE SODIUM 40 MG: 40 TABLET, DELAYED RELEASE ORAL at 09:57

## 2018-09-04 RX ADMIN — LEVETIRACETAM 500 MG: 500 TABLET, FILM COATED ORAL at 22:19

## 2018-09-04 RX ADMIN — DOCUSATE SODIUM 100 MG: 100 CAPSULE, LIQUID FILLED ORAL at 09:54

## 2018-09-04 RX ADMIN — BACITRACIN: 500 OINTMENT TOPICAL at 09:54

## 2018-09-04 RX ADMIN — Medication 100 MG: at 09:54

## 2018-09-04 RX ADMIN — THERA TABS 1 TABLET: TAB at 09:54

## 2018-09-04 RX ADMIN — FOLIC ACID 1 MG: 1 TABLET ORAL at 09:54

## 2018-09-04 RX ADMIN — BACITRACIN: 500 OINTMENT TOPICAL at 22:20

## 2018-09-04 RX ADMIN — ACETAMINOPHEN 650 MG: 325 TABLET ORAL at 09:53

## 2018-09-04 RX ADMIN — ACETAMINOPHEN 650 MG: 325 TABLET ORAL at 16:05

## 2018-09-04 RX ADMIN — SERTRALINE 100 MG: 100 TABLET, FILM COATED ORAL at 09:54

## 2018-09-04 RX ADMIN — POTASSIUM CHLORIDE 20 MEQ: 40 SOLUTION ORAL at 09:55

## 2018-09-04 RX ADMIN — METOPROLOL TARTRATE 25 MG: 25 TABLET ORAL at 22:19

## 2018-09-04 ASSESSMENT — PAIN SCALES - GENERAL
PAINLEVEL_OUTOF10: 0
PAINLEVEL_OUTOF10: 0
PAINLEVEL_OUTOF10: 4
PAINLEVEL_OUTOF10: 4
PAINLEVEL_OUTOF10: 3
PAINLEVEL_OUTOF10: 0

## 2018-09-04 ASSESSMENT — PAIN DESCRIPTION - DESCRIPTORS: DESCRIPTORS: HEADACHE

## 2018-09-04 ASSESSMENT — PAIN DESCRIPTION - PAIN TYPE
TYPE: ACUTE PAIN
TYPE: ACUTE PAIN

## 2018-09-04 ASSESSMENT — PAIN DESCRIPTION - LOCATION
LOCATION: HEAD
LOCATION: HEAD
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
## 2018-09-05 PROCEDURE — 6370000000 HC RX 637 (ALT 250 FOR IP): Performed by: INTERNAL MEDICINE

## 2018-09-05 PROCEDURE — 99232 SBSQ HOSP IP/OBS MODERATE 35: CPT | Performed by: SURGERY

## 2018-09-05 PROCEDURE — APPSS60 APP SPLIT SHARED TIME 46-60 MINUTES: Performed by: PHYSICIAN ASSISTANT

## 2018-09-05 PROCEDURE — 6370000000 HC RX 637 (ALT 250 FOR IP): Performed by: NURSE PRACTITIONER

## 2018-09-05 PROCEDURE — 6370000000 HC RX 637 (ALT 250 FOR IP): Performed by: NEUROLOGICAL SURGERY

## 2018-09-05 PROCEDURE — 6370000000 HC RX 637 (ALT 250 FOR IP): Performed by: PHYSICIAN ASSISTANT

## 2018-09-05 PROCEDURE — 97110 THERAPEUTIC EXERCISES: CPT

## 2018-09-05 PROCEDURE — 2060000000 HC ICU INTERMEDIATE R&B

## 2018-09-05 PROCEDURE — 2580000003 HC RX 258: Performed by: PHYSICIAN ASSISTANT

## 2018-09-05 PROCEDURE — 6370000000 HC RX 637 (ALT 250 FOR IP): Performed by: SURGERY

## 2018-09-05 PROCEDURE — 97116 GAIT TRAINING THERAPY: CPT

## 2018-09-05 PROCEDURE — 94010 BREATHING CAPACITY TEST: CPT

## 2018-09-05 PROCEDURE — 97535 SELF CARE MNGMENT TRAINING: CPT

## 2018-09-05 PROCEDURE — 2709999900 HC NON-CHARGEABLE SUPPLY

## 2018-09-05 RX ORDER — ASPIRIN 81 MG/1
81 TABLET ORAL DAILY
Status: ON HOLD | COMMUNITY
End: 2022-09-29 | Stop reason: HOSPADM

## 2018-09-05 RX ADMIN — Medication 100 MG: at 08:12

## 2018-09-05 RX ADMIN — THERA TABS 1 TABLET: TAB at 08:12

## 2018-09-05 RX ADMIN — METOPROLOL TARTRATE 25 MG: 25 TABLET ORAL at 20:14

## 2018-09-05 RX ADMIN — DOCUSATE SODIUM 100 MG: 100 CAPSULE, LIQUID FILLED ORAL at 08:11

## 2018-09-05 RX ADMIN — BACITRACIN: 500 OINTMENT TOPICAL at 20:15

## 2018-09-05 RX ADMIN — METOPROLOL TARTRATE 25 MG: 25 TABLET ORAL at 08:13

## 2018-09-05 RX ADMIN — HYDROCODONE BITARTRATE AND ACETAMINOPHEN 1 TABLET: 5; 325 TABLET ORAL at 13:49

## 2018-09-05 RX ADMIN — SERTRALINE 100 MG: 100 TABLET, FILM COATED ORAL at 08:12

## 2018-09-05 RX ADMIN — DOCUSATE SODIUM 100 MG: 100 CAPSULE, LIQUID FILLED ORAL at 20:14

## 2018-09-05 RX ADMIN — Medication 10 ML: at 20:14

## 2018-09-05 RX ADMIN — ATORVASTATIN CALCIUM 80 MG: 80 TABLET, FILM COATED ORAL at 20:14

## 2018-09-05 RX ADMIN — PANTOPRAZOLE SODIUM 40 MG: 40 TABLET, DELAYED RELEASE ORAL at 08:12

## 2018-09-05 RX ADMIN — FOLIC ACID 1 MG: 1 TABLET ORAL at 08:13

## 2018-09-05 RX ADMIN — Medication 10 ML: at 10:02

## 2018-09-05 RX ADMIN — LEVETIRACETAM 500 MG: 500 TABLET, FILM COATED ORAL at 20:14

## 2018-09-05 RX ADMIN — LEVETIRACETAM 500 MG: 500 TABLET, FILM COATED ORAL at 08:13

## 2018-09-05 RX ADMIN — BACITRACIN: 500 OINTMENT TOPICAL at 10:03

## 2018-09-05 RX ADMIN — LISINOPRIL 5 MG: 5 TABLET ORAL at 08:13

## 2018-09-05 RX ADMIN — POTASSIUM CHLORIDE 20 MEQ: 40 SOLUTION ORAL at 08:14

## 2018-09-05 RX ADMIN — ACETAMINOPHEN 650 MG: 325 TABLET ORAL at 03:51

## 2018-09-05 RX ADMIN — BACITRACIN: 500 OINTMENT TOPICAL at 13:49

## 2018-09-05 ASSESSMENT — PAIN SCALES - GENERAL
PAINLEVEL_OUTOF10: 3
PAINLEVEL_OUTOF10: 5
PAINLEVEL_OUTOF10: 0
PAINLEVEL_OUTOF10: 0
PAINLEVEL_OUTOF10: 6
PAINLEVEL_OUTOF10: 0

## 2018-09-05 ASSESSMENT — PAIN DESCRIPTION - LOCATION: LOCATION: HEAD

## 2018-09-05 ASSESSMENT — PAIN DESCRIPTION - PAIN TYPE: TYPE: ACUTE PAIN

## 2018-09-05 ASSESSMENT — PAIN DESCRIPTION - DESCRIPTORS: DESCRIPTORS: HEADACHE

## 2018-09-05 NOTE — PROGRESS NOTES
assistance       Ambulation 1  Device: No Device  Assistance: Contact guard assistance  Quality of Gait: slow gurpreet with arms in semi guarded position with limited arm swing also noted decreased step length and heel strike at alivia LEs discussed will cont to work on balance but that he may benefit from a rolling walker for a short time, also he c/o of fatiuge at this distance with min SOB however reported that he does get SOB after walking at home but is usually able to walk further distances at home   Distance: 75x1         Balance  Comments: pt completed dynamic balance no UE at support reaching slightly out of base with CGA noted wide base of support and hesitant to reach and guarded due to back pain             Exercises:  Exercises  Comments: pt completed seated heel raises, toe rasies, long arc quads, hip flexion, hamstring curls with lt manual resistance, hip abd/add with lt manual resistance x 10 reps each      Activity Tolerance:  Activity Tolerance: Patient Tolerated treatment well;Patient limited by endurance    Assessment: Body structures, Functions, Activity limitations: Decreased functional mobility , Decreased strength, Decreased endurance, Decreased balance, Decreased coordination  Assessment: pt tolerated session fair, he moves slow and guarded and slightly unsteady without use of AD needing hands on assist and only tolerated limited walking distance with SOB noted he would benefit from cont therapy to work on strength, balance and increased independence and safety with functional mobility   Prognosis: Good     REQUIRES PT FOLLOW UP: Yes  Discharge Recommendations: Continue to assess pending progress, Patient would benefit from continued therapy after discharge    Patient Education:  Patient Education: POC, use of RW for safety    Equipment Recommendations: Other: continue to assess, recommending use of walker     Safety:  Type of devices:  All fall risk precautions in place, Left in chair, Call

## 2018-09-05 NOTE — PROGRESS NOTES
no less than 1 week d/t underlying hx   Pain Management   - Tylenol, Norco and Flexeril   ETOH intoxication   - Addiction services consulted - patient refused assessment   - MVI, thiamine and folic acid added   - Supplemental beer with meals  Prophylaxis: SCD's, C&DB, IS, Colace, Pepcid, Zofran  General diet  Regular Neurovascular Checks  Holding Antiplatelets  PT/OT/SLP   - MOCA 27/30 but with slower processing speed and difficulty with thought organization, recommend further speech therapy treatments   Up with assistance   Planned Discharge pending clinical course,    - planning inpatient rehab - pre-cert initiated      Marva Lemons continues on 4A, and is doing fairly well. Patient continues to complain of mild headache and left hand pain. He says his hand is worse today, and the thumb is really causing him pain. He demosntrates continued weakened  strength. Orthopedic surgery will be consulted for further assistance with management. Initial X-rays are negative for acute fractures but do mention arthritic changes and possible subluxation. Patient denies any rib pain from left-sided rib fractures. He states he is short of breath but this is normal for him and he does not feel it is worse than before. Patient continues to improve cognitively and appears more alert and oriented. Physiatry saw the patient and believe he is a good candidate for inpatient rehab. Pre-cert initiated, awaiting return, however patient's insurance is [employer] and has been known to deny and/or take an extended period of time before answering.       Wt Readings from Last 3 Encounters:   09/05/18 181 lb 14.4 oz (82.5 kg)   06/13/18 160 lb (72.6 kg)   06/11/18 165 lb (74.8 kg)     Temp Readings from Last 3 Encounters:   09/05/18 98.4 °F (36.9 °C) (Oral)   08/29/18 95 °F (35 °C)   06/13/18 98.7 °F (37.1 °C) (Oral)     BP Readings from Last 3 Encounters:   09/05/18 132/66   08/29/18 135/85   06/13/18 116/61     Pulse Readings from

## 2018-09-05 NOTE — PROGRESS NOTES
> 25 ml/day       [] Improved chest x-ray  []Patients subjective response (easier clearance of secretions)      [] Improved breath sounds  []Improvement in PaO2 or oxygen saturation       [] Return of patient to home regime   []Improvement in ventilator variables    []Other:     Volume Expansion  []Decrease respiratory rate   []Resolution of fever    []Normal pulse rate    []Improved breath sounds   []Normal chest x-ray     [] Improved arterial oxygen tension (PaO2) and p(A-a)O2  []Return of IC to 75% of preop/predicted goal or an increase to 15 ml/kg of ideal body weight   []Other:     Inhaled Medication  []Improved assessment score   []Return of patient to home regime  []Other:     Oxygenation  []SpO2 greater than or equal to 92%   []Reversed signs of hypoxemia and improvement in WOB  []Correction of Hgb disorder    []Return of patient to home regime  []Other:       Action Plan Based on Assessment:   [x]Continue plan as ordered   []Change therapy based on algorithm   []Consult with physician  []Discontinue therapy and RAP (indications no longer present)  []Not enough information available, reassess in 24 hours  []Other:    Comments: RCP encouraged pt to keep working on IS throughout the day.
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
## 2018-09-05 NOTE — PROGRESS NOTES
Patient was sleeping when the  was rounding in the unit. Mary Jo Babin will attempt another encounter during the shift to provide an assessment of any spiritual care needs. Chaplains remain available for further emotional and spiritual support as needed.

## 2018-09-06 PROCEDURE — 97110 THERAPEUTIC EXERCISES: CPT

## 2018-09-06 PROCEDURE — 2580000003 HC RX 258: Performed by: PHYSICIAN ASSISTANT

## 2018-09-06 PROCEDURE — 6370000000 HC RX 637 (ALT 250 FOR IP): Performed by: INTERNAL MEDICINE

## 2018-09-06 PROCEDURE — 6370000000 HC RX 637 (ALT 250 FOR IP): Performed by: PHYSICIAN ASSISTANT

## 2018-09-06 PROCEDURE — 97116 GAIT TRAINING THERAPY: CPT

## 2018-09-06 PROCEDURE — 6370000000 HC RX 637 (ALT 250 FOR IP): Performed by: NEUROLOGICAL SURGERY

## 2018-09-06 PROCEDURE — 99024 POSTOP FOLLOW-UP VISIT: CPT | Performed by: SURGERY

## 2018-09-06 PROCEDURE — 97535 SELF CARE MNGMENT TRAINING: CPT

## 2018-09-06 PROCEDURE — 6370000000 HC RX 637 (ALT 250 FOR IP): Performed by: NURSE PRACTITIONER

## 2018-09-06 PROCEDURE — 97127 HC SP THER IVNTJ W/FOCUS COG FUNCJ: CPT

## 2018-09-06 PROCEDURE — 2709999900 HC NON-CHARGEABLE SUPPLY

## 2018-09-06 PROCEDURE — 2060000000 HC ICU INTERMEDIATE R&B

## 2018-09-06 PROCEDURE — APPSS60 APP SPLIT SHARED TIME 46-60 MINUTES: Performed by: NURSE PRACTITIONER

## 2018-09-06 PROCEDURE — 6370000000 HC RX 637 (ALT 250 FOR IP): Performed by: SURGERY

## 2018-09-06 PROCEDURE — 97530 THERAPEUTIC ACTIVITIES: CPT

## 2018-09-06 RX ORDER — OXYBUTYNIN CHLORIDE 5 MG/1
5 TABLET ORAL 3 TIMES DAILY PRN
Status: DISCONTINUED | OUTPATIENT
Start: 2018-09-06 | End: 2018-09-10 | Stop reason: HOSPADM

## 2018-09-06 RX ADMIN — LEVETIRACETAM 500 MG: 500 TABLET, FILM COATED ORAL at 20:30

## 2018-09-06 RX ADMIN — ACETAMINOPHEN 650 MG: 325 TABLET ORAL at 20:30

## 2018-09-06 RX ADMIN — Medication 100 MG: at 08:45

## 2018-09-06 RX ADMIN — HYDROCODONE BITARTRATE AND ACETAMINOPHEN 1 TABLET: 5; 325 TABLET ORAL at 00:41

## 2018-09-06 RX ADMIN — Medication 10 ML: at 20:30

## 2018-09-06 RX ADMIN — DOCUSATE SODIUM 100 MG: 100 CAPSULE, LIQUID FILLED ORAL at 20:30

## 2018-09-06 RX ADMIN — METOPROLOL TARTRATE 25 MG: 25 TABLET ORAL at 20:30

## 2018-09-06 RX ADMIN — ATORVASTATIN CALCIUM 80 MG: 80 TABLET, FILM COATED ORAL at 20:30

## 2018-09-06 RX ADMIN — DOCUSATE SODIUM 100 MG: 100 CAPSULE, LIQUID FILLED ORAL at 08:45

## 2018-09-06 RX ADMIN — BACITRACIN: 500 OINTMENT TOPICAL at 08:44

## 2018-09-06 RX ADMIN — PANTOPRAZOLE SODIUM 40 MG: 40 TABLET, DELAYED RELEASE ORAL at 08:45

## 2018-09-06 RX ADMIN — HYDROCODONE BITARTRATE AND ACETAMINOPHEN 1 TABLET: 5; 325 TABLET ORAL at 15:24

## 2018-09-06 RX ADMIN — BACITRACIN: 500 OINTMENT TOPICAL at 14:00

## 2018-09-06 RX ADMIN — SERTRALINE 100 MG: 100 TABLET, FILM COATED ORAL at 08:45

## 2018-09-06 RX ADMIN — THERA TABS 1 TABLET: TAB at 08:45

## 2018-09-06 RX ADMIN — POTASSIUM CHLORIDE 20 MEQ: 40 SOLUTION ORAL at 08:45

## 2018-09-06 RX ADMIN — FOLIC ACID 1 MG: 1 TABLET ORAL at 08:45

## 2018-09-06 RX ADMIN — LEVETIRACETAM 500 MG: 500 TABLET, FILM COATED ORAL at 08:45

## 2018-09-06 RX ADMIN — METOPROLOL TARTRATE 25 MG: 25 TABLET ORAL at 08:45

## 2018-09-06 RX ADMIN — BACITRACIN: 500 OINTMENT TOPICAL at 20:32

## 2018-09-06 RX ADMIN — Medication 10 ML: at 08:47

## 2018-09-06 RX ADMIN — LISINOPRIL 5 MG: 5 TABLET ORAL at 08:45

## 2018-09-06 RX ADMIN — ACETAMINOPHEN 650 MG: 325 TABLET ORAL at 08:44

## 2018-09-06 ASSESSMENT — PAIN SCALES - GENERAL
PAINLEVEL_OUTOF10: 4
PAINLEVEL_OUTOF10: 4
PAINLEVEL_OUTOF10: 3
PAINLEVEL_OUTOF10: 4
PAINLEVEL_OUTOF10: 3
PAINLEVEL_OUTOF10: 3
PAINLEVEL_OUTOF10: 4
PAINLEVEL_OUTOF10: 0
PAINLEVEL_OUTOF10: 4
PAINLEVEL_OUTOF10: 3
PAINLEVEL_OUTOF10: 4
PAINLEVEL_OUTOF10: 3

## 2018-09-06 ASSESSMENT — PAIN DESCRIPTION - LOCATION
LOCATION: HEAD

## 2018-09-06 ASSESSMENT — PAIN DESCRIPTION - PAIN TYPE
TYPE: ACUTE PAIN

## 2018-09-06 ASSESSMENT — PAIN DESCRIPTION - FREQUENCY: FREQUENCY: CONTINUOUS

## 2018-09-06 ASSESSMENT — PAIN DESCRIPTION - DESCRIPTORS
DESCRIPTORS: ACHING
DESCRIPTORS: ACHING;DISCOMFORT
DESCRIPTORS: JABBING
DESCRIPTORS: JABBING

## 2018-09-06 ASSESSMENT — PAIN DESCRIPTION - ORIENTATION
ORIENTATION: RIGHT
ORIENTATION: POSTERIOR

## 2018-09-06 ASSESSMENT — PAIN DESCRIPTION - PROGRESSION: CLINICAL_PROGRESSION: GRADUALLY IMPROVING

## 2018-09-06 ASSESSMENT — PAIN DESCRIPTION - ONSET: ONSET: ON-GOING

## 2018-09-06 NOTE — CARE COORDINATION
Orthopedic Surgery consult placed for thumb pain, no new order received, Follow up recommended if pain continues. PT/OT continues. Await precert for IP Rehab.   Electronically signed by Lidia Ramirez RN on 9/6/2018 at 2:12 PM

## 2018-09-06 NOTE — PROGRESS NOTES
cognitive tasks with ST; no redirections needed            ASSESSMENT:  Assessment: [x]Progressing towards goals          []Not Progressing towards goals       Patient Tolerance of Treatment:   [x]Tolerated well []Tolerated fair []Required rest breaks []Fatigued  Plan for Next Session: Potential discharge if patient agrees returned to cognitive baseline  Education:  Learner:  [x]Patient          []Significant Other          []Other  Education provided regarding:  [x]Goals and POC   []Diet and swallowing precautions    []Home Exercise Program  [x]Progress and/or discharge information  Method of Education:  [x]Discussion          [x]Demonstration          []Handout          []Other  Evaluation of Education:   [x]Verbalized understanding   [x]Demonstrates without assistance  []Demonstrates with assistance  []Needs further instruction     []No evidence of learning                  [x]No family present      Plan: [x]Continue with current plan of care    []Modify current plan of care as follows:    []Discharge patient    Discharge Location:    Services/Supervision Recommended:     [x]Patient continues to require treatment by a licensed therapist to address functional deficits as outlined in the established plan of care.     NIKKI Becker 21

## 2018-09-06 NOTE — PROGRESS NOTES
Past Surgical History:   Procedure Laterality Date    APPENDECTOMY      ARM SURGERY      CARDIAC SURGERY      CORONARY ARTERY BYPASS GRAFT  12/14/2016    Redo of prior CABG, Dr. Bernadette Reid. 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 Apartment has no steps to enter. Pt reports SOB with mobility since CABG in 2016.     Subjective:     Subjective: pt resting in bed on arrival agreeable for therapy he was restless and didn't sleep well last night and reported that he feels more comfortable in the chair     Pain:   .  Pain Assessment  Pain Level: 3 (HA at right side of head, and chronic back pain no number given )       Social/Functional:  Lives With: Significant other  Type of Home: House  Home Layout: Two level, Performs ADL's on one level  Home Access: Stairs to enter without rails (2 + 3 or 3 depending on entrance)  Home Equipment:  (was not using an AD for mobility prior to admit)     Objective:  Rolling to Right: Stand by assistance (guarded)  Supine to Sit: Stand by assistance (guarded and sat for several min prior to balance test )    Transfers  Sit to Stand: Contact guard assistance (guarded and with wide base of support )  Stand to sit: Contact guard assistance (guarded)       Ambulation 1  Device: No Device  Assistance: Contact guard assistance  Quality of Gait: slow gurpreet and flexed posture, noted wide base of support with arms in guarded position lacking arm swing, noted decreased instructions for Next Treatment: therex, mobility, balance activities  Current Treatment Recommendations: Strengthening, Balance Training, Functional Mobility Training, Transfer Training, Safety Education & Training, Patient/Caregiver Education & Training, Endurance Training, Equipment Evaluation, Education, & procurement, Gait Training, Neuromuscular Re-education    Goals:  Patient goals : Increase independence    Short term goals  Time Frame for Short term goals: 2 weeks  Short term goal 1: Patient will transfer supine <--> sit with MOD I in order to get into/out of bed. Short term goal 2: Patient will transfer sit <--> stand with MOD I in order to get up to ambulate. Short term goal 3: Patient will ambulate 76' with S and least restrictive AD in order to ambulate to/from the bathroom.     Long term goals  Time Frame for Long term goals : No LTGs due to estimated length of stay

## 2018-09-06 NOTE — PROGRESS NOTES
goal 5: Complete L hand AROM & strengthening as tolerated to increase L hand function for ease of opening ADL items  Long term goals  Time Frame for Long term goals : No LTG set d/t short ELOS

## 2018-09-07 PROCEDURE — 2580000003 HC RX 258: Performed by: PHYSICIAN ASSISTANT

## 2018-09-07 PROCEDURE — 99024 POSTOP FOLLOW-UP VISIT: CPT | Performed by: SURGERY

## 2018-09-07 PROCEDURE — 97110 THERAPEUTIC EXERCISES: CPT

## 2018-09-07 PROCEDURE — 2060000000 HC ICU INTERMEDIATE R&B

## 2018-09-07 PROCEDURE — 6370000000 HC RX 637 (ALT 250 FOR IP): Performed by: NURSE PRACTITIONER

## 2018-09-07 PROCEDURE — 6370000000 HC RX 637 (ALT 250 FOR IP): Performed by: INTERNAL MEDICINE

## 2018-09-07 PROCEDURE — 6370000000 HC RX 637 (ALT 250 FOR IP): Performed by: PHYSICIAN ASSISTANT

## 2018-09-07 PROCEDURE — 2709999900 HC NON-CHARGEABLE SUPPLY

## 2018-09-07 PROCEDURE — 6370000000 HC RX 637 (ALT 250 FOR IP): Performed by: SURGERY

## 2018-09-07 PROCEDURE — APPSS60 APP SPLIT SHARED TIME 46-60 MINUTES: Performed by: PHYSICIAN ASSISTANT

## 2018-09-07 PROCEDURE — 6370000000 HC RX 637 (ALT 250 FOR IP): Performed by: NEUROLOGICAL SURGERY

## 2018-09-07 RX ORDER — SERTRALINE HYDROCHLORIDE 100 MG/1
100 TABLET, FILM COATED ORAL DAILY
Status: CANCELLED | OUTPATIENT
Start: 2018-09-07

## 2018-09-07 RX ORDER — OXYBUTYNIN CHLORIDE 5 MG/1
5 TABLET ORAL 3 TIMES DAILY PRN
Status: CANCELLED | OUTPATIENT
Start: 2018-09-07

## 2018-09-07 RX ORDER — LEVETIRACETAM 500 MG/1
500 TABLET ORAL 2 TIMES DAILY
Status: CANCELLED | OUTPATIENT
Start: 2018-09-07

## 2018-09-07 RX ORDER — ACETAMINOPHEN 325 MG/1
650 TABLET ORAL EVERY 4 HOURS PRN
Status: CANCELLED | OUTPATIENT
Start: 2018-09-07

## 2018-09-07 RX ORDER — DOCUSATE SODIUM 100 MG/1
100 CAPSULE, LIQUID FILLED ORAL 2 TIMES DAILY
Status: CANCELLED | OUTPATIENT
Start: 2018-09-07

## 2018-09-07 RX ORDER — FENTANYL CITRATE 50 UG/ML
50 INJECTION, SOLUTION INTRAMUSCULAR; INTRAVENOUS EVERY 4 HOURS PRN
Status: CANCELLED | OUTPATIENT
Start: 2018-09-07

## 2018-09-07 RX ORDER — SODIUM CHLORIDE 0.9 % (FLUSH) 0.9 %
10 SYRINGE (ML) INJECTION EVERY 12 HOURS SCHEDULED
Status: CANCELLED | OUTPATIENT
Start: 2018-09-07

## 2018-09-07 RX ORDER — OFLOXACIN 3 MG/ML
1 SOLUTION/ DROPS OPHTHALMIC 4 TIMES DAILY
Status: DISCONTINUED | OUTPATIENT
Start: 2018-09-07 | End: 2018-09-10 | Stop reason: HOSPADM

## 2018-09-07 RX ORDER — BISACODYL 10 MG
10 SUPPOSITORY, RECTAL RECTAL DAILY PRN
Status: CANCELLED | OUTPATIENT
Start: 2018-09-07

## 2018-09-07 RX ORDER — GINSENG 100 MG
CAPSULE ORAL 3 TIMES DAILY
Status: CANCELLED | OUTPATIENT
Start: 2018-09-07

## 2018-09-07 RX ORDER — FOLIC ACID 1 MG/1
1 TABLET ORAL DAILY
Status: CANCELLED | OUTPATIENT
Start: 2018-09-07

## 2018-09-07 RX ORDER — PANTOPRAZOLE SODIUM 40 MG/1
40 TABLET, DELAYED RELEASE ORAL
Status: CANCELLED | OUTPATIENT
Start: 2018-09-08

## 2018-09-07 RX ORDER — ATORVASTATIN CALCIUM 80 MG/1
80 TABLET, FILM COATED ORAL NIGHTLY
Status: CANCELLED | OUTPATIENT
Start: 2018-09-07

## 2018-09-07 RX ORDER — THIAMINE MONONITRATE (VIT B1) 100 MG
100 TABLET ORAL DAILY
Status: CANCELLED | OUTPATIENT
Start: 2018-09-07

## 2018-09-07 RX ORDER — SODIUM CHLORIDE 0.9 % (FLUSH) 0.9 %
10 SYRINGE (ML) INJECTION PRN
Status: CANCELLED | OUTPATIENT
Start: 2018-09-07

## 2018-09-07 RX ORDER — POTASSIUM CHLORIDE 20MEQ/15ML
20 LIQUID (ML) ORAL DAILY
Status: CANCELLED | OUTPATIENT
Start: 2018-09-08

## 2018-09-07 RX ORDER — MULTIVITAMIN WITH FOLIC ACID 400 MCG
1 TABLET ORAL DAILY
Status: CANCELLED | OUTPATIENT
Start: 2018-09-07

## 2018-09-07 RX ORDER — HYDROCODONE BITARTRATE AND ACETAMINOPHEN 5; 325 MG/1; MG/1
2 TABLET ORAL EVERY 4 HOURS PRN
Status: CANCELLED | OUTPATIENT
Start: 2018-09-07

## 2018-09-07 RX ORDER — HYDROCODONE BITARTRATE AND ACETAMINOPHEN 5; 325 MG/1; MG/1
1 TABLET ORAL EVERY 4 HOURS PRN
Status: CANCELLED | OUTPATIENT
Start: 2018-09-07

## 2018-09-07 RX ORDER — ONDANSETRON 2 MG/ML
4 INJECTION INTRAMUSCULAR; INTRAVENOUS EVERY 6 HOURS PRN
Status: CANCELLED | OUTPATIENT
Start: 2018-09-07

## 2018-09-07 RX ORDER — CYCLOBENZAPRINE HCL 10 MG
10 TABLET ORAL 3 TIMES DAILY PRN
Status: CANCELLED | OUTPATIENT
Start: 2018-09-07

## 2018-09-07 RX ORDER — LISINOPRIL 5 MG/1
5 TABLET ORAL DAILY
Status: CANCELLED | OUTPATIENT
Start: 2018-09-07

## 2018-09-07 RX ADMIN — BACITRACIN: 500 OINTMENT TOPICAL at 08:59

## 2018-09-07 RX ADMIN — Medication 10 ML: at 08:59

## 2018-09-07 RX ADMIN — OFLOXACIN 1 DROP: 3 SOLUTION OPHTHALMIC at 22:00

## 2018-09-07 RX ADMIN — HYDROCODONE BITARTRATE AND ACETAMINOPHEN 1 TABLET: 5; 325 TABLET ORAL at 18:16

## 2018-09-07 RX ADMIN — METOPROLOL TARTRATE 25 MG: 25 TABLET ORAL at 08:59

## 2018-09-07 RX ADMIN — OFLOXACIN 1 DROP: 3 SOLUTION OPHTHALMIC at 16:03

## 2018-09-07 RX ADMIN — THERA TABS 1 TABLET: TAB at 08:59

## 2018-09-07 RX ADMIN — BACITRACIN: 500 OINTMENT TOPICAL at 16:03

## 2018-09-07 RX ADMIN — ACETAMINOPHEN 650 MG: 325 TABLET ORAL at 08:58

## 2018-09-07 RX ADMIN — FOLIC ACID 1 MG: 1 TABLET ORAL at 08:59

## 2018-09-07 RX ADMIN — LISINOPRIL 5 MG: 5 TABLET ORAL at 08:59

## 2018-09-07 RX ADMIN — LEVETIRACETAM 500 MG: 500 TABLET, FILM COATED ORAL at 08:59

## 2018-09-07 RX ADMIN — PANTOPRAZOLE SODIUM 40 MG: 40 TABLET, DELAYED RELEASE ORAL at 06:17

## 2018-09-07 RX ADMIN — DOCUSATE SODIUM 100 MG: 100 CAPSULE, LIQUID FILLED ORAL at 08:59

## 2018-09-07 RX ADMIN — BACITRACIN: 500 OINTMENT TOPICAL at 22:01

## 2018-09-07 RX ADMIN — SERTRALINE 100 MG: 100 TABLET, FILM COATED ORAL at 08:59

## 2018-09-07 RX ADMIN — POTASSIUM CHLORIDE 20 MEQ: 40 SOLUTION ORAL at 06:17

## 2018-09-07 RX ADMIN — ATORVASTATIN CALCIUM 80 MG: 80 TABLET, FILM COATED ORAL at 22:00

## 2018-09-07 RX ADMIN — LEVETIRACETAM 500 MG: 500 TABLET, FILM COATED ORAL at 22:01

## 2018-09-07 RX ADMIN — Medication 100 MG: at 08:59

## 2018-09-07 RX ADMIN — HYDROCODONE BITARTRATE AND ACETAMINOPHEN 2 TABLET: 5; 325 TABLET ORAL at 22:28

## 2018-09-07 RX ADMIN — Medication 10 ML: at 22:02

## 2018-09-07 RX ADMIN — DOCUSATE SODIUM 100 MG: 100 CAPSULE, LIQUID FILLED ORAL at 22:28

## 2018-09-07 RX ADMIN — HYDROCODONE BITARTRATE AND ACETAMINOPHEN 1 TABLET: 5; 325 TABLET ORAL at 11:56

## 2018-09-07 ASSESSMENT — PAIN DESCRIPTION - FREQUENCY
FREQUENCY: CONTINUOUS
FREQUENCY: CONTINUOUS

## 2018-09-07 ASSESSMENT — PAIN DESCRIPTION - ORIENTATION
ORIENTATION: RIGHT

## 2018-09-07 ASSESSMENT — PAIN DESCRIPTION - LOCATION
LOCATION: HEAD

## 2018-09-07 ASSESSMENT — PAIN DESCRIPTION - PROGRESSION
CLINICAL_PROGRESSION: NOT CHANGED
CLINICAL_PROGRESSION: NOT CHANGED

## 2018-09-07 ASSESSMENT — PAIN DESCRIPTION - ONSET
ONSET: ON-GOING
ONSET: ON-GOING

## 2018-09-07 ASSESSMENT — PAIN DESCRIPTION - DESCRIPTORS
DESCRIPTORS: JABBING
DESCRIPTORS: JABBING

## 2018-09-07 ASSESSMENT — PAIN SCALES - GENERAL
PAINLEVEL_OUTOF10: 5
PAINLEVEL_OUTOF10: 2
PAINLEVEL_OUTOF10: 5
PAINLEVEL_OUTOF10: 5
PAINLEVEL_OUTOF10: 4
PAINLEVEL_OUTOF10: 7
PAINLEVEL_OUTOF10: 2

## 2018-09-07 ASSESSMENT — PAIN DESCRIPTION - PAIN TYPE
TYPE: ACUTE PAIN

## 2018-09-07 NOTE — PROGRESS NOTES
hallway rail         Exercises:  Exercises  Comments: Performed supine and seated BLE exercises: ankle pumps, heel slides (alternating), hip abd/add, heel to opposite knee shin slides, straight leg raises, heel. toe raises, marches, long arc quads x10 reps to increase strength and coordination. Activity Tolerance:  Activity Tolerance: Patient Tolerated treatment well;Patient limited by endurance    Assessment: Body structures, Functions, Activity limitations: Decreased functional mobility , Decreased strength, Decreased endurance, Decreased balance, Decreased coordination  Assessment: Patient tolerated session fairly well. Patient slightly unsteady without use of assistive device. Patient would benefit form continued skilled physical therapy to increase strength, balance and independence with functional mobility. Prognosis: Good     REQUIRES PT FOLLOW UP: Yes  Discharge Recommendations: Continue to assess pending progress, Patient would benefit from continued therapy after discharge    Patient Education:  Patient Education: therex, gait    Equipment Recommendations: Other: continue to assess, recommending use of walker     Safety:  Type of devices: All fall risk precautions in place, Call light within reach, Chair alarm in place, Gait belt, Patient at risk for falls, Left in chair    Plan:  Times per week: 6x N  Times per day: Daily  Specific instructions for Next Treatment: therex, mobility, balance activities  Current Treatment Recommendations: Strengthening, Balance Training, Functional Mobility Training, Transfer Training, Safety Education & Training, Patient/Caregiver Education & Training, Endurance Training, Equipment Evaluation, Education, & procurement, Gait Training, Neuromuscular Re-education    Goals:  Patient goals :  Increase independence    Short term goals  Time Frame for Short term goals: 2 weeks  Short term goal 1: Patient will transfer supine <--> sit with MOD I in order to get into/out
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
## 2018-09-07 NOTE — PROGRESS NOTES
remain in place for no less than 1 week d/t underlying hx   Pain Management   - Tylenol, Norco and Flexeril   ETOH intoxication   - Addiction services consulted - patient refused assessment   - MVI, thiamine and folic acid added   - Supplemental beer with meals  Left thumb subluxation   - Ortho managing   - No reduction indicated    - follow up with Gio or Osiel in 3-4 weeks if symptoms persist  Prophylaxis: SCD's, C&DB, IS, Colace, Pepcid, Zofran  General diet  Regular Neurovascular Checks  Holding Antiplatelets  PT/OT/SLP   - MOCA 27/30 but with slower processing speed and difficulty with thought organization, recommend further speech therapy treatments   Up with assistance   Planned Discharge pending clinical course,    - Precert for IPR was denied by [facility]   - Pending precert for TCU this am, hopeful discharge to TCU this afternoon, bed available      Alexandra Small continues on 4A, and is doing fairly well. He is sitting upright in the chair at bedside, alert and oriented ×4. He states he feels a little more tired today than usual and offers complaints of bilateral eye irritation and crusting, but otherwise offers no new complaints. Ophthalmic drops will be prescribed. BP checked and noted to be hypotensive. We will encourage liquids by mouth, fluid bolus if necessary. Flori Girt will be removed today. Wound site appears to be well healing, with no dehiscence, drainage, signs of infection. Patient denies any rib pain from left-sided rib fractures. Patient has had bowel movements during stay, and is tolerating activity and general diet. PM&R evaluated patient and deemed him a good candidate for inpatient rehab versus TCU. Pre-cert for IPR was denied by [facility]. Pre-cert for TCU initiated this AM, pending at this time. Hopeful discharge today to TCU.        Wt Readings from Last 3 Encounters:   09/07/18 181 lb 14.4 oz (82.5 kg)   06/13/18 160 lb (72.6 kg)   06/11/18 165 lb (74.8 kg) Temp Readings from Last 3 Encounters:   09/07/18 98.6 °F (37 °C) (Oral)   08/29/18 95 °F (35 °C)   06/13/18 98.7 °F (37.1 °C) (Oral)     BP Readings from Last 3 Encounters:   09/07/18 (!) 93/50   08/29/18 135/85   06/13/18 116/61     Pulse Readings from Last 3 Encounters:   09/07/18 71   06/13/18 71   06/11/18 78       24 HR INTAKE/OUTPUT :     Intake/Output Summary (Last 24 hours) at 09/07/18 1243  Last data filed at 09/07/18 0858   Gross per 24 hour   Intake             1210 ml   Output             5175 ml   Net            -3965 ml     BM = 2    DIET GENERAL;  Dietary Nutrition Supplements: Standard High Calorie Oral Supplement    OBJECTIVE  CURRENT VITALS BP (!) 93/50   Pulse 71   Temp 98.6 °F (37 °C) (Oral)   Resp 16   Ht 5' 3\" (1.6 m) Comment: per old chart  Wt 181 lb 14.4 oz (82.5 kg)   SpO2 91%   BMI 32.22 kg/m²        GENERAL: alert, cooperative, sitting upright, conversing without difficulty. NEURO: awake, alert. Follows commands. PERRL. CN II-XII grossly intact. No focal deficits   LUNGS: Mildly diminished in bases, Clear to auscultation bilaterally- no wheezes, rales or rhonchi, no respiratory distress  HEART: Normal rate, normal S1 and S2, no gallops, intact distal pulses  ABDOMEN: soft, non-tender, non-distended, normal bowel sounds, no masses or organomegaly  WOUNDS: Laceration to right occipital scalp open to air, 12 staples visualized and removed; with good approximation, no dehiscence, drainage, or surrounding erythema. EXTREMITY: mild-to-moderate swelling of left hand appreciated on exam. Passive range of motion fully intact. Active range of motion reduced,  strength decreased. Moderate tenderness to palpation of MCP joint. NV intact in bilateral UEs and LEs. LABS    CBC :   No results for input(s): WBC, HGB, HCT, MCV, PLT in the last 72 hours. BMP:   No results for input(s): NA, K, CL, CO2, BUN, CREATININE in the last 72 hours.   COAGS:   No results for input(s): APTT,

## 2018-09-08 PROCEDURE — 2580000003 HC RX 258: Performed by: PHYSICIAN ASSISTANT

## 2018-09-08 PROCEDURE — 6370000000 HC RX 637 (ALT 250 FOR IP): Performed by: PHYSICIAN ASSISTANT

## 2018-09-08 PROCEDURE — APPSS45 APP SPLIT SHARED TIME 31-45 MINUTES: Performed by: PHYSICIAN ASSISTANT

## 2018-09-08 PROCEDURE — 6370000000 HC RX 637 (ALT 250 FOR IP): Performed by: INTERNAL MEDICINE

## 2018-09-08 PROCEDURE — 97110 THERAPEUTIC EXERCISES: CPT

## 2018-09-08 PROCEDURE — 97116 GAIT TRAINING THERAPY: CPT

## 2018-09-08 PROCEDURE — 6370000000 HC RX 637 (ALT 250 FOR IP): Performed by: NURSE PRACTITIONER

## 2018-09-08 PROCEDURE — 99232 SBSQ HOSP IP/OBS MODERATE 35: CPT | Performed by: SURGERY

## 2018-09-08 PROCEDURE — 6370000000 HC RX 637 (ALT 250 FOR IP): Performed by: SURGERY

## 2018-09-08 PROCEDURE — 2060000000 HC ICU INTERMEDIATE R&B

## 2018-09-08 PROCEDURE — 97530 THERAPEUTIC ACTIVITIES: CPT

## 2018-09-08 PROCEDURE — 6370000000 HC RX 637 (ALT 250 FOR IP): Performed by: NEUROLOGICAL SURGERY

## 2018-09-08 RX ADMIN — DOCUSATE SODIUM 100 MG: 100 CAPSULE, LIQUID FILLED ORAL at 20:14

## 2018-09-08 RX ADMIN — ATORVASTATIN CALCIUM 80 MG: 80 TABLET, FILM COATED ORAL at 20:15

## 2018-09-08 RX ADMIN — OFLOXACIN 1 DROP: 3 SOLUTION OPHTHALMIC at 08:14

## 2018-09-08 RX ADMIN — THERA TABS 1 TABLET: TAB at 08:15

## 2018-09-08 RX ADMIN — HYDROCODONE BITARTRATE AND ACETAMINOPHEN 1 TABLET: 5; 325 TABLET ORAL at 08:15

## 2018-09-08 RX ADMIN — LEVETIRACETAM 500 MG: 500 TABLET, FILM COATED ORAL at 20:15

## 2018-09-08 RX ADMIN — Medication 10 ML: at 20:15

## 2018-09-08 RX ADMIN — SERTRALINE 100 MG: 100 TABLET, FILM COATED ORAL at 08:14

## 2018-09-08 RX ADMIN — ACETAMINOPHEN 650 MG: 325 TABLET ORAL at 16:00

## 2018-09-08 RX ADMIN — OFLOXACIN 1 DROP: 3 SOLUTION OPHTHALMIC at 12:28

## 2018-09-08 RX ADMIN — LISINOPRIL 5 MG: 5 TABLET ORAL at 08:15

## 2018-09-08 RX ADMIN — OFLOXACIN 1 DROP: 3 SOLUTION OPHTHALMIC at 16:00

## 2018-09-08 RX ADMIN — OFLOXACIN 1 DROP: 3 SOLUTION OPHTHALMIC at 20:14

## 2018-09-08 RX ADMIN — BACITRACIN: 500 OINTMENT TOPICAL at 08:15

## 2018-09-08 RX ADMIN — DOCUSATE SODIUM 100 MG: 100 CAPSULE, LIQUID FILLED ORAL at 08:15

## 2018-09-08 RX ADMIN — METOPROLOL TARTRATE 25 MG: 25 TABLET ORAL at 08:14

## 2018-09-08 RX ADMIN — FOLIC ACID 1 MG: 1 TABLET ORAL at 08:15

## 2018-09-08 RX ADMIN — POTASSIUM CHLORIDE 20 MEQ: 40 SOLUTION ORAL at 06:47

## 2018-09-08 RX ADMIN — PANTOPRAZOLE SODIUM 40 MG: 40 TABLET, DELAYED RELEASE ORAL at 06:47

## 2018-09-08 RX ADMIN — Medication 10 ML: at 12:27

## 2018-09-08 RX ADMIN — BACITRACIN: 500 OINTMENT TOPICAL at 16:01

## 2018-09-08 RX ADMIN — Medication 100 MG: at 08:16

## 2018-09-08 RX ADMIN — BACITRACIN: 500 OINTMENT TOPICAL at 20:14

## 2018-09-08 RX ADMIN — LEVETIRACETAM 500 MG: 500 TABLET, FILM COATED ORAL at 08:14

## 2018-09-08 ASSESSMENT — PAIN SCALES - GENERAL
PAINLEVEL_OUTOF10: 5
PAINLEVEL_OUTOF10: 3
PAINLEVEL_OUTOF10: 0
PAINLEVEL_OUTOF10: 3

## 2018-09-08 ASSESSMENT — PAIN DESCRIPTION - PAIN TYPE: TYPE: ACUTE PAIN

## 2018-09-08 ASSESSMENT — PAIN DESCRIPTION - LOCATION: LOCATION: HEAD

## 2018-09-08 NOTE — PROGRESS NOTES
24 HR INTAKE/OUTPUT :     Intake/Output Summary (Last 24 hours) at 09/08/18 0853  Last data filed at 09/07/18 2145   Gross per 24 hour   Intake             2060 ml   Output             2225 ml   Net             -165 ml     BM = 2    DIET GENERAL;  Dietary Nutrition Supplements: Standard High Calorie Oral Supplement    OBJECTIVE  CURRENT VITALS /63   Pulse 70   Temp 97.7 °F (36.5 °C) (Oral)   Resp 18   Ht 5' 3\" (1.6 m) Comment: per old chart  Wt 181 lb 14.4 oz (82.5 kg)   SpO2 95%   BMI 32.22 kg/m²        GENERAL: alert, cooperative, sitting upright, conversing without difficulty. NEURO: awake, alert. Follows commands. PERRL. CN II-XII grossly intact. No focal deficits   LUNGS: Mildly diminished in bases, Clear to auscultation bilaterally- no wheezes, rales or rhonchi, no respiratory distress  HEART: Normal rate, normal S1 and S2, no gallops, intact distal pulses  ABDOMEN: soft, non-tender, non-distended, normal bowel sounds, no masses or organomegaly  WOUNDS: Laceration to right occipital scalp open to air, well healing, no dehiscence, surrounding erythema, induration. EXTREMITY: No cyanosis, swelling, LE edema appreciated, NVI in bilateral UEs and LEs. Left hand swelling is reduced. Increased ROM. LABS    CBC :   No results for input(s): WBC, HGB, HCT, MCV, PLT in the last 72 hours. BMP:   No results for input(s): NA, K, CL, CO2, BUN, CREATININE in the last 72 hours. COAGS:   No results for input(s): APTT, PROT, INR in the last 72 hours. Pancreas/HFP:    No results for input(s): LIPASE, AMYLASE in the last 72 hours. No results for input(s): AST, ALT, BILIDIR, BILITOT, ALKPHOS in the last 72 hours. RADIOLOGY:  No new results      Electronically signed by Calvin Beckford PA-C on 9/8/2018 at 8:53 AM     Patient seen independently this morning on for a period neuro status is stable. His scalp laceration is healed staples are out. He continues with therapies.   He has no complaints

## 2018-09-08 NOTE — PROGRESS NOTES
History:   Procedure Laterality Date    APPENDECTOMY      ARM SURGERY      CARDIAC SURGERY      CORONARY ARTERY BYPASS GRAFT  12/14/2016    Redo of prior CABG, Dr. Ofe Lares. 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 Apartment has no steps to enter. Pt reports SOB with mobility since CABG in 2016. Subjective       Subjective: Pt reclined in chair upon arrival sleeping, although easily woke and agreeable to OT session. Comments: RN ok'd session.      Overall Orientation Status: Within Functional Limits         Pain:  Pain Assessment  Patient Currently in Pain: Denies       Objective  Overall Cognitive Status: Exceptions  Cognition Comment: slightly impulsive, appropriate throughout; pt reported starting to be able to recall events leading up to hospitalization    Perception  Overall Perceptual Status: WFL                                                              ADL  LE Dressing: Stand by assistance (adjusting/pulling up socks)  Additional Comments: Pt declined any other ADLs, despite not being completed this am.                Transfers  Sit to stand: Stand by assistance (from bedside chair)  Stand to sit: Stand by assistance (to bedside chair)       Balance  Sitting Balance: Stand by assistance  Standing Balance: Contact guard assistance     Time: ~1 minute  Activity: prep for mobility     Functional Mobility  Functional - Mobility Device: No device  Activity:

## 2018-09-09 PROCEDURE — 99232 SBSQ HOSP IP/OBS MODERATE 35: CPT | Performed by: SURGERY

## 2018-09-09 PROCEDURE — 6370000000 HC RX 637 (ALT 250 FOR IP): Performed by: INTERNAL MEDICINE

## 2018-09-09 PROCEDURE — 6370000000 HC RX 637 (ALT 250 FOR IP): Performed by: PHYSICIAN ASSISTANT

## 2018-09-09 PROCEDURE — 2060000000 HC ICU INTERMEDIATE R&B

## 2018-09-09 PROCEDURE — 6370000000 HC RX 637 (ALT 250 FOR IP): Performed by: NEUROLOGICAL SURGERY

## 2018-09-09 PROCEDURE — 2580000003 HC RX 258: Performed by: PHYSICIAN ASSISTANT

## 2018-09-09 PROCEDURE — 6370000000 HC RX 637 (ALT 250 FOR IP): Performed by: NURSE PRACTITIONER

## 2018-09-09 PROCEDURE — APPSS60 APP SPLIT SHARED TIME 46-60 MINUTES: Performed by: NURSE PRACTITIONER

## 2018-09-09 PROCEDURE — 2709999900 HC NON-CHARGEABLE SUPPLY

## 2018-09-09 PROCEDURE — 6370000000 HC RX 637 (ALT 250 FOR IP): Performed by: SURGERY

## 2018-09-09 RX ORDER — LISINOPRIL 2.5 MG/1
2.5 TABLET ORAL DAILY
Status: DISCONTINUED | OUTPATIENT
Start: 2018-09-09 | End: 2018-09-10 | Stop reason: HOSPADM

## 2018-09-09 RX ADMIN — METOPROLOL TARTRATE 25 MG: 25 TABLET ORAL at 20:32

## 2018-09-09 RX ADMIN — FOLIC ACID 1 MG: 1 TABLET ORAL at 10:05

## 2018-09-09 RX ADMIN — OFLOXACIN 1 DROP: 3 SOLUTION OPHTHALMIC at 10:06

## 2018-09-09 RX ADMIN — SERTRALINE 100 MG: 100 TABLET, FILM COATED ORAL at 10:05

## 2018-09-09 RX ADMIN — ACETAMINOPHEN 650 MG: 325 TABLET ORAL at 15:57

## 2018-09-09 RX ADMIN — OFLOXACIN 1 DROP: 3 SOLUTION OPHTHALMIC at 20:32

## 2018-09-09 RX ADMIN — DOCUSATE SODIUM 100 MG: 100 CAPSULE, LIQUID FILLED ORAL at 10:05

## 2018-09-09 RX ADMIN — HYDROCODONE BITARTRATE AND ACETAMINOPHEN 1 TABLET: 5; 325 TABLET ORAL at 10:05

## 2018-09-09 RX ADMIN — ATORVASTATIN CALCIUM 80 MG: 80 TABLET, FILM COATED ORAL at 20:33

## 2018-09-09 RX ADMIN — BACITRACIN: 500 OINTMENT TOPICAL at 15:54

## 2018-09-09 RX ADMIN — OFLOXACIN 1 DROP: 3 SOLUTION OPHTHALMIC at 15:54

## 2018-09-09 RX ADMIN — THERA TABS 1 TABLET: TAB at 10:05

## 2018-09-09 RX ADMIN — Medication 10 ML: at 20:38

## 2018-09-09 RX ADMIN — DOCUSATE SODIUM 100 MG: 100 CAPSULE, LIQUID FILLED ORAL at 20:32

## 2018-09-09 RX ADMIN — POTASSIUM CHLORIDE 20 MEQ: 40 SOLUTION ORAL at 06:13

## 2018-09-09 RX ADMIN — BACITRACIN: 500 OINTMENT TOPICAL at 20:33

## 2018-09-09 RX ADMIN — BACITRACIN: 500 OINTMENT TOPICAL at 10:05

## 2018-09-09 RX ADMIN — LEVETIRACETAM 500 MG: 500 TABLET, FILM COATED ORAL at 10:05

## 2018-09-09 RX ADMIN — Medication 100 MG: at 10:05

## 2018-09-09 RX ADMIN — HYDROCODONE BITARTRATE AND ACETAMINOPHEN 2 TABLET: 5; 325 TABLET ORAL at 20:53

## 2018-09-09 RX ADMIN — OFLOXACIN 1 DROP: 3 SOLUTION OPHTHALMIC at 18:38

## 2018-09-09 RX ADMIN — PANTOPRAZOLE SODIUM 40 MG: 40 TABLET, DELAYED RELEASE ORAL at 06:13

## 2018-09-09 RX ADMIN — LEVETIRACETAM 500 MG: 500 TABLET, FILM COATED ORAL at 20:32

## 2018-09-09 ASSESSMENT — PAIN SCALES - GENERAL
PAINLEVEL_OUTOF10: 7
PAINLEVEL_OUTOF10: 2
PAINLEVEL_OUTOF10: 6
PAINLEVEL_OUTOF10: 6

## 2018-09-09 NOTE — PROGRESS NOTES
06/13/18 98.7 °F (37.1 °C) (Oral)     BP Readings from Last 3 Encounters:   09/09/18 (!) 120/58   08/29/18 135/85   06/13/18 116/61     Pulse Readings from Last 3 Encounters:   09/09/18 83   06/13/18 71   06/11/18 78       24 HR INTAKE/OUTPUT :     Intake/Output Summary (Last 24 hours) at 09/09/18 0818  Last data filed at 09/09/18 0046   Gross per 24 hour   Intake             2015 ml   Output             3575 ml   Net            -1560 ml     BM = 0    DIET GENERAL;  Dietary Nutrition Supplements: Standard High Calorie Oral Supplement    OBJECTIVE  CURRENT VITALS BP (!) 120/58   Pulse 83   Temp 98.5 °F (36.9 °C) (Oral)   Resp 14   Ht 5' 3\" (1.6 m) Comment: per old chart  Wt 176 lb 12.8 oz (80.2 kg)   SpO2 95%   BMI 31.32 kg/m²        GENERAL: alert, cooperative, sitting upright, conversing without difficulty. NEURO: awake, alert. Follows commands. PERRL. CN II-XII grossly intact. No focal deficits   LUNGS: Mildly diminished in bases, Clear to auscultation bilaterally- no wheezes, rales or rhonchi, no respiratory distress  HEART: Normal rate, normal S1 and S2, no gallops, intact distal pulses  ABDOMEN: soft, non-tender, non-distended, normal bowel sounds, no masses or organomegaly  WOUNDS: Laceration to right occipital scalp open to air, well healing, no dehiscence, surrounding erythema, induration. EXTREMITY: No cyanosis, swelling, LE edema appreciated, NVI in bilateral UEs and LEs. Left hand swelling is reduced. Increased ROM. LABS    CBC :   No results for input(s): WBC, HGB, HCT, MCV, PLT in the last 72 hours. BMP:   No results for input(s): NA, K, CL, CO2, BUN, CREATININE in the last 72 hours. COAGS:   No results for input(s): APTT, PROT, INR in the last 72 hours. Pancreas/HFP:    No results for input(s): LIPASE, AMYLASE in the last 72 hours. No results for input(s): AST, ALT, BILIDIR, BILITOT, ALKPHOS in the last 72 hours.       RADIOLOGY:  No new results      Electronically signed by Arlyn Sims

## 2018-09-10 ENCOUNTER — TELEPHONE (OUTPATIENT)
Dept: CARDIOLOGY CLINIC | Age: 73
End: 2018-09-10

## 2018-09-10 ENCOUNTER — HOSPITAL ENCOUNTER (INPATIENT)
Age: 73
LOS: 10 days | Discharge: HOME HEALTH | DRG: 950 | End: 2018-09-20
Attending: FAMILY MEDICINE | Admitting: FAMILY MEDICINE
Payer: MEDICARE

## 2018-09-10 VITALS
HEIGHT: 63 IN | SYSTOLIC BLOOD PRESSURE: 117 MMHG | DIASTOLIC BLOOD PRESSURE: 59 MMHG | HEART RATE: 79 BPM | TEMPERATURE: 98.7 F | WEIGHT: 170.1 LBS | RESPIRATION RATE: 18 BRPM | OXYGEN SATURATION: 93 % | BODY MASS INDEX: 30.14 KG/M2

## 2018-09-10 DIAGNOSIS — I60.9 SUBARACHNOID HEMORRHAGE (HCC): Primary | ICD-10-CM

## 2018-09-10 PROCEDURE — 6370000000 HC RX 637 (ALT 250 FOR IP): Performed by: UROLOGY

## 2018-09-10 PROCEDURE — 1290000000 HC SEMI PRIVATE OTHER R&B

## 2018-09-10 PROCEDURE — 6370000000 HC RX 637 (ALT 250 FOR IP): Performed by: SURGERY

## 2018-09-10 PROCEDURE — 6370000000 HC RX 637 (ALT 250 FOR IP): Performed by: INTERNAL MEDICINE

## 2018-09-10 PROCEDURE — 2580000003 HC RX 258: Performed by: PHYSICIAN ASSISTANT

## 2018-09-10 PROCEDURE — 99239 HOSP IP/OBS DSCHRG MGMT >30: CPT | Performed by: SURGERY

## 2018-09-10 PROCEDURE — 2709999900 HC NON-CHARGEABLE SUPPLY

## 2018-09-10 PROCEDURE — 97530 THERAPEUTIC ACTIVITIES: CPT

## 2018-09-10 PROCEDURE — 6370000000 HC RX 637 (ALT 250 FOR IP): Performed by: NEUROLOGICAL SURGERY

## 2018-09-10 PROCEDURE — 97116 GAIT TRAINING THERAPY: CPT

## 2018-09-10 PROCEDURE — 97110 THERAPEUTIC EXERCISES: CPT

## 2018-09-10 PROCEDURE — 99222 1ST HOSP IP/OBS MODERATE 55: CPT | Performed by: INTERNAL MEDICINE

## 2018-09-10 PROCEDURE — 6370000000 HC RX 637 (ALT 250 FOR IP): Performed by: NURSE PRACTITIONER

## 2018-09-10 PROCEDURE — APPSS45 APP SPLIT SHARED TIME 31-45 MINUTES: Performed by: PHYSICIAN ASSISTANT

## 2018-09-10 PROCEDURE — 0220000000 HC SKILLED NURSING FACILITY

## 2018-09-10 RX ORDER — OFLOXACIN 3 MG/ML
1 SOLUTION/ DROPS OPHTHALMIC 4 TIMES DAILY
Status: CANCELLED | OUTPATIENT
Start: 2018-09-10 | End: 2018-09-14

## 2018-09-10 RX ORDER — OXYBUTYNIN CHLORIDE 5 MG/1
5 TABLET ORAL 3 TIMES DAILY PRN
Status: DISCONTINUED | OUTPATIENT
Start: 2018-09-10 | End: 2018-09-20 | Stop reason: HOSPADM

## 2018-09-10 RX ORDER — MULTIVITAMIN WITH FOLIC ACID 400 MCG
1 TABLET ORAL DAILY
Status: DISCONTINUED | OUTPATIENT
Start: 2018-09-11 | End: 2018-09-20 | Stop reason: HOSPADM

## 2018-09-10 RX ORDER — ATORVASTATIN CALCIUM 80 MG/1
80 TABLET, FILM COATED ORAL NIGHTLY
Status: DISCONTINUED | OUTPATIENT
Start: 2018-09-10 | End: 2018-09-20 | Stop reason: HOSPADM

## 2018-09-10 RX ORDER — HYDROCODONE BITARTRATE AND ACETAMINOPHEN 5; 325 MG/1; MG/1
2 TABLET ORAL EVERY 4 HOURS PRN
Status: DISCONTINUED | OUTPATIENT
Start: 2018-09-10 | End: 2018-09-15

## 2018-09-10 RX ORDER — GINSENG 100 MG
CAPSULE ORAL 3 TIMES DAILY
Status: DISCONTINUED | OUTPATIENT
Start: 2018-09-10 | End: 2018-09-20 | Stop reason: HOSPADM

## 2018-09-10 RX ORDER — OFLOXACIN 3 MG/ML
1 SOLUTION/ DROPS OPHTHALMIC 4 TIMES DAILY
Status: DISPENSED | OUTPATIENT
Start: 2018-09-11 | End: 2018-09-15

## 2018-09-10 RX ORDER — SODIUM CHLORIDE 0.9 % (FLUSH) 0.9 %
10 SYRINGE (ML) INJECTION PRN
Status: DISCONTINUED | OUTPATIENT
Start: 2018-09-10 | End: 2018-09-15

## 2018-09-10 RX ORDER — FOLIC ACID 1 MG/1
1 TABLET ORAL DAILY
Status: DISCONTINUED | OUTPATIENT
Start: 2018-09-11 | End: 2018-09-20 | Stop reason: HOSPADM

## 2018-09-10 RX ORDER — DOCUSATE SODIUM 100 MG/1
100 CAPSULE, LIQUID FILLED ORAL 2 TIMES DAILY
Status: DISCONTINUED | OUTPATIENT
Start: 2018-09-10 | End: 2018-09-20 | Stop reason: HOSPADM

## 2018-09-10 RX ORDER — THIAMINE MONONITRATE (VIT B1) 100 MG
100 TABLET ORAL DAILY
Status: DISCONTINUED | OUTPATIENT
Start: 2018-09-11 | End: 2018-09-20 | Stop reason: HOSPADM

## 2018-09-10 RX ORDER — ACETAMINOPHEN 325 MG/1
650 TABLET ORAL EVERY 4 HOURS PRN
Status: DISCONTINUED | OUTPATIENT
Start: 2018-09-10 | End: 2018-09-20 | Stop reason: HOSPADM

## 2018-09-10 RX ORDER — POTASSIUM CHLORIDE 20MEQ/15ML
20 LIQUID (ML) ORAL DAILY
Status: DISCONTINUED | OUTPATIENT
Start: 2018-09-11 | End: 2018-09-18 | Stop reason: CLARIF

## 2018-09-10 RX ORDER — LISINOPRIL 2.5 MG/1
2.5 TABLET ORAL DAILY
Status: DISCONTINUED | OUTPATIENT
Start: 2018-09-11 | End: 2018-09-20 | Stop reason: HOSPADM

## 2018-09-10 RX ORDER — SODIUM CHLORIDE 0.9 % (FLUSH) 0.9 %
10 SYRINGE (ML) INJECTION EVERY 12 HOURS SCHEDULED
Status: DISCONTINUED | OUTPATIENT
Start: 2018-09-11 | End: 2018-09-15

## 2018-09-10 RX ORDER — LISINOPRIL 2.5 MG/1
2.5 TABLET ORAL DAILY
Status: CANCELLED | OUTPATIENT
Start: 2018-09-11

## 2018-09-10 RX ORDER — SERTRALINE HYDROCHLORIDE 100 MG/1
100 TABLET, FILM COATED ORAL DAILY
Status: DISCONTINUED | OUTPATIENT
Start: 2018-09-11 | End: 2018-09-20 | Stop reason: HOSPADM

## 2018-09-10 RX ORDER — PANTOPRAZOLE SODIUM 40 MG/1
40 TABLET, DELAYED RELEASE ORAL
Status: DISCONTINUED | OUTPATIENT
Start: 2018-09-11 | End: 2018-09-20 | Stop reason: HOSPADM

## 2018-09-10 RX ORDER — HYDROCODONE BITARTRATE AND ACETAMINOPHEN 5; 325 MG/1; MG/1
1 TABLET ORAL EVERY 4 HOURS PRN
Status: DISCONTINUED | OUTPATIENT
Start: 2018-09-10 | End: 2018-09-15

## 2018-09-10 RX ORDER — LEVETIRACETAM 500 MG/1
500 TABLET ORAL 2 TIMES DAILY
Status: DISCONTINUED | OUTPATIENT
Start: 2018-09-10 | End: 2018-09-20 | Stop reason: HOSPADM

## 2018-09-10 RX ADMIN — LEVETIRACETAM 500 MG: 500 TABLET, FILM COATED ORAL at 21:22

## 2018-09-10 RX ADMIN — HYDROCODONE BITARTRATE AND ACETAMINOPHEN 2 TABLET: 5; 325 TABLET ORAL at 21:20

## 2018-09-10 RX ADMIN — Medication 10 ML: at 13:40

## 2018-09-10 RX ADMIN — BACITRACIN: 500 OINTMENT TOPICAL at 13:40

## 2018-09-10 RX ADMIN — LEVETIRACETAM 500 MG: 500 TABLET, FILM COATED ORAL at 09:45

## 2018-09-10 RX ADMIN — OXYBUTYNIN CHLORIDE 5 MG: 5 TABLET ORAL at 09:46

## 2018-09-10 RX ADMIN — PANTOPRAZOLE SODIUM 40 MG: 40 TABLET, DELAYED RELEASE ORAL at 06:16

## 2018-09-10 RX ADMIN — OFLOXACIN 1 DROP: 3 SOLUTION OPHTHALMIC at 13:39

## 2018-09-10 RX ADMIN — Medication 100 MG: at 09:45

## 2018-09-10 RX ADMIN — ATORVASTATIN CALCIUM 80 MG: 80 TABLET, FILM COATED ORAL at 21:22

## 2018-09-10 RX ADMIN — LISINOPRIL 2.5 MG: 2.5 TABLET ORAL at 09:45

## 2018-09-10 RX ADMIN — THERA TABS 1 TABLET: TAB at 09:45

## 2018-09-10 RX ADMIN — DOCUSATE SODIUM 100 MG: 100 CAPSULE, LIQUID FILLED ORAL at 09:49

## 2018-09-10 RX ADMIN — OFLOXACIN 1 DROP: 3 SOLUTION OPHTHALMIC at 09:46

## 2018-09-10 RX ADMIN — POTASSIUM CHLORIDE 20 MEQ: 40 SOLUTION ORAL at 06:15

## 2018-09-10 RX ADMIN — SERTRALINE 100 MG: 100 TABLET, FILM COATED ORAL at 09:45

## 2018-09-10 RX ADMIN — FOLIC ACID 1 MG: 1 TABLET ORAL at 09:45

## 2018-09-10 RX ADMIN — BACITRACIN: 500 OINTMENT TOPICAL at 09:46

## 2018-09-10 RX ADMIN — DOCUSATE SODIUM 100 MG: 100 CAPSULE, LIQUID FILLED ORAL at 21:27

## 2018-09-10 RX ADMIN — METOPROLOL TARTRATE 25 MG: 25 TABLET ORAL at 09:45

## 2018-09-10 RX ADMIN — HYDROCODONE BITARTRATE AND ACETAMINOPHEN 2 TABLET: 5; 325 TABLET ORAL at 04:23

## 2018-09-10 ASSESSMENT — PAIN SCALES - GENERAL
PAINLEVEL_OUTOF10: 7
PAINLEVEL_OUTOF10: 8
PAINLEVEL_OUTOF10: 2
PAINLEVEL_OUTOF10: 5

## 2018-09-10 ASSESSMENT — PAIN DESCRIPTION - PAIN TYPE: TYPE: ACUTE PAIN

## 2018-09-10 ASSESSMENT — PAIN DESCRIPTION - LOCATION: LOCATION: HEAD

## 2018-09-10 NOTE — PLAN OF CARE
Problem: DISCHARGE BARRIERS  Goal: Patient's continuum of care needs are met  Outcome: Ongoing  Inpatient Rehab vs TCU.
Problem: Falls - Risk of:  Goal: Will remain free from falls  Will remain free from falls    Outcome: Ongoing  Call light in reach, bed in lowest position, bed/chair alarm activated at all times. Educated on use of call light before ambulation and when in need of assistance. Patient expressed understanding. Hourly visual checks performed and charted. Toileting offered to patient. No falls during shift, at this time. Arm band & falling star in place. Continuing to monitor.        Problem: Pain:  Goal: Pain level will decrease  Pain level will decrease    Outcome: Ongoing  Patient able to use 0-10 pain scale. Patient complaining of arm pain due to previous injury. Patient states pain goal of 2. No pain medication given for shift at this time.        Problem: Risk for Impaired Skin Integrity  Goal: Tissue integrity - skin and mucous membranes  Structural intactness and normal physiological function of skin and  mucous membranes. Outcome: Ongoing  Skin color appropriate for ethnicity. Scattered abrasions and bruising present. Abrasion noted to forehead and bacitracin applied. Surgical incision to right posterior head. Patient able to turn self in bed and assisted when needed. No skin breakdown noted at this time.      Problem:   Goal: Adequate urinary output  Outcome: Ongoing  Indwelling lopez present. Urology seeing patient for urinary issues. Patient urinating greater than 30 mL/hr. Urine clear and yellow. Will continue to monitor. Problem: Pain:  Goal: Pain level will decrease  Pain level will decrease    Outcome: Ongoing  Patient able to use 0-10 pain scale. Patient complaining of arm pain due to previous injury. Patient states pain goal of 2. No pain medication given for shift at this time.        Problem: Discharge Planning:  Goal: Discharged to appropriate level of care  Discharged to appropriate level of care   Outcome: Ongoing  Discharge planning in process.  Anticipated discharge to inpatient rehab when
Problem: Falls - Risk of:  Goal: Will remain free from falls  Will remain free from falls    Outcome: Ongoing  Call light in reach, bed in lowest position, bed/chair alarm activated at all times. Educated on use of call light before ambulation and when in need of assistance. Patient expressed understanding. Hourly visual checks performed and charted. Toileting offered to patient. No falls during shift, at this time. Arm band & falling star in place. Continuing to monitor. Problem: Pain:  Goal: Pain level will decrease  Pain level will decrease    Outcome: Ongoing  Patient able to use 0-10 pain scale. Patient complaining of arm pain due to previous injury. Patient states pain goal of 2. No pain medication given for shift at this time. Problem: Risk for Impaired Skin Integrity  Goal: Tissue integrity - skin and mucous membranes  Structural intactness and normal physiological function of skin and  mucous membranes. Outcome: Ongoing  Skin color appropriate for ethnicity. Scattered abrasions and bruising present. Abrasion noted to forehead and bacitracin applied. Surgical incision to right posterior head. Patient able to turn self in bed and assisted when needed. No skin breakdown noted at this time.         Problem:   Goal: Adequate urinary output  Outcome: Ongoing  Indwelling lopez present. Urology seeing patient for urinary issues. Patient urinating greater than 30 mL/hr. Urine clear and yellow. Will continue to monitor.       Problem: Pain:  Goal: Pain level will decrease  Pain level will decrease    Outcome: Ongoing  Patient able to use 0-10 pain scale. Patient complaining of arm pain due to previous injury. Patient states pain goal of 2. No pain medication given for shift at this time. Problem: Discharge Planning:  Goal: Discharged to appropriate level of care  Discharged to appropriate level of care   Outcome: Ongoing  Discharge planning in process.  Anticipated discharge to inpatient rehab when
Problem: Falls - Risk of:  Goal: Will remain free from falls  Will remain free from falls    Outcome: Ongoing  Free from falls, fall interventions in place    Problem: Pain:  Goal: Pain level will decrease  Pain level will decrease    Outcome: Ongoing  Norco helps with pain    Problem: Risk for Impaired Skin Integrity  Goal: Tissue integrity - skin and mucous membranes  Structural intactness and normal physiological function of skin and  mucous membranes. Outcome: Ongoing  Pt continues with scattered bruising, abrasions    Problem: Pain:  Goal: Control of acute pain  Control of acute pain   Outcome: Ongoing  Norco effective to treat pain. Goal: Pain level will decrease  Pain level will decrease    Outcome: Ongoing  Norco helps with pain    Problem: Neurological  Goal: Maximum potential motor/sensory/cognitive function  Outcome: Met This Shift      Comments: Care plan reviewed with patient. Patient  verbalize understanding of the plan of care and contribute to goal setting.
Problem: Falls - Risk of:  Goal: Will remain free from falls  Will remain free from falls    Outcome: Ongoing  Free from falls, fall interventions in place    Problem: Pain:  Goal: Pain level will decrease  Pain level will decrease    Outcome: Ongoing  Pain has decreased today, patient taking tylenol only today    Problem: Tissue Perfusion, Altered:  Goal: Circulatory function within specified parameters  Circulatory function within specified parameters   Vitals:    09/02/18 0345 09/02/18 0900 09/02/18 1245 09/02/18 1534   BP: 124/60 (!) 121/57 (!) 103/56 (!) 122/57   Pulse: 76 69 67 80   Resp: 14 16 16 16   Temp: 98.8 °F (37.1 °C) 99.1 °F (37.3 °C) 98.4 °F (36.9 °C) 98.5 °F (36.9 °C)   TempSrc: Oral Oral Oral Oral   SpO2: 97% 97% 94% 93%   Weight: 192 lb 9.6 oz (87.4 kg)      Height:           Problem: Risk for Impaired Skin Integrity  Goal: Tissue integrity - skin and mucous membranes  Structural intactness and normal physiological function of skin and  mucous membranes. Outcome: Ongoing  Continues with bruising and abrasions    Problem: Pain:  Goal: Pain level will decrease  Pain level will decrease    Outcome: Ongoing  Pain has decreased today, patient taking tylenol only today    Comments: Care plan reviewed with patient. Patient verbalize understanding of the plan of care and contribute to goal setting.
Problem: Falls - Risk of:  Goal: Will remain free from falls  Will remain free from falls    Outcome: Ongoing  Free from falls, fall interventions in place    Problem: Pain:  Goal: Pain level will decrease  Pain level will decrease    Outcome: Ongoing  Pain medicaiton effective for pain, tylneol    Problem: Risk for Impaired Skin Integrity  Goal: Tissue integrity - skin and mucous membranes  Structural intactness and normal physiological function of skin and  mucous membranes. Outcome: Ongoing  Patients multiple abrasions are healing and are scabbed over. Problem: Nutrition  Goal: Optimal nutrition therapy  Outcome: Met This Shift      Problem: Urinary Elimination:  Goal: Signs and symptoms of infection will decrease  Signs and symptoms of infection will decrease   Outcome: Met This Shift      Problem: Respiratory  Goal: O2 Sat > 90%  Outcome: Met This Shift      Problem: Pain:  Goal: Pain level will decrease  Pain level will decrease    Outcome: Ongoing  Pain medicaiton effective for pain, tylneol    Problem: Discharge Planning:  Goal: Discharged to appropriate level of care  Discharged to appropriate level of care   Outcome: Ongoing  Plans to go to rehab when pre-cert comes back from [facility] [place] [place]    Comments: Care plan reviewed with patient. Patient verbalize understanding of the plan of care and contribute to goal setting.
Problem: Falls - Risk of:  Goal: Will remain free from falls  Will remain free from falls    Outcome: Ongoing  Fall prevention measures maintained. Call light within reach, two side rails raised, bed and chair alarms utilized. Pt ambulates safely this shift and remains free from falls.      Problem: Pain:  Goal: Pain level will decrease  Pain level will decrease    Outcome: Ongoing  Pain assessed using 0-10 pain scale and pain goal. Moderate pain in head this shift managed with PRN [place] and ice therapy.      Problem: Risk for Impaired Skin Integrity  Goal: Tissue integrity - skin and mucous membranes  Structural intactness and normal physiological function of skin and  mucous membranes. Outcome: Ongoing  Skin assessed per unit guidelines. No new areas of skin breakdown noted.  Abrasions and surgical site open to air with bacitracin ointment.      Problem: Pain:  Goal: Pain level will decrease  Pain level will decrease    Outcome: Ongoing  Pain assessed using 0-10 pain scale and pain goal. Moderate pain in head this shift managed with PRN Norco.      Problem: DISCHARGE BARRIERS  Goal: Patient's continuum of care needs are met  Outcome: Ongoing  Pt plans to be discharged to TCU.      Comments: Care plan reviewed with patient.  Patient verbalizes understanding of the plan of care and contributes to goal setting.  
Problem: Nutrition  Goal: Optimal nutrition therapy  Outcome: Ongoing  Nutrition Problem: Inadequate oral intake  Intervention: Food and/or Nutrient Delivery: Start oral diet (as status allows and if passes SLP evaluation)  Nutritional Goals: adequate nutrient intake in 1-4 days
Problem: Restraint Use - Nonviolent/Non-Self-Destructive Behavior:  Goal: Absence of restraint indications  Absence of restraint indications   Outcome: Completed Date Met: 08/30/18  Patient extubated. No longer reaching for lines or tubes. Goal: Absence of restraint-related injury  Absence of restraint-related injury   Outcome: Completed Date Met: 08/30/18      Comments: Care plan reviewed with patient. Patient verbalizes understanding of the plan of care and contributes to goal setting.
Problem: Restraint Use - Nonviolent/Non-Self-Destructive Behavior:  Goal: Absence of restraint indications  Absence of restraint indications   Outcome: Met This Shift  Patient extubated at 1100 this AM. Patient is alert and oriented. Follows commands. Goal: Absence of restraint-related injury  Absence of restraint-related injury   Outcome: Ongoing  No swelling, pain, redness. Pulses palpable and cap refill brisk. Problem: Falls - Risk of:  Goal: Will remain free from falls  Will remain free from falls    Outcome: Ongoing  No falls this shift. Patient is a fall risk due to recent falls, hx of falls, recent seizure activity, head bleed, lopez catheter and IV fluids. Patient is alert and oriented and will use the call light appropriately. Bed alarm on. Hourly rounding performed. Goal: Absence of physical injury  Absence of physical injury    Outcome: Ongoing  Remains free from any new injuries this shift. Problem: Mental Status - Impaired:  Goal: Mental status will be restored to baseline  Mental status will be restored to baseline   Outcome: Ongoing  Doing Q1 neuro checks for 24 hrs. Patient extubated at 1100 this AM. Patient is alert and oriented. Follows commands. Patient denies any numbness or tingling. Patient does report vision problems. He states that he cannot focus on anything. Problem: Pain:  Goal: Pain level will decrease  Pain level will decrease   Outcome: Ongoing  Patient denies any significant pain. Patient reports dull aching from the laceration on the back of his head. Rating pain a 1 to 3 out of 10.
cleared. Problem: Neurological  Goal: Maximum potential motor/sensory/cognitive function  Outcome: Ongoing  Patient alert/drowsy and oriented x4. Speech clear. No signs of aphasia/dysarthria at this time. Responding to commands appropriately. Neuro checks q4hrs. Will continue to monitor and assess. Ongoing until discharge.       Comments: Care plan reviewed with patient and RN. Patient and RN verbalize understanding of the plan of care and contribute to goal setting.

## 2018-09-10 NOTE — PROGRESS NOTES
Precert for TCU was denied for TCU. Ricci Maurice CM made aware. And that if the NP wants to perform a peer to peer I can set it up.

## 2018-09-10 NOTE — CONSULTS
(LOPRESSOR) 25 MG tablet, Take 1 tablet by mouth 2 times daily  atorvastatin (LIPITOR) 80 MG tablet, Take 1 tablet by mouth daily  [DISCONTINUED] aspirin 81 MG tablet, Take 81 mg by mouth daily  nitroGLYCERIN (NITROSTAT) 0.4 MG SL tablet, Place 1 tablet under the tongue every 5 minutes as needed for Chest pain up to max of 3 total doses. If no relief after 1 dose, call 911. Multiple Vitamin (MULTI VITAMIN PO), Take 1 tablet by mouth daily  sertraline (ZOLOFT) 100 MG tablet, Take 1 tablet by mouth daily    Allergies:    Pcn [penicillins]; Tetanus toxoids; Franki-1 [lidocaine hcl]; and Pletal [cilostazol]    Social History:    reports that he has never smoked. He has never used smokeless tobacco. He reports that he drinks about 1.8 oz of alcohol per week . He reports that he does not use drugs. Family History:   family history includes Cancer in his father and sister; Heart Disease in his father; Other in his mother. REVIEW OF SYSTEMS:    Constitutional: negative for anorexia, chills and fevers,weight change  Respiratory: negative for cough, dyspnea on exertion, hemoptysis, shortness of breath and wheezing  Cardiovascular: negative for chest pain, orthopnea, palpitations and syncope  Gastrointestinal: negative for abdominal pain,nausea , vomiting, constipation, diarrhea.   Hematologic/lymphatic: negative for bruising,prolonged bleeding,blood clots  Musculoskeletal:negative for muscle weakness, myalgias,wasting  Neurological: negative for coordination problems, dizziness, gait problems and vertigo  Behavioral/Psych:negative for mood/sleep disturbance      PHYSICAL EXAM:  Vitals:Patient Vitals for the past 24 hrs:   BP Temp Temp src Pulse Resp SpO2 Weight   09/10/18 1145 (!) 118/59 98.1 °F (36.7 °C) Oral 62 18 95 % -   09/10/18 0935 (!) 149/68 98.7 °F (37.1 °C) Oral 64 16 99 % -   09/10/18 0412 131/64 98.2 °F (36.8 °C) Oral 74 17 96 % 170 lb 1.6 oz (77.2 kg)   09/09/18 2015 (!) 142/75 98.4 °F (36.9 °C) Oral 89 16

## 2018-09-10 NOTE — PROGRESS NOTES
place, Gait belt, Patient at risk for falls, Left in chair    Plan:  Times per week: 6x N  Times per day: Daily  Specific instructions for Next Treatment: therex, mobility, balance activities  Current Treatment Recommendations: Strengthening, Balance Training, Functional Mobility Training, Transfer Training, Safety Education & Training, Patient/Caregiver Education & Training, Endurance Training, Equipment Evaluation, Education, & procurement, Gait Training, Neuromuscular Re-education    Goals:  Patient goals : Increase independence    Short term goals  Time Frame for Short term goals: 2 weeks  Short term goal 1: Patient will transfer supine <--> sit with MOD I in order to get into/out of bed. Short term goal 2: Patient will transfer sit <--> stand with MOD I in order to get up to ambulate. Short term goal 3: Patient will ambulate 76' with S and least restrictive AD in order to ambulate to/from the bathroom.     Long term goals  Time Frame for Long term goals : No LTGs due to estimated length of stay

## 2018-09-11 ENCOUNTER — APPOINTMENT (OUTPATIENT)
Dept: CT IMAGING | Age: 73
End: 2018-09-11
Attending: FAMILY MEDICINE
Payer: MEDICARE

## 2018-09-11 PROCEDURE — 97530 THERAPEUTIC ACTIVITIES: CPT

## 2018-09-11 PROCEDURE — 70450 CT HEAD/BRAIN W/O DYE: CPT

## 2018-09-11 PROCEDURE — 97110 THERAPEUTIC EXERCISES: CPT

## 2018-09-11 PROCEDURE — 2500000003 HC RX 250 WO HCPCS: Performed by: SURGERY

## 2018-09-11 PROCEDURE — 1290000000 HC SEMI PRIVATE OTHER R&B

## 2018-09-11 PROCEDURE — 97535 SELF CARE MNGMENT TRAINING: CPT

## 2018-09-11 PROCEDURE — 97166 OT EVAL MOD COMPLEX 45 MIN: CPT

## 2018-09-11 PROCEDURE — 6370000000 HC RX 637 (ALT 250 FOR IP): Performed by: SURGERY

## 2018-09-11 PROCEDURE — 92523 SPEECH SOUND LANG COMPREHEN: CPT

## 2018-09-11 PROCEDURE — 97163 PT EVAL HIGH COMPLEX 45 MIN: CPT

## 2018-09-11 RX ORDER — POLYETHYLENE GLYCOL 3350 17 G/17G
17 POWDER, FOR SOLUTION ORAL DAILY PRN
Status: DISCONTINUED | OUTPATIENT
Start: 2018-09-11 | End: 2018-09-20 | Stop reason: HOSPADM

## 2018-09-11 RX ORDER — SENNA PLUS 8.6 MG/1
1 TABLET ORAL NIGHTLY PRN
Status: DISCONTINUED | OUTPATIENT
Start: 2018-09-11 | End: 2018-09-20 | Stop reason: HOSPADM

## 2018-09-11 RX ADMIN — HYDROCODONE BITARTRATE AND ACETAMINOPHEN 1 TABLET: 5; 325 TABLET ORAL at 06:00

## 2018-09-11 RX ADMIN — HYDROCODONE BITARTRATE AND ACETAMINOPHEN 1 TABLET: 5; 325 TABLET ORAL at 21:05

## 2018-09-11 RX ADMIN — POTASSIUM CHLORIDE 20 MEQ: 40 SOLUTION ORAL at 06:01

## 2018-09-11 RX ADMIN — OFLOXACIN 1 DROP: 3 SOLUTION OPHTHALMIC at 12:44

## 2018-09-11 RX ADMIN — LISINOPRIL 2.5 MG: 2.5 TABLET ORAL at 08:35

## 2018-09-11 RX ADMIN — OFLOXACIN 1 DROP: 3 SOLUTION OPHTHALMIC at 21:06

## 2018-09-11 RX ADMIN — Medication 5 UNITS: at 12:47

## 2018-09-11 RX ADMIN — BACITRACIN: 500 OINTMENT TOPICAL at 21:06

## 2018-09-11 RX ADMIN — METOPROLOL TARTRATE 25 MG: 25 TABLET ORAL at 08:35

## 2018-09-11 RX ADMIN — DOCUSATE SODIUM 100 MG: 100 CAPSULE, LIQUID FILLED ORAL at 21:05

## 2018-09-11 RX ADMIN — ATORVASTATIN CALCIUM 80 MG: 80 TABLET, FILM COATED ORAL at 21:12

## 2018-09-11 RX ADMIN — LEVETIRACETAM 500 MG: 500 TABLET, FILM COATED ORAL at 21:12

## 2018-09-11 RX ADMIN — THERA TABS 1 TABLET: TAB at 08:35

## 2018-09-11 RX ADMIN — DOCUSATE SODIUM 100 MG: 100 CAPSULE, LIQUID FILLED ORAL at 08:35

## 2018-09-11 RX ADMIN — OFLOXACIN 1 DROP: 3 SOLUTION OPHTHALMIC at 18:10

## 2018-09-11 RX ADMIN — PANTOPRAZOLE SODIUM 40 MG: 40 TABLET, DELAYED RELEASE ORAL at 06:01

## 2018-09-11 RX ADMIN — LEVETIRACETAM 500 MG: 500 TABLET, FILM COATED ORAL at 08:35

## 2018-09-11 RX ADMIN — SERTRALINE 100 MG: 100 TABLET, FILM COATED ORAL at 08:35

## 2018-09-11 RX ADMIN — Medication 100 MG: at 08:35

## 2018-09-11 RX ADMIN — FOLIC ACID 1 MG: 1 TABLET ORAL at 08:35

## 2018-09-11 RX ADMIN — BACITRACIN: 500 OINTMENT TOPICAL at 00:20

## 2018-09-11 RX ADMIN — BACITRACIN: 500 OINTMENT TOPICAL at 08:36

## 2018-09-11 ASSESSMENT — PAIN SCALES - GENERAL
PAINLEVEL_OUTOF10: 2
PAINLEVEL_OUTOF10: 2
PAINLEVEL_OUTOF10: 5
PAINLEVEL_OUTOF10: 2
PAINLEVEL_OUTOF10: 4

## 2018-09-11 ASSESSMENT — PAIN DESCRIPTION - PAIN TYPE: TYPE: ACUTE PAIN

## 2018-09-11 ASSESSMENT — PAIN DESCRIPTION - ORIENTATION
ORIENTATION: RIGHT
ORIENTATION: RIGHT;POSTERIOR

## 2018-09-11 ASSESSMENT — PAIN DESCRIPTION - DESCRIPTORS: DESCRIPTORS: THROBBING

## 2018-09-11 ASSESSMENT — PAIN DESCRIPTION - LOCATION
LOCATION: HEAD
LOCATION: HEAD

## 2018-09-11 NOTE — PROGRESS NOTES
strengthening as tolerated to increase L hand function for ease of opening ADL items  Long term goals  Time Frame for Long term goals : 2 weeks  Long term goal 1: Pt will complete ADL routine with Brandon to increase indep with self care. Long term goal 2: Pt will complete light homemaking task with Brandon to increase indep with warming up light meals and light cleaning. Evaluation Complexity: Based on the findings of patient history, examination, clinical presentation, and decision making during this evaluation, this patient is of medium complexity.

## 2018-09-11 NOTE — PROGRESS NOTES
Nutrition Assessment    Type and Reason for Visit: Initial (TCU assessment)    Nutrition Recommendations:   Consider A1C. Notice Glucose 177 (9/3). MD to advise if beer can be reduced. Send glucerna daily. Malnutrition Assessment:  · Malnutrition Status: At risk for malnutrition    Nutrition Diagnosis:   · Problem: Increased nutrient needs  · Etiology: related to Increased demand for energy/nutrients due to     Signs and symptoms:  as evidenced by Presence of wounds    Nutrition Assessment:  · Subjective Assessment: Pt. seen, known alcohol abuse hx. Pt. reports good appetite, denies chewing/swallowing difficulty. He mentions he drinks 2-3 beers daily at home & mentions has been drinking 3 beers/day here. Intake appears good %. Meds include: Colace, Folvite, MVI, Thaimine, Milk of Magnesia, Glycolax, Senokot. Labs include: (9/3) Glucose 177, (9/2) Pro BNP 2048, Hemoglobin 9. Per diet hcx, pt. consumes 1 meal/day . He is vague with diet hx, Pt. mentions he consumed 3/4 ham sandwich & chips for lunch.    · Wound Type:  (laceration head posterior right)  · Current Nutrition Therapies:  · Oral Diet Orders: General   · Oral Diet intake: %  · Oral Nutrition Supplement (ONS) Orders: Diabetic Oral Supplement  · ONS intake:  new order  · Anthropometric Measures:  · Ht: 5' 3\" (160 cm)   · Current Body Wt: 162 lb 7.7 oz (73.7 kg)  · Admission Body Wt: 162 lb 7.7 oz (73.7 kg) ((9/10) nonpitting  + 1 RUE & LUE edema, trace RLE, LLE, & facial edema)  · Usual Body Wt:  (per EMR 3/4/17: 169#3.2oz, (3/14/18): 158#11.7oz, (6/11/18): 165#)  · % Weight Change: 1.8% wt. loss,  3 months per EMR  · Ideal Body Wt: 124 lb (56.2 kg), % Ideal Body 131%  · BMI Classification: BMI 25.0 - 29.9 Overweight  · Comparative Standards (Estimated Nutrition Needs):  · Estimated Daily Total Kcal: ~1843kcals  · Estimated Daily Protein (g):  grams     Estimated Intake vs Estimated Needs: Intake Improving    Nutrition Risk Level: Moderate    Nutrition Interventions:   Continue current diet, Modify current ONS  Continued Inpatient Monitoring, Education Initiated, Coordination of Care (Encouraged lean protein chcoie with each meal for wound healing. )    Nutrition Evaluation:   · Evaluation: Goals set   · Goals: Pt. will consume 75% or more at meals during LOS to aid with wound healing. · Monitoring: Meal Intake, Supplement Intake, Diet Tolerance, Skin Integrity, Wound Healing, Ascites/Edema, Weight, Pertinent Labs    See Adult Nutrition Doc Flowsheet for more detail.      Electronically signed by Elsie Powers RD, LD on 9/11/18 at 4:15 PM    Contact Number: [phone]

## 2018-09-11 NOTE — PROGRESS NOTES
WNL    Balance  Sitting - Static: Good  Sitting - Dynamic: Good  Standing - Static: Fair, +  Standing - Dynamic: Fair    Supine to Sit: Supervision  Sit to Supine: Supervision  Scooting: Supervision    Transfers  Sit to Stand: Contact guard assistance  Stand to sit: Contact guard assistance       Ambulation 1  Surface: level tile  Device: No Device  Assistance: Contact guard assistance  Quality of Gait: decreased velocity and gurpreet, decreased but equal step length B, wide HANNY, B toe out, fair heel strike B, slight path deviations throughout  Distance: 75ft x2  Comments: gait speed continues to be well below normal values for patient age group              Stairs  # Steps : 4  Stairs Height: 6\"  Rails: Bilateral  Device: No Device  Assistance: Contact guard assistance  Comment: non reciprocal pattern, mild instability during descension with no LOB       Exercises:  Comments: seated ankle pumps, long arc quads, hip abd, hamstring curls, marches, and glute sets x10-12 reps each B with green tband to increase strength and improve mobility. fatigued following performance             Balance Score: 9  Gait Score: 9  Tinetti Total Score: 18     Activity Tolerance:  Activity Tolerance: Patient Tolerated treatment well;Patient limited by endurance    Treatment Initiated: eval complete, see mobility and therex    Assessment: Body structures, Functions, Activity limitations: Decreased functional mobility , Decreased strength, Decreased endurance, Decreased balance, Decreased coordination, Decreased ADL status  Assessment: patient presents to TCU after trauma resulting in head injury. patient requires increased time for all mobility and demos poor tolerance to activity with increased labor of breathing throughout session. patient score 18 on Tinetti balance test yielding high risk score for falls.  requires skilled PT to improve safety during mobility as well as standing balance and gait mechanics to maximize independence 150ft with no AD at mod I for household mobility  Long term goal 3: patient to negotiate 13 steps with no rails at [facility] for access to office in shop  Long term goal 4: patient score on tinetti will improve from 18 to 24 for improved balance and safety to decrease fall risk    Evaluation Complexity: Based on the findings of patient history, examination, clinical presentation, and decision making during this evaluation, the evaluation of Simi Yarbrough  is of high complexity.

## 2018-09-11 NOTE — PROGRESS NOTES
and recommendations of this evaluation     [] Reviewed diet and strategies     [] Reviewed signs, symptoms and risk of aspiration     [] Demonstrated how to thick liquid appropriately. [x] Reviewed goals and Plan of Care     [] OTHER:   Method: [x] Discussion [x] Demonstration [] Hand-out     [] OTHER:   Evaluation of Education:     [x] Verbalizes understanding [x] Demonstrates with assistance     [] Demonstrates without assistance []Needs further instruction     [] No evidence of learning  [x] Family not present    PLAN / TREATMENT RECOMMENDATIONS:  [x] No further speech therapy services indicated.       Rose Sneed M.S. [address]

## 2018-09-12 PROCEDURE — 97530 THERAPEUTIC ACTIVITIES: CPT

## 2018-09-12 PROCEDURE — 97110 THERAPEUTIC EXERCISES: CPT

## 2018-09-12 PROCEDURE — 1290000000 HC SEMI PRIVATE OTHER R&B

## 2018-09-12 PROCEDURE — 6370000000 HC RX 637 (ALT 250 FOR IP): Performed by: SURGERY

## 2018-09-12 PROCEDURE — 97535 SELF CARE MNGMENT TRAINING: CPT

## 2018-09-12 PROCEDURE — 97116 GAIT TRAINING THERAPY: CPT

## 2018-09-12 RX ADMIN — DOCUSATE SODIUM 100 MG: 100 CAPSULE, LIQUID FILLED ORAL at 08:57

## 2018-09-12 RX ADMIN — FOLIC ACID 1 MG: 1 TABLET ORAL at 08:57

## 2018-09-12 RX ADMIN — BACITRACIN: 500 OINTMENT TOPICAL at 08:57

## 2018-09-12 RX ADMIN — OFLOXACIN 1 DROP: 3 SOLUTION OPHTHALMIC at 17:27

## 2018-09-12 RX ADMIN — LISINOPRIL 2.5 MG: 2.5 TABLET ORAL at 08:57

## 2018-09-12 RX ADMIN — METOPROLOL TARTRATE 25 MG: 25 TABLET ORAL at 08:57

## 2018-09-12 RX ADMIN — OFLOXACIN 1 DROP: 3 SOLUTION OPHTHALMIC at 20:08

## 2018-09-12 RX ADMIN — PANTOPRAZOLE SODIUM 40 MG: 40 TABLET, DELAYED RELEASE ORAL at 06:15

## 2018-09-12 RX ADMIN — BACITRACIN: 500 OINTMENT TOPICAL at 14:51

## 2018-09-12 RX ADMIN — THERA TABS 1 TABLET: TAB at 08:57

## 2018-09-12 RX ADMIN — LEVETIRACETAM 500 MG: 500 TABLET, FILM COATED ORAL at 20:08

## 2018-09-12 RX ADMIN — LEVETIRACETAM 500 MG: 500 TABLET, FILM COATED ORAL at 08:57

## 2018-09-12 RX ADMIN — SERTRALINE 100 MG: 100 TABLET, FILM COATED ORAL at 08:57

## 2018-09-12 RX ADMIN — POTASSIUM CHLORIDE 20 MEQ: 40 SOLUTION ORAL at 06:15

## 2018-09-12 RX ADMIN — BACITRACIN: 500 OINTMENT TOPICAL at 20:08

## 2018-09-12 RX ADMIN — OFLOXACIN 1 DROP: 3 SOLUTION OPHTHALMIC at 08:57

## 2018-09-12 RX ADMIN — ATORVASTATIN CALCIUM 80 MG: 80 TABLET, FILM COATED ORAL at 20:08

## 2018-09-12 RX ADMIN — OFLOXACIN 1 DROP: 3 SOLUTION OPHTHALMIC at 14:51

## 2018-09-12 RX ADMIN — HYDROCODONE BITARTRATE AND ACETAMINOPHEN 2 TABLET: 5; 325 TABLET ORAL at 06:15

## 2018-09-12 RX ADMIN — HYDROCODONE BITARTRATE AND ACETAMINOPHEN 2 TABLET: 5; 325 TABLET ORAL at 20:11

## 2018-09-12 RX ADMIN — DOCUSATE SODIUM 100 MG: 100 CAPSULE, LIQUID FILLED ORAL at 20:08

## 2018-09-12 RX ADMIN — HYDROCODONE BITARTRATE AND ACETAMINOPHEN 2 TABLET: 5; 325 TABLET ORAL at 11:45

## 2018-09-12 RX ADMIN — Medication 100 MG: at 08:57

## 2018-09-12 ASSESSMENT — PAIN SCALES - GENERAL
PAINLEVEL_OUTOF10: 7
PAINLEVEL_OUTOF10: 8
PAINLEVEL_OUTOF10: 5
PAINLEVEL_OUTOF10: 7
PAINLEVEL_OUTOF10: 8
PAINLEVEL_OUTOF10: 2
PAINLEVEL_OUTOF10: 5

## 2018-09-12 ASSESSMENT — PAIN DESCRIPTION - PAIN TYPE: TYPE: ACUTE PAIN

## 2018-09-12 ASSESSMENT — PAIN DESCRIPTION - LOCATION: LOCATION: HEAD

## 2018-09-12 NOTE — PROGRESS NOTES
TCU BALANCE TEST:    Balance during to/from sit to stand PT: Contact guard assistance (09/11/18 0951)  OT: Contact guard assistance (09/11/18 1352)   Balance during walking PT: Contact guard assistance (09/11/18 0951)  OT: Contact guard assistance (09/11/18 1353)   Balance during turn-around and while walking PT: Contact guard assistance (09/11/18 0951)  OT: Contact guard assistance (09/11/18 1353)   Balance moving on and off toilet Contact guard assistance (09/11/18 1352)   Balance during surface to/from surface transfers     Independent = 0  Modified Independent, Supervision, SBA = 1  CGA, Min Assist, Mod Assist, Max Assist, Dependent = 2  Not Tested/No Response = 8

## 2018-09-12 NOTE — PROGRESS NOTES
dependence ([facility] 7/5/2016     Past Surgical History:   Procedure Laterality Date    APPENDECTOMY      ARM SURGERY      CARDIAC SURGERY  1999, 2016    CORONARY ARTERY BYPASS GRAFT  12/14/2016    Redo of prior CABG, Dr. Ryan Andrade.  DIAGNOSTIC CARDIAC CATH LAB PROCEDURE      FRACTURE SURGERY      Arm    [place] OFFICE/OUTPT VISIT,PROCEDURE ONLY N/A 8/29/2018    SCALP EXPLORATION, WOUND CLOSURE performed by Payal Frye MD at [address] [facility]         Restrictions/Precautions:  Fall Risk, General Precautions                            Prior Level of Function:  ADL Assistance: Independent  Homemaking Assistance: Independent  Ambulation Assistance: Independent  Transfer Assistance: Independent  Additional Comments: Pt reports SOB with mobility since CABG in 2016. has been sleeping at shop last 10 years or so he reports. unsure of level of care from spouse    Subjective       Subjective: pt sitting in recliner and agreeable to OT session     Pain:  Pain Assessment  Patient Currently in Pain: Yes  Pain Level: 2  Pain Type: Acute pain  Pain Location: Head       Objective     ADL  Grooming: Stand by assistance (standing at sink for shaving and washing face )  Toileting: Stand by assistance (standing to urinate )               Transfers  Sit to stand: Contact guard assistance  Stand to sit: Contact guard assistance  Toilet Transfers  Toilet - Technique: Ambulating  Equipment Used: Standard toilet  Toilet Transfer: Contact guard assistance    Balance  Sitting Balance: Stand by assistance  Standing Balance: Contact guard assistance     Time: x15 min. Activity: standing at sink for grooming      Functional Mobility  Functional - Mobility Device: No device  Activity: To/from bathroom  Assist Level: Contact guard assistance  Functional Mobility Comments: pt ambulated to and from BR with no LOB and no device used.  Pt demo goood standing balance with no RB needed during shaving task         Type of

## 2018-09-12 NOTE — H&P
TCU History and Physical      Chief Complaint and Reason for TCU Admission:   Need for additional therapy time following the acute hospital stay    History of Present Illness:  Marcus Julien  is a 74 y.o. male admitted to the transitional care unit on 9/10/2018. He was found with a head injury and significant blood loss from it. The ED eval showed several broken ribs also. He was determined to be medically stable and so brought to the unit for more therapy time. Past Medical History:      Diagnosis Date    Alcohol abuse     6-10 beers per day    Back pain     CAD (coronary artery disease)     Cancer of prostate ([facility]     Brachytherapy    Cerebral artery occlusion with cerebral infarction [facility])     2004    History of blood transfusion     Hyperlipidemia     Hypertension     Major depression in remission ([facility] 12/11/2014    Other disorders of kidney and ureter in diseases classified elsewhere     S/P CABG x 4 12/14/2016    S/P CABG x 4 1999    Simple chronic bronchitis ([facility] 10/12/2016    TIA (transient ischemic attack)     Uncomplicated alcohol dependence ([facility] 7/5/2016       Primary care provider: Reyna Genao MD     Past Surgical History:      Procedure Laterality Date    APPENDECTOMY      [facility], 2016    CORONARY ARTERY BYPASS GRAFT  12/14/2016    Redo of prior CABG, Dr. Hermes Sow.  DIAGNOSTIC CARDIAC CATH LAB PROCEDURE      FRACTURE SURGERY      Arm    [place] OFFICE/OUTPT VISIT,PROCEDURE ONLY N/A 8/29/2018    SCALP EXPLORATION, WOUND CLOSURE performed by Yannick Roblero MD at [address] [place] [address]         Allergies:    Pcn [penicillins]; Tetanus toxoids;  Franki-1 [lidocaine hcl]; and Pletal [cilostazol]    Current Medications:    Current Facility-Administered Medications: magnesium hydroxide (MILK OF MAGNESIA) 400 MG/5ML suspension 30 mL, 30 mL, Oral, Daily PRN  senna (SENOKOT) tablet 8.6 mg, 1 tablet, Oral, Nightly PRN  polyethylene glycol (GLYCOLAX) packet 17 g, 17 g, Oral, Daily PRN  lisinopril (PRINIVIL;ZESTRIL) tablet 2.5 mg, 2.5 mg, Oral, Daily  metoprolol tartrate (LOPRESSOR) tablet 25 mg, 25 mg, Oral, BID  ofloxacin (OCUFLOX) 0.3 % solution 1 drop, 1 drop, Both Eyes, 4x Daily  [START ON 9/13/2018] ppd (tuberculin skin test) read, , Does not apply, Weekly  tuberculin injection 5 Units, 5 Units, Intradermal, Weekly  acetaminophen (TYLENOL) tablet 650 mg, 650 mg, Oral, Q4H PRN  bacitracin ointment, , Topical, TID  sodium chloride flush 0.9 % injection 10 mL, 10 mL, Intravenous, 2 times per day  sodium chloride flush 0.9 % injection 10 mL, 10 mL, Intravenous, PRN  atorvastatin (LIPITOR) tablet 80 mg, 80 mg, Oral, Nightly  docusate sodium (COLACE) capsule 100 mg, 100 mg, Oral, BID  folic acid (FOLVITE) tablet 1 mg, 1 mg, Oral, Daily  HYDROcodone-acetaminophen (NORCO) 5-325 MG per tablet 1 tablet, 1 tablet, Oral, Q4H PRN **OR** HYDROcodone-acetaminophen (NORCO) 5-325 MG per tablet 2 tablet, 2 tablet, Oral, Q4H PRN  levETIRAcetam (KEPPRA) tablet 500 mg, 500 mg, Oral, BID  multivitamin 1 tablet, 1 tablet, Oral, Daily  oxybutynin (DITROPAN) tablet 5 mg, 5 mg, Oral, TID PRN  pantoprazole (PROTONIX) tablet 40 mg, 40 mg, Oral, QAM AC  potassium chloride 20 MEQ/15ML (10%) oral solution 20 mEq, 20 mEq, Oral, Daily  sertraline (ZOLOFT) tablet 100 mg, 100 mg, Oral, Daily  vitamin B-1 (THIAMINE) tablet 100 mg, 100 mg, Oral, Daily     Resident is taking an antipsychotic medication? Yes, zoloft   If yes, was Gradual Dose Reduction (GDR) attempted? No     No- GDR is clinically contraindicated due to the fact that tapering the medication  would not achieve the desired therapeutic effects and the current dose is  necessary to maintain or improve the resident's function, well-being, safety, and  quality of life.     Social History:  Social History     Social History    Marital status:      Spouse name: N/A    Number of children: 1    Years of education: 13     Occupational History    disabled      Social History Main Topics    Smoking status: Never Smoker    Smokeless tobacco: Never Used    Alcohol use 1.8 oz/week     3 Cans of beer per week      Comment: 3 cans per day per patient, wife states \"alot more\"    Drug use: No    Sexual activity: Not Currently     Partners: Female     Other Topics Concern    Not on file     Social History Narrative    No narrative on file           Family History:   Family History   Problem Relation Age of Onset    Other Mother         artery disease    Heart Disease Father     Cancer Father     Cancer Sister         breast       Review of Systems:  CONSTITUTIONAL:  positive for  fatigue  EYES:  positive for  blurred vision  HEENT:  positive for  nasal congestion  RESPIRATORY:  negative for  dyspnea  CARDIOVASCULAR:  negative for  chest pain  GASTROINTESTINAL:  negative for abdominal pain  GENITOURINARY:  negative for dysuria  SKIN:  negative  HEMATOLOGIC/LYMPHATIC:  positive for easy bruising  MUSCULOSKELETAL:  positive for  myalgias, arthralgias, pain, decreased range of motion, muscle weakness and bone pain  NEUROLOGICAL:  positive for coordination problems, gait problems and weakness  BEHAVIOR/PSYCH:  negative for increased sleep  10 point system review otherwise negative    Physical Exam:  BP (!) 110/59   Pulse 71   Temp 97.3 °F (36.3 °C) (Oral)   Resp 20   Ht 5' 3\" (1.6 m)   Wt 162 lb 6.4 oz (73.7 kg)   SpO2 94%   BMI 28.77 kg/m²   Well developed well nourished white male who is awake alert and cooperative laying in bed  Skin atrophic  Head with a healing laceration to the posterior right side  Membranes moist  Neck without mass  Chest symmetrical expansion  Lungs CTA  Heart S1S2 without murmur  Abd soft non tender, normoactive BS and no mass  Ext with trace edema  Neuro weak  Psy cooperative    Diagnostics:  No results found for this or any previous visit (from the past 24

## 2018-09-12 NOTE — PROGRESS NOTES
bronchitis ([facility] 10/12/2016    TIA (transient ischemic attack)     Uncomplicated alcohol dependence ([facility] 7/5/2016     Past Surgical History:   Procedure Laterality Date    APPENDECTOMY      ARM SURGERY      CARDIAC SURGERY  1999, 2016    CORONARY ARTERY BYPASS GRAFT  12/14/2016    Redo of prior CABG, Dr. Ryan Andrade.  DIAGNOSTIC CARDIAC CATH LAB PROCEDURE      FRACTURE SURGERY      Arm    [place] OFFICE/OUTPT VISIT,PROCEDURE ONLY N/A 8/29/2018    SCALP EXPLORATION, WOUND CLOSURE performed by Payal Frye MD at [address] [facility]         Restrictions/Precautions:  Fall Risk, General Precautions       Prior Level of Function:  ADL Assistance: Independent  Homemaking Assistance: Independent  Ambulation Assistance: Independent  Transfer Assistance: Independent  Additional Comments: Pt reports SOB with mobility since CABG in 2016. has been sleeping at shop last 10 years or so he reports. unsure of level of care from spouse    Subjective:     Subjective: Patient sitting in recliner chair upon arrival. Patient very pleasant and cooperative for therapy. Pain:  Yes.   Pain Assessment  Pain Assessment: 0-10  Pain Level: 7 (headache)       Social/Functional:  Lives With: Spouse  Type of Home: House (pt stays at shop, spouse lives at house (pt reports, Gene Tellez are sort of seperated\")  Home Layout: Two level, Performs ADL's on one level, 1/2 bath on main level (studio apartment on main floor, office upstairs (goes up 2-3x/wk))  Home Access: Level entry  Home Equipment:  (none)     Objective:       Transfers  Sit to Stand: Stand by assistance;Contact guard assistance  Stand to sit: Stand by assistance       Ambulation 1  Surface: level tile  Device: No Device  Assistance: Contact guard assistance;Stand by assistance (light CGA to SBA)  Quality of Gait: decreased gurpreet, mild path deviations, no LOB, wide HANNY, B toe out, decreased step length B  Distance: 50ft x2 ~350ft x1    Stairs  # Steps : 12  Stairs Height: 6\"  Rails: Bilateral (occasional single rail)  Assistance: Contact guard assistance;Stand by assistance  Comment: non reciprocal pattern, mild instability, no LOB, educated patient on pausing for 5 sec when feeling SOB due to old heart surgery    Balance  Comments: Performed standing BLE exercises in parallel bars with single UE support on green foam, mild instability at times, light CGA to times, no LOB, see exercise section         Exercises:  Exercises  Comments: Performed standing BLE exercises in parallel bars on green foam: heel/toe raises, marches, hip flex/abd kicks, mini squats x10 reps to increase strength for improved functional mobility. Activity Tolerance:  Activity Tolerance: Patient Tolerated treatment well    Assessment: Body structures, Functions, Activity limitations: Decreased functional mobility , Decreased strength, Decreased endurance, Decreased balance, Decreased coordination, Decreased ADL status  Assessment: Patient tolerated session well. Patient very motivated for therapy. Patient gets SOB with activity at times. Patient slightly unsteady with balance activities. Patient would benefit from continued skilled physical therapy to improve functional mobility and independence. Discharge Recommendations: Continue to assess pending progress    Patient Education:  Patient Education: therex, step training, gait    Equipment Recommendations: Other: continue to assess    Safety:  Type of devices:  All fall risk precautions in place, Call light within reach, Chair alarm in place, Gait belt, Patient at risk for falls, Left in chair    Plan:  Times per week: 6x  Current Treatment Recommendations: Strengthening, Balance Training, Functional Mobility Training, Transfer Training, Safety Education & Training, Patient/Caregiver Education & Training, Endurance Training, Equipment Evaluation, Education, & procurement, Gait Training, Neuromuscular Re-education, Stair training, Home Exercise Program    Goals:  Patient goals :  Increase independence, return home    Short term goals  Time Frame for Short term goals: 1 week  Short term goal 1: patient to perform bed mobility at mod I to get in and out of bed  Short term goal 2: patient to perform transfers to and from various surfaces at S to rise from bed or chair  Short term goal 3: patient to ambulate 100ft with no AD at S for household mobility  Short term goal 4: patient to negotiate 13 steps with no rails at [facility] for access to office in shop  Short term goal 5: patient score on tinetti will improve from 18 to 21 for improved balance and safety to decrease fall risk    Long term goals  Time Frame for Long term goals : 2 weeks  Long term goal 1: patient to perform transfers to and from various surfaces at mod I to rise from bed or chair  Long term goal 2: patient to ambulate 150ft with no AD at mod I for household mobility  Long term goal 3: patient to negotiate 13 steps with no rails at [facility] for access to office in shop  Long term goal 4: patient score on tinetti will improve from 18 to 24 for improved balance and safety to decrease fall risk

## 2018-09-13 PROCEDURE — 97110 THERAPEUTIC EXERCISES: CPT

## 2018-09-13 PROCEDURE — 97116 GAIT TRAINING THERAPY: CPT

## 2018-09-13 PROCEDURE — 6370000000 HC RX 637 (ALT 250 FOR IP): Performed by: SURGERY

## 2018-09-13 PROCEDURE — 97530 THERAPEUTIC ACTIVITIES: CPT

## 2018-09-13 PROCEDURE — 2709999900 HC NON-CHARGEABLE SUPPLY

## 2018-09-13 PROCEDURE — 97535 SELF CARE MNGMENT TRAINING: CPT

## 2018-09-13 PROCEDURE — 1290000000 HC SEMI PRIVATE OTHER R&B

## 2018-09-13 RX ADMIN — OFLOXACIN 1 DROP: 3 SOLUTION OPHTHALMIC at 09:18

## 2018-09-13 RX ADMIN — BACITRACIN: 500 OINTMENT TOPICAL at 21:52

## 2018-09-13 RX ADMIN — THERA TABS 1 TABLET: TAB at 09:17

## 2018-09-13 RX ADMIN — Medication 100 MG: at 09:17

## 2018-09-13 RX ADMIN — POTASSIUM CHLORIDE 20 MEQ: 40 SOLUTION ORAL at 06:07

## 2018-09-13 RX ADMIN — ATORVASTATIN CALCIUM 80 MG: 80 TABLET, FILM COATED ORAL at 21:49

## 2018-09-13 RX ADMIN — LISINOPRIL 2.5 MG: 2.5 TABLET ORAL at 09:18

## 2018-09-13 RX ADMIN — DOCUSATE SODIUM 100 MG: 100 CAPSULE, LIQUID FILLED ORAL at 09:17

## 2018-09-13 RX ADMIN — HYDROCODONE BITARTRATE AND ACETAMINOPHEN 2 TABLET: 5; 325 TABLET ORAL at 21:50

## 2018-09-13 RX ADMIN — FOLIC ACID 1 MG: 1 TABLET ORAL at 09:18

## 2018-09-13 RX ADMIN — BACITRACIN: 500 OINTMENT TOPICAL at 09:18

## 2018-09-13 RX ADMIN — LEVETIRACETAM 500 MG: 500 TABLET, FILM COATED ORAL at 09:18

## 2018-09-13 RX ADMIN — SERTRALINE 100 MG: 100 TABLET, FILM COATED ORAL at 09:17

## 2018-09-13 RX ADMIN — LEVETIRACETAM 500 MG: 500 TABLET, FILM COATED ORAL at 21:49

## 2018-09-13 RX ADMIN — PANTOPRAZOLE SODIUM 40 MG: 40 TABLET, DELAYED RELEASE ORAL at 06:07

## 2018-09-13 RX ADMIN — OFLOXACIN 1 DROP: 3 SOLUTION OPHTHALMIC at 17:22

## 2018-09-13 RX ADMIN — METOPROLOL TARTRATE 25 MG: 25 TABLET ORAL at 09:17

## 2018-09-13 RX ADMIN — DOCUSATE SODIUM 100 MG: 100 CAPSULE, LIQUID FILLED ORAL at 21:49

## 2018-09-13 RX ADMIN — HYDROCODONE BITARTRATE AND ACETAMINOPHEN 2 TABLET: 5; 325 TABLET ORAL at 06:08

## 2018-09-13 RX ADMIN — OFLOXACIN 1 DROP: 3 SOLUTION OPHTHALMIC at 21:48

## 2018-09-13 ASSESSMENT — PAIN DESCRIPTION - PROGRESSION: CLINICAL_PROGRESSION: NOT CHANGED

## 2018-09-13 ASSESSMENT — PAIN DESCRIPTION - ORIENTATION: ORIENTATION: RIGHT;LEFT

## 2018-09-13 ASSESSMENT — PAIN SCALES - GENERAL
PAINLEVEL_OUTOF10: 5
PAINLEVEL_OUTOF10: 7
PAINLEVEL_OUTOF10: 7
PAINLEVEL_OUTOF10: 0

## 2018-09-13 ASSESSMENT — PAIN DESCRIPTION - PAIN TYPE: TYPE: ACUTE PAIN;CHRONIC PAIN

## 2018-09-13 ASSESSMENT — PAIN DESCRIPTION - FREQUENCY: FREQUENCY: CONTINUOUS

## 2018-09-13 ASSESSMENT — PAIN DESCRIPTION - ONSET: ONSET: ON-GOING

## 2018-09-13 ASSESSMENT — PAIN DESCRIPTION - LOCATION: LOCATION: HEAD;CHEST

## 2018-09-13 NOTE — PROGRESS NOTES
[facility]  Recreational Therapy  Daily Note  [facility] TCU 8E    Time Spent with Patient: 45 minutes    Date:  9/13/2018       Patient Name: Lew Zelaya      MRN: [id number]      YOB: 1945 (74 y.o.)       Gender: male  Diagnosis: Subarachnoid Hematoma with LOC  Referring Practitioner: Attending: Veronica Dominguez MD    RESTRICTIONS/PRECAUTIONS:  Restrictions/Precautions: Fall Risk, General Precautions  Vision: Impaired  Hearing: Within functional limits    PAIN: 0    SUBJECTIVE:  I will play     OBJECTIVE:  Sit to stand from recliner with S-ambulated into bathroom with no AD and SBA and stood at toilet to urinate with S-stood to wash hands at sink with S-ambulated across the ruff to play bingo with peers this am-declined staying for lunch wanting to go back to his room and watch the news-sit to stand from standard chair with S-ambulated back to his room and toileted with S and washed his hands with S at sink and ambulated back to his chair-comfort measures given          Patient Education  New Education Provided: Importance of Leisure, Transfer technique    Electronically signed by: RIVKA Raygoza  Date: 9/13/2018

## 2018-09-13 NOTE — PROGRESS NOTES
bathing and dressing      Functional Mobility  Functional - Mobility Device: No device  Activity: To/from bathroom  Assist Level: Stand by assistance  Functional Mobility Comments: pt ambulated to and from BR with no LOB and no assistive device used. Pt was bending over to wash legs and dynamic reaching in BR with no LOB            Additional Activities: pt completed dynamic standing with reaching actiivity in room asking pt to get itmes out of closet and drawers below waist height with no LOB  and steady posture thrroughout task          Type of ROM/Therapeutic Exercise: AROM  Comment: pt part in BUE green theraband exercises while standing at bedside chair. Pt completed x15 reps x1 set with no LOB and able to complete all 5 exercises in standing with no RB needed but was needing RB after all exercises due to SOB. Pt completed exercises to increase endurance for ADLS  and IADL tasks at home. Activity Tolerance:  Activity Tolerance: Patient Tolerated treatment well;Patient limited by fatigue    Assessment:     Performance deficits / Impairments: Decreased functional mobility , Decreased ADL status, Decreased balance, Decreased safe awareness, Decreased ROM, Decreased cognition, Decreased endurance, Decreased vision/visual deficit, Decreased high-level IADLs  Prognosis: Good  Discharge Recommendations: Home with assist PRN    Patient Education:  Patient Education: safety and ADLS     Equipment Recommendations: Other: no equipment needs identified at this time. Safety:  Safety Devices in place: Yes  Type of devices:  All fall risk precautions in place, Call light within reach, Gait belt, Left in chair, Chair alarm in place    Plan:  Times per week: 6x  Current Treatment Recommendations: Balance Training, Functional Mobility Training, Endurance Training, Safety Education & Training, Self-Care / ADL, ROM, Patient/Caregiver Education & Training    Goals:       Short term goals  Time Frame for Short term goals: 1 week  Short term goal 1: Pt will complete sit-stand t/fs with S & 0-1 vcs for safety/technique to increase indep with toileting routine. Short term goal 2: Pt will complete dynamic standing task with S x 5 min for increased indep with sinkside grooming  Short term goal 3: Pt will complete mobility to/from bathroom with S & 0-2 vcs for safety to increase indep with accessing environment during ADLs. Short term goal 4: Pt will complete LB ADL with CGA to increase indep with self care. Short term goal 5: Pt will complete L hand AROM & strengthening as tolerated to increase L hand function for ease of opening ADL items  Long term goals  Time Frame for Long term goals : 2 weeks  Long term goal 1: Pt will complete ADL routine with Brandon to increase indep with self care. Long term goal 2: Pt will complete light homemaking task with Brandon to increase indep with warming up light meals and light cleaning.

## 2018-09-13 NOTE — PROGRESS NOTES
bronchitis ([facility] [address] 10/12/2016    TIA (transient ischemic attack)     Uncomplicated alcohol dependence ([facility] [address] 7/5/2016     Past Surgical History:   Procedure Laterality Date    APPENDECTOMY      ARM SURGERY      CARDIAC SURGERY  1999, 2016    CORONARY ARTERY BYPASS GRAFT  12/14/2016    Redo of prior CABG, Dr. Yessy Wilkerson.  DIAGNOSTIC CARDIAC CATH LAB PROCEDURE      FRACTURE SURGERY      Arm    MI OFFICE/OUTPT VISIT,PROCEDURE ONLY N/A 8/29/2018    SCALP EXPLORATION, WOUND CLOSURE performed by Kathleen Edmonds MD at [address] [place] [address]         Restrictions/Precautions:  Fall Risk, General Precautions        Prior Level of Function:  ADL Assistance: Independent  Homemaking Assistance: Independent  Ambulation Assistance: Independent  Transfer Assistance: Independent  Additional Comments: Pt reports SOB with mobility since CABG in 2016. has been sleeping at shop last 10 years or so he reports. unsure of level of care from spouse    Subjective:  Response To Previous Treatment: Patient with no complaints from previous session. Family / Caregiver Present: Yes (spouse arrived mid-> end of session)  Comments: pt. reporting headache and left chest pain from past heart surgery   (\"nothing new\")  Subjective: Patient sitting in recliner chair upon arrival. Patient very pleasant and cooperative for therapy. Pain:   .  Pain Assessment  Pain Level: 0 (no # given)  Pain Type: Acute pain;Chronic pain  Pain Location: Head;Chest  Pain Orientation: Right;Left (right side of head and left chest area)  Pain Descriptors: Aching;Headache; Throbbing;Constant  Pain Frequency: Continuous  Pain Onset: On-going  Clinical Progression: Not changed       Social/Functional:  Lives With: Spouse  Type of Home: House (pt stays at shop, spouse lives at house (pt reports, Jonas Finch are sort of seperated\")  Home Layout: Two level, Performs ADL's on one level, 1/2 bath on main level (studio apartment on main floor, office upstairs (goes up 2-3x/wk))  Home Access: Level entry  Home Equipment:  (none)     Objective:  Scooting: Modified independent (scoot to edge of sitting surfaces)    Transfers  Sit to Stand: Stand by assistance  Stand to sit: Stand by assistance  Supervision standing at toilet to urinate and with standing balance at sink to wash/dry hands and face, no unsteadiness, wide hanny        Ambulation 1  Surface: level tile  Device: No Device  Assistance: Stand by assistance (sba with occasional lt. cga)  Quality of Gait: decreased gurpreet, mild swaying/ path deviations,pt. able to correct without difficulty , wide HANNY, B toe out, decreased step length B  Distance: 180' x 1,120' x 1  Comments: gait speed continues to be well below normal values for patient age group    Exercises:  Exercises  Comments: Performed standing BLE exercises in parallel bars with 2# wt to b le's: heel/toe raises with 1 ue support, marches without ue support, hip flex/abd with b ue support, mini squats without ue support, str.leg hip flexion with 1 ue support and ham curls with 1 ue support x15 reps each and 15 reps each b le seated heel-toe raises, laq, marching, hip abd/add with 2# wt to b le's, resistance green t-band ham curls, hip abd/add,all to increase strength for improved functional mobility. Activity Tolerance:  Activity Tolerance: Patient Tolerated treatment well  Activity Tolerance: pt. reports he gets fatigued and sob easily    Assessment:     Discharge Recommendations: Continue to assess pending progress    Patient Education:  Patient Education: therex, step training, gait    Equipment Recommendations: Other: continue to assess    Safety:  Type of devices:  All fall risk precautions in place, Patient at risk for falls, Gait belt, Call light within reach, Left in chair, Chair alarm in place (breakfast arrived, spouse present)    Plan:  Times per week: 6x  Current Treatment Recommendations: Strengthening, Balance Training, Functional Mobility

## 2018-09-14 PROCEDURE — 6370000000 HC RX 637 (ALT 250 FOR IP): Performed by: SURGERY

## 2018-09-14 PROCEDURE — 97110 THERAPEUTIC EXERCISES: CPT

## 2018-09-14 PROCEDURE — 1290000000 HC SEMI PRIVATE OTHER R&B

## 2018-09-14 PROCEDURE — 97530 THERAPEUTIC ACTIVITIES: CPT

## 2018-09-14 PROCEDURE — 97116 GAIT TRAINING THERAPY: CPT

## 2018-09-14 RX ADMIN — POTASSIUM CHLORIDE 20 MEQ: 40 SOLUTION ORAL at 06:12

## 2018-09-14 RX ADMIN — DOCUSATE SODIUM 100 MG: 100 CAPSULE, LIQUID FILLED ORAL at 09:16

## 2018-09-14 RX ADMIN — LISINOPRIL 2.5 MG: 2.5 TABLET ORAL at 09:16

## 2018-09-14 RX ADMIN — Medication 100 MG: at 09:16

## 2018-09-14 RX ADMIN — METOPROLOL TARTRATE 25 MG: 25 TABLET ORAL at 09:16

## 2018-09-14 RX ADMIN — PANTOPRAZOLE SODIUM 40 MG: 40 TABLET, DELAYED RELEASE ORAL at 06:12

## 2018-09-14 RX ADMIN — OFLOXACIN 1 DROP: 3 SOLUTION OPHTHALMIC at 23:14

## 2018-09-14 RX ADMIN — DOCUSATE SODIUM 100 MG: 100 CAPSULE, LIQUID FILLED ORAL at 23:13

## 2018-09-14 RX ADMIN — ACETAMINOPHEN 650 MG: 325 TABLET ORAL at 06:20

## 2018-09-14 RX ADMIN — LEVETIRACETAM 500 MG: 500 TABLET, FILM COATED ORAL at 23:15

## 2018-09-14 RX ADMIN — BACITRACIN: 500 OINTMENT TOPICAL at 23:14

## 2018-09-14 RX ADMIN — METOPROLOL TARTRATE 25 MG: 25 TABLET ORAL at 23:14

## 2018-09-14 RX ADMIN — OFLOXACIN 1 DROP: 3 SOLUTION OPHTHALMIC at 09:16

## 2018-09-14 RX ADMIN — HYDROCODONE BITARTRATE AND ACETAMINOPHEN 2 TABLET: 5; 325 TABLET ORAL at 23:13

## 2018-09-14 RX ADMIN — SERTRALINE 100 MG: 100 TABLET, FILM COATED ORAL at 09:16

## 2018-09-14 RX ADMIN — LEVETIRACETAM 500 MG: 500 TABLET, FILM COATED ORAL at 09:16

## 2018-09-14 RX ADMIN — FOLIC ACID 1 MG: 1 TABLET ORAL at 09:16

## 2018-09-14 RX ADMIN — ATORVASTATIN CALCIUM 80 MG: 80 TABLET, FILM COATED ORAL at 23:15

## 2018-09-14 RX ADMIN — THERA TABS 1 TABLET: TAB at 09:16

## 2018-09-14 RX ADMIN — OFLOXACIN 1 DROP: 3 SOLUTION OPHTHALMIC at 13:46

## 2018-09-14 RX ADMIN — BACITRACIN: 500 OINTMENT TOPICAL at 13:46

## 2018-09-14 RX ADMIN — BACITRACIN: 500 OINTMENT TOPICAL at 09:16

## 2018-09-14 ASSESSMENT — PAIN SCALES - GENERAL
PAINLEVEL_OUTOF10: 4
PAINLEVEL_OUTOF10: 7
PAINLEVEL_OUTOF10: 7
PAINLEVEL_OUTOF10: 2
PAINLEVEL_OUTOF10: 2

## 2018-09-14 ASSESSMENT — PAIN DESCRIPTION - PAIN TYPE: TYPE: ACUTE PAIN
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
## 2018-09-14 NOTE — PROGRESS NOTES
step lengths, slight forward flexed posture, B trunk sway, wide HANNY, BLEs ER, mildly unsteady  Distance: 500'x1 120'x1 and short distances in gym    Stairs  # Steps : 12 (3x4 steps)  Stairs Height: 6\"  Rails: Bilateral;Right ascending;None (first set of 4 steps with B HR, 2nd: R HR only and 3rd: no rails)  Device: No Device  Assistance: Stand by assistance;Contact guard assistance  Comment: step to pattern, mild unsteadiness without rails, no LOB    Balance  Comments: functional tasks in bathroom- standing to void and wash hands at sink ~3mins wit S    Exercises:  Exercises  Comments: standing BLE therex to increase strength and independence with functional mobility 1x2 reps heel raises, marching, hs curls, glute sets, hip ab/add/ext/flex, mini squats, glute sets        Activity Tolerance:  Activity Tolerance: Patient Tolerated treatment well  Activity Tolerance: fatigued and SOB needing freq rest breaks    Assessment: Body structures, Functions, Activity limitations: Decreased functional mobility , Decreased strength, Decreased endurance, Decreased balance  Assessment: pt tolerated session well. pt demos decrased endurance and becomes SOB with activity. pt will benefit from cont PT to improve mobility and decrease the risk for falls. Discharge Recommendations: Continue to assess pending progress    Patient Education:  Patient Education: POC, therex, gait, transfers    Equipment Recommendations: Other: continue to assess    Safety:  Type of devices:  All fall risk precautions in place, Patient at risk for falls, Gait belt, Call light within reach, Left in chair, Chair alarm in place (breakfast arrived, spouse present)    Plan:  Times per week: 6x  Current Treatment Recommendations: Strengthening, Balance Training, Functional Mobility Training, Transfer Training, Safety Education & Training, Patient/Caregiver Education & Training, Endurance Training, Equipment Evaluation, Education, & procurement, Gait Training,

## 2018-09-14 NOTE — PROGRESS NOTES
dependence ([facility] [address] 7/5/2016     Past Surgical History:   Procedure Laterality Date    APPENDECTOMY      ARM SURGERY      CARDIAC SURGERY  1999, 2016    CORONARY ARTERY BYPASS GRAFT  12/14/2016    Redo of prior CABG, Dr. Macdonald Pap.  DIAGNOSTIC CARDIAC CATH LAB PROCEDURE      FRACTURE SURGERY      Arm    DC OFFICE/OUTPT VISIT,PROCEDURE ONLY N/A 8/29/2018    SCALP EXPLORATION, WOUND CLOSURE performed by Patience Sheridan MD at [address] [place] [address]         Restrictions/Precautions:  Fall Risk, General Precautions      Prior Level of Function:  ADL Assistance: Independent  Homemaking Assistance: Independent  Ambulation Assistance: Independent  Transfer Assistance: Independent  Additional Comments: Pt reports SOB with mobility since CABG in 2016. has been sleeping at shop last 10 years or so he reports. unsure of level of care from spouse    Subjective     Subjective: pt sitting in recliner when arrived .  Pt agreeeable to OT session     Overall Orientation Status: Within Functional Limits  Pain:  Pain Assessment  Patient Currently in Pain: Yes  Pain Assessment: 0-10  Pain Level: 2  Pain Type: Acute pain  Pain Location:  (L thumb)     Objective      Coordination  Fine Motor: Completed L hand task of picking up coins and placing in cup and screwing bolt on screw, minimal difficulty noted    ADL  Toileting: Supervision (Stood at toilet for urination)     Instrumental ADLs: Yes  Meal Prep  Meal Preparation: patient prepared pot of coffee    Transfers  Sit to stand: Supervision (bedside chair and arm chair)  Stand to sit: Supervision     Balance  Standing Balance: Stand by assistance     Time: greater than 8 minutes while preparing pot of coffee with 0 UE support     Functional Mobility  Functional - Mobility Device: No device  Assist Level: Stand by assistance  Functional Mobility Comments: to/from therapy gym   Comment: Completed B UE exercise with #3 weight 1 set x 15 repetitions all joints in all planes partially completed standing. Rest break between each section of exercise. Exercises completed to increase endurance for ADLs     Activity Tolerance:  Activity Tolerance: Patient Tolerated treatment well;Patient limited by fatigue    Assessment:     Performance deficits / Impairments: Decreased functional mobility , Decreased ADL status, Decreased balance, Decreased safe awareness, Decreased ROM, Decreased cognition, Decreased endurance, Decreased vision/visual deficit, Decreased high-level IADLs  Prognosis: Good  Discharge Recommendations: Home with assist PRN    Patient Education:  Patient Education: L UE movement    Equipment Recommendations: Other: no equipment needs identified at this time. Safety:  Safety Devices in place: Yes  Type of devices: All fall risk precautions in place, Call light within reach, Gait belt, Left in chair, Chair alarm in place    Plan:  Times per week: 6x  Current Treatment Recommendations: Balance Training, Functional Mobility Training, Endurance Training, Safety Education & Training, Self-Care / ADL, ROM, Patient/Caregiver Education & Training    Goals:       Short term goals  Time Frame for Short term goals: 1 week  Short term goal 1: Pt will complete sit-stand t/fs with S & 0-1 vcs for safety/technique to increase indep with toileting routine. Short term goal 2: Pt will complete dynamic standing task with S x 5 min for increased indep with sinkside grooming  Short term goal 3: Pt will complete mobility to/from bathroom with S & 0-2 vcs for safety to increase indep with accessing environment during ADLs. Short term goal 4: Pt will complete LB ADL with CGA to increase indep with self care. Short term goal 5: Pt will complete L hand AROM & strengthening as tolerated to increase L hand function for ease of opening ADL items  Long term goals  Time Frame for Long term goals : 2 weeks  Long term goal 1: Pt will complete ADL routine with Brandon to increase indep with self care.   Long term goal 2: Pt will complete light homemaking task with Brandon to increase indep with warming up light meals and light cleaning.

## 2018-09-15 ENCOUNTER — APPOINTMENT (OUTPATIENT)
Dept: GENERAL RADIOLOGY | Age: 73
DRG: 950 | End: 2018-09-15
Attending: FAMILY MEDICINE
Payer: MEDICARE

## 2018-09-15 ENCOUNTER — APPOINTMENT (OUTPATIENT)
Dept: CT IMAGING | Age: 73
End: 2018-09-15
Attending: FAMILY MEDICINE
Payer: MEDICARE

## 2018-09-15 PROBLEM — E66.3 OVERWEIGHT: Status: ACTIVE | Noted: 2018-09-15

## 2018-09-15 LAB
ANION GAP SERPL CALCULATED.3IONS-SCNC: 14 MEQ/L (ref 8–16)
BASOPHILS # BLD: 1 %
BASOPHILS ABSOLUTE: 0.1 THOU/MM3 (ref 0–0.1)
BUN BLDV-MCNC: 16 MG/DL (ref 7–22)
CALCIUM SERPL-MCNC: 9.3 MG/DL (ref 8.5–10.5)
CHLORIDE BLD-SCNC: 99 MEQ/L (ref 98–111)
CO2: 23 MEQ/L (ref 23–33)
CREAT SERPL-MCNC: 0.8 MG/DL (ref 0.4–1.2)
EOSINOPHIL # BLD: 1 %
EOSINOPHILS ABSOLUTE: 0.1 THOU/MM3 (ref 0–0.4)
ERYTHROCYTE [DISTWIDTH] IN BLOOD BY AUTOMATED COUNT: 14.2 % (ref 11.5–14.5)
ERYTHROCYTE [DISTWIDTH] IN BLOOD BY AUTOMATED COUNT: 49.1 FL (ref 35–45)
GFR SERPL CREATININE-BSD FRML MDRD: > 90 ML/MIN/1.73M2
GLUCOSE BLD-MCNC: 130 MG/DL (ref 70–108)
HCT VFR BLD CALC: 37.4 % (ref 42–52)
HEMOGLOBIN: 12 GM/DL (ref 14–18)
IMMATURE GRANS (ABS): 0.03 THOU/MM3 (ref 0–0.07)
IMMATURE GRANULOCYTES: 0.4 %
LACTIC ACID: 1.6 MMOL/L (ref 0.5–2.2)
LYMPHOCYTES # BLD: 3.2 %
LYMPHOCYTES ABSOLUTE: 0.2 THOU/MM3 (ref 1–4.8)
MCH RBC QN AUTO: 30.2 PG (ref 26–33)
MCHC RBC AUTO-ENTMCNC: 32.1 GM/DL (ref 32.2–35.5)
MCV RBC AUTO: 94.2 FL (ref 80–94)
MONOCYTES # BLD: 6.7 %
MONOCYTES ABSOLUTE: 0.5 THOU/MM3 (ref 0.4–1.3)
NUCLEATED RED BLOOD CELLS: 0 /100 WBC
PLATELET # BLD: 339 THOU/MM3 (ref 130–400)
PMV BLD AUTO: 8.8 FL (ref 9.4–12.4)
POTASSIUM SERPL-SCNC: 4.2 MEQ/L (ref 3.5–5.2)
PROCALCITONIN: 0.22 NG/ML (ref 0.01–0.09)
RBC # BLD: 3.97 MILL/MM3 (ref 4.7–6.1)
SEG NEUTROPHILS: 87.7 %
SEGMENTED NEUTROPHILS ABSOLUTE COUNT: 6.4 THOU/MM3 (ref 1.8–7.7)
SODIUM BLD-SCNC: 136 MEQ/L (ref 135–145)
WBC # BLD: 7.3 THOU/MM3 (ref 4.8–10.8)

## 2018-09-15 PROCEDURE — 1290000000 HC SEMI PRIVATE OTHER R&B

## 2018-09-15 PROCEDURE — 80048 BASIC METABOLIC PNL TOTAL CA: CPT

## 2018-09-15 PROCEDURE — 6370000000 HC RX 637 (ALT 250 FOR IP): Performed by: SURGERY

## 2018-09-15 PROCEDURE — 70450 CT HEAD/BRAIN W/O DYE: CPT

## 2018-09-15 PROCEDURE — 2700000000 HC OXYGEN THERAPY PER DAY

## 2018-09-15 PROCEDURE — 85025 COMPLETE CBC W/AUTO DIFF WBC: CPT

## 2018-09-15 PROCEDURE — 83605 ASSAY OF LACTIC ACID: CPT

## 2018-09-15 PROCEDURE — 6370000000 HC RX 637 (ALT 250 FOR IP): Performed by: FAMILY MEDICINE

## 2018-09-15 PROCEDURE — 2709999900 HC NON-CHARGEABLE SUPPLY

## 2018-09-15 PROCEDURE — 71046 X-RAY EXAM CHEST 2 VIEWS: CPT

## 2018-09-15 PROCEDURE — 84145 PROCALCITONIN (PCT): CPT

## 2018-09-15 PROCEDURE — 94640 AIRWAY INHALATION TREATMENT: CPT

## 2018-09-15 PROCEDURE — 36415 COLL VENOUS BLD VENIPUNCTURE: CPT

## 2018-09-15 RX ORDER — IPRATROPIUM BROMIDE AND ALBUTEROL SULFATE 2.5; .5 MG/3ML; MG/3ML
1 SOLUTION RESPIRATORY (INHALATION) EVERY 4 HOURS
Status: DISCONTINUED | OUTPATIENT
Start: 2018-09-15 | End: 2018-09-20 | Stop reason: HOSPADM

## 2018-09-15 RX ORDER — TRAMADOL HYDROCHLORIDE 50 MG/1
50 TABLET ORAL EVERY 6 HOURS PRN
Status: DISCONTINUED | OUTPATIENT
Start: 2018-09-15 | End: 2018-09-20 | Stop reason: HOSPADM

## 2018-09-15 RX ORDER — TIZANIDINE 4 MG/1
4 TABLET ORAL EVERY 6 HOURS PRN
Status: DISCONTINUED | OUTPATIENT
Start: 2018-09-15 | End: 2018-09-20 | Stop reason: HOSPADM

## 2018-09-15 RX ADMIN — METOPROLOL TARTRATE 25 MG: 25 TABLET ORAL at 20:49

## 2018-09-15 RX ADMIN — PANTOPRAZOLE SODIUM 40 MG: 40 TABLET, DELAYED RELEASE ORAL at 05:57

## 2018-09-15 RX ADMIN — ATORVASTATIN CALCIUM 80 MG: 80 TABLET, FILM COATED ORAL at 20:49

## 2018-09-15 RX ADMIN — LISINOPRIL 2.5 MG: 2.5 TABLET ORAL at 08:33

## 2018-09-15 RX ADMIN — HYDROCODONE BITARTRATE AND ACETAMINOPHEN 2 TABLET: 5; 325 TABLET ORAL at 05:55

## 2018-09-15 RX ADMIN — HYDROCODONE BITARTRATE AND ACETAMINOPHEN 2 TABLET: 5; 325 TABLET ORAL at 13:42

## 2018-09-15 RX ADMIN — SERTRALINE 100 MG: 100 TABLET, FILM COATED ORAL at 08:33

## 2018-09-15 RX ADMIN — ACETAMINOPHEN 650 MG: 325 TABLET ORAL at 20:49

## 2018-09-15 RX ADMIN — THERA TABS 1 TABLET: TAB at 08:33

## 2018-09-15 RX ADMIN — DOCUSATE SODIUM 100 MG: 100 CAPSULE, LIQUID FILLED ORAL at 08:33

## 2018-09-15 RX ADMIN — FOLIC ACID 1 MG: 1 TABLET ORAL at 08:33

## 2018-09-15 RX ADMIN — IPRATROPIUM BROMIDE AND ALBUTEROL SULFATE 1 AMPULE: .5; 3 SOLUTION RESPIRATORY (INHALATION) at 22:28

## 2018-09-15 RX ADMIN — POTASSIUM CHLORIDE 20 MEQ: 40 SOLUTION ORAL at 05:57

## 2018-09-15 RX ADMIN — LEVETIRACETAM 500 MG: 500 TABLET, FILM COATED ORAL at 20:49

## 2018-09-15 RX ADMIN — LEVETIRACETAM 500 MG: 500 TABLET, FILM COATED ORAL at 08:33

## 2018-09-15 RX ADMIN — METOPROLOL TARTRATE 25 MG: 25 TABLET ORAL at 08:33

## 2018-09-15 RX ADMIN — Medication 100 MG: at 08:33

## 2018-09-15 ASSESSMENT — PAIN SCALES - GENERAL
PAINLEVEL_OUTOF10: 8
PAINLEVEL_OUTOF10: 7
PAINLEVEL_OUTOF10: 4
PAINLEVEL_OUTOF10: 8
PAINLEVEL_OUTOF10: 0
PAINLEVEL_OUTOF10: 4
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
## 2018-09-15 NOTE — PLAN OF CARE
Problem: Falls - Risk of:  Goal: Will remain free from falls  Will remain free from falls   Outcome: Ongoing  Pt remains free of falls. Call light and personal belongings within reach. Chair and bed alarm are active. Will continue to hourly round on patient. Problem: Pain:  Goal: Pain level will decrease  Pain level will decrease   Outcome: Ongoing  Pt currently taking oral medications for acute pain. Will continue to monitor pain level. Problem: Mobility - Impaired:  Goal: Mobility will improve  Mobility will improve   Outcome: Ongoing  Continues to improve mobility throughout therapy sessions. Problem: Mood - Altered:  Goal: Mood stable  Mood stable   Outcome: Ongoing  Pt is calm and cooperate. Able to hold a general conversation and has a positive attitude. Problem: Health Behavior:  Goal: Ability to manage health-related needs will improve  Ability to manage health-related needs will improve   Outcome: Ongoing  Pt remains stable. Problem: Physical Regulation:  Goal: Ability to maintain a stable neurologic state will improve  Ability to maintain a stable neurologic state will improve   Outcome: Ongoing  Pt alert and oriented able to maintain a stable neurological state. Problem: Self-Concept:  Goal: Level of anxiety will decrease  Level of anxiety will decrease   Outcome: Ongoing  Pt is not anxious remains calm. Goal: Ability to verbalize feelings about condition will improve  Ability to verbalize feelings about condition will improve   Outcome: Ongoing  Pt able to talk about feelings and his condition. Will continue to monitor. Problem: Discharge Planning:  Goal: Discharged to appropriate level of care  Discharged to appropriate level of care   Outcome: Ongoing  Pt will be discharged with the appropriate level of care.      Problem: Nutrition Deficit:  Goal: Ability to achieve adequate nutritional intake will improve  Ability to achieve adequate nutritional intake will improve   Outcome: Ongoing  Pt appetite increasing and nutritional value continues to improve. Problem: DISCHARGE BARRIERS  Goal: Patient's continuum of care needs are met  Outcome: Ongoing  Team conference held every Tuesday.

## 2018-09-16 LAB
ANION GAP SERPL CALCULATED.3IONS-SCNC: 13 MEQ/L (ref 8–16)
BASOPHILS # BLD: 0.8 %
BASOPHILS ABSOLUTE: 0 THOU/MM3 (ref 0–0.1)
BILIRUBIN URINE: NEGATIVE
BLOOD, URINE: NEGATIVE
BUN BLDV-MCNC: 14 MG/DL (ref 7–22)
CALCIUM SERPL-MCNC: 9.1 MG/DL (ref 8.5–10.5)
CHARACTER, URINE: CLEAR
CHLORIDE BLD-SCNC: 100 MEQ/L (ref 98–111)
CO2: 27 MEQ/L (ref 23–33)
COLOR: YELLOW
CREAT SERPL-MCNC: 0.8 MG/DL (ref 0.4–1.2)
EOSINOPHIL # BLD: 0.6 %
EOSINOPHILS ABSOLUTE: 0 THOU/MM3 (ref 0–0.4)
ERYTHROCYTE [DISTWIDTH] IN BLOOD BY AUTOMATED COUNT: 14.4 % (ref 11.5–14.5)
ERYTHROCYTE [DISTWIDTH] IN BLOOD BY AUTOMATED COUNT: 48.6 FL (ref 35–45)
GFR SERPL CREATININE-BSD FRML MDRD: > 90 ML/MIN/1.73M2
GLUCOSE BLD-MCNC: 111 MG/DL (ref 70–108)
GLUCOSE URINE: NEGATIVE MG/DL
HCT VFR BLD CALC: 35 % (ref 42–52)
HEMOGLOBIN: 11.5 GM/DL (ref 14–18)
IMMATURE GRANS (ABS): 0.03 THOU/MM3 (ref 0–0.07)
IMMATURE GRANULOCYTES: 0.6 %
KETONES, URINE: NEGATIVE
LEUKOCYTE ESTERASE, URINE: NEGATIVE
LYMPHOCYTES # BLD: 9.9 %
LYMPHOCYTES ABSOLUTE: 0.5 THOU/MM3 (ref 1–4.8)
MCH RBC QN AUTO: 30.3 PG (ref 26–33)
MCHC RBC AUTO-ENTMCNC: 32.9 GM/DL (ref 32.2–35.5)
MCV RBC AUTO: 92.3 FL (ref 80–94)
MONOCYTES # BLD: 7.2 %
MONOCYTES ABSOLUTE: 0.3 THOU/MM3 (ref 0.4–1.3)
NITRITE, URINE: NEGATIVE
NUCLEATED RED BLOOD CELLS: 0 /100 WBC
PH UA: 5
PLATELET # BLD: 292 THOU/MM3 (ref 130–400)
PMV BLD AUTO: 8.5 FL (ref 9.4–12.4)
POTASSIUM SERPL-SCNC: 4.1 MEQ/L (ref 3.5–5.2)
PROTEIN UA: NEGATIVE
RBC # BLD: 3.79 MILL/MM3 (ref 4.7–6.1)
SEG NEUTROPHILS: 80.9 %
SEGMENTED NEUTROPHILS ABSOLUTE COUNT: 3.8 THOU/MM3 (ref 1.8–7.7)
SODIUM BLD-SCNC: 140 MEQ/L (ref 135–145)
SPECIFIC GRAVITY, URINE: 1.01 (ref 1–1.03)
UROBILINOGEN, URINE: 0.2 EU/DL
WBC # BLD: 4.7 THOU/MM3 (ref 4.8–10.8)

## 2018-09-16 PROCEDURE — 94640 AIRWAY INHALATION TREATMENT: CPT

## 2018-09-16 PROCEDURE — 6370000000 HC RX 637 (ALT 250 FOR IP): Performed by: FAMILY MEDICINE

## 2018-09-16 PROCEDURE — 97110 THERAPEUTIC EXERCISES: CPT

## 2018-09-16 PROCEDURE — 85025 COMPLETE CBC W/AUTO DIFF WBC: CPT

## 2018-09-16 PROCEDURE — 36415 COLL VENOUS BLD VENIPUNCTURE: CPT

## 2018-09-16 PROCEDURE — 6370000000 HC RX 637 (ALT 250 FOR IP): Performed by: SURGERY

## 2018-09-16 PROCEDURE — 80048 BASIC METABOLIC PNL TOTAL CA: CPT

## 2018-09-16 PROCEDURE — 97116 GAIT TRAINING THERAPY: CPT

## 2018-09-16 PROCEDURE — 2709999900 HC NON-CHARGEABLE SUPPLY

## 2018-09-16 PROCEDURE — 94760 N-INVAS EAR/PLS OXIMETRY 1: CPT

## 2018-09-16 PROCEDURE — 81003 URINALYSIS AUTO W/O SCOPE: CPT

## 2018-09-16 PROCEDURE — 1290000000 HC SEMI PRIVATE OTHER R&B

## 2018-09-16 PROCEDURE — 2700000000 HC OXYGEN THERAPY PER DAY

## 2018-09-16 PROCEDURE — 94669 MECHANICAL CHEST WALL OSCILL: CPT

## 2018-09-16 RX ORDER — BUTALBITAL, ACETAMINOPHEN AND CAFFEINE 50; 325; 40 MG/1; MG/1; MG/1
2 TABLET ORAL ONCE
Status: COMPLETED | OUTPATIENT
Start: 2018-09-16 | End: 2018-09-16

## 2018-09-16 RX ADMIN — Medication 100 MG: at 07:52

## 2018-09-16 RX ADMIN — POTASSIUM CHLORIDE 20 MEQ: 40 SOLUTION ORAL at 05:50

## 2018-09-16 RX ADMIN — TIZANIDINE 4 MG: 4 TABLET ORAL at 07:53

## 2018-09-16 RX ADMIN — TRAMADOL HYDROCHLORIDE 50 MG: 50 TABLET, FILM COATED ORAL at 00:26

## 2018-09-16 RX ADMIN — ATORVASTATIN CALCIUM 80 MG: 80 TABLET, FILM COATED ORAL at 21:13

## 2018-09-16 RX ADMIN — TRAMADOL HYDROCHLORIDE 50 MG: 50 TABLET, FILM COATED ORAL at 14:03

## 2018-09-16 RX ADMIN — TRAMADOL HYDROCHLORIDE 50 MG: 50 TABLET, FILM COATED ORAL at 07:54

## 2018-09-16 RX ADMIN — DOCUSATE SODIUM 100 MG: 100 CAPSULE, LIQUID FILLED ORAL at 07:55

## 2018-09-16 RX ADMIN — LEVETIRACETAM 500 MG: 500 TABLET, FILM COATED ORAL at 21:13

## 2018-09-16 RX ADMIN — ACETAMINOPHEN 650 MG: 325 TABLET ORAL at 05:50

## 2018-09-16 RX ADMIN — BUTALBITAL, ACETAMINOPHEN, AND CAFFEINE 2 TABLET: 50; 325; 40 TABLET ORAL at 17:19

## 2018-09-16 RX ADMIN — IPRATROPIUM BROMIDE AND ALBUTEROL SULFATE 1 AMPULE: .5; 3 SOLUTION RESPIRATORY (INHALATION) at 05:29

## 2018-09-16 RX ADMIN — IPRATROPIUM BROMIDE AND ALBUTEROL SULFATE 1 AMPULE: .5; 3 SOLUTION RESPIRATORY (INHALATION) at 01:51

## 2018-09-16 RX ADMIN — METOPROLOL TARTRATE 25 MG: 25 TABLET ORAL at 21:13

## 2018-09-16 RX ADMIN — FOLIC ACID 1 MG: 1 TABLET ORAL at 07:53

## 2018-09-16 RX ADMIN — DOCUSATE SODIUM 100 MG: 100 CAPSULE, LIQUID FILLED ORAL at 21:13

## 2018-09-16 RX ADMIN — THERA TABS 1 TABLET: TAB at 07:53

## 2018-09-16 RX ADMIN — IPRATROPIUM BROMIDE AND ALBUTEROL SULFATE 1 AMPULE: .5; 3 SOLUTION RESPIRATORY (INHALATION) at 16:29

## 2018-09-16 RX ADMIN — PANTOPRAZOLE SODIUM 40 MG: 40 TABLET, DELAYED RELEASE ORAL at 05:50

## 2018-09-16 RX ADMIN — TIZANIDINE 4 MG: 4 TABLET ORAL at 15:57

## 2018-09-16 RX ADMIN — IPRATROPIUM BROMIDE AND ALBUTEROL SULFATE 1 AMPULE: .5; 3 SOLUTION RESPIRATORY (INHALATION) at 09:43

## 2018-09-16 RX ADMIN — LEVETIRACETAM 500 MG: 500 TABLET, FILM COATED ORAL at 07:53

## 2018-09-16 RX ADMIN — SERTRALINE 100 MG: 100 TABLET, FILM COATED ORAL at 07:52

## 2018-09-16 ASSESSMENT — PAIN SCALES - GENERAL
PAINLEVEL_OUTOF10: 7
PAINLEVEL_OUTOF10: 3
PAINLEVEL_OUTOF10: 7
PAINLEVEL_OUTOF10: 0
PAINLEVEL_OUTOF10: 3
PAINLEVEL_OUTOF10: 0
PAINLEVEL_OUTOF10: 7
PAINLEVEL_OUTOF10: 2
PAINLEVEL_OUTOF10: 3
PAINLEVEL_OUTOF10: 7
PAINLEVEL_OUTOF10: 3

## 2018-09-16 NOTE — PROGRESS NOTES
balance and safety to decrease fall risk    Long term goals  Time Frame for Long term goals : 2 weeks  Long term goal 1: patient to perform transfers to and from various surfaces at mod I to rise from bed or chair  Long term goal 2: patient to ambulate 150ft with no AD at mod I for household mobility  Long term goal 3: patient to negotiate 13 steps with no rails at [facility] for access to office in shop  Long term goal 4: patient score on tinetti will improve from 18 to 24 for improved balance and safety to decrease fall risk

## 2018-09-17 ENCOUNTER — TELEPHONE (OUTPATIENT)
Dept: NEUROSURGERY | Age: 73
End: 2018-09-17

## 2018-09-17 PROCEDURE — 97110 THERAPEUTIC EXERCISES: CPT

## 2018-09-17 PROCEDURE — 6370000000 HC RX 637 (ALT 250 FOR IP): Performed by: SURGERY

## 2018-09-17 PROCEDURE — 97535 SELF CARE MNGMENT TRAINING: CPT

## 2018-09-17 PROCEDURE — 97530 THERAPEUTIC ACTIVITIES: CPT

## 2018-09-17 PROCEDURE — 97116 GAIT TRAINING THERAPY: CPT

## 2018-09-17 PROCEDURE — 0220000000 HC SKILLED NURSING FACILITY

## 2018-09-17 PROCEDURE — 6370000000 HC RX 637 (ALT 250 FOR IP): Performed by: FAMILY MEDICINE

## 2018-09-17 PROCEDURE — APPNB15 APP NON BILLABLE TIME 0-15 MINS: Performed by: PHYSICIAN ASSISTANT

## 2018-09-17 PROCEDURE — 94640 AIRWAY INHALATION TREATMENT: CPT

## 2018-09-17 PROCEDURE — 1290000000 HC SEMI PRIVATE OTHER R&B

## 2018-09-17 RX ADMIN — ACETAMINOPHEN 650 MG: 325 TABLET ORAL at 11:46

## 2018-09-17 RX ADMIN — BACITRACIN: 500 OINTMENT TOPICAL at 13:26

## 2018-09-17 RX ADMIN — THERA TABS 1 TABLET: TAB at 09:21

## 2018-09-17 RX ADMIN — ATORVASTATIN CALCIUM 80 MG: 80 TABLET, FILM COATED ORAL at 21:09

## 2018-09-17 RX ADMIN — FOLIC ACID 1 MG: 1 TABLET ORAL at 09:22

## 2018-09-17 RX ADMIN — TRAMADOL HYDROCHLORIDE 50 MG: 50 TABLET, FILM COATED ORAL at 11:46

## 2018-09-17 RX ADMIN — LEVETIRACETAM 500 MG: 500 TABLET, FILM COATED ORAL at 09:21

## 2018-09-17 RX ADMIN — METOPROLOL TARTRATE 25 MG: 25 TABLET ORAL at 21:09

## 2018-09-17 RX ADMIN — IPRATROPIUM BROMIDE AND ALBUTEROL SULFATE 1 AMPULE: .5; 3 SOLUTION RESPIRATORY (INHALATION) at 14:28

## 2018-09-17 RX ADMIN — Medication 100 MG: at 09:21

## 2018-09-17 RX ADMIN — IPRATROPIUM BROMIDE AND ALBUTEROL SULFATE 1 AMPULE: .5; 3 SOLUTION RESPIRATORY (INHALATION) at 10:16

## 2018-09-17 RX ADMIN — IPRATROPIUM BROMIDE AND ALBUTEROL SULFATE 1 AMPULE: .5; 3 SOLUTION RESPIRATORY (INHALATION) at 18:11

## 2018-09-17 RX ADMIN — DOCUSATE SODIUM 100 MG: 100 CAPSULE, LIQUID FILLED ORAL at 21:09

## 2018-09-17 RX ADMIN — TRAMADOL HYDROCHLORIDE 50 MG: 50 TABLET, FILM COATED ORAL at 05:46

## 2018-09-17 RX ADMIN — PANTOPRAZOLE SODIUM 40 MG: 40 TABLET, DELAYED RELEASE ORAL at 05:46

## 2018-09-17 RX ADMIN — LEVETIRACETAM 500 MG: 500 TABLET, FILM COATED ORAL at 21:10

## 2018-09-17 RX ADMIN — IPRATROPIUM BROMIDE AND ALBUTEROL SULFATE 1 AMPULE: .5; 3 SOLUTION RESPIRATORY (INHALATION) at 05:18

## 2018-09-17 RX ADMIN — DOCUSATE SODIUM 100 MG: 100 CAPSULE, LIQUID FILLED ORAL at 09:22

## 2018-09-17 RX ADMIN — BACITRACIN: 500 OINTMENT TOPICAL at 09:21

## 2018-09-17 RX ADMIN — IPRATROPIUM BROMIDE AND ALBUTEROL SULFATE 1 AMPULE: .5; 3 SOLUTION RESPIRATORY (INHALATION) at 00:36

## 2018-09-17 RX ADMIN — SERTRALINE 100 MG: 100 TABLET, FILM COATED ORAL at 09:21

## 2018-09-17 ASSESSMENT — PAIN SCALES - GENERAL
PAINLEVEL_OUTOF10: 3
PAINLEVEL_OUTOF10: 7
PAINLEVEL_OUTOF10: 6
PAINLEVEL_OUTOF10: 0
PAINLEVEL_OUTOF10: 0
PAINLEVEL_OUTOF10: 7
PAINLEVEL_OUTOF10: 0

## 2018-09-17 ASSESSMENT — PAIN DESCRIPTION - PROGRESSION: CLINICAL_PROGRESSION: NOT CHANGED

## 2018-09-17 ASSESSMENT — PAIN DESCRIPTION - ORIENTATION: ORIENTATION: RIGHT

## 2018-09-17 ASSESSMENT — PAIN DESCRIPTION - ONSET: ONSET: ON-GOING

## 2018-09-17 ASSESSMENT — PAIN DESCRIPTION - PAIN TYPE
TYPE: ACUTE PAIN
TYPE: ACUTE PAIN

## 2018-09-17 ASSESSMENT — PAIN DESCRIPTION - LOCATION
LOCATION: HEAD
LOCATION: HEAD

## 2018-09-17 ASSESSMENT — PAIN DESCRIPTION - FREQUENCY: FREQUENCY: CONTINUOUS

## 2018-09-17 NOTE — PROGRESS NOTES
Comments: to/from therapy gym           Type of ROM/Therapeutic Exercise: AROM  Comment: completed BUE exercises wtih green theraband x15 reps x1 set in all planes and joints seated in relciner. Rest break between each section of exercises. Exercises complted to increase endurance for ADLS      Activity Tolerance:  Activity Tolerance: Patient Tolerated treatment well;Patient limited by fatigue    Assessment:     Performance deficits / Impairments: Decreased functional mobility , Decreased ADL status, Decreased balance, Decreased safe awareness, Decreased ROM, Decreased cognition, Decreased endurance, Decreased vision/visual deficit, Decreased high-level IADLs  Prognosis: Good  Discharge Recommendations: Home with assist PRN    Patient Education:  Patient Education: standing tolerance and what activities need to complete at home for horses     Equipment Recommendations: Other: no equipment needs identified at this time. Safety:  Safety Devices in place: Yes  Type of devices: All fall risk precautions in place, Call light within reach, Gait belt, Left in chair, Chair alarm in place, Nurse notified    Plan:  Times per week: 6x  Current Treatment Recommendations: Balance Training, Functional Mobility Training, Endurance Training, Safety Education & Training, Self-Care / ADL, ROM, Patient/Caregiver Education & Training    Goals:       Short term goals  Time Frame for Short term goals: 1 week  Short term goal 1: Pt will complete sit-stand t/fs with S & 0-1 vcs for safety/technique to increase indep with toileting routine. Short term goal 2: Pt will complete dynamic standing task with S x 5 min for increased indep with sinkside grooming  Short term goal 3: Pt will complete mobility to/from bathroom with S & 0-2 vcs for safety to increase indep with accessing environment during ADLs. Short term goal 4: Pt will complete LB ADL with CGA to increase indep with self care.   Short term goal 5: Pt will complete L hand AROM &

## 2018-09-17 NOTE — PROGRESS NOTES
[address] [address]  Transitional Care Unit  Team Conference Note    Patient Name: Ava Keenan   MRN: [id number]    : 1945  (74 y.o.)  Gender: male   Referring Practitioner: Ordering: TRENTON Mccabe MD. Attending: BRISA Cespedes MD  Diagnosis: subarachnoid hematoma, with loss of consciousness of 6 hours to 24 hours, initial encounter    Team Conference Date: 2018   Admission Date:     5:21 PM  Re-Certification Date:     :  Tentative Discharge Plan and current psychosocial issues: The patient is progressing well in therapies, and plans to return to his apartment when discharged. No contact has been made with his spouse. An insurance update is needed on . Nursing:     Weight:  Weight: has been stable     Wounds/Incisions/Ulcers:  Incision healing well  Bowel: continent  Bladder:continent     Pain: Patient's pain is currently controlled with tramadol and tylenol    Education Provided:  Diabetic:  No  Peg Tube:No    Ford Care:  Education:NA  Removal Necessary:  NA  Supplies Needed: NA     Anticoagulant Use:  Patient not on anticoagulants.     Antibiotic Use:  none    Psychotropic Medication Use:  zoloft    Oxygen Use: No    Home Medications Reconciled: N/A    Physical Therapy:   Bed Mobility  Scooting: Modified independent (scoot to edge of sitting surfaces)  Transfers  Sit to Stand: Stand by assistance  Stand to sit: Stand by assistance  Ambulation 1  Surface: level tile  Device: No Device  Assistance: Stand by assistance  Quality of Gait: decreased gurpreet, decreased step length, slight foward flexed posture, B trunk sway, wide HANNY, mildly unsteady, BLEs ER  Distance: ~190ft x1, 50ft x1  Comments: gait speed continues to be well below normal values for patient age group  Stairs  # Steps : 12 (3x4 steps)  Stairs Height: 6\"  Rails: Bilateral, Right ascending, None (first set of 4 steps with B HR, 2nd: R HR only and 3rd: no rails)  Device: No Device  Assistance: Stand by assistance, Contact guard assistance  Comment: step to pattern, mild unsteadiness without rails, no LOB  PT Equipment Recommendations  Other: continue to assess    Occupational  Therapy:   ADL  Grooming: Supervision (standing at sink to wash face and complete shaving, and oral care )  UE Bathing: Supervision (completed standng at sink )  LE Bathing: Stand by assistance (to completed LE bathing standing at sink )  UE Dressing: Supervision  LE Dressing: Supervision (to don and doff slipper socks seated on STS )  Toileting: Supervision (Stood at toilet for urination)  Meal Prep  Meal Preparation: patient prepared pot of coffee       Speech Therapy:       Nutrition:    Weight: 165 lb (74.8 kg) / Body mass index is 29.23 kg/m². Please see nutrition note for details. Family Education: No family available for education  Fall Risk:  Falling star program initiated    Goal Achievement:   Patient Continues to progress toward goal achievement    Discharge Plan   Estimated Length of Stay: 9/20/18  Destination: outpatient therapies  Services at Discharge: Outpatient Physical Therapy 3x week  Equipment at Discharge: No equipment needs  Factors facilitating achievement of predicted outcomes: Motivated, Cooperative and Pleasant  Barriers to the achievement of predicted outcomes: Pain    Readmission Risk              Risk of Unplanned Readmission:        22           High (20-31)        Team Members Present at Conference:  Physician: Dr. Shivam Sood MD  Nurse: Braxton Kelly RN, Ama VIDAL  :  GABRIELLA Helton  Occupational Therapist:  DARLIN Romero  Physical Therapist:   Jimmie Conti PT  Speech Therapist: Speech Therapist: N/A. All team members have reviewed and updated as necessary and appropriate the established interdisciplinary plan of care within the medical record of [facility]. Pt's team conference attended (see above).  Pt seen on rounds with VARUNW, to review the results with

## 2018-09-17 NOTE — PROGRESS NOTES
Chair alarm in place, Call light within reach, Left in chair, Patient at risk for falls (spouse present)    Plan:  Times per week: 6x  Current Treatment Recommendations: Strengthening, Balance Training, Functional Mobility Training, Transfer Training, Safety Education & Training, Patient/Caregiver Education & Training, Endurance Training, Equipment Evaluation, Education, & procurement, Gait Training, Neuromuscular Re-education, Stair training, Home Exercise Program    Goals:  Patient goals :  Increase independence, return home    Short term goals  Time Frame for Short term goals: 1 week  Short term goal 1: patient to perform bed mobility at mod I to get in and out of bed  Short term goal 2: patient to perform transfers to and from various surfaces at S to rise from bed or chair  Short term goal 3: patient to ambulate 100ft with no AD at S for household mobility  Short term goal 4: patient to negotiate 13 steps with no rails at [facility] for access to office in shop  Short term goal 5: patient score on tinetti will improve from 18 to 21 for improved balance and safety to decrease fall risk    Long term goals  Time Frame for Long term goals : 2 weeks  Long term goal 1: patient to perform transfers to and from various surfaces at mod I to rise from bed or chair  Long term goal 2: patient to ambulate 150ft with no AD at mod I for household mobility  Long term goal 3: patient to negotiate 13 steps with no rails at [address] [id number] for access to office in shop  Long term goal 4: patient score on tinetti will improve from 18 to 24 for improved balance and safety to decrease fall risk

## 2018-09-17 NOTE — PROGRESS NOTES
Nutrition Assessment    Type and Reason for Visit: Reassess (po/wt. check)    Nutrition Recommendations:   Continue current diet. Send Glucerna daily. Malnutrition Assessment:  · Malnutrition Status: At risk for malnutrition    Nutrition Diagnosis:   · Problem: Increased nutrient needs  · Etiology: related to Increased demand for energy/nutrients due to     Signs and symptoms:  as evidenced by Presence of wounds    Nutrition Assessment:  · Subjective Assessment: Pt. seen, known alcohol abuse hx. He reports  appetite \" not so good\", no longer receiving beer on trays ( not in orders), denies chewing/swalloiwng diffiuclty, likes Glucerna ONS & states he drinks it. He doesn't remember what he ate for lunch. Denies chewing/swalloiwng difficulty. He mentions had a BM last night. Denies constipation/diarrhea. Meds include: Colace, Folvite, MVI, Thiamine, Milk of Magnesia, Senokot, Glycolax. Intake appears varied 1-75%. Labs include: (9/16) Hemoglobin 9.5. · Wound Type:  (laceration head posterior right)  · Current Nutrition Therapies:  · Oral Diet Orders: General   · Oral Diet intake: 1-25%, 26-50%, 51-75%  · Oral Nutrition Supplement (ONS) Orders: Diabetic Oral Supplement  · ONS intake: Unable to assess (Pt. states he drinks them)  · Anthropometric Measures:  · Ht: 5' 3\" (160 cm)   · Current Body Wt: 164 lb 14.5 oz (74.8 kg) no edema charted.    · Admission Body Wt: 162 lb 7.7 oz (73.7 kg) ((9/10) nonpitting  + 1 RUE & LUE edema, trace RLE, LLE, & facial edema)  · Usual Body Wt:  (per EMR 3/4/17: 169#3.2oz, (3/14/18): 158#11.7oz, (6/11/18): 165#)  · % Weight Change: no significant wt. loss,     · Ideal Body Wt: 124 lb (56.2 kg), % Ideal Body 132%  · BMI Classification: BMI 25.0 - 29.9 Overweight  · Comparative Standards (Estimated Nutrition Needs):  · Estimated Daily Total Kcal: ~1843kcals  · Estimated Daily Protein (g):  grams     Estimated Intake vs Estimated Needs: Intake Less Than Needs    Nutrition Risk Level: Moderate    Nutrition Interventions:   Continue current diet, Continue current ONS  Continued Inpatient Monitoring, Education not appropriate at this time, Coordination of Care (Encouraged good oral intake for healing. Encouraged glucerna intake. )    Nutrition Evaluation:   · Evaluation: No progress toward goals   · Goals: Pt. will consume 75% or more at meals during LOS to aid with wound healing. · Monitoring: Meal Intake, Supplement Intake, Diet Tolerance, Skin Integrity, Wound Healing, Ascites/Edema, Weight, Pertinent Labs    See Adult Nutrition Doc Flowsheet for more detail.      Electronically signed by Sandy Sainz RD, DARCI on 9/17/18 at 4:35 PM    Contact Number: [phone]

## 2018-09-17 NOTE — PLAN OF CARE
Problem: Impaired respiratory status  Goal: Clear lung sounds  Outcome: Met This Shift  Patient lung sounds are considered normal for their current lung condition. No signs of distress noted.  Current treatment regimen appropriate

## 2018-09-18 PROCEDURE — 97530 THERAPEUTIC ACTIVITIES: CPT

## 2018-09-18 PROCEDURE — 6370000000 HC RX 637 (ALT 250 FOR IP): Performed by: FAMILY MEDICINE

## 2018-09-18 PROCEDURE — 97110 THERAPEUTIC EXERCISES: CPT

## 2018-09-18 PROCEDURE — 94640 AIRWAY INHALATION TREATMENT: CPT

## 2018-09-18 PROCEDURE — 2500000003 HC RX 250 WO HCPCS: Performed by: SURGERY

## 2018-09-18 PROCEDURE — 6370000000 HC RX 637 (ALT 250 FOR IP): Performed by: SURGERY

## 2018-09-18 PROCEDURE — 94760 N-INVAS EAR/PLS OXIMETRY 1: CPT

## 2018-09-18 PROCEDURE — 97535 SELF CARE MNGMENT TRAINING: CPT

## 2018-09-18 PROCEDURE — 97116 GAIT TRAINING THERAPY: CPT

## 2018-09-18 PROCEDURE — 1290000000 HC SEMI PRIVATE OTHER R&B

## 2018-09-18 RX ADMIN — POTASSIUM CHLORIDE 20 MEQ: 40 SOLUTION ORAL at 05:50

## 2018-09-18 RX ADMIN — IPRATROPIUM BROMIDE AND ALBUTEROL SULFATE 1 AMPULE: .5; 3 SOLUTION RESPIRATORY (INHALATION) at 13:05

## 2018-09-18 RX ADMIN — BACITRACIN: 500 OINTMENT TOPICAL at 10:04

## 2018-09-18 RX ADMIN — BACITRACIN: 500 OINTMENT TOPICAL at 12:44

## 2018-09-18 RX ADMIN — IPRATROPIUM BROMIDE AND ALBUTEROL SULFATE 1 AMPULE: .5; 3 SOLUTION RESPIRATORY (INHALATION) at 16:40

## 2018-09-18 RX ADMIN — Medication 100 MG: at 10:02

## 2018-09-18 RX ADMIN — IPRATROPIUM BROMIDE AND ALBUTEROL SULFATE 1 AMPULE: .5; 3 SOLUTION RESPIRATORY (INHALATION) at 20:37

## 2018-09-18 RX ADMIN — DOCUSATE SODIUM 100 MG: 100 CAPSULE, LIQUID FILLED ORAL at 10:03

## 2018-09-18 RX ADMIN — ATORVASTATIN CALCIUM 80 MG: 80 TABLET, FILM COATED ORAL at 21:12

## 2018-09-18 RX ADMIN — THERA TABS 1 TABLET: TAB at 10:02

## 2018-09-18 RX ADMIN — IPRATROPIUM BROMIDE AND ALBUTEROL SULFATE 1 AMPULE: .5; 3 SOLUTION RESPIRATORY (INHALATION) at 03:51

## 2018-09-18 RX ADMIN — ACETAMINOPHEN 650 MG: 325 TABLET ORAL at 21:12

## 2018-09-18 RX ADMIN — TRAMADOL HYDROCHLORIDE 50 MG: 50 TABLET, FILM COATED ORAL at 10:05

## 2018-09-18 RX ADMIN — LEVETIRACETAM 500 MG: 500 TABLET, FILM COATED ORAL at 10:03

## 2018-09-18 RX ADMIN — PANTOPRAZOLE SODIUM 40 MG: 40 TABLET, DELAYED RELEASE ORAL at 10:03

## 2018-09-18 RX ADMIN — LISINOPRIL 2.5 MG: 2.5 TABLET ORAL at 10:03

## 2018-09-18 RX ADMIN — IPRATROPIUM BROMIDE AND ALBUTEROL SULFATE 1 AMPULE: .5; 3 SOLUTION RESPIRATORY (INHALATION) at 07:49

## 2018-09-18 RX ADMIN — METOPROLOL TARTRATE 25 MG: 25 TABLET ORAL at 10:02

## 2018-09-18 RX ADMIN — IPRATROPIUM BROMIDE AND ALBUTEROL SULFATE 1 AMPULE: .5; 3 SOLUTION RESPIRATORY (INHALATION) at 00:09

## 2018-09-18 RX ADMIN — LEVETIRACETAM 500 MG: 500 TABLET, FILM COATED ORAL at 21:12

## 2018-09-18 RX ADMIN — SERTRALINE 100 MG: 100 TABLET, FILM COATED ORAL at 10:04

## 2018-09-18 RX ADMIN — TRAMADOL HYDROCHLORIDE 50 MG: 50 TABLET, FILM COATED ORAL at 21:12

## 2018-09-18 RX ADMIN — FOLIC ACID 1 MG: 1 TABLET ORAL at 10:03

## 2018-09-18 RX ADMIN — Medication 5 UNITS: at 13:58

## 2018-09-18 ASSESSMENT — PAIN SCALES - GENERAL
PAINLEVEL_OUTOF10: 7
PAINLEVEL_OUTOF10: 0
PAINLEVEL_OUTOF10: 5
PAINLEVEL_OUTOF10: 4
PAINLEVEL_OUTOF10: 4

## 2018-09-18 ASSESSMENT — PAIN DESCRIPTION - LOCATION: LOCATION: HEAD

## 2018-09-18 ASSESSMENT — PAIN DESCRIPTION - PAIN TYPE: TYPE: ACUTE PAIN

## 2018-09-18 NOTE — PLAN OF CARE
Problem: Impaired respiratory status  Goal: Clear lung sounds  Outcome: Ongoing  Duoneb aerosols continued Q4 to help improve aeration t/o lungs. Breath sounds are diminished but clear prior to tx.

## 2018-09-18 NOTE — FLOWSHEET NOTE
09/18/18 1151   Encounter Summary   Services provided to: Patient   Referral/Consult From: [employer]   Support System Spouse   Continue Visiting Yes  (9/18/18 Anxious about discharge plan-continue support)   Complexity of Encounter Moderate   Length of Encounter 30 minutes   Spiritual Assessment Completed Yes   Spiritual/Moravian   Type Spiritual support   Assessment Anxious;Questioning meaning/purpose   Intervention Active listening;Nurtured hope;Discussed illness/injury and it's impact   Outcome Expressed gratitude;Engaged in conversation;Venting emotion     S: This staff visited patient in regard to a referral form volunteer and nurse. At the time of entry, patient was sitting in his chair and there were no family members available. Patient immediately stated, \"I am in distress because I was told I could be discharged on Thursday. I don't feel I am strong enough to do the work at home such as taking care of my animals, or getting around to my doctor appointment and other appointments. \" Patient also shared with this staff that he would like to have more therapy time especially in walking so he can be stronger to be able to go home. Also during this conversation between patient and staff, patient stated that his wife is not feeling well so that is also concerning him. Overall observation from listening to this patient share his feelings and thoughts about his illness, patient feels he is not strong enough to go home. This patient shared his concern that he and his wife do not have family support. He told this staff that his wife family do not want to have anything to do with patient and his wife. O: At the time of entry, patient was awake and engaged in conversation with this staff. This staff shared with patient that one of the organization to contact when he gets home is [employer].  This staff also told patient that this organization helps individuals by taking them to doctor appointment, and also provide other good services if individual qualified. This staff also told patient that he will share patients concern with the charge nurse and the  to see if there might be a way for home therapy after patient gets home. This staff offered emotional and spiritual support and provided the ministry of active listening. A: Patient was anxious and almost appeared helpless. He was willing to engage in conversation and shared his feelings and thoughts about his illness.; This patient also appeared to be experiencing a disconnection with both his family and his wife family members as he revealed to this staff during the conversation    P:  SC will follow up with this patient during the next visit to see how patient feel his is improving through his therapy. SC staff will also provide active listening to see how patient is coping. During the follow up visit, SC staff should encourage patient the verbally share how he is finding meanings and purpose as he move forward.

## 2018-09-18 NOTE — PROGRESS NOTES
bronchitis ([facility] [address] 10/12/2016    TIA (transient ischemic attack)     Uncomplicated alcohol dependence ([facility] [address] 7/5/2016     Past Surgical History:   Procedure Laterality Date    APPENDECTOMY      ARM SURGERY      CARDIAC SURGERY  1999, 2016    CORONARY ARTERY BYPASS GRAFT  12/14/2016    Redo of prior CABG, Dr. Rossana Burgess.  DIAGNOSTIC CARDIAC CATH LAB PROCEDURE      FRACTURE SURGERY      Arm    ME OFFICE/OUTPT VISIT,PROCEDURE ONLY N/A 8/29/2018    SCALP EXPLORATION, WOUND CLOSURE performed by Gorge Mckee MD at [address] [place] [address]         Restrictions/Precautions:  Fall Risk, General Precautions     Prior Level of Function:  ADL Assistance: Independent  Homemaking Assistance: Independent  Ambulation Assistance: Independent  Transfer Assistance: Independent  Additional Comments: Pt reports SOB with mobility since CABG in 2016. has been sleeping at shop last 10 years or so he reports. unsure of level of care from spouse    Subjective:  Response To Previous Treatment: Patient with no complaints from previous session. Family / Caregiver Present: No  Comments: pt. reports he is very concerned about returning home thursday and that he will have nobody to help him with chores that need to get done   Subjective: Patient up in bedside chair upon arrival.  Patient agreeable to therapy . no c/o's  of a headache throughout session. pt. much more energetic and talkative this pm vs. yesterdays session.     Pain:   .  Pain Assessment  Pain Level: 0 (pt. had no reports of any pain throughout session today)       Social/Functional:  Lives With: Spouse  Type of Home: House (pt stays at shop, spouse lives at house (pt reports, Leotha Ambrosia are sort of seperated\")  Home Layout: Two level, Performs ADL's on one level, 1/2 bath on main level (studio apartment on main floor, office upstairs (goes up 2-3x/wk))  Home Access: Level entry  Home Equipment:  (none)     Objective:  Scooting: Modified independent    Transfers  Sit to household mobility  Long term goal 3: patient to negotiate 13 steps with no rails at [facility] for access to office in shop  Long term goal 4: patient score on tinetti will improve from 18 to 24 for improved balance and safety to decrease fall risk
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
## 2018-09-18 NOTE — PLAN OF CARE
Varinder Kapoor  [id number]    This document includes baseline careplan information as well as current active orders for this admission to Transitional Care Unit (8E). A copy was given to the patient and/or patient representative after team conference, 9/18/2018. Current Active Orders:  Orders Placed This Encounter   Procedures    CT HEAD WO CONTRAST    CT HEAD WO CONTRAST    XR CHEST STANDARD (2 VW)    CT HEAD W CONTRAST    Urinalysis Reflex to Culture    Basic Metabolic Panel    CBC Auto Differential    CBC auto differential    Basic Metabolic Panel    Procalcitonin    Lactic acid, plasma    Anion Gap    Glomerular Filtration Rate, Estimated    Anion Gap    Glomerular Filtration Rate, Estimated    DIET GENERAL;    Place PPD    Vital signs    Vital signs    Waffle cushion in chair when up    Weigh patient    Daily weights    Intake and output    Limited stimulation brain injury guidelines    Neuro checks    Neurovascular checks    Tobacco cessation education    Vital signs per unit routine    Wound care    Place intermittent pneumatic compression device    Inspect skin per unit guidelines    Turn or assist with turn every 2 hours if patient is unable to turn self. Remind patient to turn if necessary.     Activity as tolerated    Place sequential compression device    Nursing communication    Full Code    Inpatient consult to Cardiology    Consult to Recreation Therapy    Consult to Social Work    Inpatient consult to Dietitian    OT eval and treat    Dietary Nutrition Supplements: Diabetic Oral Supplement    PT eval and treat    Initiate Oxygen Therapy Protocol    Nasal Cannula Oxygen    HHN Treatment    SLP eval and treat    Urinary catheter indwelling       Current Medications:  Current Facility-Administered Medications   Medication Dose Route Frequency Provider Last Rate Last Dose    traMADol (ULTRAM) tablet 50 mg  50 mg Oral Q6H PRN Maria M Denny MD   50

## 2018-09-19 PROCEDURE — 1290000000 HC SEMI PRIVATE OTHER R&B

## 2018-09-19 PROCEDURE — 6370000000 HC RX 637 (ALT 250 FOR IP): Performed by: SURGERY

## 2018-09-19 PROCEDURE — 6370000000 HC RX 637 (ALT 250 FOR IP): Performed by: FAMILY MEDICINE

## 2018-09-19 PROCEDURE — 97110 THERAPEUTIC EXERCISES: CPT

## 2018-09-19 PROCEDURE — 94640 AIRWAY INHALATION TREATMENT: CPT

## 2018-09-19 PROCEDURE — 97116 GAIT TRAINING THERAPY: CPT

## 2018-09-19 PROCEDURE — 97530 THERAPEUTIC ACTIVITIES: CPT

## 2018-09-19 RX ADMIN — METOPROLOL TARTRATE 25 MG: 25 TABLET ORAL at 09:35

## 2018-09-19 RX ADMIN — POTASSIUM BICARBONATE 20 MEQ: 782 TABLET, EFFERVESCENT ORAL at 06:01

## 2018-09-19 RX ADMIN — LISINOPRIL 2.5 MG: 2.5 TABLET ORAL at 09:35

## 2018-09-19 RX ADMIN — PANTOPRAZOLE SODIUM 40 MG: 40 TABLET, DELAYED RELEASE ORAL at 06:00

## 2018-09-19 RX ADMIN — IPRATROPIUM BROMIDE AND ALBUTEROL SULFATE 1 AMPULE: .5; 3 SOLUTION RESPIRATORY (INHALATION) at 08:14

## 2018-09-19 RX ADMIN — IPRATROPIUM BROMIDE AND ALBUTEROL SULFATE 1 AMPULE: .5; 3 SOLUTION RESPIRATORY (INHALATION) at 15:40

## 2018-09-19 RX ADMIN — LEVETIRACETAM 500 MG: 500 TABLET, FILM COATED ORAL at 21:03

## 2018-09-19 RX ADMIN — FOLIC ACID 1 MG: 1 TABLET ORAL at 09:35

## 2018-09-19 RX ADMIN — ACETAMINOPHEN 650 MG: 325 TABLET ORAL at 21:08

## 2018-09-19 RX ADMIN — IPRATROPIUM BROMIDE AND ALBUTEROL SULFATE 1 AMPULE: .5; 3 SOLUTION RESPIRATORY (INHALATION) at 05:41

## 2018-09-19 RX ADMIN — THERA TABS 1 TABLET: TAB at 09:35

## 2018-09-19 RX ADMIN — Medication 100 MG: at 09:34

## 2018-09-19 RX ADMIN — IPRATROPIUM BROMIDE AND ALBUTEROL SULFATE 1 AMPULE: .5; 3 SOLUTION RESPIRATORY (INHALATION) at 00:34

## 2018-09-19 RX ADMIN — DOCUSATE SODIUM 100 MG: 100 CAPSULE, LIQUID FILLED ORAL at 21:03

## 2018-09-19 RX ADMIN — SERTRALINE 100 MG: 100 TABLET, FILM COATED ORAL at 09:35

## 2018-09-19 RX ADMIN — IPRATROPIUM BROMIDE AND ALBUTEROL SULFATE 1 AMPULE: .5; 3 SOLUTION RESPIRATORY (INHALATION) at 19:48

## 2018-09-19 RX ADMIN — DOCUSATE SODIUM 100 MG: 100 CAPSULE, LIQUID FILLED ORAL at 09:35

## 2018-09-19 RX ADMIN — LEVETIRACETAM 500 MG: 500 TABLET, FILM COATED ORAL at 09:35

## 2018-09-19 RX ADMIN — METOPROLOL TARTRATE 25 MG: 25 TABLET ORAL at 21:03

## 2018-09-19 RX ADMIN — IPRATROPIUM BROMIDE AND ALBUTEROL SULFATE 1 AMPULE: .5; 3 SOLUTION RESPIRATORY (INHALATION) at 11:49

## 2018-09-19 RX ADMIN — ATORVASTATIN CALCIUM 80 MG: 80 TABLET, FILM COATED ORAL at 21:03

## 2018-09-19 ASSESSMENT — PAIN SCALES - GENERAL
PAINLEVEL_OUTOF10: 1
PAINLEVEL_OUTOF10: 0
PAINLEVEL_OUTOF10: 1
PAINLEVEL_OUTOF10: 0

## 2018-09-19 ASSESSMENT — PAIN DESCRIPTION - ORIENTATION: ORIENTATION: MID

## 2018-09-19 ASSESSMENT — PAIN DESCRIPTION - PAIN TYPE: TYPE: ACUTE PAIN

## 2018-09-19 ASSESSMENT — PAIN DESCRIPTION - PROGRESSION: CLINICAL_PROGRESSION: NOT CHANGED

## 2018-09-19 ASSESSMENT — PAIN DESCRIPTION - DESCRIPTORS: DESCRIPTORS: ACHING

## 2018-09-19 ASSESSMENT — PAIN DESCRIPTION - FREQUENCY: FREQUENCY: INTERMITTENT

## 2018-09-19 ASSESSMENT — PAIN DESCRIPTION - LOCATION: LOCATION: NECK

## 2018-09-19 ASSESSMENT — PAIN DESCRIPTION - ONSET: ONSET: GRADUAL

## 2018-09-19 NOTE — PLAN OF CARE
Problem: Impaired respiratory status  Goal: Clear lung sounds  Outcome: Ongoing  Patient continues on breathing treatments tolerating well.

## 2018-09-19 NOTE — FLOWSHEET NOTE
t/fs with S & 0-1 vcs for safety/technique to increase indep with toileting routine. MET  Short term goal 2: Pt will complete dynamic standing task with S x 5 min for increased indep with sinkside groomingMET  Short term goal 3: Pt will complete mobility to/from bathroom with S & 0-2 vcs for safety to increase indep with accessing environment during ADLs. MET  Short term goal 4: Pt will complete LB ADL with CGA to increase indep with self care. MET  Short term goal 5: Pt will complete L hand AROM & strengthening as tolerated to increase L hand function for ease of opening ADL itemsMET  Long term goals  Time Frame for Long term goals : 2 weeks  Long term goal 1: Pt will complete ADL routine with Brandon to increase indep with self care. MET  Long term goal 2: Pt will complete light homemaking task with Brandon to increase indep with warming up light meals and light cleaning. MET

## 2018-09-19 NOTE — PLAN OF CARE
Problem: Falls - Risk of:  Goal: Will remain free from falls  Will remain free from falls   Outcome: Ongoing  Fall precautions. Problem: Pain:  Goal: Pain level will decrease  Pain level will decrease   Outcome: Ongoing  Promote participation in pain management. Problem: Mobility - Impaired:  Goal: Mobility will improve  Mobility will improve   Outcome: Ongoing  Assess barriers to increased pain. Problem: Mood - Altered:  Goal: Mood stable  Mood stable   Outcome: Ongoing  Assess coping skills. Problem: Health Behavior:  Goal: Ability to manage health-related needs will improve  Ability to manage health-related needs will improve   Outcome: Ongoing  Encourage follow-up care. Problem: Physical Regulation:  Goal: Ability to maintain a stable neurologic state will improve  Ability to maintain a stable neurologic state will improve   Outcome: Ongoing  Monitor response to treatment.

## 2018-09-19 NOTE — PLAN OF CARE
Problem: DISCHARGE BARRIERS  Goal: Patient's continuum of care needs are met    Intervention: INVOLVE PATIENT/S.O. IN DISCHARGE PLANNING PROCESS  The TCU team meeting was held on 9/18. The therapists reported good therapy progress with patient goal of being able to return home and be independent with care. A recommendation was made to discharge patient home on 9/20 with outpatient PT. It was felt that his insurance company [employer] would be unlikely to approve additional skilled days due to the progress patient is now showing. Following the meeting, the [facility] and  met with patient and provided update. He appeared to be lethargic, and had no family present at this time. The patient expressed surprise upon hearing the recommendations for length of stay, stating that he had no one to help him at his apartment. The rationale for discharge was discussed with him and the [place] was explained and signed at this time. The patient stated that he would have no transportation to go to outpt therapy, and would  have to drive himself. He was advised against driving, until this issue has been discussed with his PCP or the neurosurgeon. The [employer] form was faxed to the [facility] reviewer on 9/18. In the later part of the afternoon, this worker received a phone call from patient's spouse Laurel Mayfield. She was upset upon hearing that the patient is expected to discharge on 9/20, as there is no one to help him, he is unsafe and will fall, and has no phone to call for help if needed. The spouse works during the day, and cannot be available to provide assistance or transportation to [employer]. The patient has no family in the area, and no friends who can assist him. The patient is planning to return to his apartment on the property that is separate from the home where the spouse is living.  She does not intend to care for him in anyway and made a number of negative statements regarding their

## 2018-09-19 NOTE — PROGRESS NOTES
bronchitis ([facility] [address] 10/12/2016    TIA (transient ischemic attack)     Uncomplicated alcohol dependence ([facility] [address] 7/5/2016     Past Surgical History:   Procedure Laterality Date    APPENDECTOMY      ARM SURGERY      CARDIAC SURGERY  1999, 2016    CORONARY ARTERY BYPASS GRAFT  12/14/2016    Redo of prior CABG, Dr. Korey Scanlon.  DIAGNOSTIC CARDIAC CATH LAB PROCEDURE      FRACTURE SURGERY      Arm    [place] OFFICE/OUTPT VISIT,PROCEDURE ONLY N/A 8/29/2018    SCALP EXPLORATION, WOUND CLOSURE performed by Master Medrano MD at [address] [place] [address]         Restrictions/Precautions:  Fall Risk, General Precautions     Prior Level of Function:  ADL Assistance: Independent  Homemaking Assistance: Independent  Ambulation Assistance: Independent  Transfer Assistance: Independent  Additional Comments: Pt reports SOB with mobility since CABG in 2016. has been sleeping at shop last 10 years or so he reports. unsure of level of care from spouse    Subjective:     Subjective: patient in chair on arrival, agrees to therapy    Pain:  Denies.           Social/Functional:  Lives With: Spouse  Type of Home: House (pt stays at shop, spouse lives at house (pt reports, Ignacio Dewitt are sort of seperated\")  Home Layout: Two level, Performs ADL's on one level, 1/2 bath on main level (studio apartment on main floor, office upstairs (goes up 2-3x/wk))  Home Access: Level entry  Home Equipment:  (none)     Objective:  Supine to Sit: Modified independent  Sit to Supine: Modified independent  Scooting: Modified independent    Transfers  Sit to Stand: Modified independent  Stand to sit: Modified independent  Car Transfer: Modified independent       Ambulation 1  Surface: level tile;carpet;outdoors  Device: No Device  Assistance: Supervision  Quality of Gait: decreased velocity, gurpreet WFL, wide HANNY with toe out B, steady throughout  Distance: 250ft, 125ft x2, 80ft  Ambulation 2  Surface - 2: level tile  Device 2: Single point cane  Assistance 2: Stand Recommendations: Strengthening, Balance Training, Functional Mobility Training, Transfer Training, Safety Education & Training, Patient/Caregiver Education & Training, Endurance Training, Equipment Evaluation, Education, & procurement, Gait Training, Neuromuscular Re-education, Stair training, Home Exercise Program    Goals:  Patient goals :  Increase independence, return home    Short term goals  Time Frame for Short term goals: 1 week  Short term goal 1: patient to perform bed mobility at mod I to get in and out of bed -MET  Short term goal 2: patient to perform transfers to and from various surfaces at S to rise from bed or chair -MET  Short term goal 3: patient to ambulate 100ft with no AD at S for household mobility - MET  Short term goal 4: patient to negotiate 13 steps with no rails at [facility] for access to office in shop - MET  Short term goal 5: patient score on tinetti will improve from 18 to 21 for improved balance and safety to decrease fall risk - MET    Long term goals  Time Frame for Long term goals : 2 weeks  Long term goal 1: patient to perform transfers to and from various surfaces at mod I to rise from bed or chair - MET  Long term goal 2: patient to ambulate 150ft with no AD at mod I for household mobility - NOT MET  Long term goal 3: patient to negotiate 13 steps with no rails at [facility] for access to office in shop - NOT MET  Long term goal 4: patient score on tinetti will improve from 18 to 24 for improved balance and safety to decrease fall risk - MET

## 2018-09-19 NOTE — PLAN OF CARE
Problem: Impaired respiratory status  Goal: Clear lung sounds  Outcome: Ongoing  Will continue treatments as ordered to improve lung aeration.

## 2018-09-20 VITALS
OXYGEN SATURATION: 96 % | WEIGHT: 164.5 LBS | HEIGHT: 63 IN | RESPIRATION RATE: 17 BRPM | BODY MASS INDEX: 29.15 KG/M2 | DIASTOLIC BLOOD PRESSURE: 68 MMHG | HEART RATE: 67 BPM | TEMPERATURE: 98.3 F | SYSTOLIC BLOOD PRESSURE: 139 MMHG

## 2018-09-20 PROCEDURE — 94640 AIRWAY INHALATION TREATMENT: CPT

## 2018-09-20 PROCEDURE — 6370000000 HC RX 637 (ALT 250 FOR IP): Performed by: FAMILY MEDICINE

## 2018-09-20 PROCEDURE — 6370000000 HC RX 637 (ALT 250 FOR IP): Performed by: SURGERY

## 2018-09-20 RX ORDER — LANOLIN ALCOHOL/MO/W.PET/CERES
100 CREAM (GRAM) TOPICAL DAILY
COMMUNITY
Start: 2018-09-20

## 2018-09-20 RX ORDER — ACETAMINOPHEN 325 MG/1
650 TABLET ORAL EVERY 4 HOURS PRN
COMMUNITY
Start: 2018-09-20 | End: 2021-03-11

## 2018-09-20 RX ORDER — LEVETIRACETAM 500 MG/1
500 TABLET ORAL 2 TIMES DAILY
Qty: 28 TABLET | Refills: 0 | Status: SHIPPED | OUTPATIENT
Start: 2018-09-20 | End: 2018-11-12 | Stop reason: ALTCHOICE

## 2018-09-20 RX ORDER — FOLIC ACID 1 MG/1
1 TABLET ORAL DAILY
COMMUNITY
Start: 2018-09-20

## 2018-09-20 RX ADMIN — IPRATROPIUM BROMIDE AND ALBUTEROL SULFATE 1 AMPULE: .5; 3 SOLUTION RESPIRATORY (INHALATION) at 01:03

## 2018-09-20 RX ADMIN — POTASSIUM BICARBONATE 20 MEQ: 782 TABLET, EFFERVESCENT ORAL at 06:14

## 2018-09-20 RX ADMIN — Medication 100 MG: at 09:03

## 2018-09-20 RX ADMIN — THERA TABS 1 TABLET: TAB at 09:03

## 2018-09-20 RX ADMIN — IPRATROPIUM BROMIDE AND ALBUTEROL SULFATE 1 AMPULE: .5; 3 SOLUTION RESPIRATORY (INHALATION) at 12:27

## 2018-09-20 RX ADMIN — PANTOPRAZOLE SODIUM 40 MG: 40 TABLET, DELAYED RELEASE ORAL at 06:14

## 2018-09-20 RX ADMIN — IPRATROPIUM BROMIDE AND ALBUTEROL SULFATE 1 AMPULE: .5; 3 SOLUTION RESPIRATORY (INHALATION) at 05:06

## 2018-09-20 RX ADMIN — LEVETIRACETAM 500 MG: 500 TABLET, FILM COATED ORAL at 09:04

## 2018-09-20 RX ADMIN — METOPROLOL TARTRATE 25 MG: 25 TABLET ORAL at 09:04

## 2018-09-20 RX ADMIN — DOCUSATE SODIUM 100 MG: 100 CAPSULE, LIQUID FILLED ORAL at 09:04

## 2018-09-20 RX ADMIN — SERTRALINE 100 MG: 100 TABLET, FILM COATED ORAL at 09:03

## 2018-09-20 RX ADMIN — FOLIC ACID 1 MG: 1 TABLET ORAL at 09:04

## 2018-09-20 RX ADMIN — IPRATROPIUM BROMIDE AND ALBUTEROL SULFATE 1 AMPULE: .5; 3 SOLUTION RESPIRATORY (INHALATION) at 08:08

## 2018-09-20 RX ADMIN — LISINOPRIL 2.5 MG: 2.5 TABLET ORAL at 09:03

## 2018-09-20 ASSESSMENT — PAIN SCALES - GENERAL: PAINLEVEL_OUTOF10: 0

## 2018-09-20 NOTE — DISCHARGE SUMMARY
Units: /100 wbc  0  0   Color, UA Latest Ref Range: STRAW-YELL    YELLOW    Glucose, UA Latest Ref Range: NEGATIVE mg/dl   NEGATIVE    Bilirubin, Urine Latest Ref Range: NEGATIVE    NEGATIVE    Ketones, Urine Latest Ref Range: NEGATIVE    NEGATIVE    Blood, Urine Latest Ref Range: NEGATIVE    NEGATIVE    pH, UA Latest Ref Range: 5.0 - 9.0    5.0    Protein, UA Latest Ref Range: NEGATIVE    NEGATIVE    Urobilinogen, Urine Latest Ref Range: 0.0 - 1.0 eu/dl   0.2    Nitrite, Urine Latest Ref Range: NEGATIVE    NEGATIVE    Leukocyte Esterase, Urine Latest Ref Range: NEGATIVE    NEGATIVE    Character, Urine Latest Ref Range: CLEAR-SL C    CLEAR    URINE RT REFLEX TO CULTURE Unknown   Rpt    Specific Gravity, Urine Latest Ref Range: 1.002 - 1.03    1.012      Narrative   PROCEDURE: CT HEAD WO CONTRAST       CLINICAL INFORMATION: return of HA intensity        COMPARISON: 9/11/2018       TECHNIQUE:  Axial CT images were obtained through the head without contrast. All CT scans at this facility use dose modulation, iterative reconstruction, and/or weight-based dosing when appropriate to reduce radiation dose to as low as reasonably    achievable.       FINDINGS:        There is a mild focal area of residual subarachnoid hemorrhage along the right sylvian fissure on axial images 12 and 13 which appears less pronounced when compared to the prior examination. No new areas of acute intracranial hemorrhage or otherwise    seen.       There is mild diffuse atrophy. There is no midline shift. No sulcal effacement is seen. The basal cisterns and posterior fossa appear within normal limits. Parasellar carotid and vertebral artery calcifications are noted. The visualized mastoid air cells    appear clear. Mild mucosal thickening within the frontal sinuses and visualized right maxillary sinus are demonstrated. The visualized globes and orbits appear grossly intact.           Impression   1.  Mild residual subarachnoid hemorrhage along

## 2018-09-30 NOTE — DISCHARGE SUMMARY
BRILINTA 90 MG TABS tablet  TAKE 1 TABLET TWICE DAILY             lisinopril (PRINIVIL;ZESTRIL) 2.5 MG tablet  TAKE 1 TABLET EVERY DAY             metoprolol tartrate (LOPRESSOR) 25 MG tablet  Take 1 tablet by mouth 2 times daily             Multiple Vitamin (MULTI VITAMIN PO)  Take 1 tablet by mouth daily             nitroGLYCERIN (NITROSTAT) 0.4 MG SL tablet  Place 1 tablet under the tongue every 5 minutes as needed for Chest pain up to max of 3 total doses.  If no relief after 1 dose, call 911.             sertraline (ZOLOFT) 100 MG tablet  Take 1 tablet by mouth daily                 Patient Instructions:    Discharge lab work: per physical medicine rehabilitation  Activity: activity as tolerated  Diet: Dietary Nutrition Supplements: Standard High Calorie Oral Supplement    Code Status: Prior    Follow-up visits:   Juan Zelaya MD  [facility] [address] Yamila Milton MD  [address] [facility] [address]  [phone]          [facility] and [address] [place] [address] Unit  [address] [place] [address]  [phone]           Procedures:     8/29: Scalp exploration, with multilayer closure, cauterization, clipping   8/30: 1 unit of platelets was transfused due to CTA blush    Consults:   cardiology, neurosurgery, interventional neurology, rehabilitation medicine and orthopedic surgery    Examination:  Vitals:  Vitals:    09/10/18 0935 09/10/18 1145 09/10/18 1659 09/10/18 1704   BP: (!) 149/68 (!) 118/59  (!) 117/59   Pulse: 64 62  79   Resp: 16 18 18   Temp: 98.7 °F (37.1 °C) 98.1 °F (36.7 °C) 98.7 °F (37.1 °C) 98.7 °F (37.1 °C)   TempSrc: Oral Oral Oral Oral   SpO2: 99% 95%  93%   Weight:       Height:         Weight: Weight: 170 lb 1.6 oz (77.2 kg)     24 hour intake/output:    Intake/Output Summary (Last 24 hours) at 09/10/18 1014  Last data filed at 09/10/18 0412    Gross per 24 hour   Intake             2200 ml   Output             4450 ml   Net            -2250 ml report has been created using voice recognition software. It may contain minor errors which are inherent in voice recognition technology. ** Final report electronically signed by Dr. Kiara Bassett on 9/11/2018 9:58 AM      Labs:     CBC :   No results for input(s): WBC, HGB, HCT, MCV, PLT in the last 72 hours. BMP:   No results for input(s): NA, K, CL, CO2, BUN, CREATININE in the last 72 hours. COAGS:   No results for input(s): APTT, PROT, INR in the last 72 hours. Pancreas/HFP:    No results for input(s): LIPASE, AMYLASE in the last 72 hours. No results for input(s): AST, ALT, BILIDIR, BILITOT, ALKPHOS in the last 72 hours.     Discharge condition: stable  Disposition: TCU   Time spent on discharge: >60 minutes     Electronically signed by Nando Miller PA-C on 9/30/2018 at 2:41 PM   Patient seen day of discharge  All meds and orders reviewed  Patient discussed with trauma nurse practitioner  Patient was stable for discharge

## 2018-11-12 ENCOUNTER — OFFICE VISIT (OUTPATIENT)
Dept: UROLOGY | Age: 73
End: 2018-11-12
Payer: MEDICARE

## 2018-11-12 VITALS
WEIGHT: 172 LBS | SYSTOLIC BLOOD PRESSURE: 120 MMHG | HEIGHT: 63 IN | BODY MASS INDEX: 30.48 KG/M2 | DIASTOLIC BLOOD PRESSURE: 70 MMHG

## 2018-11-12 DIAGNOSIS — C61 PROSTATE CANCER (HCC): ICD-10-CM

## 2018-11-12 DIAGNOSIS — R33.9 INCOMPLETE BLADDER EMPTYING: ICD-10-CM

## 2018-11-12 DIAGNOSIS — Z87.448 H/O URETHRAL STRICTURE: Primary | ICD-10-CM

## 2018-11-12 LAB
BILIRUBIN URINE: NEGATIVE
BLOOD URINE, POC: NEGATIVE
CHARACTER, URINE: CLEAR
COLOR, URINE: YELLOW
GLUCOSE URINE: NEGATIVE MG/DL
KETONES, URINE: NEGATIVE
LEUKOCYTE CLUMPS, URINE: NEGATIVE
NITRITE, URINE: NEGATIVE
PH, URINE: 7
POST VOID RESIDUAL (PVR): 129 ML
PROTEIN, URINE: NEGATIVE MG/DL
SPECIFIC GRAVITY, URINE: 1.01 (ref 1–1.03)
UROBILINOGEN, URINE: 0.2 EU/DL

## 2018-11-12 PROCEDURE — 51798 US URINE CAPACITY MEASURE: CPT | Performed by: NURSE PRACTITIONER

## 2018-11-12 PROCEDURE — 99213 OFFICE O/P EST LOW 20 MIN: CPT | Performed by: NURSE PRACTITIONER

## 2018-11-12 PROCEDURE — G8417 CALC BMI ABV UP PARAM F/U: HCPCS | Performed by: NURSE PRACTITIONER

## 2018-11-12 PROCEDURE — 81003 URINALYSIS AUTO W/O SCOPE: CPT | Performed by: NURSE PRACTITIONER

## 2018-11-12 PROCEDURE — 1101F PT FALLS ASSESS-DOCD LE1/YR: CPT | Performed by: NURSE PRACTITIONER

## 2018-11-12 PROCEDURE — 4040F PNEUMOC VAC/ADMIN/RCVD: CPT | Performed by: NURSE PRACTITIONER

## 2018-11-12 PROCEDURE — G8598 ASA/ANTIPLAT THER USED: HCPCS | Performed by: NURSE PRACTITIONER

## 2018-11-12 PROCEDURE — G8427 DOCREV CUR MEDS BY ELIG CLIN: HCPCS | Performed by: NURSE PRACTITIONER

## 2018-11-12 PROCEDURE — 1036F TOBACCO NON-USER: CPT | Performed by: NURSE PRACTITIONER

## 2018-11-12 PROCEDURE — 3017F COLORECTAL CA SCREEN DOC REV: CPT | Performed by: NURSE PRACTITIONER

## 2018-11-12 PROCEDURE — G8482 FLU IMMUNIZE ORDER/ADMIN: HCPCS | Performed by: NURSE PRACTITIONER

## 2018-11-12 PROCEDURE — 1123F ACP DISCUSS/DSCN MKR DOCD: CPT | Performed by: NURSE PRACTITIONER

## 2018-11-12 NOTE — PROGRESS NOTES
facility-administered medications for this visit. Past Medical History  Shanthi Hansenpool  has a past medical history of Alcohol abuse; Back pain; CAD (coronary artery disease); Cancer of prostate ([facility] [address] Cerebral artery occlusion with cerebral infarction ([facility] [address] History of blood transfusion; Hyperlipidemia; Hypertension; Major depression in remission ([facility] [address] Other disorders of kidney and ureter in diseases classified elsewhere; S/P CABG x 4; S/P CABG x 4; Simple chronic bronchitis ([facility] [address] TIA (transient ischemic attack); and Uncomplicated alcohol dependence ([facility] [address] Past Surgical History  The patient  has a past surgical history that includes Appendectomy; Prostate surgery; Arm Surgery; Coronary artery bypass graft (12/14/2016); Diagnostic Cardiac Cath Lab Procedure; fracture surgery; pr office/outpt visit,procedure only (N/A, 8/29/2018); and Cardiac surgery (1999, 2016). Family History  This patient's family history includes Cancer in his father and sister; Heart Disease in his father; Other in his mother. Social History  Shanthi Zachary  reports that he has never smoked. He has never used smokeless tobacco. He reports that he drinks about 1.8 oz of alcohol per week . He reports that he does not use drugs. Subjective:     Review of Systems  No problems with ears, nose or throat. No problems with eyes. No chest pain, shortness of breath, abdominal pain, extremity pain or weakness, and no neurological deficits. No rashes.  symptoms per HPI. The remainder of the review of symptoms is negative. Objective:     PE:   Vitals:    11/12/18 1358   BP: 120/70   Weight: 172 lb (78 kg)   Height: 5' 3\" (1.6 m)       Constitutional: Alert and oriented times 3, no acute distress and cooperative to examination with appropriate mood and affect. HENT:   Head:        Normocephalic and atraumatic. Mouth/Throat:         Mucous membranes are normal.   Eyes:         EOM are normal. No scleral icterus. PERRLA.    Neck:

## 2018-12-19 ENCOUNTER — OFFICE VISIT (OUTPATIENT)
Dept: CARDIOLOGY CLINIC | Age: 73
End: 2018-12-19
Payer: MEDICARE

## 2018-12-19 VITALS
WEIGHT: 171.4 LBS | HEIGHT: 63 IN | SYSTOLIC BLOOD PRESSURE: 124 MMHG | BODY MASS INDEX: 30.37 KG/M2 | HEART RATE: 72 BPM | DIASTOLIC BLOOD PRESSURE: 80 MMHG

## 2018-12-19 DIAGNOSIS — E78.01 FAMILIAL HYPERCHOLESTEROLEMIA: ICD-10-CM

## 2018-12-19 DIAGNOSIS — I10 ESSENTIAL HYPERTENSION: ICD-10-CM

## 2018-12-19 DIAGNOSIS — Z01.810 PREOP CARDIOVASCULAR EXAM: ICD-10-CM

## 2018-12-19 DIAGNOSIS — I25.708 CORONARY ARTERY DISEASE OF BYPASS GRAFT OF NATIVE HEART WITH STABLE ANGINA PECTORIS (HCC): Primary | ICD-10-CM

## 2018-12-19 PROCEDURE — G8427 DOCREV CUR MEDS BY ELIG CLIN: HCPCS | Performed by: NUCLEAR MEDICINE

## 2018-12-19 PROCEDURE — G8417 CALC BMI ABV UP PARAM F/U: HCPCS | Performed by: NUCLEAR MEDICINE

## 2018-12-19 PROCEDURE — G8598 ASA/ANTIPLAT THER USED: HCPCS | Performed by: NUCLEAR MEDICINE

## 2018-12-19 PROCEDURE — 4040F PNEUMOC VAC/ADMIN/RCVD: CPT | Performed by: NUCLEAR MEDICINE

## 2018-12-19 PROCEDURE — 99214 OFFICE O/P EST MOD 30 MIN: CPT | Performed by: NUCLEAR MEDICINE

## 2018-12-19 PROCEDURE — 1101F PT FALLS ASSESS-DOCD LE1/YR: CPT | Performed by: NUCLEAR MEDICINE

## 2018-12-19 PROCEDURE — 1036F TOBACCO NON-USER: CPT | Performed by: NUCLEAR MEDICINE

## 2018-12-19 PROCEDURE — 93000 ELECTROCARDIOGRAM COMPLETE: CPT | Performed by: NUCLEAR MEDICINE

## 2018-12-19 PROCEDURE — 3017F COLORECTAL CA SCREEN DOC REV: CPT | Performed by: NUCLEAR MEDICINE

## 2018-12-19 PROCEDURE — G8482 FLU IMMUNIZE ORDER/ADMIN: HCPCS | Performed by: NUCLEAR MEDICINE

## 2018-12-19 PROCEDURE — 1123F ACP DISCUSS/DSCN MKR DOCD: CPT | Performed by: NUCLEAR MEDICINE
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
## 2019-02-02 ENCOUNTER — HOSPITAL ENCOUNTER (EMERGENCY)
Age: 74
Discharge: HOME | End: 2019-02-02
Attending: FAMILY MEDICINE
Payer: MEDICARE

## 2019-02-02 VITALS
SYSTOLIC BLOOD PRESSURE: 162 MMHG | RESPIRATION RATE: 18 BRPM | TEMPERATURE: 98.1 F | OXYGEN SATURATION: 98 % | BODY MASS INDEX: 30.12 KG/M2 | HEART RATE: 73 BPM | HEIGHT: 63 IN | DIASTOLIC BLOOD PRESSURE: 87 MMHG | WEIGHT: 170 LBS

## 2019-02-02 DIAGNOSIS — R33.8 ACUTE URINARY RETENTION: Primary | ICD-10-CM

## 2019-02-02 LAB
ANION GAP SERPL CALCULATED.3IONS-SCNC: 16 MEQ/L (ref 8–16)
APTT: 38.2 SECONDS (ref 22–38)
BACTERIA: ABNORMAL
BASOPHILS # BLD: 1 %
BASOPHILS ABSOLUTE: 0.1 THOU/MM3 (ref 0–0.1)
BILIRUBIN URINE: NEGATIVE
BLOOD, URINE: ABNORMAL
BUN BLDV-MCNC: 17 MG/DL (ref 7–22)
CALCIUM SERPL-MCNC: 9.9 MG/DL (ref 8.5–10.5)
CASTS: ABNORMAL /LPF
CASTS: ABNORMAL /LPF
CHARACTER, URINE: CLEAR
CHLORIDE BLD-SCNC: 103 MEQ/L (ref 98–111)
CO2: 20 MEQ/L (ref 23–33)
COLOR: YELLOW
CREAT SERPL-MCNC: 0.9 MG/DL (ref 0.4–1.2)
CRYSTALS: ABNORMAL
EOSINOPHIL # BLD: 2.3 %
EOSINOPHILS ABSOLUTE: 0.2 THOU/MM3 (ref 0–0.4)
EPITHELIAL CELLS, UA: ABNORMAL /HPF
ERYTHROCYTE [DISTWIDTH] IN BLOOD BY AUTOMATED COUNT: 14.1 % (ref 11.5–14.5)
ERYTHROCYTE [DISTWIDTH] IN BLOOD BY AUTOMATED COUNT: 44.6 FL (ref 35–45)
GFR SERPL CREATININE-BSD FRML MDRD: 82 ML/MIN/1.73M2
GLUCOSE BLD-MCNC: 135 MG/DL (ref 70–108)
GLUCOSE, URINE: NEGATIVE MG/DL
HCT VFR BLD CALC: 43.7 % (ref 42–52)
HEMOGLOBIN: 15 GM/DL (ref 14–18)
IMMATURE GRANS (ABS): 0.03 THOU/MM3 (ref 0–0.07)
IMMATURE GRANULOCYTES: 0.4 %
INR BLD: 0.84 (ref 0.85–1.13)
KETONES, URINE: NEGATIVE
LEUKOCYTE ESTERASE, URINE: NEGATIVE
LYMPHOCYTES # BLD: 12.7 %
LYMPHOCYTES ABSOLUTE: 0.9 THOU/MM3 (ref 1–4.8)
MCH RBC QN AUTO: 29.6 PG (ref 26–33)
MCHC RBC AUTO-ENTMCNC: 34.3 GM/DL (ref 32.2–35.5)
MCV RBC AUTO: 86.4 FL (ref 80–94)
MISCELLANEOUS LAB TEST RESULT: ABNORMAL
MONOCYTES # BLD: 6.3 %
MONOCYTES ABSOLUTE: 0.4 THOU/MM3 (ref 0.4–1.3)
NITRITE, URINE: NEGATIVE
NUCLEATED RED BLOOD CELLS: 0 /100 WBC
OSMOLALITY CALCULATION: 281.1 MOSMOL/KG (ref 275–300)
PH UA: 5.5
PLATELET # BLD: 266 THOU/MM3 (ref 130–400)
PMV BLD AUTO: 8.9 FL (ref 9.4–12.4)
POTASSIUM SERPL-SCNC: 4.4 MEQ/L (ref 3.5–5.2)
PROTEIN UA: NEGATIVE MG/DL
RBC # BLD: 5.06 MILL/MM3 (ref 4.7–6.1)
RBC URINE: > 100 /HPF
RENAL EPITHELIAL, UA: ABNORMAL
SEG NEUTROPHILS: 77.3 %
SEGMENTED NEUTROPHILS ABSOLUTE COUNT: 5.3 THOU/MM3 (ref 1.8–7.7)
SODIUM BLD-SCNC: 139 MEQ/L (ref 135–145)
SPECIFIC GRAVITY UA: 1.01 (ref 1–1.03)
UROBILINOGEN, URINE: 0.2 EU/DL
WBC # BLD: 6.9 THOU/MM3 (ref 4.8–10.8)
WBC UA: ABNORMAL /HPF
YEAST: ABNORMAL

## 2019-02-02 PROCEDURE — 85025 COMPLETE CBC W/AUTO DIFF WBC: CPT

## 2019-02-02 PROCEDURE — 96376 TX/PRO/DX INJ SAME DRUG ADON: CPT

## 2019-02-02 PROCEDURE — 99284 EMERGENCY DEPT VISIT MOD MDM: CPT

## 2019-02-02 PROCEDURE — 99999 PR OFFICE/OUTPT VISIT,PROCEDURE ONLY: CPT | Performed by: UROLOGY

## 2019-02-02 PROCEDURE — 85730 THROMBOPLASTIN TIME PARTIAL: CPT

## 2019-02-02 PROCEDURE — 85610 PROTHROMBIN TIME: CPT

## 2019-02-02 PROCEDURE — 80048 BASIC METABOLIC PNL TOTAL CA: CPT

## 2019-02-02 PROCEDURE — 87086 URINE CULTURE/COLONY COUNT: CPT

## 2019-02-02 PROCEDURE — 52281 CYSTOSCOPY AND TREATMENT: CPT | Performed by: UROLOGY

## 2019-02-02 PROCEDURE — 96375 TX/PRO/DX INJ NEW DRUG ADDON: CPT

## 2019-02-02 PROCEDURE — 99285 EMERGENCY DEPT VISIT HI MDM: CPT | Performed by: UROLOGY

## 2019-02-02 PROCEDURE — 6360000002 HC RX W HCPCS: Performed by: FAMILY MEDICINE

## 2019-02-02 PROCEDURE — 36415 COLL VENOUS BLD VENIPUNCTURE: CPT

## 2019-02-02 PROCEDURE — 2709999900 HC NON-CHARGEABLE SUPPLY

## 2019-02-02 PROCEDURE — 96365 THER/PROPH/DIAG IV INF INIT: CPT

## 2019-02-02 PROCEDURE — 81001 URINALYSIS AUTO W/SCOPE: CPT

## 2019-02-02 RX ORDER — MORPHINE SULFATE 2 MG/ML
2 INJECTION, SOLUTION INTRAMUSCULAR; INTRAVENOUS ONCE
Status: COMPLETED | OUTPATIENT
Start: 2019-02-02 | End: 2019-02-02

## 2019-02-02 RX ORDER — CIPROFLOXACIN 2 MG/ML
400 INJECTION, SOLUTION INTRAVENOUS ONCE
Status: COMPLETED | OUTPATIENT
Start: 2019-02-02 | End: 2019-02-02

## 2019-02-02 RX ORDER — CIPROFLOXACIN 500 MG/1
500 TABLET, FILM COATED ORAL 2 TIMES DAILY
Qty: 6 TABLET | Refills: 0 | Status: SHIPPED | OUTPATIENT
Start: 2019-02-02 | End: 2019-02-05

## 2019-02-02 RX ADMIN — MORPHINE SULFATE 2 MG: 2 INJECTION, SOLUTION INTRAMUSCULAR; INTRAVENOUS at 10:24

## 2019-02-02 RX ADMIN — MORPHINE SULFATE 2 MG: 2 INJECTION, SOLUTION INTRAMUSCULAR; INTRAVENOUS at 12:09

## 2019-02-02 RX ADMIN — CIPROFLOXACIN 400 MG: 2 INJECTION, SOLUTION INTRAVENOUS at 12:12

## 2019-02-02 ASSESSMENT — PAIN SCALES - GENERAL
PAINLEVEL_OUTOF10: 3
PAINLEVEL_OUTOF10: 5
PAINLEVEL_OUTOF10: 2
PAINLEVEL_OUTOF10: 2

## 2019-02-02 ASSESSMENT — PAIN DESCRIPTION - PAIN TYPE
TYPE: ACUTE PAIN
TYPE: ACUTE PAIN

## 2019-02-02 ASSESSMENT — PAIN DESCRIPTION - ORIENTATION
ORIENTATION: LOWER
ORIENTATION: LOWER

## 2019-02-02 ASSESSMENT — ENCOUNTER SYMPTOMS
SHORTNESS OF BREATH: 0
VOMITING: 0
NAUSEA: 0

## 2019-02-02 ASSESSMENT — PAIN DESCRIPTION - LOCATION
LOCATION: ABDOMEN
LOCATION: ABDOMEN

## 2019-02-02 ASSESSMENT — PAIN DESCRIPTION - FREQUENCY
FREQUENCY: CONTINUOUS
FREQUENCY: CONTINUOUS

## 2019-02-04 LAB — URINE CULTURE, ROUTINE: NORMAL

## 2019-02-14 ENCOUNTER — TELEPHONE (OUTPATIENT)
Dept: UROLOGY | Age: 74
End: 2019-02-14

## 2019-02-14 ENCOUNTER — PROCEDURE VISIT (OUTPATIENT)
Dept: UROLOGY | Age: 74
End: 2019-02-14
Payer: MEDICARE

## 2019-02-14 VITALS
HEIGHT: 63 IN | BODY MASS INDEX: 30.72 KG/M2 | WEIGHT: 173.4 LBS | SYSTOLIC BLOOD PRESSURE: 120 MMHG | DIASTOLIC BLOOD PRESSURE: 84 MMHG

## 2019-02-14 DIAGNOSIS — N32.0 BLADDER NECK CONTRACTURE: ICD-10-CM

## 2019-02-14 DIAGNOSIS — N36.5 URETHRAL FALSE PASSAGE: Primary | ICD-10-CM

## 2019-02-14 DIAGNOSIS — R33.9 URINARY RETENTION: ICD-10-CM

## 2019-02-14 PROCEDURE — 52000 CYSTOURETHROSCOPY: CPT | Performed by: UROLOGY

## 2019-02-14 PROCEDURE — 99999 PR OFFICE/OUTPT VISIT,PROCEDURE ONLY: CPT | Performed by: UROLOGY

## 2019-03-06 ENCOUNTER — TELEPHONE (OUTPATIENT)
Dept: UROLOGY | Age: 74
End: 2019-03-06

## 2019-03-14 ENCOUNTER — HOSPITAL ENCOUNTER (OUTPATIENT)
Age: 74
Setting detail: OUTPATIENT SURGERY
Discharge: HOME | End: 2019-03-14
Attending: UROLOGY | Admitting: UROLOGY
Payer: MEDICARE

## 2019-03-14 ENCOUNTER — ANESTHESIA (OUTPATIENT)
Dept: OPERATING ROOM | Age: 74
End: 2019-03-14
Payer: MEDICARE

## 2019-03-14 ENCOUNTER — ANESTHESIA EVENT (OUTPATIENT)
Dept: OPERATING ROOM | Age: 74
End: 2019-03-14
Payer: MEDICARE

## 2019-03-14 VITALS
DIASTOLIC BLOOD PRESSURE: 107 MMHG | TEMPERATURE: 96.3 F | RESPIRATION RATE: 7 BRPM | OXYGEN SATURATION: 98 % | SYSTOLIC BLOOD PRESSURE: 165 MMHG

## 2019-03-14 VITALS
TEMPERATURE: 98 F | WEIGHT: 169.2 LBS | DIASTOLIC BLOOD PRESSURE: 74 MMHG | HEART RATE: 64 BPM | BODY MASS INDEX: 29.98 KG/M2 | HEIGHT: 63 IN | OXYGEN SATURATION: 97 % | RESPIRATION RATE: 18 BRPM | SYSTOLIC BLOOD PRESSURE: 138 MMHG

## 2019-03-14 LAB
INR BLD: 0.94 (ref 0.85–1.13)
POTASSIUM SERPL-SCNC: 4.5 MEQ/L (ref 3.5–5.2)

## 2019-03-14 PROCEDURE — 2709999900 HC NON-CHARGEABLE SUPPLY: Performed by: UROLOGY

## 2019-03-14 PROCEDURE — 6360000002 HC RX W HCPCS

## 2019-03-14 PROCEDURE — 3700000000 HC ANESTHESIA ATTENDED CARE: Performed by: UROLOGY

## 2019-03-14 PROCEDURE — 3700000001 HC ADD 15 MINUTES (ANESTHESIA): Performed by: UROLOGY

## 2019-03-14 PROCEDURE — 88305 TISSUE EXAM BY PATHOLOGIST: CPT

## 2019-03-14 PROCEDURE — 6360000002 HC RX W HCPCS: Performed by: REGISTERED NURSE

## 2019-03-14 PROCEDURE — 6360000004 HC RX CONTRAST MEDICATION: Performed by: UROLOGY

## 2019-03-14 PROCEDURE — 7100000000 HC PACU RECOVERY - FIRST 15 MIN: Performed by: UROLOGY

## 2019-03-14 PROCEDURE — 7100000010 HC PHASE II RECOVERY - FIRST 15 MIN: Performed by: UROLOGY

## 2019-03-14 PROCEDURE — 7100000001 HC PACU RECOVERY - ADDTL 15 MIN: Performed by: UROLOGY

## 2019-03-14 PROCEDURE — 2500000003 HC RX 250 WO HCPCS: Performed by: REGISTERED NURSE

## 2019-03-14 PROCEDURE — 85610 PROTHROMBIN TIME: CPT

## 2019-03-14 PROCEDURE — 2580000003 HC RX 258

## 2019-03-14 PROCEDURE — 7100000011 HC PHASE II RECOVERY - ADDTL 15 MIN: Performed by: UROLOGY

## 2019-03-14 PROCEDURE — 36415 COLL VENOUS BLD VENIPUNCTURE: CPT

## 2019-03-14 PROCEDURE — 84132 ASSAY OF SERUM POTASSIUM: CPT

## 2019-03-14 PROCEDURE — 3600000003 HC SURGERY LEVEL 3 BASE: Performed by: UROLOGY

## 2019-03-14 PROCEDURE — 6360000002 HC RX W HCPCS: Performed by: UROLOGY

## 2019-03-14 PROCEDURE — 52601 PROSTATECTOMY (TURP): CPT | Performed by: UROLOGY

## 2019-03-14 PROCEDURE — 3600000013 HC SURGERY LEVEL 3 ADDTL 15MIN: Performed by: UROLOGY

## 2019-03-14 PROCEDURE — 99999 PR OFFICE/OUTPT VISIT,PROCEDURE ONLY: CPT | Performed by: UROLOGY

## 2019-03-14 RX ORDER — DEXAMETHASONE SODIUM PHOSPHATE 4 MG/ML
INJECTION, SOLUTION INTRA-ARTICULAR; INTRALESIONAL; INTRAMUSCULAR; INTRAVENOUS; SOFT TISSUE PRN
Status: DISCONTINUED | OUTPATIENT
Start: 2019-03-14 | End: 2019-03-14 | Stop reason: SDUPTHER

## 2019-03-14 RX ORDER — FENTANYL CITRATE 50 UG/ML
INJECTION, SOLUTION INTRAMUSCULAR; INTRAVENOUS PRN
Status: DISCONTINUED | OUTPATIENT
Start: 2019-03-14 | End: 2019-03-14 | Stop reason: SDUPTHER

## 2019-03-14 RX ORDER — GLYCOPYRROLATE 1 MG/5 ML
SYRINGE (ML) INTRAVENOUS PRN
Status: DISCONTINUED | OUTPATIENT
Start: 2019-03-14 | End: 2019-03-14 | Stop reason: SDUPTHER

## 2019-03-14 RX ORDER — ONDANSETRON 2 MG/ML
4 INJECTION INTRAMUSCULAR; INTRAVENOUS
Status: DISCONTINUED | OUTPATIENT
Start: 2019-03-14 | End: 2019-03-14 | Stop reason: HOSPADM

## 2019-03-14 RX ORDER — KETOROLAC TROMETHAMINE 30 MG/ML
INJECTION, SOLUTION INTRAMUSCULAR; INTRAVENOUS PRN
Status: DISCONTINUED | OUTPATIENT
Start: 2019-03-14 | End: 2019-03-14 | Stop reason: SDUPTHER

## 2019-03-14 RX ORDER — FENTANYL CITRATE 50 UG/ML
50 INJECTION, SOLUTION INTRAMUSCULAR; INTRAVENOUS EVERY 5 MIN PRN
Status: DISCONTINUED | OUTPATIENT
Start: 2019-03-14 | End: 2019-03-14 | Stop reason: HOSPADM

## 2019-03-14 RX ORDER — HYDROCODONE BITARTRATE AND ACETAMINOPHEN 5; 325 MG/1; MG/1
1 TABLET ORAL EVERY 4 HOURS PRN
Status: DISCONTINUED | OUTPATIENT
Start: 2019-03-14 | End: 2019-03-14 | Stop reason: HOSPADM

## 2019-03-14 RX ORDER — HYDROCODONE BITARTRATE AND ACETAMINOPHEN 5; 325 MG/1; MG/1
2 TABLET ORAL EVERY 4 HOURS PRN
Status: DISCONTINUED | OUTPATIENT
Start: 2019-03-14 | End: 2019-03-14 | Stop reason: HOSPADM

## 2019-03-14 RX ORDER — ONDANSETRON 2 MG/ML
4 INJECTION INTRAMUSCULAR; INTRAVENOUS EVERY 4 HOURS PRN
Status: DISCONTINUED | OUTPATIENT
Start: 2019-03-14 | End: 2019-03-14 | Stop reason: HOSPADM

## 2019-03-14 RX ORDER — ATROPA BELLADONNA AND OPIUM 16.2; 3 MG/1; MG/1
30 SUPPOSITORY RECTAL EVERY 8 HOURS PRN
Status: DISCONTINUED | OUTPATIENT
Start: 2019-03-14 | End: 2019-03-14 | Stop reason: HOSPADM

## 2019-03-14 RX ORDER — ONDANSETRON 2 MG/ML
INJECTION INTRAMUSCULAR; INTRAVENOUS PRN
Status: DISCONTINUED | OUTPATIENT
Start: 2019-03-14 | End: 2019-03-14 | Stop reason: SDUPTHER

## 2019-03-14 RX ORDER — MORPHINE SULFATE 2 MG/ML
4 INJECTION, SOLUTION INTRAMUSCULAR; INTRAVENOUS
Status: DISCONTINUED | OUTPATIENT
Start: 2019-03-14 | End: 2019-03-14 | Stop reason: HOSPADM

## 2019-03-14 RX ORDER — LABETALOL HYDROCHLORIDE 5 MG/ML
5 INJECTION, SOLUTION INTRAVENOUS EVERY 10 MIN PRN
Status: DISCONTINUED | OUTPATIENT
Start: 2019-03-14 | End: 2019-03-14 | Stop reason: HOSPADM

## 2019-03-14 RX ORDER — CIPROFLOXACIN 250 MG/1
250 TABLET, FILM COATED ORAL 2 TIMES DAILY
Qty: 6 TABLET | Refills: 0 | Status: SHIPPED | OUTPATIENT
Start: 2019-03-14 | End: 2019-03-17

## 2019-03-14 RX ORDER — SODIUM CHLORIDE 9 MG/ML
INJECTION, SOLUTION INTRAVENOUS CONTINUOUS
Status: DISCONTINUED | OUTPATIENT
Start: 2019-03-14 | End: 2019-03-14 | Stop reason: HOSPADM

## 2019-03-14 RX ORDER — ACETAMINOPHEN 325 MG/1
650 TABLET ORAL EVERY 4 HOURS PRN
Status: DISCONTINUED | OUTPATIENT
Start: 2019-03-14 | End: 2019-03-14 | Stop reason: HOSPADM

## 2019-03-14 RX ORDER — MORPHINE SULFATE 2 MG/ML
2 INJECTION, SOLUTION INTRAMUSCULAR; INTRAVENOUS
Status: DISCONTINUED | OUTPATIENT
Start: 2019-03-14 | End: 2019-03-14 | Stop reason: HOSPADM

## 2019-03-14 RX ORDER — METHYLENE BLUE 10 MG/ML
INJECTION INTRAVENOUS PRN
Status: DISCONTINUED | OUTPATIENT
Start: 2019-03-14 | End: 2019-03-14 | Stop reason: ALTCHOICE

## 2019-03-14 RX ORDER — MEPERIDINE HYDROCHLORIDE 25 MG/ML
12.5 INJECTION INTRAMUSCULAR; INTRAVENOUS; SUBCUTANEOUS EVERY 5 MIN PRN
Status: DISCONTINUED | OUTPATIENT
Start: 2019-03-14 | End: 2019-03-14 | Stop reason: HOSPADM

## 2019-03-14 RX ORDER — PROPOFOL 10 MG/ML
INJECTION, EMULSION INTRAVENOUS PRN
Status: DISCONTINUED | OUTPATIENT
Start: 2019-03-14 | End: 2019-03-14 | Stop reason: SDUPTHER

## 2019-03-14 RX ORDER — SUCCINYLCHOLINE/SOD CL,ISO/PF 200MG/10ML
SYRINGE (ML) INTRAVENOUS PRN
Status: DISCONTINUED | OUTPATIENT
Start: 2019-03-14 | End: 2019-03-14 | Stop reason: SDUPTHER

## 2019-03-14 RX ORDER — CIPROFLOXACIN 2 MG/ML
400 INJECTION, SOLUTION INTRAVENOUS
Status: COMPLETED | OUTPATIENT
Start: 2019-03-14 | End: 2019-03-14

## 2019-03-14 RX ADMIN — CIPROFLOXACIN 400 MG: 2 INJECTION, SOLUTION INTRAVENOUS at 15:19

## 2019-03-14 RX ADMIN — PHENYLEPHRINE HYDROCHLORIDE 200 MCG: 10 INJECTION INTRAVENOUS at 16:02

## 2019-03-14 RX ADMIN — PROPOFOL 130 MG: 10 INJECTION, EMULSION INTRAVENOUS at 15:25

## 2019-03-14 RX ADMIN — SODIUM CHLORIDE: 9 INJECTION, SOLUTION INTRAVENOUS at 12:49

## 2019-03-14 RX ADMIN — PROPOFOL 70 MG: 10 INJECTION, EMULSION INTRAVENOUS at 15:56

## 2019-03-14 RX ADMIN — PHENYLEPHRINE HYDROCHLORIDE 100 MCG: 10 INJECTION INTRAVENOUS at 15:46

## 2019-03-14 RX ADMIN — LIDOCAINE HYDROCHLORIDE 100 MG: 20 INJECTION, SOLUTION INTRAVENOUS at 15:25

## 2019-03-14 RX ADMIN — FENTANYL CITRATE 100 MCG: 50 INJECTION INTRAMUSCULAR; INTRAVENOUS at 15:25

## 2019-03-14 RX ADMIN — Medication 0.2 MG: at 15:29

## 2019-03-14 RX ADMIN — KETOROLAC TROMETHAMINE 15 MG: 30 INJECTION, SOLUTION INTRAMUSCULAR; INTRAVENOUS at 15:29

## 2019-03-14 RX ADMIN — DEXAMETHASONE SODIUM PHOSPHATE 8 MG: 4 INJECTION, SOLUTION INTRAMUSCULAR; INTRAVENOUS at 15:29

## 2019-03-14 RX ADMIN — PHENYLEPHRINE HYDROCHLORIDE 100 MCG: 10 INJECTION INTRAVENOUS at 15:39

## 2019-03-14 RX ADMIN — Medication 100 MG: at 15:25

## 2019-03-14 RX ADMIN — ONDANSETRON HYDROCHLORIDE 4 MG: 4 INJECTION, SOLUTION INTRAMUSCULAR; INTRAVENOUS at 16:06

## 2019-03-14 ASSESSMENT — PULMONARY FUNCTION TESTS
PIF_VALUE: 3
PIF_VALUE: 19
PIF_VALUE: 19
PIF_VALUE: 59
PIF_VALUE: 19
PIF_VALUE: 4
PIF_VALUE: 20
PIF_VALUE: 19
PIF_VALUE: 36
PIF_VALUE: 2
PIF_VALUE: 3
PIF_VALUE: 19
PIF_VALUE: 3
PIF_VALUE: 19
PIF_VALUE: 3
PIF_VALUE: 6
PIF_VALUE: 19
PIF_VALUE: 19
PIF_VALUE: 20
PIF_VALUE: 19
PIF_VALUE: 2
PIF_VALUE: 32
PIF_VALUE: 2
PIF_VALUE: 0
PIF_VALUE: 19
PIF_VALUE: 24
PIF_VALUE: 21
PIF_VALUE: 18
PIF_VALUE: 20
PIF_VALUE: 19
PIF_VALUE: 23
PIF_VALUE: 21
PIF_VALUE: 19
PIF_VALUE: 19
PIF_VALUE: 21
PIF_VALUE: 19
PIF_VALUE: 20
PIF_VALUE: 20
PIF_VALUE: 21
PIF_VALUE: 24
PIF_VALUE: 19
PIF_VALUE: 19
PIF_VALUE: 21
PIF_VALUE: 21
PIF_VALUE: 3
PIF_VALUE: 19
PIF_VALUE: 1
PIF_VALUE: 21
PIF_VALUE: 18
PIF_VALUE: 5
PIF_VALUE: 20
PIF_VALUE: 19
PIF_VALUE: 20
PIF_VALUE: 20
PIF_VALUE: 21
PIF_VALUE: 20
PIF_VALUE: 19
PIF_VALUE: 1
PIF_VALUE: 20
PIF_VALUE: 2

## 2019-03-14 ASSESSMENT — PAIN - FUNCTIONAL ASSESSMENT: PAIN_FUNCTIONAL_ASSESSMENT: 0-10

## 2019-03-14 ASSESSMENT — PAIN SCALES - GENERAL
PAINLEVEL_OUTOF10: 0
PAINLEVEL_OUTOF10: 5
PAINLEVEL_OUTOF10: 0

## 2019-03-14 ASSESSMENT — PAIN DESCRIPTION - DESCRIPTORS: DESCRIPTORS: ACHING

## 2019-03-15 ENCOUNTER — TELEPHONE (OUTPATIENT)
Dept: UROLOGY | Age: 74
End: 2019-03-15

## 2019-03-15 RX ORDER — OXYBUTYNIN CHLORIDE 5 MG/1
5 TABLET ORAL 3 TIMES DAILY PRN
Qty: 30 TABLET | Refills: 3 | Status: SHIPPED | OUTPATIENT
Start: 2019-03-15 | End: 2019-06-10 | Stop reason: SDUPTHER

## 2019-03-25 ENCOUNTER — OFFICE VISIT (OUTPATIENT)
Dept: UROLOGY | Age: 74
End: 2019-03-25

## 2019-03-25 VITALS
WEIGHT: 170 LBS | SYSTOLIC BLOOD PRESSURE: 110 MMHG | HEIGHT: 63 IN | BODY MASS INDEX: 30.12 KG/M2 | DIASTOLIC BLOOD PRESSURE: 76 MMHG

## 2019-03-25 DIAGNOSIS — N40.0 BENIGN PROSTATIC HYPERPLASIA, UNSPECIFIED WHETHER LOWER URINARY TRACT SYMPTOMS PRESENT: ICD-10-CM

## 2019-03-25 DIAGNOSIS — C61 PROSTATE CANCER (HCC): Primary | ICD-10-CM

## 2019-03-25 DIAGNOSIS — N36.0 RECTO-URETHRAL FISTULA: ICD-10-CM

## 2019-03-25 PROCEDURE — 99024 POSTOP FOLLOW-UP VISIT: CPT | Performed by: NURSE PRACTITIONER

## 2019-03-25 RX ORDER — PHENAZOPYRIDINE HYDROCHLORIDE 100 MG/1
100 TABLET, FILM COATED ORAL 3 TIMES DAILY PRN
Qty: 30 TABLET | Refills: 5 | Status: SHIPPED | OUTPATIENT
Start: 2019-03-25 | End: 2019-06-10

## 2019-04-03 ENCOUNTER — OFFICE VISIT (OUTPATIENT)
Dept: SURGERY | Age: 74
End: 2019-04-03
Payer: MEDICARE

## 2019-04-03 VITALS
OXYGEN SATURATION: 98 % | TEMPERATURE: 98.5 F | SYSTOLIC BLOOD PRESSURE: 122 MMHG | RESPIRATION RATE: 20 BRPM | HEIGHT: 63 IN | HEART RATE: 72 BPM | DIASTOLIC BLOOD PRESSURE: 76 MMHG | WEIGHT: 168 LBS | BODY MASS INDEX: 29.77 KG/M2

## 2019-04-03 DIAGNOSIS — N36.0 URETHRORECTAL FISTULA: Primary | ICD-10-CM

## 2019-04-03 PROCEDURE — G8427 DOCREV CUR MEDS BY ELIG CLIN: HCPCS | Performed by: SURGERY

## 2019-04-03 PROCEDURE — 3017F COLORECTAL CA SCREEN DOC REV: CPT | Performed by: SURGERY

## 2019-04-03 PROCEDURE — 99214 OFFICE O/P EST MOD 30 MIN: CPT | Performed by: SURGERY

## 2019-04-03 PROCEDURE — G8417 CALC BMI ABV UP PARAM F/U: HCPCS | Performed by: SURGERY

## 2019-04-03 NOTE — LETTER
[address] Surgery  [facility] [address]  [address]  Phone: [phone]  Fax: [phone]    Reynaldo Hogan MD    April 4, 2019     Carmen Morfin MD  [facility] [id number]    Patient: eJffrey Pascual  MR Number: [id number]  YOB: 1945  Date of Visit: 4/3/2019    Dear Dr. Rudy Milton:    I recently saw your patient Sherri Buitrago in consultation. Below are the relevant portions of my assessment and plan of care. Assessment:  1. Urethrorectal fistula    Plan:  1. Await until Ford catheter removed. Then see how he does clinically and if fistulous track opens back up. If fistulous track opens back up then proceed with colonoscopy and further imaging with barium enema/CT abdomen pelvis. Discussed with patient about the pros and cons along with risks/possible complications of urethrorectal fistula repair. A large portion of this may be based on location of tract coming into rectum. Discussed robotic, laparoscopic and open techniques. Discussed possible need for colostomy. All questions answered. 2.  Follow-up in office 12 weeks after catheter removed and seen by urology. 3.  Signs and symptoms reviewed with patient that would be concerning and need him to return to office for re-evaluation. Patient states He will call if He has questions or concerns. If you have questions, please do not hesitate to call me. I look forward to following [facility] along with you.     Sincerely,    Reynaldo Hogan MD

## 2019-04-04 ASSESSMENT — ENCOUNTER SYMPTOMS
SHORTNESS OF BREATH: 0
VOMITING: 0
BLOOD IN STOOL: 0
SINUS PRESSURE: 0
SORE THROAT: 0
BACK PAIN: 0
ABDOMINAL DISTENTION: 0
EYE REDNESS: 0
ABDOMINAL PAIN: 0
CONSTIPATION: 0
APNEA: 0
FACIAL SWELLING: 0
ANAL BLEEDING: 0
PHOTOPHOBIA: 0
VOICE CHANGE: 0
RECTAL PAIN: 0
RHINORRHEA: 0
CHOKING: 0
EYE DISCHARGE: 0
DIARRHEA: 1
EYE PAIN: 0
CHEST TIGHTNESS: 0
TROUBLE SWALLOWING: 0
COUGH: 0
STRIDOR: 0
EYE ITCHING: 0
ALLERGIC/IMMUNOLOGIC NEGATIVE: 1
NAUSEA: 0
WHEEZING: 0
COLOR CHANGE: 0

## 2019-04-04 NOTE — PROGRESS NOTES
Subjective:      Patient ID: Robin Pérez is a 74 y.o. male. Chief Complaint   Patient presents with   [facility] Surgical Consult     new patient--ref by MELISSA Torres for recto-urethral fistula     HPI  Uriel Pitt is a 40-year-old male who presents for initial evaluation secondary to a urethrorectal fistula. He has a history of prostate carcinoma and obtained brachytherapy. Underwent a TURP in the past due to urinary retention. Recently has been experiencing a lot of diarrhea that was watery. During this time there was less urine output. Over further workup with urology he has been found to have a urethrorectal fistula. No recent colonoscopy or sigmoidoscopy. Currently has a urinary catheter in place. Since catheter placement watery diarrhea has drastically improved. Currently, tolerating diet. No diarrhea or constipation. No hematochezia or melena. No current chest pain or shortness of breath. He states he is to keep the Ford in place until the end of April and at that time urology is planning removal.    Review of Systems   Constitutional: Negative for activity change, appetite change, chills, diaphoresis, fatigue, fever and unexpected weight change. HENT: Negative for congestion, dental problem, drooling, ear discharge, ear pain, facial swelling, hearing loss, mouth sores, nosebleeds, postnasal drip, rhinorrhea, sinus pressure, sneezing, sore throat, tinnitus, trouble swallowing and voice change. Eyes: Negative for photophobia, pain, discharge, redness, itching and visual disturbance. Respiratory: Negative for apnea, cough, choking, chest tightness, shortness of breath, wheezing and stridor. Cardiovascular: Negative for chest pain, palpitations and leg swelling. Gastrointestinal: Positive for diarrhea. Negative for abdominal distention, abdominal pain, anal bleeding, blood in stool, constipation, nausea, rectal pain and vomiting. Endocrine: Negative.     Genitourinary: Positive for difficulty urinating. Negative for decreased urine volume, discharge, dysuria, enuresis, flank pain, frequency, genital sores, hematuria, penile pain, penile swelling, scrotal swelling, testicular pain and urgency. Musculoskeletal: Positive for arthralgias and myalgias. Negative for back pain, gait problem, joint swelling, neck pain and neck stiffness. Skin: Negative for color change, pallor, rash and wound. Allergic/Immunologic: Negative. Neurological: Negative for dizziness, tremors, seizures, syncope, facial asymmetry, speech difficulty, weakness, light-headedness, numbness and headaches. Hematological: Negative for adenopathy. Does not bruise/bleed easily. Psychiatric/Behavioral: Negative for agitation, behavioral problems, confusion, decreased concentration, dysphoric mood, hallucinations, self-injury, sleep disturbance and suicidal ideas. The patient is not nervous/anxious and is not hyperactive. Past Medical History:   Diagnosis Date    Alcohol abuse     6-10 beers per day    Back pain     CAD (coronary artery disease)     MI    Cancer of prostate ([facility]     Brachytherapy    Cerebral artery occlusion with cerebral infarction [facility])     2004    History of blood transfusion     Hyperlipidemia     Hypertension     Major depression in remission ([facility] 12/11/2014    Other disorders of kidney and ureter in diseases classified elsewhere     S/P CABG x 4 12/14/2016    S/P CABG x 4 1999    Simple chronic bronchitis ([facility] 10/12/2016    TIA (transient ischemic attack)     Uncomplicated alcohol dependence ([facility] 7/5/2016       Past Surgical History:   Procedure Laterality Date    APPENDECTOMY      R Cortin[address], 2016    CORONARY ARTERY BYPASS GRAFT  12/14/2016    Redo of prior CABG, Dr. Kiana Baptiste.     CYSTOSCOPY N/A 3/14/2019    TRANSURETHRAL RESECTION PROSTATE, EXAM UNDER ANESTHESIA, PROSTATIC URETHROGRAM performed by Sally Lin MD at [facility] CATH LAB PROCEDURE      FRACTURE SURGERY      Arm    OR OFFICE/OUTPT VISIT,PROCEDURE ONLY N/A 8/29/2018    SCALP EXPLORATION, WOUND CLOSURE performed by Leeann Biswas MD at [address] [place] [address]  2009? [facility]-Dr. Candelario Peters       Current Outpatient Medications   Medication Sig Dispense Refill    folic acid (FOLVITE) 1 MG tablet Take 1 tablet by mouth daily      vitamin B-1 100 MG tablet Take 1 tablet by mouth daily      aspirin EC 81 MG EC tablet Take 81 mg by mouth daily      lisinopril (PRINIVIL;ZESTRIL) 2.5 MG tablet TAKE 1 TABLET EVERY DAY 90 tablet 3    BRILINTA 90 MG TABS tablet TAKE 1 TABLET TWICE DAILY 180 tablet 3    metoprolol tartrate (LOPRESSOR) 25 MG tablet Take 1 tablet by mouth 2 times daily 180 tablet 3    atorvastatin (LIPITOR) 80 MG tablet Take 1 tablet by mouth daily 90 tablet 0    sertraline (ZOLOFT) 100 MG tablet Take 1 tablet by mouth daily 90 tablet 3    phenazopyridine (PYRIDIUM) 100 MG tablet Take 1 tablet by mouth 3 times daily as needed for Pain (Dysuria) 30 tablet 5    oxybutynin (DITROPAN) 5 MG tablet Take 1 tablet by mouth 3 times daily as needed (bladder spasms) 30 tablet 3    acetaminophen (TYLENOL) 325 MG tablet Take 2 tablets by mouth every 4 hours as needed for Pain or Fever      nitroGLYCERIN (NITROSTAT) 0.4 MG SL tablet Place 1 tablet under the tongue every 5 minutes as needed for Chest pain up to max of 3 total doses. If no relief after 1 dose, call 911. 25 tablet 3     No current facility-administered medications for this visit. Allergies   Allergen Reactions    Pcn [Penicillins] Shortness Of Breath    Tetanus Toxoids Shortness Of Breath    Franki-1 [Lidocaine Hcl]      New reaction today  xanderdu 07-02-16 pt states only reaction was to numbing drops at Dr Maday Tang office- swelling and trouble breathing. . But has numbing at Dentist without reaction after the eye drops without any reaction at all.     Pletal [Cilostazol]      \"made him act weird\" Family History   Problem Relation Age of Onset    Other Mother         artery disease    Heart Disease Father     Cancer Father         prostate    Cancer Sister         breast       Social History     Socioeconomic History    Marital status:      Spouse name: Not on file    Number of children: 1    Years of education: 13    Highest education level: Not on file   Occupational History    Occupation: disabled   Social Needs    Financial resource strain: Not on file    Food insecurity:     Worry: Not on file     Inability: Not on file   [employer] needs:     Medical: Not on file     Non-medical: Not on file   Tobacco Use    Smoking status: Never Smoker    Smokeless tobacco: Never Used   Substance and Sexual Activity    Alcohol use: Yes     Alcohol/week: 1.8 oz     Types: 3 Cans of beer per week     Comment: 3 cans per day per patient, wife states \"alot more\"  pt states none for 6 months    Drug use: No    Sexual activity: Not Currently     Partners: Female   Lifestyle    Physical activity:     Days per week: Not on file     Minutes per session: Not on file    Stress: Not on file   Relationships    Social connections:     Talks on phone: Not on file     Gets together: Not on file     Attends Rastafarian service: Not on file     Active member of club or organization: Not on file     Attends meetings of clubs or organizations: Not on file     Relationship status: Not on file    Intimate partner violence:     Fear of current or ex partner: Not on file     Emotionally abused: Not on file     Physically abused: Not on file     Forced sexual activity: Not on file   Other Topics Concern    Not on file   Social History Narrative    Not on file     Vitals:    04/03/19 0951   BP: 122/76   Pulse: 72   Resp: 20   Temp: 98.5 °F (36.9 °C)   SpO2: 98%     Body mass index is 29.76 kg/m². Objective:   Physical Exam   Constitutional: He is oriented to person, place, and time.  He appears cancer ([facility]    Back pain    S/P CABG (coronary artery bypass graft)    Major depression in remission ([facility]    Alcohol abuse    Unstable angina (HCC)    Benign essential HTN    History of CVA (cerebrovascular accident)    History of ischemic heart disease    Macrocytic anemia    Leukocytosis    Postprocedural bulbous urethral stricture    S/P CABG x 4    NSTEMI (non-ST elevated myocardial infarction) ([facility]    Failed coronary artery bypass graft    Claudication (HCC)    Chest pain    Episode of confusion    Tremor of left hand    Metabolic encephalopathy    Pulmonary nodules    History of prostate cancer    Coronary artery disease of bypass graft of native heart with stable angina pectoris (HCC)    Familial hypercholesterolemia    Blunt head trauma    Scalp laceration    Subarachnoid hemorrhage (HCC)    Seizure (HCC)    Subarachnoid hematoma (HCC)    Multiple fractures of ribs, left side, initial encounter for closed fracture    Antiplatelet or antithrombotic long-term use    Subdural hematoma, acute (HCC)    Gross hematuria    Rectourethral fistula    Overweight    Urinary retention    Bladder neck contracture    Acute urinary retention    BPH with obstruction/lower urinary tract symptoms     Assessment:      1. Urethrorectal fistula      Plan:      1. Await until Ford catheter removed. Then see how he does clinically and if fistulous track opens back up. If fistulous track opens back up then proceed with colonoscopy and further imaging with barium enema/CT abdomen pelvis. Discussed with patient about the pros and cons along with risks/possible complications of urethrorectal fistula repair. A large portion of this may be based on location of tract coming into rectum. Discussed robotic, laparoscopic and open techniques. Discussed possible need for colostomy. All questions answered. 2.  Follow-up in office 1-2 weeks after catheter removed and seen by urology.   3.  Signs and symptoms reviewed with patient that would be concerning and need him to return to office for re-evaluation. Patient states He will call if He has questions or concerns.           Anastacio Cabrera MD

## 2019-04-12 ENCOUNTER — NURSE TRIAGE (OUTPATIENT)
Dept: ADMINISTRATIVE | Age: 74
End: 2019-04-12

## 2019-04-12 ENCOUNTER — HOSPITAL ENCOUNTER (EMERGENCY)
Age: 74
Discharge: HOME | End: 2019-04-12
Payer: MEDICARE

## 2019-04-12 VITALS
BODY MASS INDEX: 30.12 KG/M2 | OXYGEN SATURATION: 98 % | HEART RATE: 65 BPM | DIASTOLIC BLOOD PRESSURE: 77 MMHG | RESPIRATION RATE: 16 BRPM | TEMPERATURE: 98.7 F | HEIGHT: 63 IN | SYSTOLIC BLOOD PRESSURE: 140 MMHG | WEIGHT: 170 LBS

## 2019-04-12 DIAGNOSIS — T83.011A MALFUNCTION OF FOLEY CATHETER, INITIAL ENCOUNTER (HCC): Primary | ICD-10-CM

## 2019-04-12 PROCEDURE — 2709999900 HC NON-CHARGEABLE SUPPLY

## 2019-04-12 PROCEDURE — 99283 EMERGENCY DEPT VISIT LOW MDM: CPT

## 2019-04-12 ASSESSMENT — ENCOUNTER SYMPTOMS
SORE THROAT: 0
BACK PAIN: 0
ABDOMINAL PAIN: 0
EYE REDNESS: 0
NAUSEA: 0
WHEEZING: 0
SHORTNESS OF BREATH: 0
RHINORRHEA: 0
COUGH: 0
VOMITING: 0
DIARRHEA: 0
EYE DISCHARGE: 0

## 2019-04-12 NOTE — ED PROVIDER NOTES
[facility] EMERGENCY DEPT      CHIEF COMPLAINT       Chief Complaint   Patient presents with    Urinary Catheter Problem     Catheter bag leaking, needs new leg straps     Spasms     Bladder        Nurses Notes reviewed and I agree except as noted in the HPI. HISTORY OF PRESENT ILLNESS    Cally Romo is a 66 y.o. male with a past medical history of CAD, prostate cancer, cerebral artery occlusion with cerebral infarction, hypertension, CABG x4, and TIA who presents for catheter bag leakage. The patient states his bladder spasms which causes urine to leak around catheter tube. Patient states he needs a new bag and straps. He denies hematuria, fever, chills, nausea, vomiting, chest pain or shortness of breath. No further complaints at the time of the initial encounter. REVIEW OF SYSTEMS     Review of Systems   Constitutional: Negative for appetite change, chills, fatigue and fever. HENT: Negative for congestion, ear pain, rhinorrhea and sore throat. Eyes: Negative for discharge, redness and visual disturbance. Respiratory: Negative for cough, shortness of breath and wheezing. Cardiovascular: Negative for chest pain, palpitations and leg swelling. Gastrointestinal: Negative for abdominal pain, diarrhea, nausea and vomiting. Genitourinary: Negative for decreased urine volume, difficulty urinating, dysuria, frequency, hematuria and urgency. Needs new catheter bag and straps   Musculoskeletal: Negative for arthralgias, back pain, joint swelling and neck pain. Skin: Negative for pallor and rash. Allergic/Immunologic: Negative for environmental allergies. Neurological: Negative for dizziness, syncope, weakness, light-headedness, numbness and headaches. Hematological: Negative for adenopathy. Psychiatric/Behavioral: Negative for confusion and suicidal ideas. The patient is not nervous/anxious.          PAST MEDICAL HISTORY    has a past medical history of Alcohol abuse, Back pain, CAD (coronary artery disease), Cancer of prostate ([facility] [address] [address] Cerebral artery occlusion with cerebral infarction ([facility] [address] [address] History of blood transfusion, Hyperlipidemia, Hypertension, Major depression in remission ([facility] Other disorders of kidney and ureter in diseases classified elsewhere, S/P CABG x 4, S/P CABG x 4, Simple chronic bronchitis ([facility] [address] TIA (transient ischemic attack), and Uncomplicated alcohol dependence ([facility] SURGICAL HISTORY      has a past surgical history that includes Appendectomy; Prostate surgery (2009?); Arm Surgery; Coronary artery bypass graft (12/14/2016); Diagnostic Cardiac Cath Lab Procedure; fracture surgery; pr office/outpt visit,procedure only (N/A, 8/29/2018); Cardiac surgery (1999, 2016); and Cystoscopy (N/A, 3/14/2019).     CURRENT MEDICATIONS       Discharge Medication List as of 4/12/2019  4:40 PM      CONTINUE these medications which have NOT CHANGED    Details   phenazopyridine (PYRIDIUM) 100 MG tablet Take 1 tablet by mouth 3 times daily as needed for Pain (Dysuria), Disp-30 tablet, R-5Normal      oxybutynin (DITROPAN) 5 MG tablet Take 1 tablet by mouth 3 times daily as needed (bladder spasms), Disp-30 tablet, R-3Normal      acetaminophen (TYLENOL) 325 MG tablet Take 2 tablets by mouth every 4 hours as needed for Pain or FeverOTC      folic acid (FOLVITE) 1 MG tablet Take 1 tablet by mouth dailyOTC      vitamin B-1 100 MG tablet Take 1 tablet by mouth dailyOTC      aspirin EC 81 MG EC tablet Take 81 mg by mouth dailyHistorical Med      lisinopril (PRINIVIL;ZESTRIL) 2.5 MG tablet TAKE 1 TABLET EVERY DAY, Disp-90 tablet, R-3Normal      BRILINTA 90 MG TABS tablet TAKE 1 TABLET TWICE DAILY, Disp-180 tablet, R-3Normal      metoprolol tartrate (LOPRESSOR) 25 MG tablet Take 1 tablet by mouth 2 times daily, Disp-180 tablet, R-3Normal      atorvastatin (LIPITOR) 80 MG tablet Take 1 tablet by mouth daily, Disp-90 tablet, R-0Normal      nitroGLYCERIN (NITROSTAT) 0.4 MG SL tablet Place 1 tablet under the tongue every 5 minutes as needed for Chest pain up to max of 3 total doses. If no relief after 1 dose, call 911., Disp-25 tablet, R-3      sertraline (ZOLOFT) 100 MG tablet Take 1 tablet by mouth daily, Disp-90 tablet, R-3             ALLERGIES     is allergic to pcn [penicillins]; tetanus toxoids; devon-1 [lidocaine hcl]; and pletal [cilostazol]. FAMILY HISTORY     indicated that his mother is . He indicated that his father is . He indicated that his sister is alive. family history includes Cancer in his father and sister; Heart Disease in his father; Other in his mother. SOCIAL HISTORY    reports that he has never smoked. He has never used smokeless tobacco. He reports that he drinks about 1.8 oz of alcohol per week. He reports that he does not use drugs. PHYSICAL EXAM     INITIAL VITALS:  height is 5' 3\" (1.6 m) and weight is 170 lb (77.1 kg). His oral temperature is 98.7 °F (37.1 °C). His blood pressure is 140/77 (abnormal) and his pulse is 65. His respiration is 16 and oxygen saturation is 98%. Physical Exam   Constitutional: He is oriented to person, place, and time. Vital signs are normal. He appears well-developed and well-nourished. Non-toxic appearance. No distress. HENT:   Head: Normocephalic and atraumatic. Right Ear: Hearing normal.   Left Ear: Hearing normal.   Nose: Nose normal. No rhinorrhea. Mouth/Throat: Uvula is midline, oropharynx is clear and moist and mucous membranes are normal. No oropharyngeal exudate. Eyes: Pupils are equal, round, and reactive to light. Conjunctivae, EOM and lids are normal. No scleral icterus. Neck: Normal range of motion. Neck supple. No neck rigidity. No tracheal deviation present. Cardiovascular: Normal rate, regular rhythm and normal heart sounds. No murmur heard. Pulmonary/Chest: Effort normal and breath sounds normal. No stridor. No respiratory distress. He has no decreased breath sounds. He has no wheezes. Abdominal: Soft. He exhibits no distension. There is no tenderness. There is no rigidity and no guarding. Musculoskeletal: Normal range of motion. He exhibits no edema. Lymphadenopathy:     He has no cervical adenopathy. Neurological: He is alert and oriented to person, place, and time. He has normal strength. Gait normal. GCS eye subscore is 4. GCS verbal subscore is 5. GCS motor subscore is 6. No gross deficits observed   Skin: Skin is warm, dry and intact. No rash noted. He is not diaphoretic. No pallor. Psychiatric: He has a normal mood and affect. His speech is normal and behavior is normal. Thought content normal.   Nursing note and vitals reviewed. DIFFERENTIAL DIAGNOSIS:   Including but not limited to: Ford catheter change     DIAGNOSTIC RESULTS     EKG: All E[facility]'s are [facility] who either signs or Co-signs this chart in the absence of a cardiologist.    None    RADIOLOGY: non-plain film images(s) such as CT,Ultrasound and MRI are read by the radiologist.  Plain radiographic images are visualized and preliminarily interpreted by the emergency physician unless otherwise stated below. No orders to display       LABS:   Labs Reviewed - No data to display    EMERGENCY DEPARTMENT COURSE:   Vitals:    Vitals:    04/12/19 1551   BP: (!) 140/77   Pulse: 65   Resp: 16   Temp: 98.7 °F (37.1 °C)   TempSrc: Oral   SpO2: 98%   Weight: 170 lb (77.1 kg)   Height: 5' 3\" (1.6 m)     4:23 PM: Discussed results, diagnosis and plan with the patient. Questions were addressed. Disposition and follow-up were agreed upon. Specific discharge instructions explained, including reasons to return to the emergency department. I reviewed the patient's old records. The patient presents to the ED with complaints of new Ford catheter bag and straps. The patient was not in any acute distress.  The patient's physical exam revealed normal heart sounds and lung sounds were clear throughout. A soft and non-tender abdomen. Patient's Ford was changed and he was given a new bag. Patient's status remained stable during the duration of their stay. I explained my proposed course of treatment with the patient, and he was amenable to my decision. The patient will be discharged home, and will need to follow-up with his PCP. The patient will need to come back to the ER if their symptoms worsen, or become more severe in nature. CRITICAL CARE:   None    CONSULTS:  None    PROCEDURES:  None    FINAL IMPRESSION      1. Malfunction of Ford catheter apparatus, [facility] ([facility]          DISPOSITION/PLAN     1. Malfunction of Ford catheter apparatus, initial encounter ([facility]        PATIENT REFERRED TO:  ROSALVA PARADA II.[facility]  [address] [facility]  [address] [facility]  Schedule an appointment as soon as possible for a visit         DISCHARGE MEDICATIONS:  Discharge Medication List as of 4/12/2019  4:40 PM          (Please note that portions of this note were completed with a voice recognition program.  Efforts were made to edit the dictations but occasionally words are mis-transcribed.)    Scribe:  Radha Pereira 4/12/19 4:19 PM Scribing for and in the presence of ARGELIA Rabago. Signed by: Melecio Vargas 04/17/19 10:24 AM    Provider:  I personally performed the services described in the documentation, reviewed and edited the documentation which was dictated to the scribe in my presence, and it accurately records my words and actions.     ARGELIA Rabago 04/17/19 10:24 AM    ARGELIA Rabago PA-C  04/17/19 1024

## 2019-04-12 NOTE — ED TRIAGE NOTES
Arrives to  ER for the evaluation of bladder spasms and urinary catheter bag is leaking. Needs a new urine bag and leg straps. States at times spasms will cause urine to leak around catheter tube and causes burning. Denies hematuria, malodorous urine. Dr Robert Mitchell office was closed. Waiting provider to assess. Will monitor.

## 2019-04-12 NOTE — ED NOTES
Ford leg bag changed at this time. 20 Fr catheter remains in place and urine collected from tube before new bag placed. Also provided patient with new leg straps. Tolerated having the bag changed with no issues. Will monitor.       Domonique Rollins RN  04/12/19 0503

## 2019-04-29 ENCOUNTER — OFFICE VISIT (OUTPATIENT)
Dept: UROLOGY | Age: 74
End: 2019-04-29

## 2019-04-29 VITALS
SYSTOLIC BLOOD PRESSURE: 132 MMHG | WEIGHT: 170.8 LBS | DIASTOLIC BLOOD PRESSURE: 70 MMHG | BODY MASS INDEX: 30.26 KG/M2 | HEIGHT: 63 IN

## 2019-04-29 DIAGNOSIS — C61 PROSTATE CANCER (HCC): ICD-10-CM

## 2019-04-29 DIAGNOSIS — N40.1 BPH WITH OBSTRUCTION/LOWER URINARY TRACT SYMPTOMS: ICD-10-CM

## 2019-04-29 DIAGNOSIS — N36.0 URETHRORECTAL FISTULA: Primary | ICD-10-CM

## 2019-04-29 DIAGNOSIS — N13.8 BPH WITH OBSTRUCTION/LOWER URINARY TRACT SYMPTOMS: ICD-10-CM

## 2019-04-29 PROCEDURE — 99024 POSTOP FOLLOW-UP VISIT: CPT | Performed by: NURSE PRACTITIONER

## 2019-04-29 NOTE — PROGRESS NOTES
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 He is doing okay, he is ready to have catheter removed. Has been dealing with bladder spasms and leaking. He is s/p TURP using bipolar loop w/ Dr. Michelle Stephens on 03/14/19. 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 Surgical pathology is consistent with benign fibromuscular tissue and urothelial mucosa, negative for malignancy, no prosthetic epithelium present. He was evaluated by Dr. Sj Ash, plan was to reassess symptoms after catheter was removed. He has hx of urethral stricture, Dr. Michelle Stephens noted possible false passage. He is known to have prostate cancer in 2000 and receive brachytherapy for the same. He developed urethral scar tissue which was operated by Dr Sajan Aaron in the past.     PSA in 2016 was undetectable. He comes in today with his wife. Hx is obtained from the patient and medical record.      Current Outpatient Medications   Medication Sig Dispense Refill    phenazopyridine (PYRIDIUM) 100 MG tablet Take 1 tablet by mouth 3 times daily as needed for Pain (Dysuria) 30 tablet 5    oxybutynin (DITROPAN) 5 MG tablet Take 1 tablet by mouth 3 times daily as needed (bladder spasms) 30 tablet 3    acetaminophen (TYLENOL) 325 MG tablet Take 2 tablets by mouth every 4 hours as needed for Pain or Fever      folic acid (FOLVITE) 1 MG tablet Take 1 tablet by mouth daily      vitamin B-1 100 MG tablet Take 1 tablet by mouth daily      aspirin EC 81 MG EC tablet Take 81 mg by mouth daily      lisinopril (PRINIVIL;ZESTRIL) 2.5 MG tablet TAKE 1 TABLET EVERY DAY 90 tablet 3    BRILINTA 90 MG TABS tablet TAKE 1 TABLET TWICE DAILY 180 tablet 3    metoprolol tartrate (LOPRESSOR) 25 MG tablet Take 1 tablet by mouth 2 times daily 180 tablet 3    atorvastatin (LIPITOR) 80 MG tablet Take 1 tablet by mouth daily 90 tablet 0    nitroGLYCERIN (NITROSTAT) 0.4 MG SL tablet Place 1 tablet under the tongue every 5 minutes as needed for Chest pain up to max of 3 total doses. If no relief after 1 dose, call 911. 25 tablet 3    sertraline (ZOLOFT) 100 MG tablet Take 1 tablet by mouth daily 90 tablet 3     No current facility-administered medications for this visit. Past Medical History  Rachna Charles  has a past medical history of Alcohol abuse, Back pain, CAD (coronary artery disease), Cancer of prostate ([address] Cerebral artery occlusion with cerebral infarction ([address] History of blood transfusion, Hyperlipidemia, Hypertension, Major depression in remission ([address] Other disorders of kidney and ureter in diseases classified elsewhere, S/P CABG x 4, S/P CABG x 4, Simple chronic bronchitis ([address] TIA (transient ischemic attack), and Uncomplicated alcohol dependence ([address] Past Surgical History  The patient  has a past surgical history that includes Appendectomy; Prostate surgery (2009?); Arm Surgery; Coronary artery bypass graft (12/14/2016); Diagnostic Cardiac Cath Lab Procedure; fracture surgery; pr office/outpt visit,procedure only (N/A, 8/29/2018); Cardiac surgery (1999, 2016); and Cystoscopy (N/A, 3/14/2019). Family History  This patient's family history includes Cancer in his father and sister; Heart Disease in his father; Other in his mother. Social History  Rachna Charles  reports that he has never smoked. He has never used smokeless tobacco. He reports that he drinks about 1.8 oz of alcohol per week. He reports that he does not use drugs.     Subjective:     Review of Systems  No problems with ears, nose or throat. No problems with eyes. No chest pain, shortness of breath, abdominal pain, extremity pain or weakness, and no neurological deficits. No rashes.  symptoms per HPI. The remainder of the review of symptoms is negative. Objective:     PE:   Vitals:    04/29/19 1019   BP: 132/70   Site: Left Upper Arm   Position: Sitting   Cuff Size: Medium Adult   Weight: 170 lb 12.8 oz (77.5 kg)   Height: 5' 3\" (1.6 m)       Constitutional: Alert and oriented times 3, no acute distress and cooperative to examination with appropriate mood and affect. HENT:   Head:        Normocephalic and atraumatic. Mouth/Throat:         Mucous membranes are normal.   Eyes:         EOM are normal. No scleral icterus. PERRLA. Neck:        Supple, symmetrical, trachea midline  Pulmonary/Chest:      Chest symmetric with normal A/P diameter,  Normal respiratory rate and rhthym. No use of accessory muscles. Abdominal:         Soft. No tenderness. Bowel sounds present. Musculoskeletal:         Normal range of motion. No edema or tenderness of lower extremities. Extremities: No cyanosis, clubbing, or edema present. Neurological:        Alert and oriented. No cranial nerve deficit. Psychiatric:        Normal mood and affect. Labs   Urine dip demonstrates   No results found for this visit on 04/29/19. Patients recent PSA values are as follows  Lab Results   Component Value Date    PSA <0.02 12/23/2016        Recent BUN/Creatinine:  Lab Results   Component Value Date    BUN 17 02/02/2019    CREATININE 0.9 02/02/2019       Radiology  No recent imaging       Assessment & Plan:     Urethrorectal Fistula   Prostate Defect  Hx of Urethral Stricture  Hx of Prostate Cancer    Kraft f/u today for Urethrorectal fistula. Ford catheter removed today. Will monitor symptoms, prior to surgery, had increased diarrhea with decreased urinary output and retention secondary to fistula.  Plan to f/u in 6 weeks, PVR on arrival. F/u with Dr. Lesle Goldmann as scheduled     Return in about 6 weeks (around 6/10/2019) for Urethrorectal Fistula .     Eli Morris, APRN  Urology

## 2019-04-29 NOTE — PROGRESS NOTES
15 cc of water deflated from lopez balloon. 20 Fr lopez removed without difficulty. He will drink fluids and call in 4-6 hours if patient has not urinated. Patient will follow up with provider Neftali Mike.

## 2019-06-10 ENCOUNTER — OFFICE VISIT (OUTPATIENT)
Dept: UROLOGY | Age: 74
End: 2019-06-10
Payer: MEDICARE

## 2019-06-10 VITALS
WEIGHT: 175.4 LBS | DIASTOLIC BLOOD PRESSURE: 72 MMHG | HEIGHT: 63 IN | SYSTOLIC BLOOD PRESSURE: 132 MMHG | BODY MASS INDEX: 31.08 KG/M2

## 2019-06-10 DIAGNOSIS — C61 PROSTATE CANCER (HCC): ICD-10-CM

## 2019-06-10 DIAGNOSIS — N39.45 CONTINUOUS LEAKAGE OF URINE: ICD-10-CM

## 2019-06-10 DIAGNOSIS — Z85.46 HISTORY OF PROSTATE CANCER: ICD-10-CM

## 2019-06-10 DIAGNOSIS — N36.0 URETHRORECTAL FISTULA: Primary | ICD-10-CM

## 2019-06-10 LAB — POST VOID RESIDUAL (PVR): 24 ML

## 2019-06-10 PROCEDURE — G8417 CALC BMI ABV UP PARAM F/U: HCPCS | Performed by: NURSE PRACTITIONER

## 2019-06-10 PROCEDURE — 99213 OFFICE O/P EST LOW 20 MIN: CPT | Performed by: NURSE PRACTITIONER

## 2019-06-10 PROCEDURE — G8598 ASA/ANTIPLAT THER USED: HCPCS | Performed by: NURSE PRACTITIONER

## 2019-06-10 PROCEDURE — 51798 US URINE CAPACITY MEASURE: CPT | Performed by: NURSE PRACTITIONER

## 2019-06-10 PROCEDURE — 1123F ACP DISCUSS/DSCN MKR DOCD: CPT | Performed by: NURSE PRACTITIONER

## 2019-06-10 PROCEDURE — 4040F PNEUMOC VAC/ADMIN/RCVD: CPT | Performed by: NURSE PRACTITIONER

## 2019-06-10 PROCEDURE — G8427 DOCREV CUR MEDS BY ELIG CLIN: HCPCS | Performed by: NURSE PRACTITIONER

## 2019-06-10 PROCEDURE — 1036F TOBACCO NON-USER: CPT | Performed by: NURSE PRACTITIONER

## 2019-06-10 RX ORDER — OXYBUTYNIN CHLORIDE 5 MG/1
5 TABLET ORAL 3 TIMES DAILY PRN
Qty: 90 TABLET | Refills: 1 | Status: SHIPPED | OUTPATIENT
Start: 2019-06-10 | End: 2021-03-11

## 2019-06-10 NOTE — PROGRESS NOTES
[address]  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 He reports urinary incontinence, is constant. Reports it is a constant leak, especially with movement. He denies any urgency or urge incontinence. He denies any issues with diarrhea or urine from the rectum prior to surgery. He is currently wearing depends, is going through 6-8 daily, is very frustrating. PVR is 24 ml    S/p TURP using bipolar loop w/ Dr. Whit Mejias on 03/14/19. 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 Surgical pathology is consistent with benign fibromuscular tissue and urothelial mucosa, negative for malignancy, no prosthetic epithelium present. He was evaluated by Dr. Tayler Atkins, plan was to reassess symptoms after catheter was removed. He has hx of urethral stricture, Dr. Whit Mejias noted possible false passage. He is known to have prostate cancer in 2000 and receive brachytherapy for the same. He developed urethral scar tissue which was operated by Dr Beverly Chavira in the past.     PSA in 2016 was undetectable. He comes in today with his wife. Hx is obtained from the patient and medical record.      Current Outpatient Medications   Medication Sig Dispense Refill    oxybutynin (DITROPAN) 5 MG tablet Take 1 tablet by mouth 3 times daily as needed (incontinence) 90 tablet 1    acetaminophen (TYLENOL) 325 MG tablet Take 2 tablets by mouth every 4 hours as needed for Pain or Fever      folic acid (FOLVITE) 1 MG tablet Take 1 tablet by mouth daily      vitamin B-1 100 MG tablet Take 1 tablet by mouth daily      aspirin EC 81 MG EC tablet Take 81 mg by mouth daily      lisinopril (PRINIVIL;ZESTRIL) 2.5 MG tablet TAKE 1 TABLET EVERY DAY 90 tablet 3    BRILINTA 90 MG TABS tablet TAKE 1 TABLET TWICE DAILY 180 tablet 3    metoprolol tartrate (LOPRESSOR) 25 MG tablet Take 1 tablet by mouth 2 times daily 180 tablet 3    atorvastatin (LIPITOR) 80 MG tablet Take 1 tablet by mouth daily 90 tablet 0    nitroGLYCERIN (NITROSTAT) 0.4 MG SL tablet Place 1 tablet under the tongue every 5 minutes as needed for Chest pain up to max of 3 total doses. If no relief after 1 dose, call 911. 25 tablet 3    sertraline (ZOLOFT) 100 MG tablet Take 1 tablet by mouth daily 90 tablet 3     No current facility-administered medications for this visit. Past Medical History  Rachna Charles  has a past medical history of Alcohol abuse, Back pain, CAD (coronary artery disease), Cancer of prostate ([address] Cerebral artery occlusion with cerebral infarction ([address] History of blood transfusion, Hyperlipidemia, Hypertension, Major depression in remission ([address] Other disorders of kidney and ureter in diseases classified elsewhere, S/P CABG x 4, S/P CABG x 4, Simple chronic bronchitis ([address] TIA (transient ischemic attack), and Uncomplicated alcohol dependence ([address] Past Surgical History  The patient  has a past surgical history that includes Appendectomy; Prostate surgery (2009?); Arm Surgery; Coronary artery bypass graft (12/14/2016); Diagnostic Cardiac Cath Lab Procedure; fracture surgery; pr office/outpt visit,procedure only (N/A, 8/29/2018); Cardiac surgery (1999, 2016); and Cystoscopy (N/A, 3/14/2019). Family History  This patient's family history includes Cancer in his father and sister; Heart Disease in his father; Other in his mother. Social History  Rachna Charles  reports that he has never smoked. He has never used smokeless tobacco. He reports that he drinks about 1.8 oz of alcohol per week.  He reports that he does not use drugs. Subjective:     Review of Systems  No problems with ears, nose or throat. No problems with eyes. No chest pain, shortness of breath, abdominal pain, extremity pain or weakness, and no neurological deficits. No rashes.  symptoms per HPI. The remainder of the review of symptoms is negative. Objective:     PE:   Vitals:    06/10/19 1013   BP: 132/72   Weight: 175 lb 6.4 oz (79.6 kg)   Height: 5' 3\" (1.6 m)       Constitutional: Alert and oriented times 3, no acute distress and cooperative to examination with appropriate mood and affect. HENT:   Head:        Normocephalic and atraumatic. Mouth/Throat:         Mucous membranes are normal.   Eyes:         EOM are normal. No scleral icterus. PERRLA. Neck:        Supple, symmetrical, trachea midline  Pulmonary/Chest:      Chest symmetric with normal A/P diameter,  Normal respiratory rate and rhthym. No use of accessory muscles. Abdominal:         Soft. No tenderness. Bowel sounds present. Musculoskeletal:         Normal range of motion. No edema or tenderness of lower extremities. Extremities: No cyanosis, clubbing, or edema present. Neurological:        Alert and oriented. No cranial nerve deficit. Psychiatric:        Normal mood and affect. 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 Has continual leaking, incontinence since catheter was removed.  He denies any

## 2019-06-21 ENCOUNTER — OFFICE VISIT (OUTPATIENT)
Dept: CARDIOLOGY CLINIC | Age: 74
End: 2019-06-21
Payer: MEDICARE

## 2019-06-21 VITALS
DIASTOLIC BLOOD PRESSURE: 72 MMHG | WEIGHT: 170 LBS | BODY MASS INDEX: 30.12 KG/M2 | HEART RATE: 72 BPM | HEIGHT: 63 IN | SYSTOLIC BLOOD PRESSURE: 114 MMHG

## 2019-06-21 DIAGNOSIS — I10 ESSENTIAL HYPERTENSION: ICD-10-CM

## 2019-06-21 DIAGNOSIS — E78.01 FAMILIAL HYPERCHOLESTEROLEMIA: ICD-10-CM

## 2019-06-21 DIAGNOSIS — I25.10 CORONARY ARTERY DISEASE INVOLVING NATIVE CORONARY ARTERY OF NATIVE HEART WITHOUT ANGINA PECTORIS: Primary | ICD-10-CM

## 2019-06-21 PROCEDURE — 1123F ACP DISCUSS/DSCN MKR DOCD: CPT | Performed by: NUCLEAR MEDICINE

## 2019-06-21 PROCEDURE — 99213 OFFICE O/P EST LOW 20 MIN: CPT | Performed by: NUCLEAR MEDICINE

## 2019-06-21 PROCEDURE — 4040F PNEUMOC VAC/ADMIN/RCVD: CPT | Performed by: NUCLEAR MEDICINE

## 2019-06-21 PROCEDURE — G8417 CALC BMI ABV UP PARAM F/U: HCPCS | Performed by: NUCLEAR MEDICINE

## 2019-06-21 PROCEDURE — G8598 ASA/ANTIPLAT THER USED: HCPCS | Performed by: NUCLEAR MEDICINE

## 2019-06-21 PROCEDURE — 1036F TOBACCO NON-USER: CPT | Performed by: NUCLEAR MEDICINE

## 2019-06-21 PROCEDURE — G8428 CUR MEDS NOT DOCUMENT: HCPCS | Performed by: NUCLEAR MEDICINE

## 2019-06-21 NOTE — PROGRESS NOTES
[address]  [address] [address]  Dept: [phone]  Dept Fax: [phone]  Loc: [phone]    Visit Date: 6/21/2019    Shirin Sharma is a 66 y.o. male who presents todayfor:  Chief Complaint   Patient presents with    Check-Up    Coronary Artery Disease    Hypertension    Hyperlipidemia   urological issues  Incontinence  Known CABG and failed bypasses  Stent to the LAD  Some dyspnea  Chronic   No chest pain   BP is stable         HPI:  HPI  Past Medical History:   Diagnosis Date    Alcohol abuse     6-10 beers per day    Back pain     CAD (coronary artery disease)     MI    [facility]     Brachytherapy    Cerebral artery occlusion with cerebral infarction [facility])     2004    History of blood transfusion     Hyperlipidemia     Hypertension     Major depression in remission ([address] 12/11/2014    Other disorders of kidney and ureter in diseases classified elsewhere     S/P CABG x 4 12/14/2016    S/P CABG x 4 1999    Simple chronic bronchitis ([address] 10/12/2016    TIA (transient ischemic attack)     Uncomplicated alcohol dependence ([address] 7/5/2016      Past Surgical History:   Procedure Laterality Date    APPENDECTOMY      [address], 2016    CORONARY ARTERY BYPASS GRAFT  12/14/2016    Redo of prior CABG, Dr. Gianna Chris.  CYSTOSCOPY N/A 3/14/2019    TRANSURETHRAL RESECTION PROSTATE, EXAM UNDER ANESTHESIA, PROSTATIC URETHROGRAM performed by Sangeeta Phillips MD at [facility] CATH LAB PROCEDURE      FRACTURE SURGERY      Arm    UT OFFICE/OUTPT VISIT,PROCEDURE ONLY N/A 8/29/2018    SCALP EXPLORATION, WOUND CLOSURE performed by Faye Guzman MD at [facility]  2009?     Naomi Woodburn     Family History   Problem Relation Age of Onset    Other Mother         artery disease    Heart Disease Father     Cancer Father         prostate Date Due    Shingles Vaccine (1 of 2) 04/15/1993    Annual Wellness Visit (AWV)  12/11/2015    Pneumococcal 65+ years Vaccine (2 of 2 - PPSV23) 12/11/2015    Creatinine monitoring  02/02/2020    Potassium monitoring  03/14/2020    Flu vaccine  Completed       Subjective:  Review of Systems  General:   No fever, no chills, some fatigue or weight loss  Pulmonary:    some dyspnea, no wheezing  Cardiac:    Denies recent chest pain,   GI:     No nausea or vomiting, no abdominal pain  Neuro:     No dizziness or light headedness,   Musculoskeletal:  No recent active issues  Extremities:   No edema, good peripheral pulses      Objective:  Physical Exam  /72   Pulse 72   Ht 5' 3\" (1.6 m)   Wt 170 lb (77.1 kg)   BMI 30.11 kg/m²   General:   Well developed, well nourished  Lungs:    Clear to auscultation  Heart:    Normal S1 S2, Slight murmur. no rubs, no gallops  Abdomen:   Soft, non tender, no organomegalies, positive bowel sounds  Extremities:   No edema, no cyanosis, good peripheral pulses  Neurological:   Awake, alert, oriented. No obvious focal deficits  Musculoskelatal:  No obvious deformities    Assessment:      Diagnosis Orders   1. Coronary artery disease involving native coronary artery of native heart without angina pectoris     2. Essential hypertension     3. Familial hypercholesterolemia     cardiac fair for now       Plan:  No follow-ups on file. As above  Continue risk factor modification and medical management  Thank you for allowing me to participate in the care of your patient. Please don't hesitate to contact me regarding any further issues related to the patient care  '  Orders Placed:  No orders of the defined types were placed in this encounter. Medications Prescribed:  No orders of the defined types were placed in this encounter. Discussed use, benefit, and side effects of prescribed medications. All patient questions answered. Pt voicedunderstanding.  Instructed to continue current medications, diet and exercise. Continue risk factor modification and medical management. Patient agreed with treatment plan. Follow up as directed.     Electronically signedby Juan Francisco Collins MD on 6/21/2019 at 10:45 AM

## 2019-09-10 ENCOUNTER — OFFICE VISIT (OUTPATIENT)
Dept: UROLOGY | Age: 74
End: 2019-09-10
Payer: MEDICARE

## 2019-09-10 VITALS
HEIGHT: 63 IN | BODY MASS INDEX: 30.74 KG/M2 | WEIGHT: 173.5 LBS | DIASTOLIC BLOOD PRESSURE: 64 MMHG | SYSTOLIC BLOOD PRESSURE: 112 MMHG

## 2019-09-10 DIAGNOSIS — N39.498 OTHER URINARY INCONTINENCE: Primary | ICD-10-CM

## 2019-09-10 DIAGNOSIS — N36.0 URETHRORECTAL FISTULA: ICD-10-CM

## 2019-09-10 LAB
BILIRUBIN URINE: NEGATIVE
BLOOD URINE, POC: NEGATIVE
CHARACTER, URINE: CLEAR
COLOR, URINE: YELLOW
GLUCOSE URINE: NEGATIVE MG/DL
KETONES, URINE: NEGATIVE
LEUKOCYTE CLUMPS, URINE: NEGATIVE
NITRITE, URINE: NEGATIVE
PH, URINE: 5.5 (ref 5–9)
POST VOID RESIDUAL (PVR): 30 ML
PROTEIN, URINE: NEGATIVE MG/DL
SPECIFIC GRAVITY, URINE: 1.02 (ref 1–1.03)
UROBILINOGEN, URINE: 0.2 EU/DL (ref 0–1)

## 2019-09-10 PROCEDURE — 99213 OFFICE O/P EST LOW 20 MIN: CPT | Performed by: NURSE PRACTITIONER

## 2019-09-10 PROCEDURE — 81003 URINALYSIS AUTO W/O SCOPE: CPT | Performed by: NURSE PRACTITIONER

## 2019-09-10 PROCEDURE — G8598 ASA/ANTIPLAT THER USED: HCPCS | Performed by: NURSE PRACTITIONER

## 2019-09-10 PROCEDURE — G8427 DOCREV CUR MEDS BY ELIG CLIN: HCPCS | Performed by: NURSE PRACTITIONER

## 2019-09-10 PROCEDURE — 1123F ACP DISCUSS/DSCN MKR DOCD: CPT | Performed by: NURSE PRACTITIONER

## 2019-09-10 PROCEDURE — G8417 CALC BMI ABV UP PARAM F/U: HCPCS | Performed by: NURSE PRACTITIONER

## 2019-09-10 PROCEDURE — 1036F TOBACCO NON-USER: CPT | Performed by: NURSE PRACTITIONER

## 2019-09-10 PROCEDURE — 51798 US URINE CAPACITY MEASURE: CPT | Performed by: NURSE PRACTITIONER

## 2019-09-10 PROCEDURE — 4040F PNEUMOC VAC/ADMIN/RCVD: CPT | Performed by: NURSE PRACTITIONER

## 2019-09-10 NOTE — PROGRESS NOTES
[address] [facility].  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 He reports urinary incontinence, is constant. Reports it is a constant leak, especially with movement. Is performing pelvic floor therapy, has noticed some improvement, not performing as recommended. Is taking Ditropan 5 mg TID, unsure how much benefit it is providing. Still going through 6-8 pads daily. PVR is 30 ml  Urinalysis is negative   S/p TURP using bipolar loop w/ Dr. Rafael Miles on 03/14/19. 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 Surgical pathology is consistent with benign fibromuscular tissue and urothelial mucosa, negative for malignancy, no prosthetic epithelium present. He was evaluated by Dr. Denise Gonzalez, plan was to reassess symptoms after catheter was removed. He has hx of urethral stricture, Dr. Rafael Miles noted possible false passage. He is known to have prostate cancer in 2000 and receive brachytherapy for the same. He developed urethral scar tissue which was operated by Dr Sally Finney in the past.     PSA in 2016 was undetectable. He comes in today by himself. Hx is obtained from the patient and medical record.      Current Outpatient Medications   Medication Sig Dispense Refill    oxybutynin (DITROPAN) 5 MG tablet Take 1 tablet by mouth 3 times daily as needed (incontinence) 90 tablet 1    acetaminophen (TYLENOL) 325 MG tablet Take 2 tablets by mouth every 4 hours as needed for Pain or Fever      folic acid (FOLVITE) 1 MG tablet Take 1 tablet by mouth daily      vitamin B-1 alcohol per week. He reports that he does not use drugs. Subjective:     Review of Systems  No problems with ears, nose or throat. No problems with eyes. No chest pain, shortness of breath, abdominal pain, extremity pain or weakness, and no neurological deficits. No rashes.  symptoms per HPI. The remainder of the review of symptoms is negative. Objective:     PE:   Vitals:    09/10/19 1049   BP: 112/64   Weight: 173 lb 8 oz (78.7 kg)   Height: 5' 3\" (1.6 m)       Constitutional: Alert and oriented times 3, no acute distress and cooperative to examination with appropriate mood and affect. HENT:   Head:        Normocephalic and atraumatic. Mouth/Throat:         Mucous membranes are normal.   Eyes:         EOM are normal. No scleral icterus. PERRLA. Neck:        Supple, symmetrical, trachea midline  Pulmonary/Chest:      Chest symmetric with normal A/P diameter,  Normal respiratory rate and rhthym. No use of accessory muscles. Abdominal:         Soft. No tenderness. Bowel sounds present. Musculoskeletal:         Normal range of motion. No edema or tenderness of lower extremities. Extremities: No cyanosis, clubbing, or edema present. Neurological:        Alert and oriented. No cranial nerve deficit. Psychiatric:        Normal mood and affect.       Labs   Urine dip demonstrates   Results for POC orders placed in visit on 09/10/19   POCT Urinalysis No Micro (Auto)   Result Value Ref Range    Glucose, Ur Negative NEGATIVE mg/dl    Bilirubin Urine Negative     Ketones, Urine Negative NEGATIVE    Specific Gravity, Urine 1.020 1.002 - 1.03    Blood, UA POC Negative NEGATIVE    pH, Urine 5.50 5.0 - 9.0    Protein, Urine Negative NEGATIVE mg/dl    Urobilinogen, Urine 0.20 0.0 - 1.0 eu/dl    Nitrite, Urine Negative NEGATIVE    Leukocyte Clumps, Urine Negative NEGATIVE    Color, Urine Yellow YELLOW-STR    Character, Urine Clear [id number]   poct post void residual   Result Value Ref Range    post void residual 30 ml       Patients recent PSA values are as follows  Lab Results   Component Value Date    PSA <0.02 12/23/2016        Recent BUN/Creatinine:  Lab Results   Component Value Date    BUN 17 02/02/2019    CREATININE 0.9 02/02/2019       Radiology  No recent imaging       Assessment & Plan:     Urethrorectal Fistula  Urinary Incontinence   Prostate Defect  Hx of Urethral Stricture  Hx of Prostate Cancer    Kraft f/u today for Urethrorectal fistula. Recommend to continue pelvic floor therapy, not performing on a routine basis. He can stop Ditropan to see if symptoms worsen. Return in about 6 months (around 3/10/2020) for Urethrorectal fistula, incontinence .     MARC Rivera  Urology

## 2019-09-18 ENCOUNTER — TELEPHONE (OUTPATIENT)
Dept: ADMINISTRATIVE | Age: 74
End: 2019-09-18

## 2019-09-18 RX ORDER — LISINOPRIL 2.5 MG/1
TABLET ORAL
Qty: 90 TABLET | Refills: 3 | Status: SHIPPED | OUTPATIENT
Start: 2019-09-18 | End: 2021-03-11

## 2019-09-18 NOTE — TELEPHONE ENCOUNTER
Telephone Outcome: Patient not seeing a [facility] PCP.  Actual PCP is Dr. Brianna Luo

## 2019-09-25 RX ORDER — TICAGRELOR 90 MG/1
TABLET ORAL
Qty: 180 TABLET | Refills: 3 | Status: SHIPPED | OUTPATIENT
Start: 2019-09-25 | End: 2020-06-29 | Stop reason: ALTCHOICE

## 2020-03-10 ENCOUNTER — OFFICE VISIT (OUTPATIENT)
Dept: UROLOGY | Age: 75
End: 2020-03-10
Payer: MEDICARE

## 2020-03-10 VITALS — BODY MASS INDEX: 30.12 KG/M2 | WEIGHT: 170 LBS | HEIGHT: 63 IN

## 2020-03-10 PROCEDURE — 1036F TOBACCO NON-USER: CPT | Performed by: UROLOGY

## 2020-03-10 PROCEDURE — 99213 OFFICE O/P EST LOW 20 MIN: CPT | Performed by: UROLOGY

## 2020-03-10 PROCEDURE — G8484 FLU IMMUNIZE NO ADMIN: HCPCS | Performed by: UROLOGY

## 2020-03-10 PROCEDURE — G8417 CALC BMI ABV UP PARAM F/U: HCPCS | Performed by: UROLOGY

## 2020-03-10 PROCEDURE — 4040F PNEUMOC VAC/ADMIN/RCVD: CPT | Performed by: UROLOGY

## 2020-03-10 PROCEDURE — G8427 DOCREV CUR MEDS BY ELIG CLIN: HCPCS | Performed by: UROLOGY

## 2020-03-10 PROCEDURE — 1123F ACP DISCUSS/DSCN MKR DOCD: CPT | Performed by: UROLOGY

## 2020-03-10 NOTE — PROGRESS NOTES
smoking cessation counseling offered)   Social History     Substance and Sexual Activity   Alcohol Use Yes    Alcohol/week: 3.0 standard drinks    Types: 3 Cans of beer per week    Comment: 3 cans per day per patient, wife states \"alot more\"  pt states none for 6 months       REVIEW OF SYSTEMS:  Constitutional: negative  Eyes: negative  Respiratory: negative  Cardiovascular: negative  Gastrointestinal: negative  Genitourinary: see HPI  Musculoskeletal: negative  Skin: negative   Neurological: negative  Hematological/Lymphatic: negative  Psychological: negative        Physical Exam:    This a 66 y.o. male  There were no vitals filed for this visit. Body mass index is 30.11 kg/m². Constitutional: Patient in no acute distress;       Assessment and Plan        1. Urethrorectal fistula    2. Other urinary incontinence    3. Prostate cancer ([address]               Plan:      UA normal  Does not want further treatment for incontinence  Follow up with psa in one year with [facility]      Prescriptions Ordered:  No orders of the defined types were placed in this encounter.      Orders Placed:  Orders Placed This Encounter   Procedures    PSA, Diagnostic     Standing Status:   Future     Standing Expiration Date:   3/10/2022            Ragini Barba MD

## 2020-06-13 ENCOUNTER — HOSPITAL ENCOUNTER (EMERGENCY)
Age: 75
Discharge: HOME | End: 2020-06-13
Payer: MEDICARE

## 2020-06-13 ENCOUNTER — APPOINTMENT (OUTPATIENT)
Dept: CT IMAGING | Age: 75
End: 2020-06-13
Payer: MEDICARE

## 2020-06-13 VITALS
SYSTOLIC BLOOD PRESSURE: 115 MMHG | HEIGHT: 62 IN | TEMPERATURE: 98.6 F | BODY MASS INDEX: 31.28 KG/M2 | RESPIRATION RATE: 16 BRPM | HEART RATE: 77 BPM | OXYGEN SATURATION: 97 % | WEIGHT: 170 LBS | DIASTOLIC BLOOD PRESSURE: 74 MMHG

## 2020-06-13 LAB
ALBUMIN SERPL-MCNC: 4 G/DL (ref 3.5–5.1)
ALP BLD-CCNC: 145 U/L (ref 38–126)
ALT SERPL-CCNC: 17 U/L (ref 11–66)
ANION GAP SERPL CALCULATED.3IONS-SCNC: 12 MEQ/L (ref 8–16)
AST SERPL-CCNC: 16 U/L (ref 5–40)
BASOPHILS # BLD: 0.6 %
BASOPHILS ABSOLUTE: 0 THOU/MM3 (ref 0–0.1)
BILIRUB SERPL-MCNC: 0.4 MG/DL (ref 0.3–1.2)
BILIRUBIN DIRECT: < 0.2 MG/DL (ref 0–0.3)
BUN BLDV-MCNC: 13 MG/DL (ref 7–22)
CALCIUM SERPL-MCNC: 9.3 MG/DL (ref 8.5–10.5)
CHLORIDE BLD-SCNC: 103 MEQ/L (ref 98–111)
CO2: 25 MEQ/L (ref 23–33)
CREAT SERPL-MCNC: 1 MG/DL (ref 0.4–1.2)
EKG ATRIAL RATE: 73 BPM
EKG P AXIS: 66 DEGREES
EKG P-R INTERVAL: 180 MS
EKG Q-T INTERVAL: 408 MS
EKG QRS DURATION: 100 MS
EKG QTC CALCULATION (BAZETT): 449 MS
EKG R AXIS: 64 DEGREES
EKG T AXIS: 50 DEGREES
EKG VENTRICULAR RATE: 73 BPM
EOSINOPHIL # BLD: 0.6 %
EOSINOPHILS ABSOLUTE: 0 THOU/MM3 (ref 0–0.4)
ERYTHROCYTE [DISTWIDTH] IN BLOOD BY AUTOMATED COUNT: 13 % (ref 11.5–14.5)
ERYTHROCYTE [DISTWIDTH] IN BLOOD BY AUTOMATED COUNT: 44.7 FL (ref 35–45)
GFR SERPL CREATININE-BSD FRML MDRD: 72 ML/MIN/1.73M2
GLUCOSE BLD-MCNC: 123 MG/DL (ref 70–108)
HCT VFR BLD CALC: 38.1 % (ref 42–52)
HEMOGLOBIN: 12.2 GM/DL (ref 14–18)
IMMATURE GRANS (ABS): 0.02 THOU/MM3 (ref 0–0.07)
IMMATURE GRANULOCYTES: 0.3 %
LIPASE: 11.4 U/L (ref 5.6–51.3)
LYMPHOCYTES # BLD: 8 %
LYMPHOCYTES ABSOLUTE: 0.6 THOU/MM3 (ref 1–4.8)
MAGNESIUM: 2 MG/DL (ref 1.6–2.4)
MCH RBC QN AUTO: 30 PG (ref 26–33)
MCHC RBC AUTO-ENTMCNC: 32 GM/DL (ref 32.2–35.5)
MCV RBC AUTO: 93.6 FL (ref 80–94)
MONOCYTES # BLD: 8.4 %
MONOCYTES ABSOLUTE: 0.7 THOU/MM3 (ref 0.4–1.3)
NUCLEATED RED BLOOD CELLS: 0 /100 WBC
OSMOLALITY CALCULATION: 280.9 MOSMOL/KG (ref 275–300)
PLATELET # BLD: 252 THOU/MM3 (ref 130–400)
PMV BLD AUTO: 8.8 FL (ref 9.4–12.4)
POTASSIUM SERPL-SCNC: 4.4 MEQ/L (ref 3.5–5.2)
RBC # BLD: 4.07 MILL/MM3 (ref 4.7–6.1)
SEG NEUTROPHILS: 82.1 %
SEGMENTED NEUTROPHILS ABSOLUTE COUNT: 6.4 THOU/MM3 (ref 1.8–7.7)
SODIUM BLD-SCNC: 140 MEQ/L (ref 135–145)
TOTAL PROTEIN: 6.4 G/DL (ref 6.1–8)
TROPONIN T: < 0.01 NG/ML
WBC # BLD: 7.8 THOU/MM3 (ref 4.8–10.8)

## 2020-06-13 PROCEDURE — 96374 THER/PROPH/DIAG INJ IV PUSH: CPT

## 2020-06-13 PROCEDURE — 80053 COMPREHEN METABOLIC PANEL: CPT

## 2020-06-13 PROCEDURE — 85025 COMPLETE CBC W/AUTO DIFF WBC: CPT

## 2020-06-13 PROCEDURE — 6360000004 HC RX CONTRAST MEDICATION: Performed by: PHYSICIAN ASSISTANT

## 2020-06-13 PROCEDURE — 76376 3D RENDER W/INTRP POSTPROCES: CPT

## 2020-06-13 PROCEDURE — 99284 EMERGENCY DEPT VISIT MOD MDM: CPT

## 2020-06-13 PROCEDURE — 93005 ELECTROCARDIOGRAM TRACING: CPT | Performed by: PHYSICIAN ASSISTANT

## 2020-06-13 PROCEDURE — 2709999900 HC NON-CHARGEABLE SUPPLY

## 2020-06-13 PROCEDURE — 82248 BILIRUBIN DIRECT: CPT

## 2020-06-13 PROCEDURE — 83735 ASSAY OF MAGNESIUM: CPT

## 2020-06-13 PROCEDURE — 36415 COLL VENOUS BLD VENIPUNCTURE: CPT

## 2020-06-13 PROCEDURE — 84484 ASSAY OF TROPONIN QUANT: CPT

## 2020-06-13 PROCEDURE — 93010 ELECTROCARDIOGRAM REPORT: CPT | Performed by: INTERNAL MEDICINE

## 2020-06-13 PROCEDURE — 96375 TX/PRO/DX INJ NEW DRUG ADDON: CPT

## 2020-06-13 PROCEDURE — 96376 TX/PRO/DX INJ SAME DRUG ADON: CPT

## 2020-06-13 PROCEDURE — 6360000002 HC RX W HCPCS: Performed by: PHYSICIAN ASSISTANT

## 2020-06-13 PROCEDURE — 74174 CTA ABD&PLVS W/CONTRAST: CPT

## 2020-06-13 PROCEDURE — 83690 ASSAY OF LIPASE: CPT

## 2020-06-13 RX ORDER — MORPHINE SULFATE 2 MG/ML
2 INJECTION, SOLUTION INTRAMUSCULAR; INTRAVENOUS ONCE
Status: COMPLETED | OUTPATIENT
Start: 2020-06-13 | End: 2020-06-13

## 2020-06-13 RX ORDER — MORPHINE SULFATE 4 MG/ML
4 INJECTION, SOLUTION INTRAMUSCULAR; INTRAVENOUS ONCE
Status: DISCONTINUED | OUTPATIENT
Start: 2020-06-13 | End: 2020-06-13

## 2020-06-13 RX ORDER — PREDNISONE 20 MG/1
TABLET ORAL
Qty: 15 TABLET | Refills: 0 | Status: SHIPPED | OUTPATIENT
Start: 2020-06-13 | End: 2021-03-11

## 2020-06-13 RX ORDER — ONDANSETRON 2 MG/ML
4 INJECTION INTRAMUSCULAR; INTRAVENOUS ONCE
Status: COMPLETED | OUTPATIENT
Start: 2020-06-13 | End: 2020-06-13

## 2020-06-13 RX ORDER — DEXAMETHASONE SODIUM PHOSPHATE 4 MG/ML
10 INJECTION, SOLUTION INTRA-ARTICULAR; INTRALESIONAL; INTRAMUSCULAR; INTRAVENOUS; SOFT TISSUE ONCE
Status: COMPLETED | OUTPATIENT
Start: 2020-06-13 | End: 2020-06-13

## 2020-06-13 RX ORDER — OXYCODONE HYDROCHLORIDE AND ACETAMINOPHEN 5; 325 MG/1; MG/1
1 TABLET ORAL EVERY 6 HOURS PRN
Qty: 12 TABLET | Refills: 0 | Status: SHIPPED | OUTPATIENT
Start: 2020-06-13 | End: 2020-06-16

## 2020-06-13 RX ADMIN — IOPAMIDOL 85 ML: 755 INJECTION, SOLUTION INTRAVENOUS at 11:46

## 2020-06-13 RX ADMIN — DEXAMETHASONE SODIUM PHOSPHATE 10 MG: 4 INJECTION, SOLUTION INTRA-ARTICULAR; INTRALESIONAL; INTRAMUSCULAR; INTRAVENOUS; SOFT TISSUE at 11:04

## 2020-06-13 RX ADMIN — ONDANSETRON 4 MG: 2 INJECTION INTRAMUSCULAR; INTRAVENOUS at 11:04

## 2020-06-13 RX ADMIN — MORPHINE SULFATE 2 MG: 2 INJECTION, SOLUTION INTRAMUSCULAR; INTRAVENOUS at 14:24

## 2020-06-13 RX ADMIN — MORPHINE SULFATE 2 MG: 2 INJECTION, SOLUTION INTRAMUSCULAR; INTRAVENOUS at 11:04

## 2020-06-13 ASSESSMENT — PAIN SCALES - GENERAL
PAINLEVEL_OUTOF10: 7
PAINLEVEL_OUTOF10: 6
PAINLEVEL_OUTOF10: 6
PAINLEVEL_OUTOF10: 10

## 2020-06-13 ASSESSMENT — ENCOUNTER SYMPTOMS
ABDOMINAL PAIN: 0
RHINORRHEA: 0
SORE THROAT: 0
VOMITING: 0
DIARRHEA: 0
SHORTNESS OF BREATH: 0
CONSTIPATION: 0
NAUSEA: 0
COUGH: 0
BACK PAIN: 1
BLOOD IN STOOL: 0

## 2020-06-13 ASSESSMENT — PAIN DESCRIPTION - PAIN TYPE: TYPE: ACUTE PAIN

## 2020-06-13 ASSESSMENT — PAIN DESCRIPTION - ORIENTATION: ORIENTATION: LOWER;MID;UPPER

## 2020-06-13 ASSESSMENT — PAIN DESCRIPTION - LOCATION: LOCATION: BACK

## 2020-06-13 NOTE — ED NOTES
Called wife at this time to get an updated time of her arrival. Wife states she will be here in 5 minutes.      Amie Ortiz RN  06/13/20 [id number]

## 2020-06-13 NOTE — ED PROVIDER NOTES
(PRINIVIL;ZESTRIL) 2.5 MG TABLET    TAKE 1 TABLET EVERY DAY    METOPROLOL TARTRATE (LOPRESSOR) 25 MG TABLET    Take 1 tablet by mouth 2 times daily    NITROGLYCERIN (NITROSTAT) 0.4 MG SL TABLET    Place 1 tablet under the tongue every 5 minutes as needed for Chest pain up to max of 3 total doses. If no relief after 1 dose, call 911. OXYBUTYNIN (DITROPAN) 5 MG TABLET    Take 1 tablet by mouth 3 times daily as needed (incontinence)    SERTRALINE (ZOLOFT) 100 MG TABLET    Take 1 tablet by mouth daily    VITAMIN B-1 100 MG TABLET    Take 1 tablet by mouth daily       ALLERGIES     is allergic to pcn [penicillins]; tetanus toxoids; devon-1 [lidocaine hcl]; and pletal [cilostazol]. FAMILY HISTORY     He indicated that his mother is . He indicated that his father is . He indicated that his sister is alive. family history includes Cancer in his father and sister; Heart Disease in his father; Other in his mother. SOCIAL HISTORY      reports that he has never smoked. He has never used smokeless tobacco. He reports current alcohol use of about 3.0 standard drinks of alcohol per week. He reports that he does not use drugs. PHYSICAL EXAM     INITIAL VITALS:  height is 5' 2\" (1.575 m) and weight is 170 lb (77.1 kg). His oral temperature is 98.6 °F (37 °C). His blood pressure is 115/74 and his pulse is 77. His respiration is 16 and oxygen saturation is 97%. Physical Exam  Vitals signs and nursing note reviewed. Constitutional:       Appearance: He is well-developed. He is not toxic-appearing or diaphoretic. HENT:      Head: Normocephalic and atraumatic. Right Ear: Tympanic membrane and external ear normal.      Left Ear: Tympanic membrane and external ear normal.      Nose: Nose normal.      Mouth/Throat:      Pharynx: No oropharyngeal exudate or posterior oropharyngeal erythema.    Eyes:      Conjunctiva/sclera: Conjunctivae normal.   Neck:      Musculoskeletal: Normal range of motion and neck supple. Vascular: No JVD. Cardiovascular:      Rate and Rhythm: Normal rate and regular rhythm. Pulses: Normal pulses. Heart sounds: Normal heart sounds. No murmur. No friction rub. No gallop. Pulmonary:      Effort: Pulmonary effort is normal. No respiratory distress. Breath sounds: Normal breath sounds. No decreased breath sounds, wheezing, rhonchi or rales. Abdominal:      General: Bowel sounds are normal. There is no distension. Palpations: Abdomen is soft. Tenderness: There is no abdominal tenderness. There is no guarding or rebound. Musculoskeletal: Normal range of motion. Lumbar back: He exhibits tenderness (right sided paraspinal ). Right lower leg: He exhibits tenderness. Skin:     General: Skin is warm and dry. Findings: No rash. Neurological:      Mental Status: He is alert and oriented to person, place, and time. Motor: No abnormal muscle tone. Coordination: Coordination normal.          DIFFERENTIAL DIAGNOSIS:   Including but not limited to herniated disc, DVT, aortic aneurysm, compression fracture. DIAGNOSTIC RESULTS     EKG: All EKG's are interpreted by the Emergency Department Physician who either signs or Co-signs this chart in the absence of a cardiologist.    Gilbert Irizarry. Rate: 73 bpm  AZ interval: 180 ms  QRS duration: 100 ms  QTc: 449 ms  P-R-T axes: 66, 64, 50  Normal sinus rhythm  Incomplete right bundle branch block  Borderline ECG  When compared with ECG of 30-AUG-2018   No STEMI     RADIOLOGY: non-plainfilm images(s) such as CT, Ultrasound and MRI are read by the radiologist.    CT LUMBAR RECONSTRUCTION WO POST PROCESS   Final Result   No aneurysm or dissection. Multilevel spondylosis with spinal stenosis most notably at L2-L3. **This report has been created using voice recognition software. It may contain minor errors which are inherent in voice recognition technology. **      Final report electronically signed by Dr. Patience Foy on 6/13/2020 12:47 PM       [facility]   Final Result   No aneurysm or dissection. Multilevel spondylosis with spinal stenosis most notably at L2-L3. **This report has been created using voice recognition software. It may contain minor errors which are inherent in voice recognition technology. **      Final report electronically signed by Dr. Patience Foy on 6/13/2020 12:47 PM          LABS:     Labs Reviewed   CBC WITH AUTO DIFFERENTIAL - Abnormal; Notable for the following components:       Result Value    RBC 4.07 (*)     Hemoglobin 12.2 (*)     Hematocrit 38.1 (*)     MCHC 32.0 (*)     MPV 8.8 (*)     Lymphocytes Absolute 0.6 (*)     All other components within normal limits   BASIC METABOLIC PANEL - Abnormal; Notable for the following components:    Glucose 123 (*)     All other components within normal limits   HEPATIC FUNCTION PANEL - Abnormal; Notable for the following components:    Alkaline Phosphatase 145 (*)     All other components within normal limits   GLOMERULAR FILTRATION RATE, ESTIMATED - Abnormal; Notable for the following components:    Est, Glom Filt Rate 72 (*)     All other components within normal limits   LIPASE   TROPONIN   MAGNESIUM   ANION GAP   OSMOLALITY   URINE RT REFLEX TO CULTURE       EMERGENCY DEPARTMENT COURSE:   Vitals:    Vitals:    06/13/20 0951 06/13/20 1101 06/13/20 1238 06/13/20 1357   BP: 115/62 126/64 110/82 115/74   Pulse: 69 75 78 77   Resp: 18 17 16 16   Temp: 98.6 °F (37 °C)      TempSrc: Oral      SpO2: 95% 93% 96% 97%   Weight: 170 lb (77.1 kg)      Height: 5' 2\" (1.575 m)          10:24 AM EDT: The patient was seen and evaluated. 1027: EKG, labs, CTA abdomen pelvis with and without contrast, CT reconstruction without post process, Dexamethasone, Morphine, and Zofran ordered. Patient's pain was improved. Will discharge home.     CRITICAL CARE:   None CONSULTS:  None    PROCEDURES:  None     FINAL IMPRESSION      1. Acute exacerbation of chronic low back pain          DISPOSITION/PLAN   Discharge    PATIENT REFERRED TO:  Ritchie Bean MD  [address]  [phone]    In 2 days        DISCHARGE MEDICATIONS:  New Prescriptions    OXYCODONE-ACETAMINOPHEN (PERCOCET) 5-325 MG PER TABLET    Take 1 tablet by mouth every 6 hours as needed for Pain for up to 3 days. Intended supply: 3 days. Take lowest dose possible to manage pain    PREDNISONE (DELTASONE) 20 MG TABLET    Take 3 tablets by mouth once daily for 5 days       (Please note that portions of this note were completed with a voice recognition program.  Efforts were made to edit the dictations but occasionally words are mis-transcribed.)    The patient was given an opportunity to see the Emergency Attending. The patient voiced understanding that I was a Mid-LevelProvider and was in agreement with being seen independently by myself. Scribe:  Raquel Xiong 6/13/20 10:24 AM EDT Scribing for and in the presence of Nikole Orellana Pa-C. Signed by: Richard Christopher, 06/13/20 3:48 PM    Provider:  I personally performed the services described in the documentation, reviewed and edited the documentation which was dictated to the scribe in my presence, and it accurately records my words and actions.     Nikole Orellana PA-C 6/13/20 3:48 PM          JAREN Jolly  06/13/20 [id number]

## 2020-06-13 NOTE — ED NOTES
While discharging pt, pt states his wife is in [place] and unable to come get him until 1500 and he has no way into his home without the keys that she has. Charge nurse Tomi Jordan notified.      Demario Correa RN  06/13/20 [id number]

## 2020-06-15 ENCOUNTER — CARE COORDINATION (OUTPATIENT)
Dept: CARE COORDINATION | Age: 75
End: 2020-06-15

## 2020-06-26 ENCOUNTER — OFFICE VISIT (OUTPATIENT)
Dept: CARDIOLOGY CLINIC | Age: 75
End: 2020-06-26
Payer: MEDICARE

## 2020-06-26 VITALS
DIASTOLIC BLOOD PRESSURE: 70 MMHG | SYSTOLIC BLOOD PRESSURE: 136 MMHG | BODY MASS INDEX: 30.83 KG/M2 | WEIGHT: 174 LBS | HEIGHT: 63 IN | HEART RATE: 60 BPM

## 2020-06-26 PROCEDURE — 1036F TOBACCO NON-USER: CPT | Performed by: NUCLEAR MEDICINE

## 2020-06-26 PROCEDURE — 4040F PNEUMOC VAC/ADMIN/RCVD: CPT | Performed by: NUCLEAR MEDICINE

## 2020-06-26 PROCEDURE — 1123F ACP DISCUSS/DSCN MKR DOCD: CPT | Performed by: NUCLEAR MEDICINE

## 2020-06-26 PROCEDURE — 99213 OFFICE O/P EST LOW 20 MIN: CPT | Performed by: NUCLEAR MEDICINE

## 2020-06-26 PROCEDURE — G8427 DOCREV CUR MEDS BY ELIG CLIN: HCPCS | Performed by: NUCLEAR MEDICINE

## 2020-06-26 PROCEDURE — G8417 CALC BMI ABV UP PARAM F/U: HCPCS | Performed by: NUCLEAR MEDICINE

## 2020-06-26 RX ORDER — GABAPENTIN 300 MG/1
300 CAPSULE ORAL 3 TIMES DAILY
COMMUNITY

## 2020-06-26 RX ORDER — LOSARTAN POTASSIUM 25 MG/1
25 TABLET ORAL DAILY
COMMUNITY

## 2020-06-26 NOTE — PROGRESS NOTES
[facility],[address]  [address] [place] [id number]  Dept: [phone]  Dept Fax: [phone]  Loc: [phone]    Visit Date: 6/26/2020    Andres Laguna is a 66 y.o. male who presents todayfor:  Chief Complaint   Patient presents with    1 Year Follow Up    Coronary Artery Disease    Hypertension    Hyperlipidemia   does have failed CABG and stents  Does have baseline dyspnea  Some dyspnea on exertion   No chest pain     BP is stable  No dizziness  No syncope  Higher risk patient  No Er visits  On statins for hyperlipidemia  He is not interested in a stress test       HPI:  HPI  Past Medical History:   Diagnosis Date    Alcohol abuse     6-10 beers per day    Back pain     CAD (coronary artery disease)     MI    Cancer of prostate ([facility]     Brachytherapy    Cerebral artery occlusion with cerebral infarction [facility])     2004    History of blood transfusion     Hyperlipidemia     Hypertension     Major depression in remission ([facility] 12/11/2014    Other disorders of kidney and ureter in diseases classified elsewhere     S/P CABG x 4 12/14/2016    S/P CABG x 4 1999    Simple chronic bronchitis ([address] 10/12/2016    TIA (transient ischemic attack)     Uncomplicated alcohol dependence ([address] 7/5/2016      Past Surgical History:   Procedure Laterality Date    APPENDECTOMY      ARM SURGERY      CARDIAC SURGERY  1999, 2016    CORONARY ARTERY BYPASS GRAFT  12/14/2016    Redo of prior CABG, Dr. El Villanueva.  CYSTOSCOPY N/A 3/14/2019    TRANSURETHRAL RESECTION PROSTATE, EXAM UNDER ANESTHESIA, PROSTATIC URETHROGRAM performed by Chad Blanton MD at [facility] CATH LAB PROCEDURE      FRACTURE SURGERY      Arm    AK OFFICE/OUTPT VISIT,PROCEDURE ONLY N/A 8/29/2018    SCALP EXPLORATION, WOUND CLOSURE performed by Andre Garcias MD at [facility]  2009?     [place]     Family History   Problem Relation Age of Onset    Other Mother         artery disease    Heart Disease Father     Cancer Father         prostate    Cancer Sister         breast     Social History     Tobacco Use    Smoking status: Never Smoker    Smokeless tobacco: Never Used   Substance Use Topics    Alcohol use: Yes     Alcohol/week: 3.0 standard drinks     Types: 3 Cans of beer per week     Comment: 3 cans per day per patient, wife states \"alot more\"  pt states none for 6 months      Current Outpatient Medications   Medication Sig Dispense Refill    gabapentin (NEURONTIN) 300 MG capsule Take 300 mg by mouth 3 times daily.  losartan (COZAAR) 25 MG tablet Take 25 mg by mouth daily      BRILINTA 90 MG TABS tablet TAKE 1 TABLET TWICE DAILY 180 tablet 3    acetaminophen (TYLENOL) 325 MG tablet Take 2 tablets by mouth every 4 hours as needed for Pain or Fever      folic acid (FOLVITE) 1 MG tablet Take 1 tablet by mouth daily      vitamin B-1 100 MG tablet Take 1 tablet by mouth daily      aspirin EC 81 MG EC tablet Take 81 mg by mouth daily      metoprolol tartrate (LOPRESSOR) 25 MG tablet Take 1 tablet by mouth 2 times daily 180 tablet 3    atorvastatin (LIPITOR) 80 MG tablet Take 1 tablet by mouth daily 90 tablet 0    nitroGLYCERIN (NITROSTAT) 0.4 MG SL tablet Place 1 tablet under the tongue every 5 minutes as needed for Chest pain up to max of 3 total doses.  If no relief after 1 dose, call 911. 25 tablet 3    sertraline (ZOLOFT) 100 MG tablet Take 1 tablet by mouth daily 90 tablet 3    predniSONE (DELTASONE) 20 MG tablet Take 3 tablets by mouth once daily for 5 days (Patient not taking: Reported on 6/26/2020) 15 tablet 0    lisinopril (PRINIVIL;ZESTRIL) 2.5 MG tablet TAKE 1 TABLET EVERY DAY (Patient not taking: Reported on 6/26/2020) 90 tablet 3    oxybutynin (DITROPAN) 5 MG tablet Take 1 tablet by mouth 3 times daily as needed (incontinence) (Patient not taking: Reported on 3/10/2020) 90 tablet 1     No current

## 2020-06-29 RX ORDER — CLOPIDOGREL BISULFATE 75 MG/1
75 TABLET ORAL DAILY
Qty: 90 TABLET | Refills: 3 | Status: SHIPPED | OUTPATIENT
Start: 2020-06-29 | End: 2021-07-02 | Stop reason: SDUPTHER

## 2020-06-29 RX ORDER — CLOPIDOGREL BISULFATE 75 MG/1
75 TABLET ORAL DAILY
COMMUNITY
End: 2020-06-29 | Stop reason: SDUPTHER

## 2020-11-03 PROBLEM — I10 ESSENTIAL HYPERTENSION: Status: RESOLVED | Noted: 2020-11-03 | Resolved: 2020-11-03

## 2021-02-09 ENCOUNTER — HOSPITAL ENCOUNTER (OUTPATIENT)
Age: 76
Setting detail: SPECIMEN
Discharge: HOME | End: 2021-02-09
Payer: MEDICARE

## 2021-02-09 LAB
ALBUMIN SERPL-MCNC: 4.2 G/DL (ref 3.5–5.2)
ALBUMIN/GLOBULIN RATIO: 1.6 (ref 1–2.5)
ALP BLD-CCNC: 146 U/L (ref 40–129)
ALT SERPL-CCNC: 18 U/L (ref 5–41)
ANION GAP SERPL CALCULATED.3IONS-SCNC: 12 MMOL/L (ref 9–17)
AST SERPL-CCNC: 13 U/L
BILIRUB SERPL-MCNC: 0.6 MG/DL (ref 0.3–1.2)
BUN BLDV-MCNC: 15 MG/DL (ref 8–23)
BUN/CREAT BLD: ABNORMAL (ref 9–20)
CALCIUM SERPL-MCNC: 9.5 MG/DL (ref 8.6–10.4)
CHLORIDE BLD-SCNC: 100 MMOL/L (ref 98–107)
CHOLESTEROL/HDL RATIO: 4.2
CHOLESTEROL: 192 MG/DL
CO2: 26 MMOL/L (ref 20–31)
CREAT SERPL-MCNC: 0.95 MG/DL (ref 0.7–1.2)
GFR AFRICAN AMERICAN: >60 ML/MIN
GFR NON-AFRICAN AMERICAN: >60 ML/MIN
GFR SERPL CREATININE-BSD FRML MDRD: ABNORMAL ML/MIN/{1.73_M2}
GFR SERPL CREATININE-BSD FRML MDRD: ABNORMAL ML/MIN/{1.73_M2}
GLUCOSE BLD-MCNC: 118 MG/DL (ref 70–99)
HCT VFR BLD CALC: 41.4 % (ref 40.7–50.3)
HDLC SERPL-MCNC: 46 MG/DL
HEMOGLOBIN: 13.4 G/DL (ref 13–17)
LDL CHOLESTEROL: 116 MG/DL (ref 0–130)
MCH RBC QN AUTO: 29.6 PG (ref 25.2–33.5)
MCHC RBC AUTO-ENTMCNC: 32.4 G/DL (ref 28.4–34.8)
MCV RBC AUTO: 91.4 FL (ref 82.6–102.9)
NRBC AUTOMATED: 0 PER 100 WBC
PDW BLD-RTO: 13.3 % (ref 11.8–14.4)
PLATELET # BLD: 227 K/UL (ref 138–453)
PMV BLD AUTO: 9.7 FL (ref 8.1–13.5)
POTASSIUM SERPL-SCNC: 4.6 MMOL/L (ref 3.7–5.3)
PROSTATE SPECIFIC ANTIGEN: <0.01 UG/L
PROSTATE SPECIFIC ANTIGEN: <0.01 UG/L
RBC # BLD: 4.53 M/UL (ref 4.21–5.77)
SODIUM BLD-SCNC: 138 MMOL/L (ref 135–144)
TOTAL PROTEIN: 6.9 G/DL (ref 6.4–8.3)
TRIGL SERPL-MCNC: 148 MG/DL
VLDLC SERPL CALC-MCNC: NORMAL MG/DL (ref 1–30)
WBC # BLD: 9.9 K/UL (ref 3.5–11.3)

## 2021-03-11 ENCOUNTER — TELEPHONE (OUTPATIENT)
Dept: UROLOGY | Age: 76
End: 2021-03-11

## 2021-03-11 ENCOUNTER — OFFICE VISIT (OUTPATIENT)
Dept: UROLOGY | Age: 76
End: 2021-03-11
Payer: MEDICARE

## 2021-03-11 VITALS — BODY MASS INDEX: 31.07 KG/M2 | HEIGHT: 64 IN | WEIGHT: 182 LBS | TEMPERATURE: 97.4 F

## 2021-03-11 DIAGNOSIS — N39.498 OTHER URINARY INCONTINENCE: Primary | ICD-10-CM

## 2021-03-11 LAB
BILIRUBIN URINE: NEGATIVE
BLOOD URINE, POC: NEGATIVE
CHARACTER, URINE: CLEAR
COLOR, URINE: YELLOW
GLUCOSE URINE: NEGATIVE MG/DL
KETONES, URINE: NEGATIVE
LEUKOCYTE CLUMPS, URINE: NEGATIVE
NITRITE, URINE: NEGATIVE
PH, URINE: 7 (ref 5–9)
POST VOID RESIDUAL (PVR): 33 ML
PROTEIN, URINE: NEGATIVE MG/DL
SPECIFIC GRAVITY, URINE: 1.02 (ref 1–1.03)
UROBILINOGEN, URINE: 0.2 EU/DL (ref 0–1)

## 2021-03-11 PROCEDURE — 1123F ACP DISCUSS/DSCN MKR DOCD: CPT | Performed by: NURSE PRACTITIONER

## 2021-03-11 PROCEDURE — G8427 DOCREV CUR MEDS BY ELIG CLIN: HCPCS | Performed by: NURSE PRACTITIONER

## 2021-03-11 PROCEDURE — 1036F TOBACCO NON-USER: CPT | Performed by: NURSE PRACTITIONER

## 2021-03-11 PROCEDURE — 81003 URINALYSIS AUTO W/O SCOPE: CPT | Performed by: NURSE PRACTITIONER

## 2021-03-11 PROCEDURE — 99214 OFFICE O/P EST MOD 30 MIN: CPT | Performed by: NURSE PRACTITIONER

## 2021-03-11 PROCEDURE — 51798 US URINE CAPACITY MEASURE: CPT | Performed by: NURSE PRACTITIONER

## 2021-03-11 PROCEDURE — G8417 CALC BMI ABV UP PARAM F/U: HCPCS | Performed by: NURSE PRACTITIONER

## 2021-03-11 PROCEDURE — G8484 FLU IMMUNIZE NO ADMIN: HCPCS | Performed by: NURSE PRACTITIONER

## 2021-03-11 PROCEDURE — 4040F PNEUMOC VAC/ADMIN/RCVD: CPT | Performed by: NURSE PRACTITIONER

## 2021-03-11 RX ORDER — TROSPIUM CHLORIDE 20 MG/1
20 TABLET, FILM COATED ORAL 2 TIMES DAILY
Qty: 60 TABLET | Refills: 3 | Status: SHIPPED | OUTPATIENT
Start: 2021-03-11 | End: 2021-03-23 | Stop reason: SDUPTHER

## 2021-03-11 RX ORDER — UNDERPADS 23" X 36"
1 EACH MISCELLANEOUS
Qty: 240 EACH | Refills: 11 | Status: SHIPPED | OUTPATIENT
Start: 2021-03-11 | End: 2022-09-20

## 2021-03-11 NOTE — PROGRESS NOTES
clopidogrel (PLAVIX) 75 MG tablet Take 1 tablet by mouth daily 90 tablet 3    gabapentin (NEURONTIN) 300 MG capsule Take 300 mg by mouth 3 times daily.  losartan (COZAAR) 25 MG tablet Take 25 mg by mouth daily      folic acid (FOLVITE) 1 MG tablet Take 1 tablet by mouth daily      vitamin B-1 100 MG tablet Take 1 tablet by mouth daily      aspirin EC 81 MG EC tablet Take 81 mg by mouth daily      metoprolol tartrate (LOPRESSOR) 25 MG tablet Take 1 tablet by mouth 2 times daily 180 tablet 3    atorvastatin (LIPITOR) 80 MG tablet Take 1 tablet by mouth daily 90 tablet 0    nitroGLYCERIN (NITROSTAT) 0.4 MG SL tablet Place 1 tablet under the tongue every 5 minutes as needed for Chest pain up to max of 3 total doses. If no relief after 1 dose, call 911. 25 tablet 3    sertraline (ZOLOFT) 100 MG tablet Take 1 tablet by mouth daily 90 tablet 3     No current facility-administered medications for this visit. Past Medical History  María Elena Chamorro  has a past medical history of Alcohol abuse, Back pain, CAD (coronary artery disease), Cancer of prostate ([address] Cerebral artery occlusion with cerebral infarction ([address] History of blood transfusion, Hyperlipidemia, Hypertension, Major depression in remission ([address] Other disorders of kidney and ureter in diseases classified elsewhere, S/P CABG x 4, S/P CABG x 4, Simple chronic bronchitis ([address] TIA (transient ischemic attack), and Uncomplicated alcohol dependence ([address] Past Surgical History  The patient  has a past surgical history that includes Appendectomy; Prostate surgery (2009?); Arm Surgery; Coronary artery bypass graft (12/14/2016); Diagnostic Cardiac Cath Lab Procedure; fracture surgery; pr office/outpt visit,procedure only (N/A, 8/29/2018); Cardiac surgery (1999, 2016); and Cystoscopy (N/A, 3/14/2019). Family History  This patient's family history includes Cancer in his father and sister; Heart Disease in his father; Other in his mother.     Social 03/11/21   POCT Urinalysis No Micro (Auto)   Result Value Ref Range    Glucose, Ur Negative NEGATIVE mg/dl    Bilirubin Urine Negative     Ketones, Urine Negative NEGATIVE    Specific Gravity, Urine 1.020 1.002 - 1.030    Blood, UA POC Negative NEGATIVE    pH, Urine 7.00 5.0 - 9.0    Protein, Urine Negative NEGATIVE mg/dl    Urobilinogen, Urine 0.20 0.0 - 1.0 eu/dl    Nitrite, Urine Negative NEGATIVE    Leukocyte Clumps, Urine Negative NEGATIVE    Color, Urine Yellow [id number]    Character, Urine Clear [id number]   poct post void residual   Result Value Ref Range    post void residual 33 ml       Patients recent PSA values are as follows  Lab Results   Component Value Date    PSA <0.01 02/09/2021    PSA <0.01 02/09/2021    PSA <0.02 12/23/2016        Recent BUN/Creatinine:  Lab Results   Component Value Date    BUN 15 02/09/2021    CREATININE 0.95 02/09/2021         Assessment:   Prostate cancer s/p brachytherapy 2000  Mixed incontinence  Prostate cancer  Urethrorectal fistula    Plan:     Pt's PSA continues to be undetectable 21 years out from brachytherapy for prostate cancer showing excellent control. Nirmala Thomas has significant urinary incontinence without improvement with ditropan and pelvic floor therapy. Discussed trying different medication, considering possible penile clamp or procedure for stress urinary incontinence or possible advanced therapies for incontinence. At this time Nirmala Thomas would just like to try a different medication. Script for trospium sent to pharmacy. Pt to notify the office if not covered by insurance. F/u in 2 months with PVR.

## 2021-03-12 ASSESSMENT — ENCOUNTER SYMPTOMS
ABDOMINAL PAIN: 0
VOMITING: 0
NAUSEA: 0
BACK PAIN: 0

## 2021-03-23 RX ORDER — TROSPIUM CHLORIDE 20 MG/1
20 TABLET, FILM COATED ORAL 2 TIMES DAILY
Qty: 60 TABLET | Refills: 3 | Status: SHIPPED | OUTPATIENT
Start: 2021-03-23 | End: 2021-05-11 | Stop reason: SDUPTHER

## 2021-03-23 NOTE — TELEPHONE ENCOUNTER
It looks like pt's insurance recommends trying Mario Alberto Citrin first before they would consider covering the Trospium. The trospium is $26/month at [facility] if pt wants to use a Goodrx coupon. [facility] and have [facility] use a Goodrx coupon or we can try the Mario Alberto Citrin (which it looks like is covered by insurance) and see if it helps his symptoms. Please see which route the pt would like to take.

## 2021-05-11 ENCOUNTER — OFFICE VISIT (OUTPATIENT)
Dept: UROLOGY | Age: 76
End: 2021-05-11
Payer: MEDICARE

## 2021-05-11 VITALS
SYSTOLIC BLOOD PRESSURE: 118 MMHG | HEIGHT: 64 IN | DIASTOLIC BLOOD PRESSURE: 80 MMHG | WEIGHT: 181 LBS | BODY MASS INDEX: 30.9 KG/M2

## 2021-05-11 DIAGNOSIS — N39.498 OTHER URINARY INCONTINENCE: Primary | ICD-10-CM

## 2021-05-11 LAB — POST VOID RESIDUAL (PVR): 114 ML

## 2021-05-11 PROCEDURE — G8417 CALC BMI ABV UP PARAM F/U: HCPCS | Performed by: NURSE PRACTITIONER

## 2021-05-11 PROCEDURE — G8427 DOCREV CUR MEDS BY ELIG CLIN: HCPCS | Performed by: NURSE PRACTITIONER

## 2021-05-11 PROCEDURE — 51798 US URINE CAPACITY MEASURE: CPT | Performed by: NURSE PRACTITIONER

## 2021-05-11 PROCEDURE — 4040F PNEUMOC VAC/ADMIN/RCVD: CPT | Performed by: NURSE PRACTITIONER

## 2021-05-11 PROCEDURE — 99213 OFFICE O/P EST LOW 20 MIN: CPT | Performed by: NURSE PRACTITIONER

## 2021-05-11 PROCEDURE — 1036F TOBACCO NON-USER: CPT | Performed by: NURSE PRACTITIONER

## 2021-05-11 PROCEDURE — 1123F ACP DISCUSS/DSCN MKR DOCD: CPT | Performed by: NURSE PRACTITIONER

## 2021-05-11 RX ORDER — TROSPIUM CHLORIDE 20 MG/1
20 TABLET, FILM COATED ORAL 2 TIMES DAILY
Qty: 180 TABLET | Refills: 3 | Status: SHIPPED | OUTPATIENT
Start: 2021-05-11 | End: 2022-05-10

## 2021-05-11 ASSESSMENT — ENCOUNTER SYMPTOMS
ABDOMINAL PAIN: 0
VOMITING: 0
BACK PAIN: 0
NAUSEA: 0

## 2021-05-11 NOTE — PROGRESS NOTES
[address] [address]  Dept: [phone]  Loc: [phone]    Visit Date: 5/11/2021        HPI:     Moreno Johnson is a 64 y.o. male who presents today for:  Chief Complaint   Patient presents with    Follow-up     urinary incontinence pvr today        HPI   Pt seen in follow up for prostate cancer and urinary incontinence.     Pt with hx of prostate cancer s/p brachytherapy. Pt underwent TURP using bipolar loop w/ Dr. Rigo Springer on 03/14/19 for urinary retention. Findings: lateral lobes resected to open prostatic urethra, deep defect in posterior prostate that is very possibly a urethrorectal fistula, exam under anesthesia demonstrated defect in anterior rectum at same spot, unable to perform sigmoidoscopy at this time, probable contrast in rectum with prostatic urethrogram. Surgical pathology is consistent with benign fibromuscular tissue and urothelial mucosa, negative for malignancy, no prosthetic epithelium present. He was evaluated by Dr. Yakelin Valdovinos, plan was to reassess symptoms after catheter was removed. Pt reports he really doesn't have symptoms or issues from the fistula. Denies any significant fecal incontinence or diarrhea. Reports he prefers not to undergo further surgery at this time and as such hasn't been back to see general surgery.       He has hx of urethral stricture, Dr. Rigo Springer noted possible false passage. He developed \"urethral scar tissue which was operated by Dr Melisa Kulkarni in the past\"      At last appt Ashley Romero reported he has fairly constant urinary incontinence since TURP 3/2019 requiring 7-8 briefs per day. He reports urge and stress incontinence. Denies dysuria, hematuria. Previously tried oxybutynin without improvement in symptoms. No improvement with pelvic floor therapy. We started him on trospium 20 mg BID and he reports some improvement in leaking.   Notes he still uses 6-7 pads per day but he is now having less leaking overnight. He no longer leaks out of his depends while sleeping. Current Outpatient Medications   Medication Sig Dispense Refill    trospium (SANCTURA) 20 MG tablet Take 1 tablet by mouth 2 times daily 60 tablet 3    Incontinence Supply Disposable (INCONTINENCE BRIEF MEDIUM) MISC 1 each by Does not apply route 8 times daily Flexfit Depends 240 each 11    clopidogrel (PLAVIX) 75 MG tablet Take 1 tablet by mouth daily 90 tablet 3    gabapentin (NEURONTIN) 300 MG capsule Take 300 mg by mouth 3 times daily.  losartan (COZAAR) 25 MG tablet Take 25 mg by mouth daily      folic acid (FOLVITE) 1 MG tablet Take 1 tablet by mouth daily      vitamin B-1 100 MG tablet Take 1 tablet by mouth daily      aspirin EC 81 MG EC tablet Take 81 mg by mouth daily      metoprolol tartrate (LOPRESSOR) 25 MG tablet Take 1 tablet by mouth 2 times daily 180 tablet 3    atorvastatin (LIPITOR) 80 MG tablet Take 1 tablet by mouth daily 90 tablet 0    nitroGLYCERIN (NITROSTAT) 0.4 MG SL tablet Place 1 tablet under the tongue every 5 minutes as needed for Chest pain up to max of 3 total doses. If no relief after 1 dose, call 911. 25 tablet 3    sertraline (ZOLOFT) 100 MG tablet Take 1 tablet by mouth daily 90 tablet 3     No current facility-administered medications for this visit. Past Medical History  Priscilla Vela  has a past medical history of Alcohol abuse, Back pain, CAD (coronary artery disease), Cancer of prostate ([address] Cerebral artery occlusion with cerebral infarction ([address] History of blood transfusion, Hyperlipidemia, Hypertension, Major depression in remission ([address] Other disorders of kidney and ureter in diseases classified elsewhere, S/P CABG x 4, S/P CABG x 4, Simple chronic bronchitis ([address] TIA (transient ischemic attack), and Uncomplicated alcohol dependence ([address]     Past Surgical History  The patient  has a past surgical history that includes Appendectomy; Prostate surgery (2009?); Arm Surgery; Coronary artery bypass graft (12/14/2016); Diagnostic Cardiac Cath Lab Procedure; fracture surgery; pr office/outpt visit,procedure only (N/A, 8/29/2018); Cardiac surgery (1999, 2016); and Cystoscopy (N/A, 3/14/2019). Family History  This patient's family history includes Cancer in his father and sister; Heart Disease in his father; Other in his mother. Social History  She Prince  reports that he has never smoked. He has never used smokeless tobacco. He reports current alcohol use of about 3.0 standard drinks of alcohol per week. He reports that he does not use drugs. Subjective:      Review of Systems   Constitutional: Negative for activity change, appetite change, chills, diaphoresis, fatigue, fever and unexpected weight change. Gastrointestinal: Negative for abdominal pain, nausea and vomiting. Genitourinary: Negative for decreased urine volume, difficulty urinating, dysuria, flank pain, frequency, hematuria and urgency. Musculoskeletal: Negative for back pain. Objective:   /80   Ht 5' 3.5\" (1.613 m)   Wt 181 lb (82.1 kg)   BMI 31.56 kg/m²     Physical Exam  Vitals signs reviewed. Constitutional:       General: He is not in acute distress. Appearance: Normal appearance. He is well-developed. He is not ill-appearing or diaphoretic. HENT:      Head: Normocephalic and atraumatic. Right Ear: External ear normal.      Left Ear: External ear normal.      Nose: Nose normal.      Mouth/Throat:      Mouth: Mucous membranes are moist.   Eyes:      General: No scleral icterus. Right eye: No discharge. Left eye: No discharge. Neck:      Vascular: No JVD. Trachea: No tracheal deviation. Pulmonary:      Effort: Pulmonary effort is normal. No respiratory distress. Musculoskeletal: Normal range of motion. Neurological:      Mental Status: He is alert and oriented to person, place, and time.  Mental status is at baseline. Psychiatric:         Mood and Affect: Mood normal.         Behavior: Behavior normal.         Thought Content: Thought content normal.         POC  Results for POC orders placed in visit on 05/11/21   poct post void residual   Result Value Ref Range    post void residual 114 ml         Patients recent PSA values are as follows  Lab Results   Component Value Date    PSA <0.01 02/09/2021    PSA <0.01 02/09/2021    PSA <0.02 12/23/2016        Recent BUN/Creatinine:  Lab Results   Component Value Date    BUN 15 02/09/2021    CREATININE 0.95 02/09/2021       Assessment:   Prostate cancer s/p brachytherapy 2000  Mixed incontinence  Prostate cancer  Urethrorectal fistula    Plan:     PSA undetectable 21 years out from brachytherapy for prostate cancer showing excellent control. Madison Modi has undergone pelvic floor therapy in the past.  Ditropan was ineffective. He reports some improvement with trospium. We discussed trying Myrbetriq (which may not be covered by insurance), procedure for stress incontinence or possible advanced therapies for incontinence. At this time Madison Modi would like to continue on trospium. Provided with Goodrx coupon. F/u in 1 year with PVR. Call if any worsening in symptoms prior.

## 2021-07-02 ENCOUNTER — OFFICE VISIT (OUTPATIENT)
Dept: CARDIOLOGY CLINIC | Age: 76
End: 2021-07-02
Payer: MEDICARE

## 2021-07-02 VITALS
WEIGHT: 175.8 LBS | DIASTOLIC BLOOD PRESSURE: 70 MMHG | BODY MASS INDEX: 31.15 KG/M2 | HEIGHT: 63 IN | HEART RATE: 63 BPM | SYSTOLIC BLOOD PRESSURE: 128 MMHG

## 2021-07-02 DIAGNOSIS — I10 ESSENTIAL HYPERTENSION: ICD-10-CM

## 2021-07-02 DIAGNOSIS — I25.10 CORONARY ARTERY DISEASE INVOLVING NATIVE CORONARY ARTERY OF NATIVE HEART WITHOUT ANGINA PECTORIS: Primary | ICD-10-CM

## 2021-07-02 DIAGNOSIS — E78.01 FAMILIAL HYPERCHOLESTEROLEMIA: ICD-10-CM

## 2021-07-02 PROCEDURE — G8417 CALC BMI ABV UP PARAM F/U: HCPCS | Performed by: NUCLEAR MEDICINE

## 2021-07-02 PROCEDURE — 1123F ACP DISCUSS/DSCN MKR DOCD: CPT | Performed by: NUCLEAR MEDICINE

## 2021-07-02 PROCEDURE — 99213 OFFICE O/P EST LOW 20 MIN: CPT | Performed by: NUCLEAR MEDICINE

## 2021-07-02 PROCEDURE — 93000 ELECTROCARDIOGRAM COMPLETE: CPT | Performed by: NUCLEAR MEDICINE

## 2021-07-02 PROCEDURE — 4040F PNEUMOC VAC/ADMIN/RCVD: CPT | Performed by: NUCLEAR MEDICINE

## 2021-07-02 PROCEDURE — 1036F TOBACCO NON-USER: CPT | Performed by: NUCLEAR MEDICINE

## 2021-07-02 PROCEDURE — G8427 DOCREV CUR MEDS BY ELIG CLIN: HCPCS | Performed by: NUCLEAR MEDICINE

## 2021-07-02 RX ORDER — MONTELUKAST SODIUM 10 MG/1
TABLET ORAL
COMMUNITY
Start: 2021-06-07

## 2021-07-02 RX ORDER — MELOXICAM 15 MG/1
TABLET ORAL
Status: ON HOLD | COMMUNITY
Start: 2021-05-12 | End: 2022-09-29 | Stop reason: HOSPADM

## 2021-07-02 RX ORDER — CLOPIDOGREL BISULFATE 75 MG/1
75 TABLET ORAL DAILY
Qty: 90 TABLET | Refills: 3 | Status: SHIPPED | OUTPATIENT
Start: 2021-07-02 | End: 2022-06-14

## 2021-07-02 NOTE — PROGRESS NOTES
[address] [facility] [address] [address]  [address] [place] [address]  Dept: [phone]  Dept Fax: [phone]  Loc: [phone]    Visit Date: 7/2/2021    Mag Castro is a 64 y.o. male who presents todayfor:  Chief Complaint   Patient presents with    1 Year Follow Up    Coronary Artery Disease    Hypertension    Hyperlipidemia     Known CABG and stents  No chest pain   No changes in breathing  No dizziness  No syncope  BP is stable       HPI:  HPI  Past Medical History:   Diagnosis Date    Alcohol abuse     6-10 beers per day    Back pain     CAD (coronary artery disease)     MI    Cancer of prostate ([facility] [address]     Brachytherapy    Cerebral artery occlusion with cerebral infarction [facility])     2004    History of blood transfusion     Hyperlipidemia     Hypertension     Major depression in remission ([facility] [address] 12/11/2014    Other disorders of kidney and ureter in diseases classified elsewhere     S/P CABG x 4 12/14/2016    S/P CABG x 4 1999    Simple chronic bronchitis ([facility] [address] 10/12/2016    TIA (transient ischemic attack)     Uncomplicated alcohol dependence ([facility] [address] 7/5/2016      Past Surgical History:   Procedure Laterality Date    APPENDECTOMY      R [address], 2016    CORONARY ARTERY BYPASS GRAFT  12/14/2016    Redo of prior CABG, Dr. Stout Gip.  CYSTOSCOPY N/A 3/14/2019    TRANSURETHRAL RESECTION PROSTATE, EXAM UNDER ANESTHESIA, PROSTATIC URETHROGRAM performed by Gonsalo Shah MD at [address] [facility] CATH LAB PROCEDURE      FRACTURE SURGERY      Arm    [place] OFFICE/OUTPT VISIT,PROCEDURE ONLY N/A 8/29/2018    SCALP EXPLORATION, WOUND CLOSURE performed by Radha Tyler MD at [facility]  2009?     Davina Melendez     Family History   Problem Relation Age of Onset    Other Mother         artery disease    Heart Disease Father     Cancer Father         prostate    Cancer Sister breast     Social History     Tobacco Use    Smoking status: Never Smoker    Smokeless tobacco: Never Used   Substance Use Topics    Alcohol use: Yes     Alcohol/week: 3.0 standard drinks     Types: 3 Cans of beer per week     Comment: 3 cans per day per patient, wife states \"alot more\"  pt states none for 6 months      Current Outpatient Medications   Medication Sig Dispense Refill    montelukast (SINGULAIR) 10 MG tablet take 1 tablet by mouth at bedtime      meloxicam (MOBIC) 15 MG tablet take 1 tablet by mouth once daily with food      trospium (SANCTURA) 20 MG tablet Take 1 tablet by mouth 2 times daily 180 tablet 3    Incontinence Supply Disposable (INCONTINENCE BRIEF MEDIUM) MISC 1 each by Does not apply route 8 times daily Flexfit Depends 240 each 11    clopidogrel (PLAVIX) 75 MG tablet Take 1 tablet by mouth daily 90 tablet 3    gabapentin (NEURONTIN) 300 MG capsule Take 300 mg by mouth 3 times daily.  losartan (COZAAR) 25 MG tablet Take 25 mg by mouth daily      folic acid (FOLVITE) 1 MG tablet Take 1 tablet by mouth daily      vitamin B-1 100 MG tablet Take 1 tablet by mouth daily      aspirin EC 81 MG EC tablet Take 81 mg by mouth daily      metoprolol tartrate (LOPRESSOR) 25 MG tablet Take 1 tablet by mouth 2 times daily 180 tablet 3    atorvastatin (LIPITOR) 80 MG tablet Take 1 tablet by mouth daily 90 tablet 0    nitroGLYCERIN (NITROSTAT) 0.4 MG SL tablet Place 1 tablet under the tongue every 5 minutes as needed for Chest pain up to max of 3 total doses. If no relief after 1 dose, call 911. 25 tablet 3    sertraline (ZOLOFT) 100 MG tablet Take 1 tablet by mouth daily 90 tablet 3     No current facility-administered medications for this visit.      Allergies   Allergen Reactions    Pcn [Penicillins] Shortness Of Breath    Tetanus Toxoids Shortness Of Breath    Franki-1 [Lidocaine Hcl]      New reaction today  jenn 07-02-16 pt states only reaction was to numbing drops at findings    Plan:  No follow-ups on file. As above  Continue risk factor modification and medical management.t  Thank you for allowing me to participate in the care of your patient. Please don't hesitate to contact me regarding any further issues related to the patient care      Orders Placed:  Orders Placed This Encounter   Procedures    EKG 12 Lead     Order Specific Question:   Reason for Exam?     Answer: Other       Medications Prescribed:  No orders of the defined types were placed in this encounter. Discussed use, benefit, and side effects of prescribed medications. All patient questions answered. Pt voicedunderstanding. Instructed to continue current medications, diet and exercise. Continue risk factor modification and medical management. Patient agreed with treatment plan. Follow up as directed.     Electronically signedby Yolanda Tamez MD on 7/2/2021 at 10:44 AM

## 2022-02-10 ENCOUNTER — HOSPITAL ENCOUNTER (OUTPATIENT)
Age: 77
Setting detail: SPECIMEN
Discharge: HOME | End: 2022-02-10

## 2022-02-10 LAB
ALBUMIN SERPL-MCNC: 4.5 G/DL (ref 3.5–5.2)
ALBUMIN/GLOBULIN RATIO: 2.1 (ref 1–2.5)
ALP BLD-CCNC: 159 U/L (ref 40–129)
ALT SERPL-CCNC: 18 U/L (ref 5–41)
ANION GAP SERPL CALCULATED.3IONS-SCNC: 17 MMOL/L (ref 9–17)
AST SERPL-CCNC: 17 U/L
BILIRUB SERPL-MCNC: 0.4 MG/DL (ref 0.3–1.2)
BUN BLDV-MCNC: 18 MG/DL (ref 8–23)
BUN/CREAT BLD: ABNORMAL (ref 9–20)
CALCIUM SERPL-MCNC: 9.6 MG/DL (ref 8.6–10.4)
CHLORIDE BLD-SCNC: 106 MMOL/L (ref 98–107)
CHOLESTEROL/HDL RATIO: 3.7
CHOLESTEROL: 186 MG/DL
CO2: 22 MMOL/L (ref 20–31)
CREAT SERPL-MCNC: 0.95 MG/DL (ref 0.7–1.2)
GFR AFRICAN AMERICAN: >60 ML/MIN
GFR NON-AFRICAN AMERICAN: >60 ML/MIN
GFR SERPL CREATININE-BSD FRML MDRD: ABNORMAL ML/MIN/{1.73_M2}
GFR SERPL CREATININE-BSD FRML MDRD: ABNORMAL ML/MIN/{1.73_M2}
GLUCOSE BLD-MCNC: 111 MG/DL (ref 70–99)
HCT VFR BLD CALC: 41.3 % (ref 40.7–50.3)
HDLC SERPL-MCNC: 50 MG/DL
HEMOGLOBIN: 13.3 G/DL (ref 13–17)
LDL CHOLESTEROL: 115 MG/DL (ref 0–130)
MCH RBC QN AUTO: 29.2 PG (ref 25.2–33.5)
MCHC RBC AUTO-ENTMCNC: 32.2 G/DL (ref 28.4–34.8)
MCV RBC AUTO: 90.6 FL (ref 82.6–102.9)
NRBC AUTOMATED: 0 PER 100 WBC
PDW BLD-RTO: 13.2 % (ref 11.8–14.4)
PLATELET # BLD: 238 K/UL (ref 138–453)
PMV BLD AUTO: 9.6 FL (ref 8.1–13.5)
POTASSIUM SERPL-SCNC: 4.6 MMOL/L (ref 3.7–5.3)
PROSTATE SPECIFIC ANTIGEN: <0.02 UG/L
RBC # BLD: 4.56 M/UL (ref 4.21–5.77)
SODIUM BLD-SCNC: 145 MMOL/L (ref 135–144)
TOTAL PROTEIN: 6.6 G/DL (ref 6.4–8.3)
TRIGL SERPL-MCNC: 106 MG/DL
VLDLC SERPL CALC-MCNC: NORMAL MG/DL (ref 1–30)
WBC # BLD: 6.5 K/UL (ref 3.5–11.3)

## 2022-05-10 ENCOUNTER — OFFICE VISIT (OUTPATIENT)
Dept: UROLOGY | Age: 77
End: 2022-05-10
Payer: MEDICARE

## 2022-05-10 VITALS
SYSTOLIC BLOOD PRESSURE: 134 MMHG | WEIGHT: 180.9 LBS | HEIGHT: 63 IN | BODY MASS INDEX: 32.05 KG/M2 | DIASTOLIC BLOOD PRESSURE: 80 MMHG

## 2022-05-10 DIAGNOSIS — N39.498 OTHER URINARY INCONTINENCE: Primary | ICD-10-CM

## 2022-05-10 LAB
BILIRUBIN URINE: NEGATIVE
BLOOD URINE, POC: NEGATIVE
CHARACTER, URINE: CLEAR
COLOR, URINE: YELLOW
GLUCOSE URINE: NEGATIVE MG/DL
KETONES, URINE: NEGATIVE
LEUKOCYTE CLUMPS, URINE: NEGATIVE
NITRITE, URINE: NEGATIVE
PH, URINE: 5 (ref 5–9)
POST VOID RESIDUAL (PVR): 23 ML
PROTEIN, URINE: NEGATIVE MG/DL
SPECIFIC GRAVITY, URINE: <= 1.005 (ref 1–1.03)
UROBILINOGEN, URINE: 0.2 EU/DL (ref 0–1)

## 2022-05-10 PROCEDURE — 1123F ACP DISCUSS/DSCN MKR DOCD: CPT | Performed by: NURSE PRACTITIONER

## 2022-05-10 PROCEDURE — G8427 DOCREV CUR MEDS BY ELIG CLIN: HCPCS | Performed by: NURSE PRACTITIONER

## 2022-05-10 PROCEDURE — 81003 URINALYSIS AUTO W/O SCOPE: CPT | Performed by: NURSE PRACTITIONER

## 2022-05-10 PROCEDURE — G8417 CALC BMI ABV UP PARAM F/U: HCPCS | Performed by: NURSE PRACTITIONER

## 2022-05-10 PROCEDURE — 4040F PNEUMOC VAC/ADMIN/RCVD: CPT | Performed by: NURSE PRACTITIONER

## 2022-05-10 PROCEDURE — 51798 US URINE CAPACITY MEASURE: CPT | Performed by: NURSE PRACTITIONER

## 2022-05-10 PROCEDURE — 99213 OFFICE O/P EST LOW 20 MIN: CPT | Performed by: NURSE PRACTITIONER

## 2022-05-10 PROCEDURE — 1036F TOBACCO NON-USER: CPT | Performed by: NURSE PRACTITIONER

## 2022-05-10 ASSESSMENT — ENCOUNTER SYMPTOMS
ABDOMINAL PAIN: 0
NAUSEA: 0
VOMITING: 0
BACK PAIN: 0

## 2022-05-10 NOTE — PROGRESS NOTES
[address] [facility].  [address]  [facility] [address]  Dept: [phone]  Loc: [phone]    Visit Date: 5/10/2022        HPI:     Saad Dickson is a 76 y.o. male who presents today for:  Chief Complaint   Patient presents with    Incontinence    Prostate Cancer    Follow-up     PVR       HPI   Pt seen in follow up for prostate cancer and urinary incontinence.     Pt with hx of prostate cancer s/p brachytherapy.  Pt underwent TURP using bipolar loop w/ Dr. Vianney Quezada on 03/14/19 for urinary retention. Findings: lateral lobes resected to open prostatic urethra, deep defect in posterior prostate that is very possibly a urethrorectal fistula, exam under anesthesia demonstrated defect in anterior rectum at same spot, unable to perform sigmoidoscopy at this time, probable contrast in rectum with prostatic urethrogram. Surgical pathology is consistent with benign fibromuscular tissue and urothelial mucosa, negative for malignancy, no prosthetic epithelium present. He was evaluated by Dr. Mellissa Stark, plan was to reassess symptoms after catheter was removed. Pt reports he really doesn't have symptoms or issues from the fistula.  Denies any significant fecal incontinence or diarrhea.  Reports he prefers not to undergo further surgery at this time and as such hasn't been back to see general surgery.       He has hx of urethral stricture, Dr. Vianney Quezada noted possible false passage.    He developed \"urethral scar tissue which was operated by Dr Dain Jeff in the past\"      Previously Sherrell Wall reported he has fairly constant urinary incontinence since TURP 3/2019 requiring 7-8 briefs per day. Porsha Frye reports urge and stress incontinence.  Denies dysuria, hematuria.  Previously tried oxybutynin, and most recently trospium without significant improvement in symptoms.  No improvement with pelvic floor therapy.     Today Sherrell Wall reports he is using 5-6 pads/briefs per day. Notes the incontinence is average for him. Not interested in surgeries or interventions at this time. Current Outpatient Medications   Medication Sig Dispense Refill    montelukast (SINGULAIR) 10 MG tablet take 1 tablet by mouth at bedtime      meloxicam (MOBIC) 15 MG tablet take 1 tablet by mouth once daily with food      clopidogrel (PLAVIX) 75 MG tablet Take 1 tablet by mouth daily 90 tablet 3    trospium (SANCTURA) 20 MG tablet Take 1 tablet by mouth 2 times daily 180 tablet 3    Incontinence Supply Disposable (INCONTINENCE BRIEF MEDIUM) MISC 1 each by Does not apply route 8 times daily Flexfit Depends 240 each 11    gabapentin (NEURONTIN) 300 MG capsule Take 300 mg by mouth 3 times daily.  losartan (COZAAR) 25 MG tablet Take 25 mg by mouth daily      folic acid (FOLVITE) 1 MG tablet Take 1 tablet by mouth daily      vitamin B-1 100 MG tablet Take 1 tablet by mouth daily      aspirin EC 81 MG EC tablet Take 81 mg by mouth daily      metoprolol tartrate (LOPRESSOR) 25 MG tablet Take 1 tablet by mouth 2 times daily 180 tablet 3    atorvastatin (LIPITOR) 80 MG tablet Take 1 tablet by mouth daily 90 tablet 0    nitroGLYCERIN (NITROSTAT) 0.4 MG SL tablet Place 1 tablet under the tongue every 5 minutes as needed for Chest pain up to max of 3 total doses. If no relief after 1 dose, call 911. 25 tablet 3    sertraline (ZOLOFT) 100 MG tablet Take 1 tablet by mouth daily 90 tablet 3     No current facility-administered medications for this visit.        Past Medical History  Wesley Joseph  has a past medical history of Alcohol abuse, Back pain, CAD (coronary artery disease), Cancer of prostate ([facility] [address] Cerebral artery occlusion with cerebral infarction ([facility] [address] History of blood transfusion, Hyperlipidemia, Hypertension, Major depression in remission ([facility] [address] Other disorders of kidney and ureter in diseases classified elsewhere, S/P CABG x 4, S/P CABG x 4, Simple chronic bronchitis ([address] TIA (transient ischemic attack), and Uncomplicated alcohol dependence ([facility] [address] Past Surgical History  The patient  has a past surgical history that includes Appendectomy; Prostate surgery (2009?); Arm Surgery; Coronary artery bypass graft (12/14/2016); Diagnostic Cardiac Cath Lab Procedure; fracture surgery; pr office/outpt visit,procedure only (N/A, 8/29/2018); Cardiac surgery (1999, 2016); and Cystoscopy (N/A, 3/14/2019). Family History  This patient's family history includes Cancer in his father and sister; Heart Disease in his father; Other in his mother. Social History  Balaji Mcdowell  reports that he has never smoked. He has never used smokeless tobacco. He reports current alcohol use of about 3.0 standard drinks of alcohol per week. He reports that he does not use drugs. Subjective:      Review of Systems   Constitutional: Negative for activity change, appetite change, chills, diaphoresis, fatigue, fever and unexpected weight change. Gastrointestinal: Negative for abdominal pain, nausea and vomiting. Genitourinary: Negative for decreased urine volume, difficulty urinating, dysuria, flank pain, frequency, hematuria and urgency. Incontinence   Musculoskeletal: Negative for back pain. Objective:   /80   Ht 5' 3\" (1.6 m)   Wt 180 lb 14.4 oz (82.1 kg)   BMI 32.04 kg/m²     Physical Exam  Vitals reviewed. Constitutional:       General: He is not in acute distress. Appearance: Normal appearance. He is well-developed. He is not ill-appearing or diaphoretic. HENT:      Head: Normocephalic and atraumatic. Right Ear: External ear normal.      Left Ear: External ear normal.      Nose: Nose normal.      Mouth/Throat:      Mouth: Mucous membranes are moist.   Eyes:      General: No scleral icterus. Right eye: No discharge. Left eye: No discharge. Neck:      Vascular: No JVD. Trachea: No tracheal deviation. Cardiovascular:      Rate and Rhythm: Normal rate. Pulmonary:      Effort: Pulmonary effort is normal. No respiratory distress. Breath sounds: Normal breath sounds. Abdominal:      General: There is no distension. Tenderness: There is no abdominal tenderness. There is no right CVA tenderness or left CVA tenderness. Musculoskeletal:         General: Normal range of motion. Skin:     General: Skin is warm and dry. Neurological:      Mental Status: He is alert and oriented to person, place, and time. Mental status is at baseline. Psychiatric:         Mood and Affect: Mood normal.         Behavior: Behavior normal.         Thought Content: Thought content normal.         POC  Results for POC orders placed in visit on 05/10/22   POCT Urinalysis No Micro (Auto)   Result Value Ref Range    Glucose, Ur Negative NEGATIVE mg/dl    Bilirubin Urine Negative     Ketones, Urine Negative NEGATIVE    Specific Gravity, Urine <= 1.005 1.002 - 1.030    Blood, UA POC Negative NEGATIVE    pH, Urine 5.00 5.0 - 9.0    Protein, Urine Negative NEGATIVE mg/dl    Urobilinogen, Urine 0.20 0.0 - 1.0 eu/dl    Nitrite, Urine Negative NEGATIVE    Leukocyte Clumps, Urine Negative NEGATIVE    Color, Urine Yellow [id number]    Character, Urine Clear [id number].[id number]   poct post void residual   Result Value Ref Range    post void residual 23 ml         Patients recent PSA values are as follows  Lab Results   Component Value Date    PSA <0.02 02/10/2022    PSA <0.01 02/09/2021    PSA <0.01 02/09/2021        Recent BUN/Creatinine:  Lab Results   Component Value Date    BUN 18 02/10/2022    CREATININE 0.95 02/10/2022       Assessment:   Prostate cancer s/p brachytherapy 2000  Mixed incontinence  Urethrorectal fistula  L nonobstructive nephrolithiasis 2 mm per CT 6/2020  Left renal cysts  Plan:     Pt reports urinary incontinence is stable. He is not interested in further medications or any procedures for stress incontinence or advanced therapies at this time.   Denies any leaking via rectum. Normal BMs. Denies hematuria or utis. PSA undetectable 22 years out from brachytherapy showing excellent control of prostate cancer at this time. F/u in 1 year with pvr. Call with any worsening in symptoms prior. PSA checked per PCP.

## 2022-06-14 RX ORDER — CLOPIDOGREL BISULFATE 75 MG/1
TABLET ORAL
Qty: 90 TABLET | Refills: 3 | Status: SHIPPED | OUTPATIENT
Start: 2022-06-14

## 2022-07-08 ENCOUNTER — OFFICE VISIT (OUTPATIENT)
Dept: CARDIOLOGY CLINIC | Age: 77
End: 2022-07-08
Payer: MEDICARE

## 2022-07-08 VITALS
SYSTOLIC BLOOD PRESSURE: 120 MMHG | HEART RATE: 58 BPM | DIASTOLIC BLOOD PRESSURE: 76 MMHG | HEIGHT: 63 IN | BODY MASS INDEX: 31.96 KG/M2 | WEIGHT: 180.4 LBS

## 2022-07-08 DIAGNOSIS — I25.10 CORONARY ARTERY DISEASE INVOLVING NATIVE CORONARY ARTERY OF NATIVE HEART WITHOUT ANGINA PECTORIS: Primary | ICD-10-CM

## 2022-07-08 DIAGNOSIS — E78.01 FAMILIAL HYPERCHOLESTEROLEMIA: ICD-10-CM

## 2022-07-08 DIAGNOSIS — I10 ESSENTIAL HYPERTENSION: ICD-10-CM

## 2022-07-08 PROCEDURE — G8417 CALC BMI ABV UP PARAM F/U: HCPCS | Performed by: NUCLEAR MEDICINE

## 2022-07-08 PROCEDURE — 1123F ACP DISCUSS/DSCN MKR DOCD: CPT | Performed by: NUCLEAR MEDICINE

## 2022-07-08 PROCEDURE — 99214 OFFICE O/P EST MOD 30 MIN: CPT | Performed by: NUCLEAR MEDICINE

## 2022-07-08 PROCEDURE — G8427 DOCREV CUR MEDS BY ELIG CLIN: HCPCS | Performed by: NUCLEAR MEDICINE

## 2022-07-08 PROCEDURE — 1036F TOBACCO NON-USER: CPT | Performed by: NUCLEAR MEDICINE

## 2022-07-08 PROCEDURE — 93000 ELECTROCARDIOGRAM COMPLETE: CPT | Performed by: NUCLEAR MEDICINE

## 2022-07-08 NOTE — PROGRESS NOTES
[address] [facility] [employer] [address]  [address] [place] [address]  Dept: [phone]  Dept Fax: [phone]  Loc: [phone]    Visit Date: 7/8/2022    Gareth Winchester is a 76 y.o. male who presents todayfor:  Chief Complaint   Patient presents with    1 Year Follow Up    Coronary Artery Disease    Hyperlipidemia    Hypertension   known CABG and stents  Some dyspnea  Exertional dyspnea  Some hip limitation   No chest pain   BP is stable   No dizziness  No syncope  On statins for hyperlipidemia  No issues  Some hip and back issues       HPI:  HPI  Past Medical History:   Diagnosis Date    Alcohol abuse     6-10 beers per day    Back pain     CAD (coronary artery disease)     MI    Cancer of prostate ([facility] [address]     Brachytherapy    Cerebral artery occlusion with cerebral infarction [facility])     2004    History of blood transfusion     Hyperlipidemia     Hypertension     Major depression in remission ([facility] [address] 12/11/2014    Other disorders of kidney and ureter in diseases classified elsewhere     S/P CABG x 4 12/14/2016    S/P CABG x 4 1999    Simple chronic bronchitis ([facility] [address] 10/12/2016    TIA (transient ischemic attack)     Uncomplicated alcohol dependence ([facility] [address] 7/5/2016      Past Surgical History:   Procedure Laterality Date    APPENDECTOMY      R Cortinhas [address], 2016    CORONARY ARTERY BYPASS GRAFT  12/14/2016    Redo of prior CABG, Dr. Bridgett Trinh.  CYSTOSCOPY N/A 3/14/2019    TRANSURETHRAL RESECTION PROSTATE, EXAM UNDER ANESTHESIA, PROSTATIC URETHROGRAM performed by Hayes Mcdaniel MD at [facility] CATH LAB PROCEDURE      FRACTURE SURGERY      Arm    [place] OFFICE/OUTPT VISIT,PROCEDURE ONLY N/A 8/29/2018    SCALP EXPLORATION, WOUND CLOSURE performed by Malissa Vinson MD at [facility]  2009?     Macarena Anon     Family History   Problem Relation Age of Onset    Other Mother artery disease    Heart Disease Father     Cancer Father         prostate    Cancer Sister         breast     Social History     Tobacco Use    Smoking status: Never Smoker    Smokeless tobacco: Never Used   Substance Use Topics    Alcohol use: Yes     Alcohol/week: 3.0 standard drinks     Types: 3 Cans of beer per week     Comment: 3 cans per day per patient, wife states \"alot more\"  pt states none for 6 months      Current Outpatient Medications   Medication Sig Dispense Refill    clopidogrel (PLAVIX) 75 MG tablet TAKE 1 TABLET EVERY DAY 90 tablet 3    montelukast (SINGULAIR) 10 MG tablet take 1 tablet by mouth at bedtime      meloxicam (MOBIC) 15 MG tablet take 1 tablet by mouth once daily with food      Incontinence Supply Disposable (INCONTINENCE BRIEF MEDIUM) MISC 1 each by Does not apply route 8 times daily Flexfit Depends 240 each 11    gabapentin (NEURONTIN) 300 MG capsule Take 300 mg by mouth 3 times daily.  losartan (COZAAR) 25 MG tablet Take 25 mg by mouth daily      folic acid (FOLVITE) 1 MG tablet Take 1 tablet by mouth daily      vitamin B-1 100 MG tablet Take 1 tablet by mouth daily      aspirin EC 81 MG EC tablet Take 81 mg by mouth daily      metoprolol tartrate (LOPRESSOR) 25 MG tablet Take 1 tablet by mouth 2 times daily 180 tablet 3    atorvastatin (LIPITOR) 80 MG tablet Take 1 tablet by mouth daily 90 tablet 0    nitroGLYCERIN (NITROSTAT) 0.4 MG SL tablet Place 1 tablet under the tongue every 5 minutes as needed for Chest pain up to max of 3 total doses. If no relief after 1 dose, call 911. 25 tablet 3    sertraline (ZOLOFT) 100 MG tablet Take 1 tablet by mouth daily 90 tablet 3     No current facility-administered medications for this visit.      Allergies   Allergen Reactions    Pcn [Penicillins] Shortness Of Breath    Tetanus Toxoids Shortness Of Breath    Franki-1 [Lidocaine Hcl]      New reaction today  jenn 07-02-16 pt states only reaction was to numbing drops Plan:  No follow-ups on file. Discussed  Discussed a stress test   Discussed follow up echo   He is reluctant   Continue risk factor modification and medical management    Thank you for allowing me to participate in the care of your patient. Please don't hesitate to contact me regarding any further issues related to the patient care    Orders Placed:  Orders Placed This Encounter   Procedures    EKG 12 lead     Order Specific Question:   Reason for Exam?     Answer: Other       Medications Prescribed:  No orders of the defined types were placed in this encounter. Discussed use, benefit, and side effects of prescribed medications. All patient questions answered. Pt voicedunderstanding. Instructed to continue current medications, diet and exercise. Continue risk factor modification and medical management. Patient agreed with treatment plan. Follow up as directed.     Electronically signedby Melvin Servin MD on 7/8/2022 at 10:50 AM

## 2022-08-23 ENCOUNTER — HOSPITAL ENCOUNTER (OUTPATIENT)
Age: 77
Discharge: HOME | End: 2022-08-23
Payer: MEDICARE

## 2022-08-23 ENCOUNTER — HOSPITAL ENCOUNTER (OUTPATIENT)
Dept: GENERAL RADIOLOGY | Age: 77
Discharge: HOME | End: 2022-08-23
Payer: MEDICARE

## 2022-08-23 DIAGNOSIS — M25.552 LEFT HIP PAIN: ICD-10-CM

## 2022-08-23 PROCEDURE — 73502 X-RAY EXAM HIP UNI 2-3 VIEWS: CPT

## 2022-09-01 ENCOUNTER — TELEPHONE (OUTPATIENT)
Dept: CARDIOLOGY CLINIC | Age: 77
End: 2022-09-01

## 2022-09-01 DIAGNOSIS — G45.9 TIA (TRANSIENT ISCHEMIC ATTACK): ICD-10-CM

## 2022-09-01 DIAGNOSIS — R07.9 CHEST PAIN, UNSPECIFIED TYPE: Primary | ICD-10-CM

## 2022-09-01 DIAGNOSIS — R06.02 SOB (SHORTNESS OF BREATH): ICD-10-CM

## 2022-09-01 NOTE — TELEPHONE ENCOUNTER
Pre op Risk Assessment    Procedure Hip Replacement  Physician Dr. Porsha Farrell  Date of surgery/procedure 9/27/2022    Last OV 7/8/2022  Last Stress 4/25/2016  Last Echo Limited done 2/14/2017, Complete Echo done 12/9/2016  Last Cath 12/9/2016-recommended CABG  Last Stent   Is patient on blood thinners Plavix, ASA  Hold Meds/how many days

## 2022-09-02 NOTE — TELEPHONE ENCOUNTER
Spoke with patient to schedule echo and gio stress test for 9/12/22, with arrival time of 9:15 am. All instructions reviewed via phone with patient and also mailed to patient this date. Patient verbalized understanding.

## 2022-09-02 NOTE — TELEPHONE ENCOUNTER
Pt returned call, verbalized understanding, is unsure about the gio, but orders were given to scheduling along with pre op clearance form.

## 2022-09-12 ENCOUNTER — HOSPITAL ENCOUNTER (OUTPATIENT)
Dept: NON INVASIVE DIAGNOSTICS | Age: 77
Discharge: HOME | End: 2022-09-12
Payer: MEDICARE

## 2022-09-12 DIAGNOSIS — R07.9 CHEST PAIN, UNSPECIFIED TYPE: ICD-10-CM

## 2022-09-12 DIAGNOSIS — G45.9 TIA (TRANSIENT ISCHEMIC ATTACK): ICD-10-CM

## 2022-09-12 DIAGNOSIS — R06.02 SOB (SHORTNESS OF BREATH): ICD-10-CM

## 2022-09-12 LAB
LV EF: 55 %
LVEF MODALITY: NORMAL

## 2022-09-12 PROCEDURE — 3430000000 HC RX DIAGNOSTIC RADIOPHARMACEUTICAL: Performed by: NUCLEAR MEDICINE

## 2022-09-12 PROCEDURE — 78452 HT MUSCLE IMAGE SPECT MULT: CPT

## 2022-09-12 PROCEDURE — 6360000002 HC RX W HCPCS

## 2022-09-12 PROCEDURE — 93306 TTE W/DOPPLER COMPLETE: CPT

## 2022-09-12 PROCEDURE — A9500 TC99M SESTAMIBI: HCPCS | Performed by: NUCLEAR MEDICINE

## 2022-09-12 PROCEDURE — 93017 CV STRESS TEST TRACING ONLY: CPT | Performed by: NUCLEAR MEDICINE

## 2022-09-12 RX ADMIN — Medication 8.5 MILLICURIE: at 10:40

## 2022-09-12 RX ADMIN — Medication 30.4 MILLICURIE: at 11:25

## 2022-09-13 NOTE — TELEPHONE ENCOUNTER
Stephanie and ECHO done for pre op clearance for Hip replacement. Is pt clear? Pt take Plavix and ASA.  (Form in Dr Alex Christensen box)

## 2022-09-20 NOTE — PROGRESS NOTES
Follow all instructions given by your physician    NPO after midnight   Sips of water am of surgery with allowed medications  Bring insurance info and 's license  Wear comfortable clean, loose fitting clothing  No jewelry or contact lenses to be worn day of surgery  No glue on dentures morning of surgery;you will be asked to remove them for surgery. Case for glasses. Shower night before and morning of surgery with a liquid antibacterial soap, dry with fresh clean towel; no lotions, creams or powder. Clean sheets and pillow case on bed night before surgery  Bring medications in original bottles  Bring CPAP/BIPAP machine if you have one ( you may be charged if one is needed in recovery room )   needed at discharge and someone over 18 to stay with you for 24 hours overnight (surgery may be cancelled if you don't have this)  Report to [facility] on 2nd floor  If you would become ill prior to surgery, please call the surgeon  May have a visitor with you, we request that you limit to 2 visitors in pre-op area  Please bring and wear mask  Call [phone] for any questions  Covid questionnaire Complete; Patient negative for symptoms or exposure. See documentation.

## 2022-09-20 NOTE — PROGRESS NOTES
PAT Call Date: 9/20   Surgery Date: 9/27    Surgeon: Billy Padilla   Surgery: Left total hip    Is patient from a nursing home? No   Any Isolation Precautions? No   Any Pacemaker or ICD? No If YES, has it been checked recently and where? Has the rep been notified? No     On Snap[employer]?  No     Hard Copy on Chart  In EPIC Pending/Notes   Consent -   Within 30 days; signed, dated & timed by patient and physician     [] On Arrival     [] Blood    Additional Consent Needs:     H&P - Within 30 days  9/20  [] Physician To Do     [] H&P Update - If H&P is older then 24 hours    Clearance -  Medical, Cardiac, Pulmonary, etc.   9/14 Baki-mod risk ok to hold both 5d   Orders - Signed and Dated    Copy Sent to Pharm []    [] Physician To Do    Labs - Within 3 months   9/1 IOS to send  [x] CBC    [x] BMP   [] GFR   [] INR    [] PTT    [] Urine    [] Liver Enzymes    [] Kidney Function    [x] MRSA Nasal   [] MSSA      Others:    Radiology Studies-   Within 1 year  9/1 IOS to send  [x] Chest X-Ray   [] MRI    [] CT    EKG -   Within 1 year, unless hx of HTN  7/8 Abnormal cleared by Sharri Tyler   Cardiac Workup -   Stress Test, Echo, Cath within 18 months    9/12 9/12  [] Cath                                [x] Stress Test                      [x] Echo 55%   [] Holter Monitor    [] LEN

## 2022-09-20 NOTE — PROGRESS NOTES
Preliminary Discharge Planning Questionnaire  Date of Surgery 9/20   Surgeon Richie Or      Having the proper help and care after surgery is very important to your recovery. Who will be able to help you at home when you are discharged from the hospital? ([facility] - 24/7 for a minimum of 2 weeks)    spouse    she lives a few blocks away He lives in his \"shop\"    How many steps to enter your home? 0    Bathroom on first floor? Yes    Bedroom on the first floor? Yes sleeps on a couch    Do you have an elevated toilet seat to use at home? Yes    Do you have a walker to use at home? Total Joints - with wheels No   Spine - with wheels  N/A     Have you been doing home exercises? *You will go home with some outpatient physical therapy, where do you prefer to go?  Does not have preference    *If needed, what home health agency would you like to use? same

## 2022-09-26 ENCOUNTER — ANESTHESIA EVENT (OUTPATIENT)
Dept: OPERATING ROOM | Age: 77
DRG: 470 | End: 2022-09-26
Payer: MEDICARE

## 2022-09-27 ENCOUNTER — APPOINTMENT (OUTPATIENT)
Dept: GENERAL RADIOLOGY | Age: 77
DRG: 470 | End: 2022-09-27
Attending: ORTHOPAEDIC SURGERY
Payer: MEDICARE

## 2022-09-27 ENCOUNTER — HOSPITAL ENCOUNTER (INPATIENT)
Age: 77
LOS: 7 days | Discharge: NURSING, REHAB OR LONG-TERM CARE | DRG: 470 | End: 2022-10-04
Attending: ORTHOPAEDIC SURGERY | Admitting: ORTHOPAEDIC SURGERY
Payer: MEDICARE

## 2022-09-27 ENCOUNTER — ANESTHESIA (OUTPATIENT)
Dept: OPERATING ROOM | Age: 77
DRG: 470 | End: 2022-09-27
Payer: MEDICARE

## 2022-09-27 DIAGNOSIS — M16.12 OSTEOARTHRITIS OF LEFT HIP, UNSPECIFIED OSTEOARTHRITIS TYPE: Primary | ICD-10-CM

## 2022-09-27 PROCEDURE — 3E0T3BZ INTRODUCTION OF ANESTHETIC AGENT INTO PERIPHERAL NERVES AND PLEXI, PERCUTANEOUS APPROACH: ICD-10-PCS | Performed by: ORTHOPAEDIC SURGERY

## 2022-09-27 PROCEDURE — 6370000000 HC RX 637 (ALT 250 FOR IP): Performed by: PHYSICIAN ASSISTANT

## 2022-09-27 PROCEDURE — 3600000015 HC SURGERY LEVEL 5 ADDTL 15MIN: Performed by: ORTHOPAEDIC SURGERY

## 2022-09-27 PROCEDURE — 3700000000 HC ANESTHESIA ATTENDED CARE: Performed by: ORTHOPAEDIC SURGERY

## 2022-09-27 PROCEDURE — 0SRB02A REPLACEMENT OF LEFT HIP JOINT WITH METAL ON POLYETHYLENE SYNTHETIC SUBSTITUTE, UNCEMENTED, OPEN APPROACH: ICD-10-PCS | Performed by: ORTHOPAEDIC SURGERY

## 2022-09-27 PROCEDURE — 73501 X-RAY EXAM HIP UNI 1 VIEW: CPT

## 2022-09-27 PROCEDURE — 73502 X-RAY EXAM HIP UNI 2-3 VIEWS: CPT

## 2022-09-27 PROCEDURE — 2580000003 HC RX 258: Performed by: ORTHOPAEDIC SURGERY

## 2022-09-27 PROCEDURE — 6370000000 HC RX 637 (ALT 250 FOR IP): Performed by: ORTHOPAEDIC SURGERY

## 2022-09-27 PROCEDURE — 3700000001 HC ADD 15 MINUTES (ANESTHESIA): Performed by: ORTHOPAEDIC SURGERY

## 2022-09-27 PROCEDURE — 3600000005 HC SURGERY LEVEL 5 BASE: Performed by: ORTHOPAEDIC SURGERY

## 2022-09-27 PROCEDURE — 2500000003 HC RX 250 WO HCPCS: Performed by: REGISTERED NURSE

## 2022-09-27 PROCEDURE — 76942 ECHO GUIDE FOR BIOPSY: CPT | Performed by: ANESTHESIOLOGY

## 2022-09-27 PROCEDURE — C1776 JOINT DEVICE (IMPLANTABLE): HCPCS | Performed by: ORTHOPAEDIC SURGERY

## 2022-09-27 PROCEDURE — 7100000000 HC PACU RECOVERY - FIRST 15 MIN: Performed by: ORTHOPAEDIC SURGERY

## 2022-09-27 PROCEDURE — 6360000002 HC RX W HCPCS: Performed by: ORTHOPAEDIC SURGERY

## 2022-09-27 PROCEDURE — 2500000003 HC RX 250 WO HCPCS: Performed by: ORTHOPAEDIC SURGERY

## 2022-09-27 PROCEDURE — 6360000002 HC RX W HCPCS: Performed by: PHYSICIAN ASSISTANT

## 2022-09-27 PROCEDURE — 6360000002 HC RX W HCPCS: Performed by: REGISTERED NURSE

## 2022-09-27 PROCEDURE — 7100000001 HC PACU RECOVERY - ADDTL 15 MIN: Performed by: ORTHOPAEDIC SURGERY

## 2022-09-27 PROCEDURE — 3209999900 FLUORO FOR SURGICAL PROCEDURES

## 2022-09-27 PROCEDURE — 2580000003 HC RX 258: Performed by: PHYSICIAN ASSISTANT

## 2022-09-27 PROCEDURE — 1200000000 HC SEMI PRIVATE

## 2022-09-27 PROCEDURE — 64450 NJX AA&/STRD OTHER PN/BRANCH: CPT | Performed by: ANESTHESIOLOGY

## 2022-09-27 PROCEDURE — 2709999900 HC NON-CHARGEABLE SUPPLY: Performed by: ORTHOPAEDIC SURGERY

## 2022-09-27 DEVICE — POLARSTEM LATERAL NON-CEMENTED                                    WITH TI/HA 4
Type: IMPLANTABLE DEVICE | Site: HIP | Status: FUNCTIONAL
Brand: POLARSTEM

## 2022-09-27 DEVICE — R3 0 HOLE ACETABULAR SHELL 52MM
Type: IMPLANTABLE DEVICE | Site: HIP | Status: FUNCTIONAL
Brand: R3 ACETABULAR

## 2022-09-27 DEVICE — COBALT CHROME 12/14 TAPER FEMORAL                                    HEAD 36MM +8: Type: IMPLANTABLE DEVICE | Site: HIP | Status: FUNCTIONAL

## 2022-09-27 DEVICE — REFLECTION THREADED HOLE COVER
Type: IMPLANTABLE DEVICE | Site: HIP | Status: FUNCTIONAL
Brand: REFLECTION

## 2022-09-27 DEVICE — R3 0 DEGREE XLPE ACETABULAR LINER                                    36MM INNER DIAMETER X OUTER DIAMETER 52MM
Type: IMPLANTABLE DEVICE | Site: HIP | Status: FUNCTIONAL
Brand: R3

## 2022-09-27 RX ORDER — CELECOXIB 100 MG/1
100 CAPSULE ORAL ONCE
Status: COMPLETED | OUTPATIENT
Start: 2022-09-27 | End: 2022-09-27

## 2022-09-27 RX ORDER — BUPIVACAINE HYDROCHLORIDE AND EPINEPHRINE 5; 5 MG/ML; UG/ML
INJECTION, SOLUTION EPIDURAL; INTRACAUDAL; PERINEURAL PRN
Status: DISCONTINUED | OUTPATIENT
Start: 2022-09-27 | End: 2022-09-27 | Stop reason: ALTCHOICE

## 2022-09-27 RX ORDER — PROPOFOL 10 MG/ML
INJECTION, EMULSION INTRAVENOUS CONTINUOUS PRN
Status: DISCONTINUED | OUTPATIENT
Start: 2022-09-27 | End: 2022-09-27 | Stop reason: SDUPTHER

## 2022-09-27 RX ORDER — SODIUM CHLORIDE 9 MG/ML
INJECTION, SOLUTION INTRAVENOUS PRN
Status: DISCONTINUED | OUTPATIENT
Start: 2022-09-27 | End: 2022-09-27 | Stop reason: HOSPADM

## 2022-09-27 RX ORDER — POLYETHYLENE GLYCOL 3350 17 G/17G
17 POWDER, FOR SOLUTION ORAL DAILY
Status: DISCONTINUED | OUTPATIENT
Start: 2022-09-27 | End: 2022-10-04 | Stop reason: HOSPADM

## 2022-09-27 RX ORDER — TRANEXAMIC ACID 100 MG/ML
INJECTION, SOLUTION INTRAVENOUS PRN
Status: DISCONTINUED | OUTPATIENT
Start: 2022-09-27 | End: 2022-09-27 | Stop reason: ALTCHOICE

## 2022-09-27 RX ORDER — SODIUM CHLORIDE 0.9 % (FLUSH) 0.9 %
5-40 SYRINGE (ML) INJECTION PRN
Status: DISCONTINUED | OUTPATIENT
Start: 2022-09-27 | End: 2022-09-27 | Stop reason: HOSPADM

## 2022-09-27 RX ORDER — SODIUM CHLORIDE 9 MG/ML
INJECTION, SOLUTION INTRAVENOUS CONTINUOUS
Status: DISCONTINUED | OUTPATIENT
Start: 2022-09-27 | End: 2022-10-04 | Stop reason: HOSPADM

## 2022-09-27 RX ORDER — SODIUM PHOSPHATE, DIBASIC AND SODIUM PHOSPHATE, MONOBASIC 7; 19 G/133ML; G/133ML
1 ENEMA RECTAL DAILY PRN
Status: DISCONTINUED | OUTPATIENT
Start: 2022-09-27 | End: 2022-10-04 | Stop reason: HOSPADM

## 2022-09-27 RX ORDER — GLYCOPYRROLATE 1 MG/5 ML
SYRINGE (ML) INTRAVENOUS PRN
Status: DISCONTINUED | OUTPATIENT
Start: 2022-09-27 | End: 2022-09-27 | Stop reason: SDUPTHER

## 2022-09-27 RX ORDER — SODIUM CHLORIDE 9 MG/ML
INJECTION, SOLUTION INTRAVENOUS PRN
Status: DISCONTINUED | OUTPATIENT
Start: 2022-09-27 | End: 2022-10-04 | Stop reason: HOSPADM

## 2022-09-27 RX ORDER — NITROGLYCERIN 0.4 MG/1
0.4 TABLET SUBLINGUAL EVERY 5 MIN PRN
Status: DISCONTINUED | OUTPATIENT
Start: 2022-09-27 | End: 2022-10-04 | Stop reason: HOSPADM

## 2022-09-27 RX ORDER — LOSARTAN POTASSIUM 25 MG/1
25 TABLET ORAL DAILY
Status: DISCONTINUED | OUTPATIENT
Start: 2022-09-27 | End: 2022-10-04 | Stop reason: HOSPADM

## 2022-09-27 RX ORDER — CLOPIDOGREL BISULFATE 75 MG/1
75 TABLET ORAL DAILY
Status: DISCONTINUED | OUTPATIENT
Start: 2022-09-27 | End: 2022-10-04 | Stop reason: HOSPADM

## 2022-09-27 RX ORDER — MORPHINE SULFATE 2 MG/ML
2 INJECTION, SOLUTION INTRAMUSCULAR; INTRAVENOUS
Status: ACTIVE | OUTPATIENT
Start: 2022-09-27 | End: 2022-09-28

## 2022-09-27 RX ORDER — ATORVASTATIN CALCIUM 80 MG/1
80 TABLET, FILM COATED ORAL DAILY
Status: DISCONTINUED | OUTPATIENT
Start: 2022-09-27 | End: 2022-10-04 | Stop reason: HOSPADM

## 2022-09-27 RX ORDER — HYDRALAZINE HYDROCHLORIDE 20 MG/ML
10 INJECTION INTRAMUSCULAR; INTRAVENOUS
Status: DISCONTINUED | OUTPATIENT
Start: 2022-09-27 | End: 2022-09-27 | Stop reason: HOSPADM

## 2022-09-27 RX ORDER — SERTRALINE HYDROCHLORIDE 100 MG/1
100 TABLET, FILM COATED ORAL DAILY
Status: DISCONTINUED | OUTPATIENT
Start: 2022-09-27 | End: 2022-10-04 | Stop reason: HOSPADM

## 2022-09-27 RX ORDER — TRANEXAMIC ACID 10 MG/ML
1000 INJECTION, SOLUTION INTRAVENOUS
Status: DISCONTINUED | OUTPATIENT
Start: 2022-09-27 | End: 2022-09-27 | Stop reason: HOSPADM

## 2022-09-27 RX ORDER — MONTELUKAST SODIUM 10 MG/1
10 TABLET ORAL NIGHTLY
Status: DISCONTINUED | OUTPATIENT
Start: 2022-09-27 | End: 2022-10-04 | Stop reason: HOSPADM

## 2022-09-27 RX ORDER — ONDANSETRON 2 MG/ML
4 INJECTION INTRAMUSCULAR; INTRAVENOUS EVERY 6 HOURS PRN
Status: DISCONTINUED | OUTPATIENT
Start: 2022-09-27 | End: 2022-10-04 | Stop reason: HOSPADM

## 2022-09-27 RX ORDER — SODIUM CHLORIDE 0.9 % (FLUSH) 0.9 %
5-40 SYRINGE (ML) INJECTION PRN
Status: DISCONTINUED | OUTPATIENT
Start: 2022-09-27 | End: 2022-10-04 | Stop reason: HOSPADM

## 2022-09-27 RX ORDER — ACETAMINOPHEN 500 MG
1000 TABLET ORAL ONCE
Status: COMPLETED | OUTPATIENT
Start: 2022-09-27 | End: 2022-09-27

## 2022-09-27 RX ORDER — SODIUM CHLORIDE 9 MG/ML
INJECTION, SOLUTION INTRAVENOUS CONTINUOUS
Status: DISCONTINUED | OUTPATIENT
Start: 2022-09-27 | End: 2022-09-27 | Stop reason: HOSPADM

## 2022-09-27 RX ORDER — GABAPENTIN 300 MG/1
300 CAPSULE ORAL 3 TIMES DAILY
Status: DISCONTINUED | OUTPATIENT
Start: 2022-09-27 | End: 2022-10-04 | Stop reason: HOSPADM

## 2022-09-27 RX ORDER — SODIUM CHLORIDE 0.9 % (FLUSH) 0.9 %
5-40 SYRINGE (ML) INJECTION EVERY 12 HOURS SCHEDULED
Status: DISCONTINUED | OUTPATIENT
Start: 2022-09-27 | End: 2022-10-04 | Stop reason: HOSPADM

## 2022-09-27 RX ORDER — HYDROCODONE BITARTRATE AND ACETAMINOPHEN 5; 325 MG/1; MG/1
1 TABLET ORAL EVERY 4 HOURS PRN
Status: DISCONTINUED | OUTPATIENT
Start: 2022-09-27 | End: 2022-10-04 | Stop reason: HOSPADM

## 2022-09-27 RX ORDER — SODIUM CHLORIDE 0.9 % (FLUSH) 0.9 %
5-40 SYRINGE (ML) INJECTION EVERY 12 HOURS SCHEDULED
Status: DISCONTINUED | OUTPATIENT
Start: 2022-09-27 | End: 2022-09-27 | Stop reason: HOSPADM

## 2022-09-27 RX ORDER — ACETAMINOPHEN 325 MG/1
650 TABLET ORAL EVERY 6 HOURS
Status: DISCONTINUED | OUTPATIENT
Start: 2022-09-27 | End: 2022-10-04 | Stop reason: HOSPADM

## 2022-09-27 RX ORDER — MORPHINE SULFATE 2 MG/ML
2 INJECTION, SOLUTION INTRAMUSCULAR; INTRAVENOUS EVERY 5 MIN PRN
Status: DISCONTINUED | OUTPATIENT
Start: 2022-09-27 | End: 2022-09-27 | Stop reason: HOSPADM

## 2022-09-27 RX ORDER — FENTANYL CITRATE 50 UG/ML
50 INJECTION, SOLUTION INTRAMUSCULAR; INTRAVENOUS EVERY 5 MIN PRN
Status: DISCONTINUED | OUTPATIENT
Start: 2022-09-27 | End: 2022-09-27 | Stop reason: HOSPADM

## 2022-09-27 RX ORDER — ONDANSETRON 2 MG/ML
INJECTION INTRAMUSCULAR; INTRAVENOUS PRN
Status: DISCONTINUED | OUTPATIENT
Start: 2022-09-27 | End: 2022-09-27 | Stop reason: SDUPTHER

## 2022-09-27 RX ADMIN — GABAPENTIN 300 MG: 300 CAPSULE ORAL at 20:02

## 2022-09-27 RX ADMIN — ACETAMINOPHEN 650 MG: 325 TABLET ORAL at 15:03

## 2022-09-27 RX ADMIN — CELECOXIB 100 MG: 100 CAPSULE ORAL at 08:40

## 2022-09-27 RX ADMIN — PHENYLEPHRINE HYDROCHLORIDE 100 MCG: 10 INJECTION INTRAVENOUS at 11:24

## 2022-09-27 RX ADMIN — ATORVASTATIN CALCIUM 80 MG: 80 TABLET, FILM COATED ORAL at 15:03

## 2022-09-27 RX ADMIN — CEFAZOLIN 2000 MG: 10 INJECTION, POWDER, FOR SOLUTION INTRAVENOUS at 10:44

## 2022-09-27 RX ADMIN — PHENYLEPHRINE HYDROCHLORIDE 100 MCG: 10 INJECTION INTRAVENOUS at 11:42

## 2022-09-27 RX ADMIN — METOPROLOL TARTRATE 25 MG: 25 TABLET, FILM COATED ORAL at 20:02

## 2022-09-27 RX ADMIN — PROPOFOL 60 MCG/KG/MIN: 10 INJECTION, EMULSION INTRAVENOUS at 10:44

## 2022-09-27 RX ADMIN — GABAPENTIN 300 MG: 300 CAPSULE ORAL at 15:03

## 2022-09-27 RX ADMIN — HYDROCODONE BITARTRATE AND ACETAMINOPHEN 1 TABLET: 5; 325 TABLET ORAL at 16:45

## 2022-09-27 RX ADMIN — Medication 0.2 MG: at 10:48

## 2022-09-27 RX ADMIN — SODIUM CHLORIDE: 9 INJECTION, SOLUTION INTRAVENOUS at 23:21

## 2022-09-27 RX ADMIN — ACETAMINOPHEN 1000 MG: 500 TABLET ORAL at 08:40

## 2022-09-27 RX ADMIN — HYDROCODONE BITARTRATE AND ACETAMINOPHEN 1 TABLET: 5; 325 TABLET ORAL at 22:00

## 2022-09-27 RX ADMIN — LOSARTAN POTASSIUM 25 MG: 25 TABLET, FILM COATED ORAL at 15:03

## 2022-09-27 RX ADMIN — ONDANSETRON 4 MG: 2 INJECTION INTRAMUSCULAR; INTRAVENOUS at 10:31

## 2022-09-27 RX ADMIN — POLYETHYLENE GLYCOL 3350 17 G: 17 POWDER, FOR SOLUTION ORAL at 15:03

## 2022-09-27 RX ADMIN — CLOPIDOGREL BISULFATE 75 MG: 75 TABLET ORAL at 15:03

## 2022-09-27 RX ADMIN — SODIUM CHLORIDE: 9 INJECTION, SOLUTION INTRAVENOUS at 14:37

## 2022-09-27 RX ADMIN — CEFAZOLIN 2000 MG: 10 INJECTION, POWDER, FOR SOLUTION INTRAVENOUS at 18:06

## 2022-09-27 RX ADMIN — ASPIRIN 325 MG: 325 TABLET, COATED ORAL at 15:03

## 2022-09-27 RX ADMIN — MONTELUKAST SODIUM 10 MG: 10 TABLET ORAL at 20:02

## 2022-09-27 RX ADMIN — SERTRALINE 100 MG: 100 TABLET, FILM COATED ORAL at 15:03

## 2022-09-27 RX ADMIN — PHENYLEPHRINE HYDROCHLORIDE 100 MCG: 10 INJECTION INTRAVENOUS at 11:33

## 2022-09-27 RX ADMIN — SODIUM CHLORIDE: 9 INJECTION, SOLUTION INTRAVENOUS at 09:08

## 2022-09-27 RX ADMIN — PHENYLEPHRINE HYDROCHLORIDE 200 MCG: 10 INJECTION INTRAVENOUS at 11:50

## 2022-09-27 ASSESSMENT — PAIN DESCRIPTION - LOCATION
LOCATION: HIP

## 2022-09-27 ASSESSMENT — PAIN DESCRIPTION - DESCRIPTORS
DESCRIPTORS: ACHING

## 2022-09-27 ASSESSMENT — PAIN DESCRIPTION - ONSET: ONSET: ON-GOING

## 2022-09-27 ASSESSMENT — PAIN SCALES - GENERAL
PAINLEVEL_OUTOF10: 3
PAINLEVEL_OUTOF10: 4
PAINLEVEL_OUTOF10: 6
PAINLEVEL_OUTOF10: 7
PAINLEVEL_OUTOF10: 4
PAINLEVEL_OUTOF10: 7

## 2022-09-27 ASSESSMENT — PAIN DESCRIPTION - FREQUENCY: FREQUENCY: CONTINUOUS

## 2022-09-27 ASSESSMENT — PAIN DESCRIPTION - ORIENTATION
ORIENTATION: LEFT

## 2022-09-27 ASSESSMENT — PAIN DESCRIPTION - PAIN TYPE: TYPE: SURGICAL PAIN

## 2022-09-27 ASSESSMENT — PAIN - FUNCTIONAL ASSESSMENT
PAIN_FUNCTIONAL_ASSESSMENT: 0-10
PAIN_FUNCTIONAL_ASSESSMENT: PREVENTS OR INTERFERES SOME ACTIVE ACTIVITIES AND ADLS
PAIN_FUNCTIONAL_ASSESSMENT: PREVENTS OR INTERFERES SOME ACTIVE ACTIVITIES AND ADLS

## 2022-09-27 NOTE — CARE COORDINATION
Case Management Assessment  Initial Evaluation    Date/Time of Evaluation: 9/27/2022 4:03 PM  Assessment Completed by: Gabriela Mcclendon RN    If patient is discharged prior to next notation, then this note serves as note for discharge by case management. Patient Name: Piter Lopez                   YOB: 1945  Diagnosis: Osteoarthritis of left hip, unspecified osteoarthritis type [M16.12]  Primary osteoarthritis of left hip [M16.12]                   Date / Time: 9/27/2022  7:19 AM    Patient Admission Status: Outpatient in a bed     Current PCP: Colleen Falcon MD  PCP verified by CM? Yes    Chart Reviewed: Yes      History Provided by: Patient  Patient Orientation: Alert and Oriented    Patient Cognition: Alert    Hospitalization in the last 30 days (Readmission):  No    If yes, Readmission Assessment in  Navigator will be completed. Advance Directives:     Code Status: Full Code       Discharge Planning  Patient lives with: Spouse/Significant Other Type of Home: House  Primary Care Giver: Self  Patient Support Systems include: Spouse/Significant Other   Current Financial resources:    Current community resources:    Current services prior to admission:     Type of Home Care services:       ADLS  Prior functional level: Independent in ADLs/IADLs  Current functional level: Other (see comment) (await PT/OT evals post-op)    PT AM-PAC:   /24  OT AM-PAC:   /24    Family can provide assistance at DC: Yes  Would you like Case Management to discuss the discharge plan with any other family members/significant others, and if so, who?  No (wife present.)  Plans to Return to Present Housing: Yes  Other Identified Issues/Barriers to RETURNING to current housing: none  Potential Assistance needed at discharge:    Patient expects to discharge to: [address] [place] [address] for transportation at discharge:      Financial  Payor: [employer] MEDICARE / Plan: [employer] / Product Type: *No Product type* /     Does insurance require precert for SNF: Yes    Potential assistance Purchasing Medications:    Meds-to-Beds request: Yes      [facility] [facility]  [address] [address]  Phone: [phone] Fax: [phone]    [facility] [id number] - [place], [address] [phone] Anais Anaya [phone]  [address] [place] [address]  Phone: [phone] Fax: [phone]    [id number] [place], [address] - [phone]  [address] [place] [address]  Phone: [phone] Fax: [phone]      Factors facilitating achievement of predicted outcomes: Family support, Motivated, Cooperative, Pleasant, and Has needed Durable Medical Equipment at home    Barriers to discharge: Pain and Decreased endurance  Procedure: 9/27 Left Total Hip Anterior Approach  Additional Case Management Notes: Planned surgery with Dr. Don Newell. Pain control. PT/OT. Wound care. The Plan for Transition of Care is related to the following treatment goals of Osteoarthritis of left hip, unspecified osteoarthritis type [M16.12]  Primary osteoarthritis of left hip [M16.12]      The Patient and/or Patient Representative Agree with the Discharge Plan? Spoke with Jeni Ricks and wife, plans to return home with his wife. Has a walker. Unsure if he will need OP PT or HH. Will follow and re-eval post therapy evals.      Ember Bojorquez RN  Case Management Department

## 2022-09-27 NOTE — H&P
[facility]  History and Physical Update    Pt Name: Will Arthur  MRN: [id number]  YOB: 1945  Date of evaluation: 9/27/2022    I have examined the patient and reviewed the H&P/Consult and there are no changes to the patient or plans.       Molly Meyer MD  Electronically signed 9/27/2022 at 7:25 AM

## 2022-09-27 NOTE — ANESTHESIA POSTPROCEDURE EVALUATION
Department of Anesthesiology  Postprocedure Note    Patient: Lord Peralta  MRN: [id number]  YOB: 1945  Date of evaluation: 9/27/2022      Procedure Summary     Date: 09/27/22 Room / Location: [address] RAHEEM Alonzo    Anesthesia Start: 1031 Anesthesia Stop: 1222    Procedure: Left Total Hip Anterior Approach (Left: Hip) Diagnosis:       Osteoarthritis of left hip, unspecified osteoarthritis type      (Osteoarthritis of left hip, unspecified osteoarthritis type [M16.12])    Surgeons: Riley Anaya MD Responsible Provider: Rock Rice MD    Anesthesia Type: spinal ASA Status: 4          Anesthesia Type: No value filed.     David Phase I: David Score: 9    David Phase II:        Anesthesia Post Evaluation    Patient location during evaluation: PACU  Patient participation: complete - patient participated  Level of consciousness: awake  Airway patency: patent  Nausea & Vomiting: no vomiting and no nausea  Complications: no  Cardiovascular status: hemodynamically stable  Respiratory status: acceptable  Hydration status: stable

## 2022-09-27 NOTE — PROGRESS NOTES
ADMITTED TO [facility] AND ORIENTED TO UNIT. SCDS ON. FALL AND ALLERGY BANDS ON. PT VERBALIZED APPROVAL FOR FIRST NAME, LAST INITIAL AND PHYSICIAN NAME ON UNIT WHITEBOARD. Stacie Bear with the patient. They state that he was a \"code\" and his heart stopped prior to surgery at the [facility]. Advised to speak with anesthesia about this prior to surgery.

## 2022-09-27 NOTE — ANESTHESIA PROCEDURE NOTES
Spinal Block    Patient location during procedure: OR  End time: 9/27/2022 9:39 AM  Reason for block: primary anesthetic  Staffing  Performed: resident/CRNA   Anesthesiologist: Antelmo Moser MD  Resident/CRNA: Massie Essex, APRN - CRNA  Spinal Block  Patient position: sitting  Prep: ChloraPrep and site prepped and draped  Patient monitoring: cardiac monitor, continuous pulse ox, frequent blood pressure checks and oxygen  Approach: midline  Location: L4/L5  Guidance: paresthesia technique  Provider prep: mask and sterile gloves  Local infiltration: lidocaine  Needle  Needle type: pencil-tip   Needle gauge: 25 G  Needle length: 3.5 in  Assessment  Swirl obtained: Yes  CSF: clear  Attempts: 1  Hemodynamics: stable  Preanesthetic Checklist  Completed: patient identified, IV checked, site marked, risks and benefits discussed, surgical/procedural consents, equipment checked, pre-op evaluation, timeout performed, anesthesia consent given, oxygen available, monitors applied/VS acknowledged, fire risk safety assessment completed and verbalized and blood product R/B/A discussed and consented

## 2022-09-27 NOTE — BRIEF OP NOTE
Brief Postoperative Note      Patient: Loly Nettles  YOB: 1945  MRN: [id number]    Date of Procedure: 9/27/2022    Pre-Op Diagnosis: Osteoarthritis of left hip, unspecified osteoarthritis type [M16.12]    Post-Op Diagnosis: Same       Procedure(s):  Left Total Hip Anterior Approach    Surgeon(s):  Jose Shaikh MD    Assistant:  Surgical Assistant: Crys Lainez  Physician Assistant: Celso York. ARGELIA Abreu    Anesthesia: Spinal    Estimated Blood Loss (mL): 794     Complications: None    Specimens:   * No specimens in log *    Implants:  Implant Name Type Inv.  Item Serial No.  Lot No. LRB No. Used Action   SHELL ACET [id number] STD HIP HA NO H SLD R3 - [id number]  SHELL ACET [id number] STD HIP HA NO H SLD R3  [facility] [employer] [id number] Left 1 Implanted   COVER H ACET HIP THRD FOR REFLCT SYS R3 - [id number]  COVER H ACET HIP THRD FOR REFLCT SYS R3  [facility] [employer] [id number] Left 1 Implanted   STEM FEM SZ 4 CCD 126DEG LAT 12/14 TAPR TI HA CEMENTLESS - [id number]  STEM FEM SZ 4 CCD 126DEG LAT 12/14 TAPR TI HA CEMENTLESS  [facility] [id number] Left 1 Implanted   HEAD FEM [id number] NK L+8MM HIP CO CHROM CLLRD 12/14 TAPR BRAXTON - [id number]  HEAD FEM [id number] NK L+8MM HIP CO CHROM [facility]14 TAPR BRAXTON  [facility] [id number] Left 1 Implanted   LINER ACET OD52MM ID36MM TAPR REGION THK4.3MM LOAD BEAR - [id number]  LINER ACET OD52MM ID36MM TAPR REGION THK4.3MM LOAD [facility] NEPH Jd Pettitand [id number] Left 1 Implanted         Drains: * No LDAs found *    Findings: See operative dictation    Electronically signed by Jose Shaikh MD on 9/27/2022 at 12:07 PM

## 2022-09-27 NOTE — ANESTHESIA PROCEDURE NOTES
Peripheral Block    Patient location during procedure: OR  Reason for block: post-op pain management and at surgeon's request  Start time: 9/27/2022 12:25 PM  End time: 9/27/2022 12:35 PM  Staffing  Anesthesiologist: Mathew Knox MD  Preanesthetic Checklist  Completed: patient identified, IV checked, site marked, risks and benefits discussed, surgical/procedural consents, equipment checked, pre-op evaluation, timeout performed, anesthesia consent given, oxygen available, monitors applied/VS acknowledged, fire risk safety assessment completed and verbalized and blood product R/B/A discussed and consented  Peripheral Block   Patient position: supine  Prep: ChloraPrep  Provider prep: mask and sterile gloves  Patient monitoring: cardiac monitor, continuous pulse ox, IV access and responsive to questions  Block type: PENG  Laterality: left  Injection technique: single-shot  Guidance: nerve stimulator and ultrasound guided  Local infiltration: ropivacaine  Infiltration strength: 0.2 %  Local infiltration: ropivacaine  Dose: 20 mL    Needle   Needle type: insulated echogenic nerve stimulator needle   Needle gauge: 20 G  Needle localization: nerve stimulator and ultrasound guidance  Needle insertion depth: 3.7 cm  Test dose: negative  Needle length: 10 cm  Assessment   Injection assessment: negative aspiration for heme, no paresthesia on injection, local visualized surrounding nerve on ultrasound and no intravascular symptoms  Paresthesia pain: none  Slow fractionated injection: yes  Hemodynamics: stableno  Outcomes: uncomplicated and patient tolerated procedure well
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
## 2022-09-27 NOTE — PROGRESS NOTES
This nurse questioned patient on the last time he has taken any of his home medications. Patient states he hasn't taken any of his medications for 6 days because he could not remember which ones he was supposed to hold before surgery so he just didn't take any of them.

## 2022-09-27 NOTE — PROGRESS NOTES
1241 pt arrived to PACU, awake and alert. Denies pain. Sensation at mid back area. VSS. Dressing CDI  1256 pt awake in bed, sensation at groin level  1311 pt awake in bed, sensation at L medial thigh level  1326 pt awake in bed, denies pain.  VSS  1341 meets criteria for discharge from PACU, transported to [address] [place] [address]

## 2022-09-28 LAB
ERYTHROCYTE [DISTWIDTH] IN BLOOD BY AUTOMATED COUNT: 13.3 % (ref 11.5–14.5)
ERYTHROCYTE [DISTWIDTH] IN BLOOD BY AUTOMATED COUNT: 44.4 FL (ref 35–45)
HCT VFR BLD CALC: 34 % (ref 42–52)
HEMOGLOBIN: 11.1 GM/DL (ref 14–18)
MCH RBC QN AUTO: 29.9 PG (ref 26–33)
MCHC RBC AUTO-ENTMCNC: 32.6 GM/DL (ref 32.2–35.5)
MCV RBC AUTO: 91.6 FL (ref 80–94)
PLATELET # BLD: 186 THOU/MM3 (ref 130–400)
PMV BLD AUTO: 9.1 FL (ref 9.4–12.4)
RBC # BLD: 3.71 MILL/MM3 (ref 4.7–6.1)
WBC # BLD: 8.7 THOU/MM3 (ref 4.8–10.8)

## 2022-09-28 PROCEDURE — 97112 NEUROMUSCULAR REEDUCATION: CPT

## 2022-09-28 PROCEDURE — 97162 PT EVAL MOD COMPLEX 30 MIN: CPT

## 2022-09-28 PROCEDURE — 85027 COMPLETE CBC AUTOMATED: CPT

## 2022-09-28 PROCEDURE — 36415 COLL VENOUS BLD VENIPUNCTURE: CPT

## 2022-09-28 PROCEDURE — 97530 THERAPEUTIC ACTIVITIES: CPT

## 2022-09-28 PROCEDURE — 97535 SELF CARE MNGMENT TRAINING: CPT

## 2022-09-28 PROCEDURE — 2580000003 HC RX 258: Performed by: ORTHOPAEDIC SURGERY

## 2022-09-28 PROCEDURE — 97166 OT EVAL MOD COMPLEX 45 MIN: CPT

## 2022-09-28 PROCEDURE — 6370000000 HC RX 637 (ALT 250 FOR IP): Performed by: ORTHOPAEDIC SURGERY

## 2022-09-28 PROCEDURE — 6360000002 HC RX W HCPCS: Performed by: ORTHOPAEDIC SURGERY

## 2022-09-28 PROCEDURE — 1200000000 HC SEMI PRIVATE

## 2022-09-28 RX ADMIN — GABAPENTIN 300 MG: 300 CAPSULE ORAL at 09:40

## 2022-09-28 RX ADMIN — GABAPENTIN 300 MG: 300 CAPSULE ORAL at 20:07

## 2022-09-28 RX ADMIN — POLYETHYLENE GLYCOL 3350 17 G: 17 POWDER, FOR SOLUTION ORAL at 09:45

## 2022-09-28 RX ADMIN — SODIUM CHLORIDE: 9 INJECTION, SOLUTION INTRAVENOUS at 07:43

## 2022-09-28 RX ADMIN — CLOPIDOGREL BISULFATE 75 MG: 75 TABLET ORAL at 09:45

## 2022-09-28 RX ADMIN — SODIUM CHLORIDE: 9 INJECTION, SOLUTION INTRAVENOUS at 17:43

## 2022-09-28 RX ADMIN — ACETAMINOPHEN 650 MG: 325 TABLET ORAL at 08:42

## 2022-09-28 RX ADMIN — GABAPENTIN 300 MG: 300 CAPSULE ORAL at 14:22

## 2022-09-28 RX ADMIN — HYDROCODONE BITARTRATE AND ACETAMINOPHEN 1 TABLET: 5; 325 TABLET ORAL at 12:53

## 2022-09-28 RX ADMIN — CEFAZOLIN 2000 MG: 10 INJECTION, POWDER, FOR SOLUTION INTRAVENOUS at 09:54

## 2022-09-28 RX ADMIN — ATORVASTATIN CALCIUM 80 MG: 80 TABLET, FILM COATED ORAL at 09:40

## 2022-09-28 RX ADMIN — SERTRALINE 100 MG: 100 TABLET, FILM COATED ORAL at 09:40

## 2022-09-28 RX ADMIN — ACETAMINOPHEN 650 MG: 325 TABLET ORAL at 20:06

## 2022-09-28 RX ADMIN — ASPIRIN 325 MG: 325 TABLET, COATED ORAL at 09:45

## 2022-09-28 RX ADMIN — MONTELUKAST SODIUM 10 MG: 10 TABLET ORAL at 20:06

## 2022-09-28 RX ADMIN — CEFAZOLIN 2000 MG: 10 INJECTION, POWDER, FOR SOLUTION INTRAVENOUS at 02:53

## 2022-09-28 RX ADMIN — HYDROCODONE BITARTRATE AND ACETAMINOPHEN 1 TABLET: 5; 325 TABLET ORAL at 02:53

## 2022-09-28 RX ADMIN — METOPROLOL TARTRATE 25 MG: 25 TABLET, FILM COATED ORAL at 20:07

## 2022-09-28 RX ADMIN — ACETAMINOPHEN 650 MG: 325 TABLET ORAL at 14:22

## 2022-09-28 ASSESSMENT — PAIN SCALES - GENERAL
PAINLEVEL_OUTOF10: 5
PAINLEVEL_OUTOF10: 7
PAINLEVEL_OUTOF10: 6
PAINLEVEL_OUTOF10: 5
PAINLEVEL_OUTOF10: 6
PAINLEVEL_OUTOF10: 6
PAINLEVEL_OUTOF10: 0

## 2022-09-28 ASSESSMENT — PAIN DESCRIPTION - ORIENTATION
ORIENTATION: LEFT

## 2022-09-28 ASSESSMENT — PAIN - FUNCTIONAL ASSESSMENT: PAIN_FUNCTIONAL_ASSESSMENT: PREVENTS OR INTERFERES SOME ACTIVE ACTIVITIES AND ADLS

## 2022-09-28 ASSESSMENT — PAIN DESCRIPTION - LOCATION
LOCATION: HIP

## 2022-09-28 ASSESSMENT — PAIN DESCRIPTION - FREQUENCY: FREQUENCY: INTERMITTENT

## 2022-09-28 ASSESSMENT — PAIN DESCRIPTION - DESCRIPTORS
DESCRIPTORS: ACHING

## 2022-09-28 ASSESSMENT — PAIN DESCRIPTION - PAIN TYPE: TYPE: ACUTE PAIN;SURGICAL PAIN

## 2022-09-28 ASSESSMENT — PAIN DESCRIPTION - ONSET: ONSET: ON-GOING

## 2022-09-28 NOTE — CARE COORDINATION
9/28/22, 11:55 AM EDT    DISCHARGE ON GOING EVALUATION    Lorraine Filter day: 0  Location: [facility]24/024-A Reason for admit: Osteoarthritis of left hip, unspecified osteoarthritis type [M16.12]  Primary osteoarthritis of left hip [M16.12]   Procedure:   9/27 Left Total Hip Anterior Approach  Barriers to Discharge: POD 1. PT/OT. IVF. Pain control. PCP: Maraim Graves MD  Readmission Risk Score: 7.2%  Patient Goals/Plan/Treatment Preferences: Plan home, reported he and wife live separately. Follow for HH vs OP therapy needs.

## 2022-09-28 NOTE — OP NOTE
[address] [place] [address]                                OPERATIVE REPORT    PATIENT NAME: Chandan Bruno                   :        1945  MED REC NO:   [id number]                           ROOM:       0024  ACCOUNT NO:   [de-identified]                           ADMIT DATE: 2022  PROVIDER:     Nathalie Harris M.D.    Gabe Granados OF PROCEDURE:  2022    ATTENDING PROVIDER:  Nathalie Harris MD    ASSISTANT:  Ganga Fraser PA-C    PREOPERATIVE DIAGNOSIS:  Left hip degenerative joint disease. POSTOPERATIVE DIAGNOSIS:  Left hip degenerative joint disease. OPERATION PERFORMED:  Left direct anterior approach total hip  replacement. MODIFIER 22: This surgery took 50% longer than normal secondary to body  mass index of 30.7. ANESTHESIA:  Block plus spinal.    SPECIMENS TO PATHOLOGY:  None. IMPLANTS:  [facility] [employer] [facility]:  1. Size 52 mm outer diameter no-hole R3 acetabular shell with apex hole  eliminator. 2.  Size 36 mm inner diameter polyethylene liner. 3.  Size 4 lateralized cementless, collarless polar stem. 4.  Size 36 mm +8 Cobalt chrome femoral head. COMPLICATIONS:  None. TOURNIQUET TIME:  None. ESTIMATED BLOOD LOSS:  300 mL. ANTIBIOTICS:  Preoperative Ancef, intraoperative vancomycin powder, and  Irrisept. HISTORY OF PRESENT ILLNESS:  The patient is a pleasant 49-year-old  gentleman who is referred to us for left hip complaints. He had  increasing pain and problems, increasing stiffness, increasing  difficulty with activities of daily living including in and out of  chairs, up and down stairs, in and out of cars.   He has tried and failed  conservative measures including anti-inflammatory medication, activity  modification, external assistive device as well as home exercise  programs, injections, etc.  Now, he has opted to move on with surgical  intervention for hip replacement on the left side. Risks and benefits  including possible complications such as infection, dislocation,  neurovascular compromise, DVT/PE, flare-up of medical problems, limb  length inequality, stiffness; recovery time were explained to him and he  is scheduled for the above procedure. DETAILS OF OPERATION:  The patient was brought to the operating room and  placed on the operating table in supine position. After spinal block  was obtained, left lower extremity was then prepped and draped in  standard sterile fashion. Using a standard anterior approach, an incision was made and carried  down through the subcutaneous tissues to the fascia. The fascia was  divided revealing split between sartorius and TFL, which was exploited. Came down to the vessels, identified, coagulated and placed anterior,  posterior, and superior retractors around the capsule. T'd, H'd, and  tagged our capsule. We placed our retractors intraarticularly. Took a  napkin ring, cut out of the femoral neck, and removed the femoral head  without difficulty. Next, posterior and inferior retractors were placed around the  acetabulum. Excess labrum and pulvinar were removed. The acetabulum  was then medialized with a 44 reamer, completely reamed up to a 52.  52  reamer was left in.  C-arm was brought in and showed it to be in good  position on both AP and 20-degree lateral x-ray. Next, a 52 no-hole R3  acetabular shell was impacted at about 45 degrees of abduction and about  20 degrees of anteversion. Once again, AP and 20-degree lateral x-ray  showed it to be in good position and seated properly. [place] hole  eliminator was placed followed by the real polyethylene liner snapped  into place. Next, posterior capsular stripping was performed. Femoral neck was then  delivered into the wound. Box osteotome was used followed by broaching  to a size 4.   We actually tried to do a 5 broach, but a 5 sat very  proud, so we went back to a 4 broach, placed our lateralized neck and 0  ball, reduced the hip, had a little bit of instability, but otherwise we  were in the ball part. The hip was then dislocated. We removed the broach, impacted the real  stem. It was embedded in metaphyseal bone. Re-trialed now with a +8  and with +8, we had excellent stability. The hip was then dislocated. Removed the trial head, cleaned the Tellez  taper, impacted the real 36 +8 Cobalt chrome head, reduced the hip and  had no instability. The hip was then copiously irrigated with a  combination of normal saline and Irrisept, sprinkled with 500 mg of  vancomycin powder. Capsule was closed followed by injection of 1 gm of  tranexamic acid. Fascia was closed followed by 2-0 Vicryl, ZipLine,  Dermabond, and dressing was applied. The patient remained awake throughout the entire procedure, and taken to  recovery room in satisfactory condition. All needle and sponge counts  were correct. Mary Ann Abreu PA-C, assisted throughout the procedure with positioning,  draping, retraction, wound closure, dressing, and splint application.         Seema Freeman M.D.    D: 09/27/2022 12:07:17       T: 09/27/2022 15:27:42     [email or web address]  Job#: [id number]     Doc#: [id number]    CC:

## 2022-09-28 NOTE — PROGRESS NOTES
Normdevonaniyah Esposito 58  INPATIENT OCCUPATIONAL THERAPY  [facility] ORTHOPEDICS 7K  EVALUATION    Time:   Time In: 845  Time Out: 0392  Timed Code Treatment Minutes: 30 Minutes  Minutes: 38          Date: 2022  Patient Name: Digna Sanders,   Gender: male      MRN: [id number]  : 1945  (76 y.o.)  Referring Practitioner: Melania Edwards MD  Diagnosis: Primary Osteoathritis of Left Hip  Additional Pertinent Hx: This 76year old male is s/p Left Total Hip Anterior Approach by Dr. Ursula Niño on 22. Restrictions/Precautions:  Restrictions/Precautions: Weight Bearing, General Precautions  Left Lower Extremity Weight Bearing: Weight Bearing As Tolerated    Subjective  Chart Reviewed: Yes, Orders, Progress Notes, History and Physical  Patient assessed for rehabilitation services?: Yes    Subjective: RN okayed OT session. Upon arrival patient was sitting up in bed. Pt was agreeable to OT session. Pain: 5/10: L hip     Vitals: Vitals not assessed per clinical judgement, see nursing flowsheet    Social/Functional History:  Lives With: Spouse  Type of Home: House  Home Layout: Two level, Performs ADL's on one level, Able to Live on Main level with bedroom/bathroom  Home Access: Level entry  Home Equipment: Walker, rolling   Bathroom Shower/Tub: Walk-in shower  Bathroom Toilet: Handicap height  Bathroom Accessibility: Accessible    Receives Help From: Family  ADL Assistance: Independent  Homemaking Assistance: Independent (Spouse completes all cooking, and laundry.)  Homemaking Responsibilities: Yes  Ambulation Assistance: Independent  Transfer Assistance: Independent    Active : Yes  Mode of Transportation: Car     Additional Comments: Pt owns horses and cares for them daily at home. *Unsure of accuracy of information d/t confusion.     VISION:WFL    HEARING:  WFL    COGNITION: Slow Processing, Decreased Insight, Decreased Problem Solving, and Decreased Safety Awareness    RANGE OF MOTION:  Bilateral Upper Extremity:  WFL    STRENGTH:  Bilateral Upper Extremity:  Impaired - deconditioned    SENSATION:   WFL    ADL:   Lower Extremity Dressing: Maximum Assistance. Socks . Toileting: Max A for juan m care. BALANCE:  Sitting Balance:  Stand By Assistance. Standing Balance: Minimal Assistance, X 1, with cues for safety, with verbal cues . BED MOBILITY:  Supine to Sit: Moderate Assistance, with head of bed raised, with increased time for completion    Scooting: Minimal Assistance, with increased time for completion      TRANSFERS:  Sit to Stand: Moderate Assistance, X 1, with increased time for completion, cues for hand placement. Stand to Sit: Minimal Assistance. FUNCTIONAL MOBILITY:  Assistive Device: Rolling Walker  Assist Level:  Minimal Assistance. Distance:  EOB to recliner. Slow pace, difficulty advancing LLE       Activity Tolerance:  Patient tolerance of  treatment: good. Assessment: This 76year old male presents with L TERE. Pt demonstrates weakness, decreased balance, decrease safety awareness, decreased endurance. Pt requires skilled OT intervention to increase indep and safety with all self cares, transfers, mobility, and IADLs to return to PLOF. Without skilled OT intervention patient is at increased risk for falls, caregiver burden, and hospital readmission after discharge. Pt would benefit from OT at discharge. Performance deficits / Impairments: Decreased functional mobility , Decreased ADL status, Decreased endurance, Decreased strength, Decreased safe awareness, Decreased high-level IADLs, Decreased balance, Decreased cognition  Prognosis: Good  REQUIRES OT FOLLOW-UP: Yes    Treatment Initiated: Treatment and education initiated within context of evaluation.   Evaluation time included review of current medical information, gathering information related to past medical, social and functional history, completion of standardized testing, formal and informal observation of tasks, assessment of data and development of plan of care and goals. Treatment time included skilled education and facilitation of tasks to increase safety and independence with ADL's for improved functional independence and quality of life. Discharge Recommendations:  Continue to assess pending progress, 24 hour supervision or assist, Patient would benefit from continued therapy after discharge    Patient Education:     Patient Education  Education Given To: Patient  Education Provided: Role of Therapy, Plan of Care, Precautions, ADL Adaptive Strategies, Transfer Training  Education Method: Demonstration  Barriers to Learning: Cognition  Education Outcome: Continued education needed    Equipment Recommendations:  Equipment Needed: No  Other: Monitor needs . Plan:  Times per Week: 6x  Current Treatment Recommendations: Strengthening, Endurance training, Functional mobility training, Equipment evaluation, education, & procurement, Home management training, Safety education & training, Patient/Caregiver education & training, Self-Care / ADL, Balance training. See long-term goal time frame for expected duration of plan of care. If no long-term goals established, a short length of stay is anticipated. Goals:  Patient goals : Go Home  Short Term Goals  Time Frame for Short term goals: Until discharge  Short Term Goal 1: Pt will complete BUE strengthening exercises with Min vcs for technique to increase indep and endurance with all self cares. Short Term Goal 2: Pt will complete standing tolerance x 4 minutes with SBA and min vcs for safety to increase indep and endurance within home environment. Short Term Goal 3: Pt will complete functional mobility to/from BR and HH distances with SBA and min vcs for safety to increase indep and endurance with self cares.   Short Term Goal 4: Pt will complete LB dressing with LHAE PRN and min A to increase indep and endurance within home environment. Additional Goals?: No         Following session, patient left in safe position with all fall risk precautions in place.

## 2022-09-28 NOTE — PROGRESS NOTES
[facility]  INPATIENT PHYSICAL THERAPY  DAILY NOTE  [facility] ORTHOPEDICS 7K - 8O-86/614-M    Time In: 6440  Time Out: 7714  Timed Code Treatment Minutes: 25 Minutes  Minutes: 25          Date: 2022  Patient Name: Adela Hightower,  Gender:  male        MRN: [id number]  : 1945  (76 y.o.)     Referring Practitioner: Isi Márquez MD  Diagnosis: osteoarthritis of left hip, unspecified osteoarthrisis type  Additional Pertinent Hx: Per H&P : The patient is a pleasant 66-year-old  gentleman who came in complaining of pain and discomfort in his left  hip. The pain has been in his groin, worse with his activities of daily  living, including walking, going up and downstairs, and in and out of  chairs and cars. Pt is s/p Left direct anterior approach total hip  replacement. DOS . Prior Level of Function:  Lives With: Spouse  Type of Home: House  Home Layout: Two level, Performs ADL's on one level, Able to Live on Main level with bedroom/bathroom  Home Access: Level entry  Home Equipment: [employer], rolling   Bathroom Shower/Tub: Walk-in shower  Bathroom Toilet: Handicap height  Bathroom Accessibility: Accessible    Receives Help From: Family  ADL Assistance: Independent  Homemaking Assistance: Independent (Spouse completes all cooking, and laundry.)  Homemaking Responsibilities: Yes  Ambulation Assistance: Independent  Transfer Assistance: Independent  Active : Yes  Additional Comments: Pt owns horses and cares for them daily at home. *Unsure of accuracy of information d/t confusion. Pt states he is indep, his wife is with him during the day but they stay seperately. 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 Restrictions/Precautions:  Restrictions/Precautions: Weight Bearing, General Precautions  Left Lower Extremity Weight Bearing: Weight Bearing As Tolerated     SUBJECTIVE: RN approved session. Pt agrees for treatment this afternoon.  RN in to assist with hygine as pt's external catheter was no longer functioning and pt with incontinence of urine on the chair and floor. Increased time this sessio to discuss discharge plan. When asked if pt was convinced he could return home safely with limited support, he denies this. PAIN: yes, unrated    Vitals: Vitals not assessed per clinical judgement, see nursing flowsheet    OBJECTIVE:  Bed Mobility:  Not Tested    Transfers:  Sit to Stand: Minimal Assistance, Moderate Assistance, with increased time for completion  Stand to Sit:Minimal Assistance, with increased time for completion  ~5 sit to stand transfers completed varying from min to mod A required for force production     Ambulation:  Minimal Assistance, with verbal cues , with increased time for completion  Distance: 1' fwd and retro ~3 times  Surface: Level Tile  Device:Rolling Walker  Gait Deviations: Forward Flexed Posture, Slow Shirley, Decreased Step Length Bilaterally, Decreased Weight Shift Left, Decreased Heel Strike Bilaterally, Wide Base of Support, and Unsteady Gait    Balance:  Static Sitting Balance:  Contact Guard Assistance, Minimal Assistance  Dynamic Sitting Balance: Contact Guard Assistance, Minimal Assistance  Static Standing Balance: Minimal Assistance  Dynamic Standing Balance: Minimal Assistance  Min A required to bring trunk upright while seated in the chair   Pt required min A for donning a brief in standing with min A of another for stability in stance     Neuromuscular Facilitation:  Completed the following tasks to increase weight acceptance L LE and initiation of swing phase R LE to improve pt's gait pattern. Completed with RW and min A to facilitate.    X10 lateral weight shifts   X10 stationary steps each LE  X10 anterior and posterior steps in place    Exercise:none this session     Functional Outcome Measures: Completed  AM-PAC Inpatient Mobility without Stair Climbing Raw Score : 11  AM-PAC Inpatient without Stair Climbing T-Scale Score : 35.66    ASSESSMENT:  Assessment: Patient progressing toward established goals. Activity Tolerance:  Patient tolerance of  treatment: fair. Pt required increased time and assist for hygiene this session, and min A for pregait tasks. Pt noted to have increased pain and decreased weight acceptance on L LE requiring much assist to facilitate this. Equipment Recommendations:Equipment Needed: No  Discharge Recommendations: Continue to assess pending progress, 24 hour assistance or supervision, and Patient would benefit from continued PT at discharge  Plan: Current Treatment Recommendations: Strengthening, ROM, Balance training, Functional mobility training, Transfer training, Stair training, Gait training, Endurance training, Neuromuscular re-education, Home exercise program, Safety education & training, Patient/Caregiver education & training, Equipment evaluation, education, & procurement, Return to work related activity, Therapeutic activities  Plan:  (6x BID, 1x QD)    Patient Education  Patient Education: Plan of Care, Transfers, Reviewed Prior Education, Gait, Verbal Exercise Instruction, Activity Pacing    Goals:  Patient goals : no  Short Term Goals  Time Frame for Short term goals: by hospital d/c  Short term goal 1: Pt to demo supine <->sit with CGA for ease getting out of bed  Short term goal 2: Pt to complete sit <->stand with RW and CGA for improved safety  Short term goal 3: Pt to ambulate >=30' with RW and CGA for improved mobility  Short term goal 4: Pt to compelte 1 step with uni HR for access to his bathroom with CGA  Long Term Goals  Time Frame for Long term goals : NA due to short ELOS    Following session, patient left in safe position with all fall risk precautions in place.

## 2022-09-28 NOTE — PLAN OF CARE
Problem: Discharge Planning  Goal: Discharge to home or other facility with appropriate resources  Outcome: Progressing  Flowsheets (Taken 9/28/2022 0134)  Discharge to home or other facility with appropriate resources:   Identify barriers to discharge with patient and caregiver   Arrange for needed discharge resources and transportation as appropriate   Identify discharge learning needs (meds, wound care, etc)   Refer to discharge planning if patient needs post-hospital services based on physician order or complex needs related to functional status, cognitive ability or social support system     Problem: Chronic Conditions and Co-morbidities  Goal: Patient's chronic conditions and co-morbidity symptoms are monitored and maintained or improved  Outcome: Progressing  Flowsheets (Taken 9/28/2022 0134)  Care Plan - Patient's Chronic Conditions and Co-Morbidity Symptoms are Monitored and Maintained or Improved:   Monitor and assess patient's chronic conditions and comorbid symptoms for stability, deterioration, or improvement   Collaborate with multidisciplinary team to address chronic and comorbid conditions and prevent exacerbation or deterioration   Update acute care plan with appropriate goals if chronic or comorbid symptoms are exacerbated and prevent overall improvement and discharge     Problem: Pain  Goal: Verbalizes/displays adequate comfort level or baseline comfort level  Outcome: Progressing  Flowsheets (Taken 9/28/2022 0134)  Verbalizes/displays adequate comfort level or baseline comfort level:   Encourage patient to monitor pain and request assistance   Assess pain using appropriate pain scale   Administer analgesics based on type and severity of pain and evaluate response   Implement non-pharmacological measures as appropriate and evaluate response     Problem: ABCDS Injury Assessment  Goal: Absence of physical injury  Outcome: Progressing  Flowsheets (Taken 9/28/2022 0134)  Absence of Physical Injury: Implement safety measures based on patient assessment     Problem: Musculoskeletal - Adult  Goal: Return mobility to safest level of function  Outcome: Progressing  Flowsheets (Taken 9/28/2022 0136)  Return Mobility to Safest Level of Function:   Assess patient stability and activity tolerance for standing, transferring and ambulating with or without assistive devices   Assist with transfers and ambulation using safe patient handling equipment as needed   Ensure adequate protection for wounds/incisions during mobilization   Obtain physical therapy/occupational therapy consults as needed   Instruct patient/family in ordered activity level     Problem: Musculoskeletal - Adult  Goal: Return ADL status to a safe level of function  Outcome: Progressing  Flowsheets (Taken 9/28/2022 0136)  Return ADL Status to a Safe Level of Function:   Administer medication as ordered   Assess activities of daily living deficits and provide assistive devices as needed   Obtain physical therapy/occupational therapy consults as needed   Assist and instruct patient to increase activity and self care as tolerated     Problem: Genitourinary - Adult  Goal: Absence of urinary retention  Outcome: Progressing  Flowsheets (Taken 9/28/2022 0136)  Absence of urinary retention: Assess patients ability to void and empty bladder  Note: External catheter in place.       Problem: Infection - Adult  Goal: Absence of infection at discharge  Outcome: Progressing  Flowsheets (Taken 9/28/2022 0136)  Absence of infection at discharge:   Assess and monitor for signs and symptoms of infection   Monitor lab/diagnostic results     Problem: Infection - Adult  Goal: Absence of infection during hospitalization  Outcome: Progressing  Flowsheets (Taken 9/28/2022 0136)  Absence of infection during hospitalization:   Assess and monitor for signs and symptoms of infection   Monitor lab/diagnostic results     Problem: Hematologic - Adult  Goal: Maintains hematologic stability  Outcome: Progressing  Flowsheets (Taken 9/28/2022 0136)  Maintains hematologic stability:   Assess for signs and symptoms of bleeding or hemorrhage   Monitor labs for bleeding or clotting disorders    Care plan reviewed with patient. Patient verbalizes understanding of the plan of care and contribute to goal setting.

## 2022-09-28 NOTE — PROGRESS NOTES
[address] [address]  INPATIENT PHYSICAL THERAPY  EVALUATION  [facility] ORTHOPEDICS 7K - [id number]    Time In: 2198  Time Out: 6715  Timed Code Treatment Minutes: 25 Minutes  Minutes: 35          Date: 2022  Patient Name: Akil Lua,  Gender:  male        MRN: [id number]  : 1945  (76 y.o.)      Referring Practitioner: Penelope De La Cruz MD  Diagnosis: osteoarthritis of left hip, unspecified osteoarthrisis type  Additional Pertinent Hx: Per H&P : The patient is a pleasant 77-year-old  gentleman who came in complaining of pain and discomfort in his left  hip. The pain has been in his groin, worse with his activities of daily  living, including walking, going up and downstairs, and in and out of  chairs and cars. Pt is s/p Left direct anterior approach total hip  replacement. DOS . Restrictions/Precautions:  Restrictions/Precautions: Weight Bearing, General Precautions  Left Lower Extremity Weight Bearing: Weight Bearing As Tolerated    Subjective:  Chart Reviewed: Yes  Patient assessed for rehabilitation services?: Yes  Family / Caregiver Present: No  Subjective: RN approved session. Pt pleasantly agrees, noted to have some confusion. 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 RN aware, this PT did educate on concerns with this, he would benefit from continued education as he was noted to not be receptive.     General:  Overall Orientation Status: Within Functional Limits  Vision: Within Functional Limits  Hearing: Exceptions to [facility]  Hearing Exceptions: Hard of hearing/hearing concerns     Pain: 0/10: denies at rest    Vitals:  RN in to assess vital signs follow pt report of dizziness upon stance, all WFL, refer to flowsheet    Social/Functional History:    Lives With: Spouse  Type of Home: House  Home Layout: Two level, Performs ADL's on one level, Able to Live on Main level with bedroom/bathroom  Home Access: Level entry  Home Equipment: Walker, rolling     Bathroom Shower/Tub: Walk-in shower  Bathroom Toilet: Handicap height  Bathroom Accessibility: Accessible    Receives Help From: Family  ADL Assistance: Independent  Homemaking Assistance: Independent (Spouse completes all cooking, and laundry.)  Homemaking Responsibilities: Yes  Ambulation Assistance: Independent  Transfer Assistance: Independent    Active : Yes  Mode of Transportation: Car     Additional Comments: Pt owns horses and cares for them daily at home. *Unsure of accuracy of information d/t confusion. Pt states he is indep, his wife is with him during the day but they stay seperately. 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 OBJECTIVE:  Range of Motion:  Right Lower Extremity: WFL  Left Lower Extremity: Impaired - due to post op status    Strength:  Right Lower Extremity: WFL  Left Lower Extremity: Impaired - due to post op status    Balance:  Static Sitting Balance:  Contact Guard Assistance  Dynamic Sitting Balance: Contact Guard Assistance  Static Standing Balance: Minimal Assistance  Dynamic Standing Balance: Minimal Assistance  Pt completed X10 lateral weight shifts with min A and RW to increased weight acceptance L LE     Pt stood statically for ~40 seconds with initial trial with min A to see if c/o dizziness improved, seated rest then prompted. No c/o dizziness with second trial.     Bed Mobility:  Not Tested    Transfers:  Sit to Stand: Minimal Assistance, Moderate Assistance, with increased time for completion, cues for hand placement  Stand to Sit:Minimal Assistance  Pt required mod A for force production initially, improved to min A to stand second trial     Ambulation:  Minimal Assistance, with cues for safety, with increased time for completion  Distance: 3'fwd, 3' retro  Surface: Level Tile  Device:Rolling Walker  Gait Deviations:   Forward Flexed Posture, Slow Shirley, Decreased Step Length Bilaterally, Decreased Weight Shift Left, Decreased Heel Strike Bilaterally, Wide Base of Support, and Unsteady Gait    Pt noted to ambulate with fwd flexion and antalgic gait with decreased weight acceptance L Le and limited step R Le. min A for RW progression and stability. Exercise:  Patient was guided in 1 set(s) 12 reps of exercise to both lower extremities. Ankle pumps, Glut sets, and Quad sets. Exercises were completed for increased independence with functional mobility. *Cues and demo provided for appropriate completion of exercises. Cues to maintain within a tolerable range provided. Functional Outcome Measures: Completed  AM-PAC Inpatient Mobility without Stair Climbing Raw Score : 11  AM-PAC Inpatient without Stair Climbing T-Scale Score : 35.66    ASSESSMENT:  Activity Tolerance:  Patient tolerance of  treatment: fair. Pt required min to mod A for mobility with increased cues for safety. He is noted to have some confusion and c/o dizziness following initial standing trial. Pt requires hands on assist and continued pt. This PT communicated concern regarding his plan to return home with limited support. Treatment Initiated: Treatment and education initiated within context of evaluation. Evaluation time included review of current medical information, gathering information related to past medical, social and functional history, completion of standardized testing, formal and informal observation of tasks, assessment of data and development of plan of care and goals. Treatment time included skilled education and facilitation of tasks to increase safety and independence with functional mobility for improved independence and quality of life. Assessment:   Body Structures, Functions, Activity Limitations Requiring Skilled Therapeutic Intervention: Decreased functional mobility , Decreased endurance, Decreased safe awareness, Decreased strength, Decreased balance, Decreased posture, Increased pain  Assessment: This patient is a 76 y.o. who presents with s/p L TERE. This is a decline from the patient's baseline status of living with his wife anjana. The patient is observed to have deficits in strength, balance, activity tolerance, and safety awareness and would benefit from skilled PT services to progress functional mobility, safety awareness, and to decrease overall risk of falls. He requires hands on physical assist and continued PT for improved safety. He would benefit from a therapy stay. Therapy Prognosis: Good    Requires PT Follow-Up: Yes    Discharge Recommendations:  Discharge Recommendations: Continue to assess pending progress, 24 hour supervision or assist, Therapy recommended at discharge    Patient Education:      .     Patient Education  Education Given To: Patient  Education Provided: Role of Therapy, Plan of Care, Transfer Training  Education Method: Demonstration, Verbal  Education Outcome: Continued education needed       Equipment Recommendations:  Equipment Needed: No    Plan:  Current Treatment Recommendations: Strengthening, ROM, Balance training, Functional mobility training, Transfer training, Stair training, Gait training, Endurance training, Neuromuscular re-education, Home exercise program, Safety education & training, Patient/Caregiver education & training, Equipment evaluation, education, & procurement, Return to work related activity, Therapeutic activities  Plan:  (6x BID, 1x QD)    Goals:  Patient goals : no  Short Term Goals  Time Frame for Short term goals: by hospital d/c  Short term goal 1: Pt to demo supine <->sit with CGA for ease getting out of bed  Short term goal 2: Pt to complete sit <->stand with RW and CGA for improved safety  Short term goal 3: Pt to ambulate >=30' with RW and CGA for improved mobility  Short term goal 4: Pt to compelte 1 step with uni HR for access to his bathroom with CGA  Long Term Goals  Time Frame for Long term goals : NA due to short ELOS    Following session, patient left in safe position with all fall risk precautions in place.

## 2022-09-28 NOTE — PROGRESS NOTES
Progress Note    Subjective:     Post-Operative Day: 1 Status Post left Total Hip Arthroplasty  Systemic or Specific Complaints:No Complaints    Objective:   VITALS:  /65   Pulse 98   Temp 98.2 °F (36.8 °C) (Oral)   Resp 16   Ht 5' 3\" (1.6 m)   Wt 173 lb (78.5 kg)   SpO2 92%   BMI 30.65 kg/m²     General: alert, appears stated age, and cooperative   Wound: Some shadowing along dressing   DVT Exam: No evidence of DVT seen on physical exam.   Abdomen soft and non tender  NVI in lower extremity. Thigh swollen but soft. Moving foot and ankle. Data Review  CBC:   Lab Results   Component Value Date/Time    WBC 8.7 09/28/2022 05:29 AM    RBC 3.71 09/28/2022 05:29 AM    RBC 0-2 07/24/2011 06:05 AM    HGB 11.1 09/28/2022 05:29 AM    HCT 34.0 09/28/2022 05:29 AM     09/28/2022 05:29 AM    INR 0.94 03/14/2019 12:53 PM     BMP:   Lab Results   Component Value Date/Time     02/10/2022 11:47 AM    K 4.6 02/10/2022 11:47 AM    K 3.6 08/30/2018 02:50 AM     02/10/2022 11:47 AM    CO2 22 02/10/2022 11:47 AM    BUN 18 02/10/2022 11:47 AM    CREATININE 0.95 02/10/2022 11:47 AM    GLUCOSE 111 02/10/2022 11:47 AM    GLUCOSE 105 07/25/2011 04:31 AM       Assessment:     Status Post left Total Hip Arthroplasty. Doing well postoperatively.      Plan:      1:  Continue Total Hip Replacement protocol   2:  Continue Deep venous thrombosis prophylaxis  3:  Continue physical therapy  4:  Continue Pain Control  5:  Monitor Labs  6:  Discharge pending home tomorrow    Lina Adams PA-C in collaboration with Dr. González Goodman

## 2022-09-28 NOTE — CARE COORDINATION
DISCHARGE/PLANNING EVALUATION  9/28/22, 11:35 AM EDT    Reason for Referral: discharge needs   Mental Status:  alert, oriented, slow to answer  Decision Making:  makes own decisions   Family/Social/Home Environment: patient states he lives in his workshop, has a bathroom and ability to get meals. He sleeps on a couch. He lives alone, his wife lives in their house a few blocks away. He provides only limited information on his living situation, but states that he believes he will be able to manage his care at home. Current Services including food security, transportation and housekeeping: no current services, denies food security, housekeeping or transportation concerns  Current Equipment: none   Payment Source: [employer]   Concerns or Barriers to Discharge: denies concerns about returning home alone to his workshop  Post-acute provider list with abbreviated version of the quality measures, geographic area, and applicable managed care information provided. Offered option of searching Medicare. gov website by using the computer in patient's room to review complete quality measures information. Questions regarding selection process answered: provided home health list, explained list and in network agencies, patient states he will review and select his preferences       Teach Back Method used with patient regarding care plan and discharge plan  Patient  verbalizes understanding of the plan of care and contributes to goal setting. Patient goals, treatment preferences and discharge plan:  spoke with patient about discharge plan. He plans to return to his workshop near his home, where he lives alone. Discussed home health and he is agreeable to this service. He offers only limited information about his living situation but denies concerns about returning home.       Electronically signed by GABRIELLA Wolf on 9/28/2022 at 11:35 AM

## 2022-09-29 PROCEDURE — 2580000003 HC RX 258: Performed by: ORTHOPAEDIC SURGERY

## 2022-09-29 PROCEDURE — 1200000000 HC SEMI PRIVATE

## 2022-09-29 PROCEDURE — 99222 1ST HOSP IP/OBS MODERATE 55: CPT | Performed by: PHYSICAL MEDICINE & REHABILITATION

## 2022-09-29 PROCEDURE — 6370000000 HC RX 637 (ALT 250 FOR IP): Performed by: ORTHOPAEDIC SURGERY

## 2022-09-29 PROCEDURE — 97110 THERAPEUTIC EXERCISES: CPT

## 2022-09-29 PROCEDURE — 97530 THERAPEUTIC ACTIVITIES: CPT

## 2022-09-29 PROCEDURE — 97116 GAIT TRAINING THERAPY: CPT

## 2022-09-29 RX ORDER — HYDROCODONE BITARTRATE AND ACETAMINOPHEN 5; 325 MG/1; MG/1
1 TABLET ORAL EVERY 4 HOURS PRN
Qty: 42 TABLET | Refills: 0 | Status: SHIPPED | OUTPATIENT
Start: 2022-09-29 | End: 2022-10-06

## 2022-09-29 RX ADMIN — POLYETHYLENE GLYCOL 3350 17 G: 17 POWDER, FOR SOLUTION ORAL at 08:16

## 2022-09-29 RX ADMIN — METOPROLOL TARTRATE 25 MG: 25 TABLET, FILM COATED ORAL at 21:06

## 2022-09-29 RX ADMIN — SODIUM CHLORIDE, PRESERVATIVE FREE 10 ML: 5 INJECTION INTRAVENOUS at 21:06

## 2022-09-29 RX ADMIN — ACETAMINOPHEN 650 MG: 325 TABLET ORAL at 21:06

## 2022-09-29 RX ADMIN — METOPROLOL TARTRATE 25 MG: 25 TABLET, FILM COATED ORAL at 08:17

## 2022-09-29 RX ADMIN — SERTRALINE 100 MG: 100 TABLET, FILM COATED ORAL at 08:16

## 2022-09-29 RX ADMIN — GABAPENTIN 300 MG: 300 CAPSULE ORAL at 08:15

## 2022-09-29 RX ADMIN — ACETAMINOPHEN 650 MG: 325 TABLET ORAL at 08:15

## 2022-09-29 RX ADMIN — ASPIRIN 325 MG: 325 TABLET, COATED ORAL at 08:16

## 2022-09-29 RX ADMIN — LOSARTAN POTASSIUM 25 MG: 25 TABLET, FILM COATED ORAL at 08:17

## 2022-09-29 RX ADMIN — ATORVASTATIN CALCIUM 80 MG: 80 TABLET, FILM COATED ORAL at 08:15

## 2022-09-29 RX ADMIN — ACETAMINOPHEN 650 MG: 325 TABLET ORAL at 12:38

## 2022-09-29 RX ADMIN — MONTELUKAST SODIUM 10 MG: 10 TABLET ORAL at 21:06

## 2022-09-29 RX ADMIN — GABAPENTIN 300 MG: 300 CAPSULE ORAL at 21:06

## 2022-09-29 RX ADMIN — CLOPIDOGREL BISULFATE 75 MG: 75 TABLET ORAL at 08:16

## 2022-09-29 RX ADMIN — GABAPENTIN 300 MG: 300 CAPSULE ORAL at 12:38

## 2022-09-29 ASSESSMENT — PAIN DESCRIPTION - ONSET: ONSET: ON-GOING

## 2022-09-29 ASSESSMENT — PAIN - FUNCTIONAL ASSESSMENT
PAIN_FUNCTIONAL_ASSESSMENT: PREVENTS OR INTERFERES SOME ACTIVE ACTIVITIES AND ADLS
PAIN_FUNCTIONAL_ASSESSMENT: PREVENTS OR INTERFERES SOME ACTIVE ACTIVITIES AND ADLS

## 2022-09-29 ASSESSMENT — ENCOUNTER SYMPTOMS
VOMITING: 0
ABDOMINAL PAIN: 0
CONSTIPATION: 1
EYE DISCHARGE: 0
TROUBLE SWALLOWING: 0
SORE THROAT: 0
BACK PAIN: 1
EYE PAIN: 0
RHINORRHEA: 0
WHEEZING: 0
DIARRHEA: 0
NAUSEA: 0
SHORTNESS OF BREATH: 0
COUGH: 0

## 2022-09-29 ASSESSMENT — PAIN DESCRIPTION - ORIENTATION
ORIENTATION: LEFT

## 2022-09-29 ASSESSMENT — PAIN DESCRIPTION - PAIN TYPE
TYPE: ACUTE PAIN;SURGICAL PAIN
TYPE: ACUTE PAIN;SURGICAL PAIN

## 2022-09-29 ASSESSMENT — PAIN SCALES - GENERAL
PAINLEVEL_OUTOF10: 2
PAINLEVEL_OUTOF10: 2
PAINLEVEL_OUTOF10: 3

## 2022-09-29 ASSESSMENT — PAIN DESCRIPTION - DESCRIPTORS
DESCRIPTORS: ACHING
DESCRIPTORS: ACHING;SORE
DESCRIPTORS: ACHING
DESCRIPTORS: ACHING;DISCOMFORT

## 2022-09-29 ASSESSMENT — PAIN DESCRIPTION - FREQUENCY
FREQUENCY: INTERMITTENT
FREQUENCY: INTERMITTENT

## 2022-09-29 ASSESSMENT — PAIN DESCRIPTION - LOCATION
LOCATION: HIP
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
## 2022-09-29 NOTE — PROGRESS NOTES
[facility]  Occupational Therapy  Daily Note  Time:   Time In: 1137  Time Out: 1202  Timed Code Treatment Minutes: 25 Minutes  Minutes: 25          Date: 2022  Patient Name: Vinny Cardoso,   Gender: male      Room: [facility]24/024-A  MRN: [id number]  : 1945  (76 y.o.)  Referring Practitioner: Roque Sharma MD  Diagnosis: Primary Osteoathritis of Left Hip  Additional Pertinent Hx: This 76year old male is s/p Left Total Hip Anterior Approach by Dr. Fran Lee on 22. Restrictions/Precautions:  Restrictions/Precautions: Weight Bearing, General Precautions  Left Lower Extremity Weight Bearing: Weight Bearing As Tolerated     SUBJECTIVE: Nurse Lucy mcgee'naresh session, Up in chair upon arrival, agreeable to OT session, wife present    PAIN: 7/10: L hip    Vitals: Vitals not assessed per clinical judgement, see nursing flowsheet    COGNITION: WNL    ADL:   Grooming: with set-up. Combed hair sitting in bedside chair . Eating: Mod I- Able to open all containers  BALANCE:  Standing Balance: Stand By Assistance. Approx 1 1/2 minutes x 2 events with B UE support    BED MOBILITY:  Not Tested    TRANSFERS:  Sit to Stand:  Contact Guard Assistance. Bedside chair  Stand to Sit: [address] FUNCTIONAL MOBILITY:  Assistive Device: Rolling Walker  Assist Level:  Contact Guard Assistance. Distance:  approx 5 feet x 2      ASSESSMENT:     Activity Tolerance:  Patient tolerance of  treatment: good. Discharge Recommendations: Inpatient Rehabilitation  Equipment Recommendations: Equipment Needed: No  Other: Monitor needs .   Plan: Times per Week: 6x  Current Treatment Recommendations: Strengthening, Endurance training, Functional mobility training, Equipment evaluation, education, & procurement, Home management training, Safety education & training, Patient/Caregiver education & training, Self-Care / ADL, Balance training    Patient Education  Patient Education: ADL's    Goals  Short Term Goals  Time Frame for Short term goals: Until discharge  Short Term Goal 1: Pt will complete BUE strengthening exercises with Min vcs for technique to increase indep and endurance with all self cares. Short Term Goal 2: Pt will complete standing tolerance x 4 minutes with SBA and min vcs for safety to increase indep and endurance within home environment. Short Term Goal 3: Pt will complete functional mobility to/from BR and HH distances with SBA and min vcs for safety to increase indep and endurance with self cares. Short Term Goal 4: Pt will complete LB dressing with LHAE PRN and min A to increase indep and endurance within home environment. Additional Goals?: No    Following session, patient left in safe position with all fall risk precautions in place.

## 2022-09-29 NOTE — PROGRESS NOTES
Progress Note    Subjective:     Post-Operative Day: 2 Status Post left Total Hip Arthroplasty  Systemic or Specific Complaints:No Complaints and Fever    Objective:   VITALS:  /68   Pulse 72   Temp 99.3 °F (37.4 °C) (Oral)   Resp 18   Ht 5' 3\" (1.6 m)   Wt 173 lb (78.5 kg)   SpO2 95%   BMI 30.65 kg/m²   24HR INTAKE/OUTPUT:    Intake/Output Summary (Last 24 hours) at 2022 0758  Last data filed at 2022 0507  Gross per 24 hour   Intake 1340 ml   Output 375 ml   Net 965 ml     TEMPERATURE:  Current - Temp: 99.3 °F (37.4 °C); Max - Temp  Av.3 °F (37.4 °C)  Min: 98.2 °F (36.8 °C)  Max: 100.7 °F (38.2 °C)    General: alert, appears stated age, and cooperative   Wound: No Erythema, No Edema, and Wound drainage   DVT Exam: No evidence of DVT seen on physical exam.  Negative Klarissa's sign. NVI in lower extremity. Thigh swollen but soft. Moving foot and ankle. Data Review  CBC:   Lab Results   Component Value Date/Time    WBC 8.7 2022 05:29 AM    RBC 3.71 2022 05:29 AM    RBC 0-2 2011 06:05 AM    HGB 11.1 2022 05:29 AM    HCT 34.0 2022 05:29 AM     2022 05:29 AM    INR 0.94 2019 12:53 PM     BMP:   Lab Results   Component Value Date/Time     02/10/2022 11:47 AM    K 4.6 02/10/2022 11:47 AM    K 3.6 2018 02:50 AM     02/10/2022 11:47 AM    CO2 22 02/10/2022 11:47 AM    BUN 18 02/10/2022 11:47 AM    CREATININE 0.95 02/10/2022 11:47 AM    GLUCOSE 111 02/10/2022 11:47 AM    GLUCOSE 105 2011 04:31 AM       Assessment:     Status Post left Total Hip Arthroplasty. Doing well postoperatively.      Plan:      1:  Continue Total Hip Replacement protocol   2:  Continue Deep venous thrombosis prophylaxis  3:  Continue physical therapy with Total Hip precautions  4:  Continue Pain Control   5:  Dressing change  6:  Home

## 2022-09-29 NOTE — PLAN OF CARE
Problem: Discharge Planning  Goal: Discharge to home or other facility with appropriate resources  Outcome: Progressing  Flowsheets (Taken 9/28/2022 2006 by Navid Bloom RN)  Discharge to home or other facility with appropriate resources: Identify barriers to discharge with patient and caregiver     Problem: Chronic Conditions and Co-morbidities  Goal: Patient's chronic conditions and co-morbidity symptoms are monitored and maintained or improved  Outcome: Progressing  Flowsheets (Taken 9/28/2022 2006 by Navid Bloom RN)  Care Plan - Patient's Chronic Conditions and Co-Morbidity Symptoms are Monitored and Maintained or Improved:   Monitor and assess patient's chronic conditions and comorbid symptoms for stability, deterioration, or improvement   Collaborate with multidisciplinary team to address chronic and comorbid conditions and prevent exacerbation or deterioration     Problem: Pain  Goal: Verbalizes/displays adequate comfort level or baseline comfort level  Outcome: Progressing  Flowsheets (Taken 9/28/2022 0134 by Milton Andrews RN)  Verbalizes/displays adequate comfort level or baseline comfort level:   Encourage patient to monitor pain and request assistance   Assess pain using appropriate pain scale   Administer analgesics based on type and severity of pain and evaluate response   Implement non-pharmacological measures as appropriate and evaluate response     Problem: ABCDS Injury Assessment  Goal: Absence of physical injury  Outcome: Progressing  Flowsheets (Taken 9/28/2022 0134 by Milton Andrews RN)  Absence of Physical Injury: Implement safety measures based on patient assessment     Problem: Musculoskeletal - Adult  Goal: Return mobility to safest level of function  Outcome: Progressing  Flowsheets (Taken 9/28/2022 2006 by Navid Bloom RN)  Return Mobility to Safest Level of Function:   Assess patient stability and activity tolerance for standing, transferring and ambulating with or without assistive devices   Assist with transfers and ambulation using safe patient handling equipment as needed     Problem: Genitourinary - Adult  Goal: Absence of urinary retention  Outcome: Progressing  Flowsheets (Taken 9/28/2022 2006 by Alex Millan RN)  Absence of urinary retention:   Assess patients ability to void and empty bladder   Monitor intake/output and perform bladder scan as needed     Problem: Infection - Adult  Goal: Absence of infection during hospitalization  Outcome: Progressing  Flowsheets (Taken 9/28/2022 2006 by Alex Millan RN)  Absence of infection during hospitalization:   Assess and monitor for signs and symptoms of infection   Monitor lab/diagnostic results     Problem: Hematologic - Adult  Goal: Maintains hematologic stability  Outcome: Progressing  Flowsheets (Taken 9/29/2022 1530)  Maintains hematologic stability: Assess for signs and symptoms of bleeding or hemorrhage     Problem: Safety - Adult  Goal: Free from fall injury  Outcome: Progressing  Flowsheets (Taken 9/29/2022 1530)  Free From Fall Injury: Instruct family/caregiver on patient safety   Care plan reviewed with patient and wife. Patient and wife verbalize understanding of the plan of care and contribute to goal setting.

## 2022-09-29 NOTE — PROGRESS NOTES
[facility]  INPATIENT REHABILITATION UNIT  PRECERTIFICATION TEMPLATE    Date of Admission: 2022  7:19 AM    Patient Name: Eugenia Alexander      MRN: [id number]    : 1945  (76 y.o.)  Gender: male     Payor Source: Payor: Azam Cordial / Plan: Bobby Barragan / Product Type: *No Product type* /   Secondary Payor Source:      Isolation Status: No active isolations    Precert initiated for Inpatient Rehab Admission at [facility]. Reference Number:  [id number]. Route of Precertification Transaction: Fax to [employer] as per policy. Requested OT to see patient and document for efficiency in timing of precert. Dr. Michelle Serrano to see patient and document for efficiency of timing of precert.

## 2022-09-29 NOTE — PROGRESS NOTES
[address] [address]  INPATIENT PHYSICAL THERAPY  DAILY NOTE  [facility] ORTHOPEDICS 7K - 2I-70/460-L    Time In: 3578  Time Out: 3234  Timed Code Treatment Minutes: 27 Minutes  Minutes: 27          Date: 2022  Patient Name: Kourtney Gil,  Gender:  male        MRN: [id number]  : 1945  (76 y.o.)     Referring Practitioner: Rk Cox MD  Diagnosis: osteoarthritis of left hip, unspecified osteoarthrisis type  Additional Pertinent Hx: Per H&P : The patient is a pleasant 68-year-old  gentleman who came in complaining of pain and discomfort in his left  hip. The pain has been in his groin, worse with his activities of daily  living, including walking, going up and downstairs, and in and out of  chairs and cars. Pt is s/p Left direct anterior approach total hip  replacement. DOS . Prior Level of Function:  Lives With: Spouse  Type of Home: House  Home Layout: Two level, Performs ADL's on one level, Able to Live on Main level with bedroom/bathroom  Home Access: Level entry  Home Equipment: Fidelina Pedro Luis, rolling   Bathroom Shower/Tub: Walk-in shower  Bathroom Toilet: Handicap height  Bathroom Accessibility: Accessible    Receives Help From: Family  ADL Assistance: Independent  Homemaking Assistance: Independent (Spouse completes all cooking, and laundry.)  Homemaking Responsibilities: Yes  Ambulation Assistance: Independent  Transfer Assistance: Independent  Active : Yes  Additional Comments: Pt owns horses and cares for them daily at home. *Unsure of accuracy of information d/t confusion. Pt states he is indep, his wife is with him during the day but they stay seperately. 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 Restrictions/Precautions:  Restrictions/Precautions: Weight Bearing, General Precautions  Left Lower Extremity Weight Bearing: Weight Bearing As Tolerated     SUBJECTIVE: RN approved session, pt pleasantly agrees.  Asked pt about his plan to go home he states, \"I don't know about that\". Discussed considering a therapy stay once again.  aware. PAIN: 7/10: L hip     Vitals: Vitals not assessed per clinical judgement, see nursing flowsheet    OBJECTIVE:  Bed Mobility:  Supine to Sit: Maximum Assistance, with head of bed raised, with rail, with increased time for completion  *HOB~ 40degrees    Transfers:  Sit to Stand: Minimal Assistance, with increased time for completion, cues for hand placement  Stand to Sit:Minimal Assistance, with increased time for completion, cues for hand placement    Ambulation:  Minimal Assistance, with cues for safety, with verbal cues , with increased time for completion  Distance: 4'  Surface: Level Tile  Device:Rolling Walker  Gait Deviations: Forward Flexed Posture, Slow Shirley, Decreased Step Length on Right, Decreased Weight Shift Left, Lean to Right, Decreased Heel Strike Bilaterally, Wide Base of Support, Mild Path Deviations, Unsteady Gait, and Decreased Terminal Knee Extension    Pt continues to have antalgic gait pattern with decreased weight shift on L LE and decreased step length R LE. Max cues and min A for stability in stance and for RW progression to sequence to the chair. noted unsteadiness. Balance:  Static Sitting Balance:  Contact Guard Assistance  Dynamic Sitting Balance: Contact Guard Assistance  Static Standing Balance: Minimal Assistance  Dynamic Standing Balance: Minimal Assistance  Completed x5 lateral weight shifts to increase weight acceptance B     Exercise:  Patient was guided in 1 set(s) 12 reps of exercise to both lower extremities. Glut sets, Quad sets, Heelslides, Hip abduction/adduction, Seated heel/toe raises, and Long arc quads. Exercises were completed for increased independence with functional mobility. *Cues and demo provided for appropriate completion of exercises. Cues to maintain within a tolerable range provided.    AAROM provided at L LE with some of exercises, cues not to hold his breath provided     Functional Outcome Measures: Completed  AM-PAC Inpatient Mobility without Stair Climbing Raw Score : 12  AM-PAC Inpatient without Stair Climbing T-Scale Score : 37.26    ASSESSMENT:  Assessment: Patient progressing toward established goals. Activity Tolerance:  Patient tolerance of  treatment: good. Pt tolerated mobility well, continues to require physical assist and cues for safety with all mobility.  This PT with concerns if pt plans to return home, recommending a therapy stay as he has limited support and requires min A for transfers, short distance ambulation, and max assist for bed mobility   Equipment Recommendations:Equipment Needed: No  Discharge Recommendations: Continue to assess pending progress, 24 hour assistance or supervision, and Patient would benefit from continued PT at discharge  Plan: Current Treatment Recommendations: Strengthening, ROM, Balance training, Functional mobility training, Transfer training, Stair training, Gait training, Endurance training, Neuromuscular re-education, Home exercise program, Safety education & training, Patient/Caregiver education & training, Equipment evaluation, education, & procurement, Return to work related activity, Therapeutic activities  Plan:  (6x BID, 1x QD)    Patient Education  Patient Education: Plan of Care, Bed Mobility, Equipment Education, Transfers, Reviewed Prior Education, Gait, Verbal Exercise Instruction, Home Safety Education, Activity Pacing, Pursed Lip Breathing    Goals:  Patient goals : no  Short Term Goals  Time Frame for Short term goals: by hospital d/c  Short term goal 1: Pt to demo supine <->sit with CGA for ease getting out of bed  Short term goal 2: Pt to complete sit <->stand with RW and CGA for improved safety  Short term goal 3: Pt to ambulate >=30' with RW and CGA for improved mobility  Short term goal 4: Pt to compelte 1 step with uni HR for access to his bathroom with CGA  Long Term Goals  Time Frame for Long term goals : NA due to short ELOS    Following session, patient left in safe position with all fall risk precautions in place.

## 2022-09-29 NOTE — PROGRESS NOTES
[address] [address]  INPATIENT PHYSICAL THERAPY  DAILY NOTE  [facility] ORTHOPEDICS 7K - 5U-01/965-I    Time In: 8702  Time Out: 8084  Timed Code Treatment Minutes: 40 Minutes  Minutes: 40          Date: 2022  Patient Name: Dinorah Hinds,  Gender:  male        MRN: [id number]  : 1945  (76 y.o.)     Referring Practitioner: Audelia Pennington MD  Diagnosis: osteoarthritis of left hip, unspecified osteoarthrisis type  Additional Pertinent Hx: Per H&P : The patient is a pleasant 64-year-old  gentleman who came in complaining of pain and discomfort in his left  hip. The pain has been in his groin, worse with his activities of daily  living, including walking, going up and downstairs, and in and out of  chairs and cars. Pt is s/p Left direct anterior approach total hip  replacement. DOS . Prior Level of Function:  Lives With: Spouse  Type of Home: House  Home Layout: Two level, Performs ADL's on one level, Able to Live on Main level with bedroom/bathroom  Home Access: Level entry  Home Equipment: Lamar Manifold, rolling   Bathroom Shower/Tub: Walk-in shower  Bathroom Toilet: Handicap height  Bathroom Accessibility: Accessible    Receives Help From: Family  ADL Assistance: Independent  Homemaking Assistance: Independent (Spouse completes all cooking, and laundry.)  Homemaking Responsibilities: Yes  Ambulation Assistance: Independent  Transfer Assistance: Independent  Active : Yes  Additional Comments: Pt owns horses and cares for them daily at home. *Unsure of accuracy of information d/t confusion. Pt states he is indep, his wife is with him during the day but they stay seperately. 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 Restrictions/Precautions:  Restrictions/Precautions: Weight Bearing, General Precautions  Left Lower Extremity Weight Bearing: Weight Bearing As Tolerated     SUBJECTIVE: RN approved session. Pt requesting to use the restroom. RN in to assist with hygiene with this. PAIN: yes, unrated. Fresh ice applied at end of session     Vitals: Vitals not assessed per clinical judgement, see nursing flowsheet    OBJECTIVE:  Bed Mobility:  Sit to Supine: Maximum Assistance, with rail, with increased time for completion   Max Ax2 to boost superiorly in bed    Transfers:  Sit to Stand: Minimal Assistance, with increased time for completion, cues for hand placement  Stand to [address], with increased time for completion, cues for hand placement    Ambulation:  Minimal Assistance, with cues for safety, with increased time for completion  Distance: 3', 4'  Surface: Level Tile  Device:Rolling Walker  Gait Deviations: Forward Flexed Posture, Slow Shirley, Decreased Step Length on Right, Decreased Weight Shift Left, Lean to Right, Decreased Heel Strike Bilaterally, Mild Path Deviations, Unsteady Gait, and Decreased Terminal Knee Extension    Min A to support unsteadiness and for RW progression. Cues for sequencing and to increase step length. Pt noted to have continued fwd flexion and decreased step length. Balance:  Static Sitting Balance:  Contact Guard Assistance  Dynamic Sitting Balance: Contact Guard Assistance  Static Standing Balance: Minimal Assistance  Dynamic Standing Balance: Minimal Assistance  Min A required for hygiene following use of BSC, assist for changing gown and brief provided. Exercise:  Patient was guided in 1 set(s) 12 reps of exercise to both lower extremities. Ankle pumps, Glut sets, Quad sets, Heelslides, Short arc quads, abdominal press isometric, and Hip abduction/adduction. Exercises were completed for increased independence with functional mobility. *Cues and demo provided for appropriate completion of exercises. Cues to maintain within a tolerable range provided.      Functional Outcome Measures: Completed  AM-PAC Inpatient Mobility without Stair Climbing Raw Score : 12  AM-PAC Inpatient without Stair Climbing T-Scale Score : 37.26    ASSESSMENT:  Assessment: Patient progressing toward established goals. Activity Tolerance:  Patient tolerance of  treatment: good. Pt tolerated mobility well this session, completed transfers and ambulation with min A and max cues. ~3 rest breaks provided this session   Equipment Recommendations:Equipment Needed: No  Discharge Recommendations: Continue to assess pending progress, 24 hour assistance or supervision, and Patient would benefit from continued PT at discharge  Plan: Current Treatment Recommendations: Strengthening, ROM, Balance training, Functional mobility training, Transfer training, Stair training, Gait training, Endurance training, Neuromuscular re-education, Home exercise program, Safety education & training, Patient/Caregiver education & training, Equipment evaluation, education, & procurement, Return to work related activity, Therapeutic activities  Plan:  (6x BID, 1x QD)    Patient Education  Patient Education: Plan of Care, Equipment Education, Transfers, Reviewed Prior Education, Gait, Verbal Exercise Instruction, Health Promotion and Wellness Education, Activity Pacing, Pursed Lip Breathing    Goals:  Patient goals : no  Short Term Goals  Time Frame for Short term goals: by hospital d/c  Short term goal 1: Pt to demo supine <->sit with CGA for ease getting out of bed  Short term goal 2: Pt to complete sit <->stand with RW and CGA for improved safety  Short term goal 3: Pt to ambulate >=30' with RW and CGA for improved mobility  Short term goal 4: Pt to compelte 1 step with uni HR for access to his bathroom with CGA  Long Term Goals  Time Frame for Long term goals : NA due to short ELOS    Following session, patient left in safe position with all fall risk precautions in place.

## 2022-09-29 NOTE — CARE COORDINATION
9/29/22, 11:44 AM EDT    DISCHARGE ON GOING EVALUATION    Alison Gonzalez day: 0  Location: -24/024-A Reason for admit: Osteoarthritis of left hip, unspecified osteoarthritis type [M16.12]  Primary osteoarthritis of left hip [M16.12]   Procedure: 9/27   left Total Hip Arthroplasty by Dr Angel Wells  Barriers to Discharge: POD 2, therapy continues, pain control, total hip precautions. Patient Goals/Plan/Treatment Preferences: pt prefers IP rehab; notified Petey EASTMAN and ordrs placed. Pt will be precert for IPR. Jodi Bright follows also. 12 - Notified Larry SALAZAR IP coordinator. 1315: Condition Code 44 conditions met, a Utilization Review Committee member reviewed this case and agrees that this patient does not meet evidenced based criteria for inpatient services, requiring a change in patient status from \"admit as inpatient\" to \"place in observation\", in accordance with condition code 44. Medical care will continue to be provided by Martina Kc MD, who agrees with the decision, as evidenced by the observation order/additional documentation. DEUTSCH was signed by the patient and placed in the chart. Copy of the MOON was given to the patient. Explanation to the patient that they are in Observation status and Code 44 letter of Explanation was given to the patient. All questions addressed. Copy of cond 44 and copy of DEUTSCH to chart.             Type of Home: House  Home Layout: Two level, Performs ADL's on one level, Able to Live on Main level with bedroom/bathroom  Home Access: Level entry  Home Equipment: Walker, rolling   Bathroom Shower/Tub: Walk-in shower  Bathroom Toilet: Handicap height  Bathroom Accessibility: Accessible  Receives Help From: Family  ADL Assistance: Independent

## 2022-09-29 NOTE — PROGRESS NOTES
Received phone call from direct nurse reviewer Chao Fields request that clinicals be sent directly to her fax . [facility] faxed directly to [facility] ext [phone].

## 2022-09-29 NOTE — CARE COORDINATION
9/29/22, 11:00 AM EDT    DISCHARGE PLANNING EVALUATION    Spoke with patient about discharge plan. He is requesting inpt rehab before returning home, list offered and declined, reviewed options for in pt rehab. Also discussed ecf, which he may consider if necessary. List offered, declined until needed.

## 2022-09-29 NOTE — PROGRESS NOTES
Sj COORDINATOR CONSULT    Referral Type: internal    Patient Name: Cole Fallon      MRN: [id number]    : 1945  (73 y.o.)  Gender: male   Race:White (non-)     Payor Source: Payor: Sharon Brenham / Plan: Del Ports / Product Type: *No Product type* /   Secondary Payor Source:      Isolation Status: No active isolations    Lives With: Spouse  Type of Home: House  Home Layout: Two level, Performs ADL's on one level, Able to Live on Main level with bedroom/bathroom  Home Access: Level entry  Receives Help From: Family     Additional Comments: Pt owns horses and cares for them daily at home. *Unsure of accuracy of information d/t confusion. Pt states he is indep, his wife is with him during the day but they stay seperately. 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 Disciplines Required upon Admission to Inpatient Rehabilitation: Physical Therapy and Occupational Therapy  Post operative: Yes  Fall: Unknown, last documented noted 2018   Dialysis: No  Diet: ADULT DIET; Regular  Await PM&R consult for further determination of patient needs. [facility] Medicare eligibility and benefits have been checked as IPR requires precert prior to admission if patient deemed appropriate by PM&R.
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
## 2022-09-30 PROCEDURE — 97116 GAIT TRAINING THERAPY: CPT

## 2022-09-30 PROCEDURE — 94760 N-INVAS EAR/PLS OXIMETRY 1: CPT

## 2022-09-30 PROCEDURE — 6370000000 HC RX 637 (ALT 250 FOR IP): Performed by: ORTHOPAEDIC SURGERY

## 2022-09-30 PROCEDURE — 1200000000 HC SEMI PRIVATE

## 2022-09-30 PROCEDURE — 97110 THERAPEUTIC EXERCISES: CPT

## 2022-09-30 PROCEDURE — 97530 THERAPEUTIC ACTIVITIES: CPT

## 2022-09-30 PROCEDURE — 2580000003 HC RX 258: Performed by: ORTHOPAEDIC SURGERY

## 2022-09-30 RX ADMIN — SODIUM CHLORIDE, PRESERVATIVE FREE 10 ML: 5 INJECTION INTRAVENOUS at 08:34

## 2022-09-30 RX ADMIN — ASPIRIN 325 MG: 325 TABLET, COATED ORAL at 08:34

## 2022-09-30 RX ADMIN — METOPROLOL TARTRATE 25 MG: 25 TABLET, FILM COATED ORAL at 08:34

## 2022-09-30 RX ADMIN — GABAPENTIN 300 MG: 300 CAPSULE ORAL at 08:34

## 2022-09-30 RX ADMIN — ACETAMINOPHEN 650 MG: 325 TABLET ORAL at 08:34

## 2022-09-30 RX ADMIN — GABAPENTIN 300 MG: 300 CAPSULE ORAL at 14:07

## 2022-09-30 RX ADMIN — MONTELUKAST SODIUM 10 MG: 10 TABLET ORAL at 21:35

## 2022-09-30 RX ADMIN — ATORVASTATIN CALCIUM 80 MG: 80 TABLET, FILM COATED ORAL at 08:34

## 2022-09-30 RX ADMIN — LOSARTAN POTASSIUM 25 MG: 25 TABLET, FILM COATED ORAL at 08:34

## 2022-09-30 RX ADMIN — ACETAMINOPHEN 650 MG: 325 TABLET ORAL at 02:53

## 2022-09-30 RX ADMIN — POLYETHYLENE GLYCOL 3350 17 G: 17 POWDER, FOR SOLUTION ORAL at 08:34

## 2022-09-30 RX ADMIN — ACETAMINOPHEN 650 MG: 325 TABLET ORAL at 14:07

## 2022-09-30 RX ADMIN — GABAPENTIN 300 MG: 300 CAPSULE ORAL at 21:35

## 2022-09-30 RX ADMIN — SERTRALINE 100 MG: 100 TABLET, FILM COATED ORAL at 08:34

## 2022-09-30 RX ADMIN — CLOPIDOGREL BISULFATE 75 MG: 75 TABLET ORAL at 08:34

## 2022-09-30 RX ADMIN — ACETAMINOPHEN 650 MG: 325 TABLET ORAL at 21:34

## 2022-09-30 RX ADMIN — METOPROLOL TARTRATE 25 MG: 25 TABLET, FILM COATED ORAL at 21:35

## 2022-09-30 RX ADMIN — SODIUM CHLORIDE, PRESERVATIVE FREE 10 ML: 5 INJECTION INTRAVENOUS at 21:35

## 2022-09-30 ASSESSMENT — PAIN DESCRIPTION - DESCRIPTORS
DESCRIPTORS: DISCOMFORT;TENDER
DESCRIPTORS: ACHING
DESCRIPTORS: ACHING

## 2022-09-30 ASSESSMENT — PAIN DESCRIPTION - ORIENTATION
ORIENTATION: LEFT

## 2022-09-30 ASSESSMENT — PAIN DESCRIPTION - LOCATION
LOCATION: HIP

## 2022-09-30 ASSESSMENT — PAIN DESCRIPTION - FREQUENCY: FREQUENCY: INTERMITTENT

## 2022-09-30 ASSESSMENT — PAIN SCALES - GENERAL
PAINLEVEL_OUTOF10: 0
PAINLEVEL_OUTOF10: 2
PAINLEVEL_OUTOF10: 2
PAINLEVEL_OUTOF10: 3
PAINLEVEL_OUTOF10: 2
PAINLEVEL_OUTOF10: 2

## 2022-09-30 ASSESSMENT — PAIN - FUNCTIONAL ASSESSMENT: PAIN_FUNCTIONAL_ASSESSMENT: PREVENTS OR INTERFERES SOME ACTIVE ACTIVITIES AND ADLS

## 2022-09-30 ASSESSMENT — PAIN DESCRIPTION - PAIN TYPE: TYPE: ACUTE PAIN;SURGICAL PAIN

## 2022-09-30 ASSESSMENT — PAIN DESCRIPTION - ONSET: ONSET: ON-GOING

## 2022-09-30 NOTE — PROGRESS NOTES
[address] [address]  INPATIENT PHYSICAL THERAPY  DAILY NOTE  [facility] ORTHOPEDICS 7K - 7P-15/807-L    Time In: 3741  Time Out: 1010  Timed Code Treatment Minutes: 29 Minutes  Minutes: 29          Date: 2022  Patient Name: Crista Hendricks,  Gender:  male        MRN: [id number]  : 1945  (76 y.o.)     Referring Practitioner: Annalise Hassan MD  Diagnosis: osteoarthritis of left hip, unspecified osteoarthrisis type  Additional Pertinent Hx: Per H&P : The patient is a pleasant 57-year-old  gentleman who came in complaining of pain and discomfort in his left  hip. The pain has been in his groin, worse with his activities of daily  living, including walking, going up and downstairs, and in and out of  chairs and cars. Pt is s/p Left direct anterior approach total hip  replacement. DOS . Prior Level of Function:  Lives With: Spouse  Type of Home: House  Home Layout: Two level, Performs ADL's on one level, Able to Live on Main level with bedroom/bathroom  Home Access: Level entry  Home Equipment: Eleonore Fail, rolling   Bathroom Shower/Tub: Walk-in shower  Bathroom Toilet: Handicap height  Bathroom Accessibility: Accessible    Receives Help From: Family  ADL Assistance: Independent  Homemaking Assistance: Independent (Spouse completes all cooking, and laundry.)  Homemaking Responsibilities: Yes  Ambulation Assistance: Independent  Transfer Assistance: Independent  Active : Yes  Additional Comments: Pt owns horses and cares for them daily at home. *Unsure of accuracy of information d/t confusion. Pt states he is indep, his wife is with him during the day but they stay seperately. 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 Restrictions/Precautions:  Restrictions/Precautions: Weight Bearing, General Precautions  Left Lower Extremity Weight Bearing: Weight Bearing As Tolerated     SUBJECTIVE: RN approved session. Patient in bed upon arrival and agreeable to therapy.      PAIN: pt stated he had pain in left hip but did not quantify    Vitals: Vitals not assessed per clinical judgement, see nursing flowsheet    OBJECTIVE:  Bed Mobility:  Supine to Sit: Minimal Assistance, with head of bed raised, with rail, with increased time for completion  Scooting: Contact Guard Assistance    Transfers:  Sit to Stand: [employer], with increased time for completion, cues for hand placement, x2 trials; x1 from EOB, x1 from chair  Stand to [facility] [address]    Ambulation:  [employer], Minimal Assistance  Distance: 10 ft  Surface: Level Tile  Device:Rolling Walker  Gait Deviations: Forward Flexed Posture, Slow Shirley, Decreased Step Length Bilaterally, Decreased Gait Speed, and Decreased Terminal Knee Extension as well as reduced hip extension with stance phase    Balance:  Dynamic Sitting Balance: Stand By Assistance, While performing exercises without back support  Dynamic Standing Balance: Contact Guard Assistance  -Weight shifting to aid in increasing WB on left    Exercise:  Patient was guided in 1 set(s) 10 reps of exercise to both lower extremities. Seated marches, Seated heel/toe raises, Long arc quads, and Seated isometric hip adduction. Exercises were completed for increased independence with functional mobility. Functional Outcome Measures: Completed  -PAC Inpatient Mobility without Stair Climbing Raw Score : 15  AM-PAC Inpatient without Stair Climbing T-Scale Score : 43.03    ASSESSMENT:  Assessment: Patient progressing toward established goals. Activity Tolerance:  Patient tolerance of  treatment: good.       Equipment Recommendations:Equipment Needed: No  Discharge Recommendations: Inpatient Rehabilitation and Patient would benefit from continued PT at discharge  Plan: Current Treatment Recommendations: Strengthening, ROM, Balance training, Functional mobility training, Transfer training, Stair training, Gait training, Endurance training, Neuromuscular re-education, Home exercise program, Safety education & training, Patient/Caregiver education & training, Equipment evaluation, education, & procurement, Return to work related activity, Therapeutic activities  General Plan:  (6x BID, 1x QD)    Patient Education  Patient Education: Plan of Care, Bed Mobility, Transfers, Gait, Up in Chair for All Meals, Verbal Exercise Instruction    Goals:  Patient Goals : no  Short Term Goals  Time Frame for Short Term Goals: by hospital d/c  Short Term Goal 1: Pt to demo supine <->sit with CGA for ease getting out of bed  Short Term Goal 2: Pt to complete sit <->stand with RW and CGA for improved safety  Short Term Goal 3: Pt to ambulate >=30' with RW and CGA for improved mobility  Short Term Goal 4: Pt to compelte 1 step with uni HR for access to his bathroom with CGA  Long Term Goals  Time Frame for Long Term Goals : NA due to short ELOS    Following session, patient left in safe position with all fall risk precautions in place.

## 2022-09-30 NOTE — CARE COORDINATION
9/30/22, 3:09 PM EDT    DISCHARGE PLANNING EVALUATION    Spoke with patient about discharge plan. He is receptive to ecf and is reviewing list, but has not decided yet. Will need precert for ecf, once accepting facility has been identified.

## 2022-09-30 NOTE — CARE COORDINATION
9/30/22, 4:05 PM EDT    DISCHARGE PLANNING EVALUATION    Patient has chosen [address] [facility]. Referral initiated, however, facility does not have a bed open. Second choice is [facility]. Referral initiated, will need precert if accepted.

## 2022-09-30 NOTE — PROGRESS NOTES
[address] [address]  INPATIENT PHYSICAL THERAPY  DAILY NOTE  [facility] ORTHOPEDICS 7K - [id number]/[id number]    Time In: 6801  Time Out: 1508  Timed Code Treatment Minutes: 27 Minutes  Minutes: 27          Date: 2022  Patient Name: Cristian Emerson,  Gender:  male        MRN: [id number]  : 1945  (76 y.o.)     Referring Practitioner: Susu Tamayo MD  Diagnosis: osteoarthritis of left hip, unspecified osteoarthrisis type  Additional Pertinent Hx: Per H&P : The patient is a pleasant 77-year-old  gentleman who came in complaining of pain and discomfort in his left  hip. The pain has been in his groin, worse with his activities of daily  living, including walking, going up and downstairs, and in and out of  chairs and cars. Pt is s/p Left direct anterior approach total hip  replacement. DOS . Prior Level of Function:  Lives With: Spouse  Type of Home: House  Home Layout: Two level, Performs ADL's on one level, Able to Live on Main level with bedroom/bathroom  Home Access: Level entry  Home Equipment: Hardeep Sciara, rolling   Bathroom Shower/Tub: Walk-in shower  Bathroom Toilet: Handicap height  Bathroom Accessibility: Accessible    Receives Help From: Family  ADL Assistance: Independent  Homemaking Assistance: Independent (Spouse completes all cooking, and laundry.)  Homemaking Responsibilities: Yes  Ambulation Assistance: Independent  Transfer Assistance: Independent  Active : Yes  Additional Comments: Pt owns horses and cares for them daily at home. *Unsure of accuracy of information d/t confusion. Pt states he is indep, his wife is with him during the day but they stay seperately. 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 Restrictions/Precautions:  Restrictions/Precautions: Weight Bearing, General Precautions  Left Lower Extremity Weight Bearing: Weight Bearing As Tolerated     SUBJECTIVE: RN approved session. Patient in chair upon arrival and agreeable to therapy.      PAIN: 5/10 when sitting but increased to 7/10 while ambulating    Vitals: Vitals not assessed per clinical judgement, see nursing flowsheet    OBJECTIVE:  Bed Mobility:  Not Tested    Transfers:  Sit to Stand: [employer], x3 trials all from chair  Stand to [address] [address]    Ambulation:  [employer], with verbal cues , with increased time for completion  Distance: 15 ft, 10 ft  Surface: Level Tile  Device:Rolling Walker  Gait Deviations: Forward Flexed Posture, Slow Shirley, Decreased Step Length Bilaterally, Decreased Gait Speed, Decreased Heel Strike Bilaterally, and Narrow Base of Support  -Pt needed Mod verbal cues for upright posture and in increase WB through LLE    Balance:  Dynamic Sitting Balance: Stand By Assistance, While performing seated exercises without back support  Static Standing Balance: Contact Guard Assistance, ~40 sec without UE support    Exercise:  Patient was guided in 1 set(s) 10 reps of exercise to both lower extremities. Ankle pumps, Glut sets, Quad sets, Seated marches, Seated heel/toe raises, Long arc quads, and Seated isometric hip adduction. Exercises were completed for increased independence with functional mobility. Functional Outcome Measures: Completed  -PAC Inpatient Mobility without Stair Climbing Raw Score : 15  -PAC Inpatient without Stair Climbing T-Scale Score : 43.03    ASSESSMENT:  Assessment: Patient progressing toward established goals. Activity Tolerance:  Patient tolerance of  treatment: good.       Equipment Recommendations:Equipment Needed: No  Discharge Recommendations: Continue to assess pending progress, Inpatient Rehabilitation, and Patient would benefit from continued PT at discharge  Plan: Current Treatment Recommendations: Strengthening, ROM, Balance training, Functional mobility training, Transfer training, Stair training, Gait training, Endurance training, Neuromuscular re-education, Home exercise program, Safety education & training, Patient/Caregiver education & training, Equipment evaluation, education, & procurement, Return to work related activity, Therapeutic activities  General Plan:  (6x BID, 1x QD)    Patient Education  Patient Education: Plan of Care, Transfers, Gait, Up in Chair for All Meals, Verbal Exercise Instruction    Goals:  Patient Goals : no  Short Term Goals  Time Frame for Short Term Goals: by hospital d/c  Short Term Goal 1: Pt to demo supine <->sit with CGA for ease getting out of bed  Short Term Goal 2: Pt to complete sit <->stand with RW and CGA for improved safety  Short Term Goal 3: Pt to ambulate >=30' with RW and CGA for improved mobility  Short Term Goal 4: Pt to compelte 1 step with uni HR for access to his bathroom with CGA  Long Term Goals  Time Frame for Long Term Goals : NA due to short ELOS    Following session, patient left in safe position with all fall risk precautions in place.

## 2022-09-30 NOTE — PROGRESS NOTES
Call made to [facility] MARIE Curtis [phone] ext [phone] inquiring about precert status; await response. [phone]  Phone call with denial received from [facility] stating the patient needs can be met at a lesser level of care. Peer to Peer option provided, Dr. Kenna Bill to set up [id number] deadline for completion is 10/3/2022 0930. Update:  1150  Dr. Kenna Bill peer to peer complete, patient has been denied acute inpatient rehab admission per insurance company. Statement made related to fact that would approve a SNF level of care. Tyler Anderson CM updated.

## 2022-09-30 NOTE — PLAN OF CARE
Problem: Discharge Planning  Goal: Discharge to home or other facility with appropriate resources  Outcome: Progressing  Flowsheets (Taken 9/29/2022 2226 by Ya Roberts, RN)  Discharge to home or other facility with appropriate resources:   Identify barriers to discharge with patient and caregiver   Arrange for needed discharge resources and transportation as appropriate   Identify discharge learning needs (meds, wound care, etc)     Problem: Chronic Conditions and Co-morbidities  Goal: Patient's chronic conditions and co-morbidity symptoms are monitored and maintained or improved  Outcome: Progressing  Flowsheets (Taken 9/29/2022 2226 by Ya Roberts, RN)  Care Plan - Patient's Chronic Conditions and Co-Morbidity Symptoms are Monitored and Maintained or Improved:   Monitor and assess patient's chronic conditions and comorbid symptoms for stability, deterioration, or improvement   Collaborate with multidisciplinary team to address chronic and comorbid conditions and prevent exacerbation or deterioration   Update acute care plan with appropriate goals if chronic or comorbid symptoms are exacerbated and prevent overall improvement and discharge     Problem: Pain  Goal: Verbalizes/displays adequate comfort level or baseline comfort level  Outcome: Progressing  Flowsheets (Taken 9/29/2022 2226 by Ya Roberts, RN)  Verbalizes/displays adequate comfort level or baseline comfort level:   Encourage patient to monitor pain and request assistance   Assess pain using appropriate pain scale   Administer analgesics based on type and severity of pain and evaluate response   Implement non-pharmacological measures as appropriate and evaluate response     Problem: ABCDS Injury Assessment  Goal: Absence of physical injury  Outcome: Progressing  Flowsheets (Taken 9/29/2022 2226 by Ya Roberts, RN)  Absence of Physical Injury: Implement safety measures based on patient assessment     Problem: Musculoskeletal - Adult  Goal: Return mobility to safest level of function  Outcome: Progressing  Flowsheets (Taken 9/29/2022 2226 by Alirio Pinedo RN)  Return Mobility to Safest Level of Function:   Assess patient stability and activity tolerance for standing, transferring and ambulating with or without assistive devices   Assist with transfers and ambulation using safe patient handling equipment as needed   Ensure adequate protection for wounds/incisions during mobilization   Obtain physical therapy/occupational therapy consults as needed   Instruct patient/family in ordered activity level     Problem: Genitourinary - Adult  Goal: Absence of urinary retention  Outcome: Progressing  Flowsheets (Taken 9/29/2022 2226 by Alirio Pinedo RN)  Absence of urinary retention:   Assess patients ability to void and empty bladder   Monitor intake/output and perform bladder scan as needed     Problem: Infection - Adult  Goal: Absence of infection during hospitalization  Outcome: Progressing  Flowsheets (Taken 9/29/2022 2226 by Alirio Pinedo RN)  Absence of infection during hospitalization:   Assess and monitor for signs and symptoms of infection   Monitor lab/diagnostic results     Problem: Hematologic - Adult  Goal: Maintains hematologic stability  Outcome: Progressing  Flowsheets (Taken 9/29/2022 2226 by Alirio Pinedo RN)  Maintains hematologic stability: Monitor labs for bleeding or clotting disorders     Problem: Safety - Adult  Goal: Free from fall injury  Outcome: Progressing  Flowsheets (Taken 9/29/2022 2226 by Alirio Pinedo RN)  Free From Fall Injury: Instruct family/caregiver on patient safety   Care plan reviewed with patient. Patient verbalize understanding of the plan of care and contribute to goal setting.

## 2022-09-30 NOTE — PROGRESS NOTES
[address] [facility]  Occupational Therapy  Daily Note  Time:   Time In: 1412  Time Out: 1438  Timed Code Treatment Minutes: 26 Minutes  Minutes: 26          Date: 2022  Patient Name: Kait Salvador,   Gender: male      Room: [facility]24/024-A  MRN: [id number]  : 1945  (76 y.o.)  Referring Practitioner: Shazia Calderón MD  Diagnosis: Primary Osteoathritis of Left Hip  Additional Pertinent Hx: This 76year old male is s/p Left Total Hip Anterior Approach by Dr. Dontrell Lei on 22. Restrictions/Precautions:  Restrictions/Precautions: Weight Bearing, General Precautions  Left Lower Extremity Weight Bearing: Weight Bearing As Tolerated     SUBJECTIVE: Nurse Lucy mcgee'naresh session, Up in chair upon arrival, agreeable to OT session, cooperative    PAIN: 3/10: L Hip    Vitals: Vitals not assessed per clinical judgement, see nursing flowsheet    COGNITION: WFL    ADL:   No ADL's completed this session. .    BED MOBILITY:  Not Tested    TRANSFERS:  Sit to Stand:  Stand By Assistance. Bedside chair  Stand to Sit: Stand By Assistance. FUNCTIONAL MOBILITY:  Assistive Device: Rolling Walker  Assist Level:  Stand By Assistance. Distance:  approx 10 feet x 2  Slow pace and weight through B UE     ADDITIONAL ACTIVITIES:  Patient completed BUE strengthening exercises with skilled education on HEP: completed x10 reps x1 set with a yellow band in all joints and all planes in order to improve UE strength and activity tolerance required for BADL routine and toilet / shower transfers. Patient tolerated well , requiring minimal rest breaks. Patient also required minimal cues for technique. ASSESSMENT:     Activity Tolerance:  Patient tolerance of  treatment: good. Discharge Recommendations: Inpatient Rehabilitation and ECF with OT  Equipment Recommendations: Equipment Needed: No  Other: Monitor needs .   Plan: Times Per Week: 6x  Current Treatment Recommendations: Strengthening, Endurance training, Functional mobility training, Equipment evaluation, education, & procurement, Home management training, Safety education & training, Patient/Caregiver education & training, Self-Care / ADL, Balance training    Patient Education  Patient Education: Home Exercise Program    Goals  Short Term Goals  Time Frame for Short Term Goals: Until discharge  Short Term Goal 1: Pt will complete BUE strengthening exercises with Ginna Ace for technique to increase indep and endurance with all self cares. Short Term Goal 2: Pt will complete standing tolerance x 4 minutes with SBA and min vcs for safety to increase indep and endurance within home environment. Short Term Goal 3: Pt will complete functional mobility to/from BR and HH distances with SBA and min vcs for safety to increase indep and endurance with self cares. Short Term Goal 4: Pt will complete LB dressing with LHAE PRN and min A to increase indep and endurance within home environment. Additional Goals?: No    Following session, patient left in safe position with all fall risk precautions in place.

## 2022-09-30 NOTE — CARE COORDINATION
9/30/22, 3:29 PM EDT    DISCHARGE ON GOING EVALUATION    Kai Zelayater day: 0  Location: -24/024-A Reason for admit: Osteoarthritis of left hip, unspecified osteoarthritis type [M16.12]  Primary osteoarthritis of left hip [M16.12]    Procedure: 9/27   left Total Hip Arthroplasty by Dr Odilai Burnett  Barriers to Discharge: POD 3, therapy continues, pain control, total hip precautions. Patient Goals/Plan/Treatment Preferences: patient denied by insurance for IP rehab (needs can be met at a lesser level of care), Dr Arnol Rios completed P2P and was denied. Pt possibly requesting ECF; Darnell Cross saw pt. See her note.

## 2022-09-30 NOTE — PROGRESS NOTES
Request to patient's insurance company for inpatient rehab admission was denied. Peer to peer with insurance and physician was carried out and the denial was upheld.     Millicent Mendoza MD

## 2022-09-30 NOTE — PLAN OF CARE
Problem: Discharge Planning  Goal: Discharge to home or other facility with appropriate resources  9/29/2022 2226 by Hugo Pederson RN  Outcome: Progressing  Flowsheets (Taken 9/29/2022 2226)  Discharge to home or other facility with appropriate resources:   Identify barriers to discharge with patient and caregiver   Arrange for needed discharge resources and transportation as appropriate   Identify discharge learning needs (meds, wound care, etc)     Problem: Chronic Conditions and Co-morbidities  Goal: Patient's chronic conditions and co-morbidity symptoms are monitored and maintained or improved  9/29/2022 2226 by Hugo Pederson RN  Outcome: Progressing  Flowsheets (Taken 9/29/2022 2226)  Care Plan - Patient's Chronic Conditions and Co-Morbidity Symptoms are Monitored and Maintained or Improved:   Monitor and assess patient's chronic conditions and comorbid symptoms for stability, deterioration, or improvement   Collaborate with multidisciplinary team to address chronic and comorbid conditions and prevent exacerbation or deterioration   Update acute care plan with appropriate goals if chronic or comorbid symptoms are exacerbated and prevent overall improvement and discharge     Problem: Pain  Goal: Verbalizes/displays adequate comfort level or baseline comfort level  9/29/2022 2226 by Hugo Pederson RN  Outcome: Progressing  Flowsheets (Taken 9/29/2022 2226)  Verbalizes/displays adequate comfort level or baseline comfort level:   Encourage patient to monitor pain and request assistance   Assess pain using appropriate pain scale   Administer analgesics based on type and severity of pain and evaluate response   Implement non-pharmacological measures as appropriate and evaluate response     Problem: ABCDS Injury Assessment  Goal: Absence of physical injury  9/29/2022 2226 by Hugo Pederson RN  Outcome: Progressing  Flowsheets  Taken 9/29/2022 2226  Absence of Physical Injury: Implement safety measures based on patient assessment  Taken 9/29/2022 2224  Absence of Physical Injury: Implement safety measures based on patient assessment     Problem: Musculoskeletal - Adult  Goal: Return mobility to safest level of function  9/29/2022 2226 by Josefina Mari RN  Outcome: Progressing  Flowsheets (Taken 9/29/2022 2226)  Return Mobility to Safest Level of Function:   Assess patient stability and activity tolerance for standing, transferring and ambulating with or without assistive devices   Assist with transfers and ambulation using safe patient handling equipment as needed   Ensure adequate protection for wounds/incisions during mobilization   Obtain physical therapy/occupational therapy consults as needed   Instruct patient/family in ordered activity level     Problem: Genitourinary - Adult  Goal: Absence of urinary retention  9/29/2022 2226 by Josefina Mari RN  Outcome: Progressing  Flowsheets (Taken 9/29/2022 2226)  Absence of urinary retention:   Assess patients ability to void and empty bladder   Monitor intake/output and perform bladder scan as needed     Problem: Infection - Adult  Goal: Absence of infection during hospitalization  9/29/2022 2226 by Josefina Mari RN  Outcome: Progressing  Flowsheets (Taken 9/29/2022 2226)  Absence of infection during hospitalization:   Assess and monitor for signs and symptoms of infection   Monitor lab/diagnostic results     Problem: Hematologic - Adult  Goal: Maintains hematologic stability  9/29/2022 2226 by Josefina Mari RN  Outcome: Progressing  Flowsheets (Taken 9/29/2022 2226)  Maintains hematologic stability: Monitor labs for bleeding or clotting disorders     Problem: Safety - Adult  Goal: Free from fall injury  9/29/2022 2226 by Josefina Mari RN  Outcome: Progressing  Flowsheets  Taken 9/29/2022 2226  Free From Fall Injury: Instruct family/caregiver on patient safety  Taken 9/29/2022 2224  Free From Fall Injury: Instruct family/caregiver on patient safety     Care plan reviewed with patient. Patient verbalizes understanding of the plan of care and contribute to goal setting.

## 2022-10-01 PROCEDURE — 1200000000 HC SEMI PRIVATE

## 2022-10-01 PROCEDURE — 97535 SELF CARE MNGMENT TRAINING: CPT

## 2022-10-01 PROCEDURE — 2580000003 HC RX 258: Performed by: ORTHOPAEDIC SURGERY

## 2022-10-01 PROCEDURE — 97116 GAIT TRAINING THERAPY: CPT

## 2022-10-01 PROCEDURE — 97110 THERAPEUTIC EXERCISES: CPT

## 2022-10-01 PROCEDURE — 6370000000 HC RX 637 (ALT 250 FOR IP): Performed by: ORTHOPAEDIC SURGERY

## 2022-10-01 RX ADMIN — CLOPIDOGREL BISULFATE 75 MG: 75 TABLET ORAL at 09:59

## 2022-10-01 RX ADMIN — ASPIRIN 325 MG: 325 TABLET, COATED ORAL at 09:59

## 2022-10-01 RX ADMIN — SODIUM CHLORIDE, PRESERVATIVE FREE 5 ML: 5 INJECTION INTRAVENOUS at 19:54

## 2022-10-01 RX ADMIN — METOPROLOL TARTRATE 25 MG: 25 TABLET, FILM COATED ORAL at 09:53

## 2022-10-01 RX ADMIN — MONTELUKAST SODIUM 10 MG: 10 TABLET ORAL at 19:54

## 2022-10-01 RX ADMIN — GABAPENTIN 300 MG: 300 CAPSULE ORAL at 19:54

## 2022-10-01 RX ADMIN — SODIUM CHLORIDE, PRESERVATIVE FREE 10 ML: 5 INJECTION INTRAVENOUS at 09:59

## 2022-10-01 RX ADMIN — GABAPENTIN 300 MG: 300 CAPSULE ORAL at 16:25

## 2022-10-01 RX ADMIN — GABAPENTIN 300 MG: 300 CAPSULE ORAL at 09:59

## 2022-10-01 RX ADMIN — POLYETHYLENE GLYCOL 3350 17 G: 17 POWDER, FOR SOLUTION ORAL at 09:59

## 2022-10-01 RX ADMIN — ACETAMINOPHEN 650 MG: 325 TABLET ORAL at 19:54

## 2022-10-01 RX ADMIN — LOSARTAN POTASSIUM 25 MG: 25 TABLET, FILM COATED ORAL at 09:59

## 2022-10-01 RX ADMIN — SERTRALINE 100 MG: 100 TABLET, FILM COATED ORAL at 09:59

## 2022-10-01 RX ADMIN — ATORVASTATIN CALCIUM 80 MG: 80 TABLET, FILM COATED ORAL at 09:59

## 2022-10-01 RX ADMIN — ACETAMINOPHEN 650 MG: 325 TABLET ORAL at 07:10

## 2022-10-01 RX ADMIN — HYDROCODONE BITARTRATE AND ACETAMINOPHEN 1 TABLET: 5; 325 TABLET ORAL at 14:24

## 2022-10-01 RX ADMIN — METOPROLOL TARTRATE 25 MG: 25 TABLET, FILM COATED ORAL at 19:54

## 2022-10-01 RX ADMIN — ACETAMINOPHEN 650 MG: 325 TABLET ORAL at 14:26

## 2022-10-01 ASSESSMENT — PAIN - FUNCTIONAL ASSESSMENT
PAIN_FUNCTIONAL_ASSESSMENT: PREVENTS OR INTERFERES SOME ACTIVE ACTIVITIES AND ADLS
PAIN_FUNCTIONAL_ASSESSMENT: ACTIVITIES ARE NOT PREVENTED
PAIN_FUNCTIONAL_ASSESSMENT: PREVENTS OR INTERFERES SOME ACTIVE ACTIVITIES AND ADLS

## 2022-10-01 ASSESSMENT — PAIN DESCRIPTION - FREQUENCY
FREQUENCY: CONTINUOUS
FREQUENCY: INTERMITTENT
FREQUENCY: CONTINUOUS

## 2022-10-01 ASSESSMENT — PAIN DESCRIPTION - DESCRIPTORS
DESCRIPTORS: DISCOMFORT
DESCRIPTORS: OTHER (COMMENT)
DESCRIPTORS: SORE

## 2022-10-01 ASSESSMENT — PAIN SCALES - GENERAL
PAINLEVEL_OUTOF10: 5
PAINLEVEL_OUTOF10: 4
PAINLEVEL_OUTOF10: 6
PAINLEVEL_OUTOF10: 3
PAINLEVEL_OUTOF10: 3
PAINLEVEL_OUTOF10: 2

## 2022-10-01 ASSESSMENT — PAIN DESCRIPTION - ONSET
ONSET: ON-GOING

## 2022-10-01 ASSESSMENT — PAIN DESCRIPTION - DIRECTION: RADIATING_TOWARDS: LEFT KNEE

## 2022-10-01 ASSESSMENT — PAIN DESCRIPTION - PAIN TYPE
TYPE: ACUTE PAIN;SURGICAL PAIN
TYPE: SURGICAL PAIN
TYPE: ACUTE PAIN;SURGICAL PAIN

## 2022-10-01 ASSESSMENT — PAIN DESCRIPTION - ORIENTATION
ORIENTATION: LEFT

## 2022-10-01 ASSESSMENT — PAIN DESCRIPTION - LOCATION
LOCATION: HIP

## 2022-10-01 NOTE — PROGRESS NOTES
Subjective:   Pain controlled. Denies numbness or tingling. Afebrile. Objective:   /60   Pulse 76   Temp 98.2 °F (36.8 °C) (Oral)   Resp 16   Ht 5' 3\" (1.6 m)   Wt 173 lb (78.5 kg)   SpO2 94%   BMI 30.65 kg/m²     No results for input(s): WBC, HGB, HCT, PLT, INR, PTT, CRP in the last 72 hours. Invalid input(s): PT, SED    Current Facility-Administered Medications: atorvastatin (LIPITOR) tablet 80 mg, 80 mg, Oral, Daily  clopidogrel (PLAVIX) tablet 75 mg, 75 mg, Oral, Daily  gabapentin (NEURONTIN) capsule 300 mg, 300 mg, Oral, TID  losartan (COZAAR) tablet 25 mg, 25 mg, Oral, Daily  metoprolol tartrate (LOPRESSOR) tablet 25 mg, 25 mg, Oral, BID  montelukast (SINGULAIR) tablet 10 mg, 10 mg, Oral, Nightly  nitroGLYCERIN (NITROSTAT) SL tablet 0.4 mg, 0.4 mg, SubLINGual, Q5 Min PRN  sertraline (ZOLOFT) tablet 100 mg, 100 mg, Oral, Daily  0.9 % sodium chloride infusion, , IntraVENous, Continuous  sodium chloride flush 0.9 % injection 5-40 mL, 5-40 mL, IntraVENous, 2 times per day  sodium chloride flush 0.9 % injection 5-40 mL, 5-40 mL, IntraVENous, PRN  0.9 % sodium chloride infusion, , IntraVENous, PRN  acetaminophen (TYLENOL) tablet 650 mg, 650 mg, Oral, Q6H  HYDROcodone-acetaminophen (NORCO) 5-325 MG per tablet 1 tablet, 1 tablet, Oral, Q4H PRN  polyethylene glycol (GLYCOLAX) packet 17 g, 17 g, Oral, Daily  magnesium hydroxide (MILK OF MAGNESIA) 400 MG/5ML suspension 30 mL, 30 mL, Oral, Daily PRN  fleet rectal enema 1 enema, 1 enema, Rectal, Daily PRN  ondansetron (ZOFRAN) injection 4 mg, 4 mg, IntraVENous, Q6H PRN  aspirin EC tablet 325 mg, 325 mg, Oral, Daily      Exam:  Gen: NAD  LLE: Dressing CDI. Skin intact. Soft compartments. 2+ DP pulse. TA/EHL/FHL/GS motor intact. DP/SP/T/S/Saph SILT. TTP about the anterior thigh. Pain with hip ROM.  No calf TTP        Assessment:  76 y.o. male POD 4 s/p L TERE with Dr Jeane Yanez:  Pain control  Continue THR protocol  Continue DVT prophylaxis  PT/OT - Total hip precautions  IM for med management appreciate recs   CRISTINA planning: Awaiting precert for placement    Electronically signed by Renata Rowe PA-C on 10/1/2022 at 5:35 PM

## 2022-10-01 NOTE — PROGRESS NOTES
[address] [address]  INPATIENT PHYSICAL THERAPY  DAILY NOTE  [facility] ORTHOPEDICS 7K - 7W-72/208-L    Time In: 0720  Time Out: 1597  Timed Code Treatment Minutes: 26 Minutes  Minutes: 26          Date: 10/1/2022  Patient Name: Fawn Siegel,  Gender:  male        MRN: [id number]  : 1945  (76 y.o.)     Referring Practitioner: Sasha Hahn MD  Diagnosis: osteoarthritis of left hip, unspecified osteoarthrisis type  Additional Pertinent Hx: Per H&P : The patient is a pleasant 40-year-old  gentleman who came in complaining of pain and discomfort in his left  hip. The pain has been in his groin, worse with his activities of daily  living, including walking, going up and downstairs, and in and out of  chairs and cars. Pt is s/p Left direct anterior approach total hip  replacement. DOS . Prior Level of Function:  Lives With: Spouse  Type of Home: House  Home Layout: Two level, Performs ADL's on one level, Able to Live on Main level with bedroom/bathroom  Home Access: Level entry  Home Equipment: Bharti Jules, rolling   Bathroom Shower/Tub: Walk-in shower  Bathroom Toilet: Handicap height  Bathroom Accessibility: Accessible    Receives Help From: Family  ADL Assistance: Independent  Homemaking Assistance: Independent (Spouse completes all cooking, and laundry.)  Homemaking Responsibilities: Yes  Ambulation Assistance: Independent  Transfer Assistance: Independent  Active : Yes  Additional Comments: Pt owns horses and cares for them daily at home. *Unsure of accuracy of information d/t confusion. Pt states he is indep, his wife is with him during the day but they stay seperately. 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 Restrictions/Precautions:  Restrictions/Precautions: Weight Bearing, General Precautions  Left Lower Extremity Weight Bearing: Weight Bearing As Tolerated     SUBJECTIVE: RN approved session. Patient in bed upon arrival and agreeable to therapy.      PAIN: 4-5/10: while in bed, but increased to 7-8/10 when ambulating    Vitals: Vitals not assessed per clinical judgement, see nursing flowsheet    OBJECTIVE:  Bed Mobility:  Rolling to Left: Contact Guard Assistance, with head of bed raised, with rail, with increased time for completion   Supine to Sit: Minimal Assistance, with head of bed raised, with rail, with verbal cues , with increased time for completion  Scooting: Contact Guard Assistance    Transfers:  Sit to Stand: [employer], with increased time for completion, with verbal cues, x3 trials; x1 EOB, x2 from chair  Stand to [facility] 68    Ambulation:  [employer], with increased time for completion  Distance: 20 ft  Surface: Level Tile  Device:Rolling Walker  Gait Deviations: Forward Flexed Posture, Slow Shirley, Decreased Step Length Bilaterally, Decreased Gait Speed, and Decreased Terminal Knee Extension    Balance:  Dynamic Sitting Balance: Stand By Assistance, While performing exercises no back support  Static Standing Balance: Contact Guard Assistance, ~30 sec without UE support  Dynamic Standing Balance: Contact Guard Assistance  -Reaching out of HANNY without UE support    Exercise:  Patient was guided in 1 set(s) 10 reps of exercise to both lower extremities. Seated marches, Seated heel/toe raises, Long arc quads, and Seated isometric hip adduction. Exercises were completed for increased independence with functional mobility. Functional Outcome Measures: Completed  AM-PAC Inpatient Mobility without Stair Climbing Raw Score : 15  AM-PAC Inpatient without Stair Climbing T-Scale Score : 43.03    ASSESSMENT:  Assessment: Patient progressing toward established goals. Activity Tolerance:  Patient tolerance of  treatment: good.       Equipment Recommendations:Equipment Needed: No  Discharge Recommendations: Continue to assess pending progress, Subacte/Skilled Nursing Facility, Inpatient Rehabilitation, and Patient would benefit from continued PT at discharge  Plan: Current Treatment Recommendations: Strengthening, ROM, Balance training, Functional mobility training, Transfer training, Stair training, Gait training, Endurance training, Neuromuscular re-education, Home exercise program, Safety education & training, Patient/Caregiver education & training, Equipment evaluation, education, & procurement, Return to work related activity, Therapeutic activities  General Plan:  (6x BID, 1x QD)    Patient Education  Patient Education: Plan of Care, Bed Mobility, Transfers, Gait, Up in Chair for All Meals, Verbal Exercise Instruction    Goals:  Patient Goals : no  Short Term Goals  Time Frame for Short Term Goals: by hospital d/c  Short Term Goal 1: Pt to demo supine <->sit with CGA for ease getting out of bed  Short Term Goal 2: Pt to complete sit <->stand with RW and CGA for improved safety  Short Term Goal 3: Pt to ambulate >=30' with RW and CGA for improved mobility  Short Term Goal 4: Pt to compelte 1 step with uni HR for access to his bathroom with CGA  Long Term Goals  Time Frame for Long Term Goals : NA due to short ELOS    Following session, patient left in safe position with all fall risk precautions in place.

## 2022-10-01 NOTE — PROGRESS NOTES
.  Plan: Times Per Week: 6x  Current Treatment Recommendations: Strengthening, Endurance training, Functional mobility training, Equipment evaluation, education, & procurement, Home management training, Safety education & training, Patient/Caregiver education & training, Self-Care / ADL, Balance training    Patient Education  Patient Education: Role of OT and Plan of Care     Goals  Short Term Goals  Time Frame for Short Term Goals: Until discharge  Short Term Goal 1: Pt will complete BUE strengthening exercises with Nevaeh Hernandez for technique to increase indep and endurance with all self cares. Short Term Goal 2: Pt will complete standing tolerance x 4 minutes with SBA and min vcs for safety to increase indep and endurance within home environment. Short Term Goal 3: Pt will complete functional mobility to/from BR and HH distances with SBA and min vcs for safety to increase indep and endurance with self cares. Short Term Goal 4: Pt will complete LB dressing with LHAE PRN and min A to increase indep and endurance within home environment. Additional Goals?: No    Following session, patient left in safe position with all fall risk precautions in place.

## 2022-10-01 NOTE — PLAN OF CARE
Problem: Discharge Planning  Goal: Discharge to home or other facility with appropriate resources  10/1/2022 0018 by Erin Jefferson RN  Outcome: Progressing  Flowsheets (Taken 9/30/2022 2130)  Discharge to home or other facility with appropriate resources: Identify barriers to discharge with patient and caregiver     Problem: Musculoskeletal - Adult  Goal: Return mobility to safest level of function  10/1/2022 0018 by Erin Jefferson RN  Outcome: Not Progressing  Flowsheets (Taken 9/30/2022 2127)  Return Mobility to Safest Level of Function: Assess patient stability and activity tolerance for standing, transferring and ambulating with or without assistive devices  9/30/2022 1720 by Viviane Mccullough RN  Outcome: Progressing    Problem: Chronic Conditions and Co-morbidities  Goal: Patient's chronic conditions and co-morbidity symptoms are monitored and maintained or improved  10/1/2022 0018 by Erin Jefferson RN  Outcome: Progressing  Flowsheets (Taken 9/30/2022 2130)  Care Plan - Patient's Chronic Conditions and Co-Morbidity Symptoms are Monitored and Maintained or Improved: Monitor and assess patient's chronic conditions and comorbid symptoms for stability, deterioration, or improvement     Problem: Pain  Goal: Verbalizes/displays adequate comfort level or baseline comfort level  10/1/2022 0018 by Erin Jefferson RN  Outcome: Progressing  Flowsheets (Taken 9/30/2022 2115)  Verbalizes/displays adequate comfort level or baseline comfort level: Encourage patient to monitor pain and request assistance     Problem: ABCDS Injury Assessment  Goal: Absence of physical injury  10/1/2022 0018 by Erin Jefferson RN  Outcome: Progressing     Problem: Musculoskeletal - Adult  Goal: Return mobility to safest level of function  10/1/2022 0018 by Erin Jefferson RN  Outcome: Not Progressing  Flowsheets (Taken 9/30/2022 2127)  Return Mobility to Safest Level of Function: Assess patient stability and activity tolerance for standing, transferring and ambulating with or without assistive devices    Problem: Infection - Adult  Goal: Absence of infection at discharge  Recent Flowsheet Documentation  Taken 9/30/2022 2130 by Moraima Ragland RN  Absence of infection at discharge: Monitor lab/diagnostic results

## 2022-10-01 NOTE — PROGRESS NOTES
[address] [address]  INPATIENT PHYSICAL THERAPY  DAILY NOTE  [facility] ORTHOPEDICS 7K - 8R-55/561-V    Time In:   Time Out: 7069  Timed Code Treatment Minutes: 25 Minutes  Minutes: 25          Date: 10/1/2022  Patient Name: Latonia Mccrary,  Gender:  male        MRN: [id number]  : 1945  (76 y.o.)     Referring Practitioner: Kelsey Estrada MD  Diagnosis: osteoarthritis of left hip, unspecified osteoarthrisis type  Additional Pertinent Hx: Per H&P : The patient is a pleasant 76-year-old  gentleman who came in complaining of pain and discomfort in his left  hip. The pain has been in his groin, worse with his activities of daily  living, including walking, going up and downstairs, and in and out of  chairs and cars. Pt is s/p Left direct anterior approach total hip  replacement. DOS . Prior Level of Function:  Lives With: Spouse  Type of Home: House  Home Layout: Two level, Performs ADL's on one level, Able to Live on Main level with bedroom/bathroom  Home Access: Level entry  Home Equipment: Laruth Commander, rolling   Bathroom Shower/Tub: Walk-in shower  Bathroom Toilet: Handicap height  Bathroom Accessibility: Accessible    Receives Help From: Family  ADL Assistance: Independent  Homemaking Assistance: Independent (Spouse completes all cooking, and laundry.)  Homemaking Responsibilities: Yes  Ambulation Assistance: Independent  Transfer Assistance: Independent  Active : Yes  Additional Comments: Pt owns horses and cares for them daily at home. *Unsure of accuracy of information d/t confusion. Pt states he is indep, his wife is with him during the day but they stay seperately. 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 Restrictions/Precautions:  Restrictions/Precautions: Weight Bearing, General Precautions  Left Lower Extremity Weight Bearing: Weight Bearing As Tolerated     SUBJECTIVE: RN approved session. Patient in chair upon arrival and agreeable to therapy.      PAIN: 7/10: L Hip and left anterior aspect of knee    Vitals: Vitals not assessed per clinical judgement, see nursing flowsheet    OBJECTIVE:  Bed Mobility:  Not Tested    Transfers:  Sit to Stand: [employer], with increased time for completion, cues for hand placement, x3 trials all from chair  Stand to Sit:Contact Guard Assistance    Ambulation:  [employer], with increased time for completion  Distance: 15 ft, 10 ft  Surface: Level Tile  Device:Rolling Walker  Gait Deviations: Forward Flexed Posture, Slow Shirley, Decreased Step Length on Right, Decreased Weight Shift Left, and Decreased Gait Speed  -Small lift placed in R shoe. Following placement of lift pt demonstrated a less antalgic gait pattern    Balance:  Static Standing Balance: Contact Guard Assistance, EO without UE support ~30 sec  Dynamic Standing Balance: Contact Guard Assistance, reaching out of HANNY without UE support    Exercise:  Patient was guided in 1 set(s) 10 reps of exercise to both lower extremities. Seated marches and Long arc quads. Exercises were completed for increased independence with functional mobility. Functional Outcome Measures: Completed  -PAC Inpatient Mobility without Stair Climbing Raw Score : 15  -PAC Inpatient without Stair Climbing T-Scale Score : 43.03    ASSESSMENT:  Assessment: Patient progressing toward established goals. Activity Tolerance:  Patient tolerance of  treatment: good.       Equipment Recommendations:Equipment Needed: No  Discharge Recommendations: Continue to assess pending progress, Subacte/Skilled Nursing Facility, Inpatient Rehabilitation, and Patient would benefit from continued PT at discharge  Plan: Current Treatment Recommendations: Strengthening, ROM, Balance training, Functional mobility training, Transfer training, Stair training, Gait training, Endurance training, Neuromuscular re-education, Home exercise program, Safety education & training, Patient/Caregiver education & training, Equipment evaluation, education, & procurement, Return to work related activity, Therapeutic activities  General Plan:  (6x BID, 1x QD)    Patient Education  Patient Education: Plan of Care, Transfers, Gait, Up in Chair for All Meals, Verbal Exercise Instruction    Goals:  Patient Goals : no  Short Term Goals  Time Frame for Short Term Goals: by hospital d/c  Short Term Goal 1: Pt to demo supine <->sit with CGA for ease getting out of bed  Short Term Goal 2: Pt to complete sit <->stand with RW and CGA for improved safety  Short Term Goal 3: Pt to ambulate >=30' with RW and CGA for improved mobility  Short Term Goal 4: Pt to compelte 1 step with uni HR for access to his bathroom with CGA  Long Term Goals  Time Frame for Long Term Goals : NA due to short ELOS    Following session, patient left in safe position with all fall risk precautions in place.

## 2022-10-02 PROCEDURE — 51798 US URINE CAPACITY MEASURE: CPT

## 2022-10-02 PROCEDURE — 97116 GAIT TRAINING THERAPY: CPT

## 2022-10-02 PROCEDURE — 6370000000 HC RX 637 (ALT 250 FOR IP): Performed by: ORTHOPAEDIC SURGERY

## 2022-10-02 PROCEDURE — 1200000000 HC SEMI PRIVATE

## 2022-10-02 PROCEDURE — 2580000003 HC RX 258: Performed by: ORTHOPAEDIC SURGERY

## 2022-10-02 PROCEDURE — 97110 THERAPEUTIC EXERCISES: CPT

## 2022-10-02 RX ORDER — DIPHENHYDRAMINE HCL 25 MG
25 TABLET ORAL EVERY 6 HOURS PRN
Status: DISCONTINUED | OUTPATIENT
Start: 2022-10-02 | End: 2022-10-04 | Stop reason: HOSPADM

## 2022-10-02 RX ADMIN — HYDROCODONE BITARTRATE AND ACETAMINOPHEN 1 TABLET: 5; 325 TABLET ORAL at 09:54

## 2022-10-02 RX ADMIN — METOPROLOL TARTRATE 25 MG: 25 TABLET, FILM COATED ORAL at 21:46

## 2022-10-02 RX ADMIN — SERTRALINE 100 MG: 100 TABLET, FILM COATED ORAL at 09:54

## 2022-10-02 RX ADMIN — CLOPIDOGREL BISULFATE 75 MG: 75 TABLET ORAL at 09:54

## 2022-10-02 RX ADMIN — ACETAMINOPHEN 650 MG: 325 TABLET ORAL at 20:40

## 2022-10-02 RX ADMIN — POLYETHYLENE GLYCOL 3350 17 G: 17 POWDER, FOR SOLUTION ORAL at 09:54

## 2022-10-02 RX ADMIN — ACETAMINOPHEN 650 MG: 325 TABLET ORAL at 15:09

## 2022-10-02 RX ADMIN — GABAPENTIN 300 MG: 300 CAPSULE ORAL at 21:47

## 2022-10-02 RX ADMIN — SODIUM CHLORIDE, PRESERVATIVE FREE 10 ML: 5 INJECTION INTRAVENOUS at 09:54

## 2022-10-02 RX ADMIN — HYDROCODONE BITARTRATE AND ACETAMINOPHEN 1 TABLET: 5; 325 TABLET ORAL at 15:09

## 2022-10-02 RX ADMIN — MONTELUKAST SODIUM 10 MG: 10 TABLET ORAL at 21:47

## 2022-10-02 RX ADMIN — ACETAMINOPHEN 650 MG: 325 TABLET ORAL at 09:54

## 2022-10-02 RX ADMIN — DIPHENHYDRAMINE HYDROCHLORIDE 25 MG: 25 TABLET ORAL at 21:47

## 2022-10-02 RX ADMIN — LOSARTAN POTASSIUM 25 MG: 25 TABLET, FILM COATED ORAL at 09:54

## 2022-10-02 RX ADMIN — ASPIRIN 325 MG: 325 TABLET, COATED ORAL at 09:54

## 2022-10-02 RX ADMIN — GABAPENTIN 300 MG: 300 CAPSULE ORAL at 15:10

## 2022-10-02 RX ADMIN — SODIUM CHLORIDE, PRESERVATIVE FREE 10 ML: 5 INJECTION INTRAVENOUS at 21:49

## 2022-10-02 RX ADMIN — ATORVASTATIN CALCIUM 80 MG: 80 TABLET, FILM COATED ORAL at 09:54

## 2022-10-02 RX ADMIN — METOPROLOL TARTRATE 25 MG: 25 TABLET, FILM COATED ORAL at 09:54

## 2022-10-02 RX ADMIN — GABAPENTIN 300 MG: 300 CAPSULE ORAL at 09:54

## 2022-10-02 RX ADMIN — ACETAMINOPHEN 650 MG: 325 TABLET ORAL at 03:09

## 2022-10-02 ASSESSMENT — PAIN DESCRIPTION - LOCATION
LOCATION: HIP;LEG

## 2022-10-02 ASSESSMENT — PAIN SCALES - GENERAL
PAINLEVEL_OUTOF10: 3
PAINLEVEL_OUTOF10: 0
PAINLEVEL_OUTOF10: 3
PAINLEVEL_OUTOF10: 4

## 2022-10-02 ASSESSMENT — PAIN DESCRIPTION - DESCRIPTORS
DESCRIPTORS: ACHING

## 2022-10-02 ASSESSMENT — PAIN DESCRIPTION - ORIENTATION: ORIENTATION: LEFT

## 2022-10-02 ASSESSMENT — PAIN DESCRIPTION - FREQUENCY
FREQUENCY: CONTINUOUS
FREQUENCY: CONTINUOUS

## 2022-10-02 ASSESSMENT — PAIN - FUNCTIONAL ASSESSMENT: PAIN_FUNCTIONAL_ASSESSMENT: PREVENTS OR INTERFERES SOME ACTIVE ACTIVITIES AND ADLS

## 2022-10-02 NOTE — PROGRESS NOTES
Subjective:   Pain controlled. Denies numbness or tingling. Afebrile. Objective:   BP (!) 174/80   Pulse 88   Temp 98.6 °F (37 °C) (Oral)   Resp 16   Ht 5' 3\" (1.6 m)   Wt 173 lb (78.5 kg)   SpO2 97%   BMI 30.65 kg/m²     No results for input(s): WBC, HGB, HCT, PLT, INR, PTT, CRP in the last 72 hours. Invalid input(s): PT, SED    Current Facility-Administered Medications: atorvastatin (LIPITOR) tablet 80 mg, 80 mg, Oral, Daily  clopidogrel (PLAVIX) tablet 75 mg, 75 mg, Oral, Daily  gabapentin (NEURONTIN) capsule 300 mg, 300 mg, Oral, TID  losartan (COZAAR) tablet 25 mg, 25 mg, Oral, Daily  metoprolol tartrate (LOPRESSOR) tablet 25 mg, 25 mg, Oral, BID  montelukast (SINGULAIR) tablet 10 mg, 10 mg, Oral, Nightly  nitroGLYCERIN (NITROSTAT) SL tablet 0.4 mg, 0.4 mg, SubLINGual, Q5 Min PRN  sertraline (ZOLOFT) tablet 100 mg, 100 mg, Oral, Daily  0.9 % sodium chloride infusion, , IntraVENous, Continuous  sodium chloride flush 0.9 % injection 5-40 mL, 5-40 mL, IntraVENous, 2 times per day  sodium chloride flush 0.9 % injection 5-40 mL, 5-40 mL, IntraVENous, PRN  0.9 % sodium chloride infusion, , IntraVENous, PRN  acetaminophen (TYLENOL) tablet 650 mg, 650 mg, Oral, Q6H  HYDROcodone-acetaminophen (NORCO) 5-325 MG per tablet 1 tablet, 1 tablet, Oral, Q4H PRN  polyethylene glycol (GLYCOLAX) packet 17 g, 17 g, Oral, Daily  magnesium hydroxide (MILK OF MAGNESIA) 400 MG/5ML suspension 30 mL, 30 mL, Oral, Daily PRN  fleet rectal enema 1 enema, 1 enema, Rectal, Daily PRN  ondansetron (ZOFRAN) injection 4 mg, 4 mg, IntraVENous, Q6H PRN  aspirin EC tablet 325 mg, 325 mg, Oral, Daily      Exam:  Gen: NAD  LLE: Dressing CDI. Skin intact. Soft compartments. 2+ DP pulse. TA/EHL/FHL/GS motor intact. DP/SP/T/S/Saph SILT. TTP about the anterior thigh. Pain with hip ROM. No calf TTP.       Assessment:  76 y.o. male POD 5 s/p L TERE with Dr Francie Henderson:  Pain control  Continue THR protocol  Continue DVT prophylaxis  PT/OT - Total hip precautions  IM for med management appreciate recs   DC planning: Awaiting precert for placement    Electronically signed by Heidy Yu PA-C on 10/2/2022 at 8:47 AM

## 2022-10-02 NOTE — PROGRESS NOTES
[address] [address]  INPATIENT PHYSICAL THERAPY  DAILY NOTE  [facility] ORTHOPEDICS 7K - 9C-64/549-X    Time In: 1421  Time Out: 1045  Timed Code Treatment Minutes: 29 Minutes  Minutes: 29          Date: 10/2/2022  Patient Name: Shruti Mccarty,  Gender:  male        MRN: [id number]  : 1945  (76 y.o.)     Referring Practitioner: Jordyn Salmon MD  Diagnosis: osteoarthritis of left hip, unspecified osteoarthrisis type  Additional Pertinent Hx: Per H&P : The patient is a pleasant 24-year-old  gentleman who came in complaining of pain and discomfort in his left  hip. The pain has been in his groin, worse with his activities of daily  living, including walking, going up and downstairs, and in and out of  chairs and cars. Pt is s/p Left direct anterior approach total hip  replacement. DOS . Prior Level of Function:  Lives With: Spouse  Type of Home: House  Home Layout: Two level, Performs ADL's on one level, Able to Live on Main level with bedroom/bathroom  Home Access: Level entry  Home Equipment: Fina Addy, rolling   Bathroom Shower/Tub: Walk-in shower  Bathroom Toilet: Handicap height  Bathroom Accessibility: Accessible    Receives Help From: Family  ADL Assistance: Independent  Homemaking Assistance: Independent (Spouse completes all cooking, and laundry.)  Homemaking Responsibilities: Yes  Ambulation Assistance: Independent  Transfer Assistance: Independent  Active : Yes  Additional Comments: Pt owns horses and cares for them daily at home. *Unsure of accuracy of information d/t confusion. Pt states he is indep, his wife is with him during the day but they stay seperately. 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 Restrictions/Precautions:  Restrictions/Precautions: Weight Bearing, General Precautions  Left Lower Extremity Weight Bearing: Weight Bearing As Tolerated     SUBJECTIVE: RN approved session. Patient in chair upon arrival and agreeable to therapy.      PAIN: 7/10: Left hip and knee    Vitals: Vitals not assessed per clinical judgement, see nursing flowsheet    OBJECTIVE:  Bed Mobility:  Not Tested    Transfers:  Sit to Stand: [employer], with increased time for completion, x3 trials all from chair  Stand to Sit:Stand By Assistance    Ambulation:  Stand By Assistance, [employer], with increased time for completion  Distance: 15 ft, 15 ft  Surface: Level Tile  Device:Rolling Walker  Gait Deviations: Forward Flexed Posture, Slow Shirley, Decreased Step Length on Right, Decreased Weight Shift Left, Decreased Gait Speed, and Decreased Terminal Knee Extension  -Pt had two occurences of knee giving out due to increased pain    Balance:  Dynamic Sitting Balance: Stand By Assistance, While performing exercises without back support  Static Standing Balance: Contact Guard Assistance  - ~45 sec without UE support  Dynamic Standing Balance: Contact Guard Assistance  - Reaching out of HANNY to tap therapist hand without UE support    Exercise:  Patient was guided in 1 set(s) 10 reps of exercise to both lower extremities. Seated marches, Long arc quads, and Mini squats. Exercises were completed for increased independence with functional mobility. Functional Outcome Measures: Completed  -PAC Inpatient Mobility without Stair Climbing Raw Score : 15  -PAC Inpatient without Stair Climbing T-Scale Score : 43.03    ASSESSMENT:  Assessment: Patient progressing toward established goals. Activity Tolerance:  Patient tolerance of  treatment: good.       Equipment Recommendations:Equipment Needed: No  Discharge Recommendations: Subacte/Skilled Nursing Facility and Patient would benefit from continued PT at discharge  Plan: Current Treatment Recommendations: Strengthening, ROM, Balance training, Functional mobility training, Transfer training, Stair training, Gait training, Endurance training, Neuromuscular re-education, Home exercise program, Safety education & training, Patient/Caregiver education & training, Equipment evaluation, education, & procurement, Return to work related activity, Therapeutic activities  General Plan:  (6x BID, 1x QD)    Patient Education  Patient Education: Plan of Care, Transfers, Gait, Up in Chair for All Meals, Verbal Exercise Instruction    Goals:  Patient Goals : no  Short Term Goals  Time Frame for Short Term Goals: by hospital d/c  Short Term Goal 1: Pt to demo supine <->sit with CGA for ease getting out of bed  Short Term Goal 2: Pt to complete sit <->stand with RW and CGA for improved safety  Short Term Goal 3: Pt to ambulate >=30' with RW and CGA for improved mobility  Short Term Goal 4: Pt to compelte 1 step with uni HR for access to his bathroom with CGA  Long Term Goals  Time Frame for Long Term Goals : NA due to short ELOS    Following session, patient left in safe position with all fall risk precautions in place.

## 2022-10-02 NOTE — PROCEDURES
A Bladder scan was performed at 1155 . The residual amount was measured to be 20 ML. Report of results was given to [facility].

## 2022-10-02 NOTE — CARE COORDINATION
10/2/22, 8:48 AM EDT    DISCHARGE ON GOING EVALUATION    [place] day: 0  Location: [facility]24/024-A Reason for admit: Osteoarthritis of left hip, unspecified osteoarthritis type [M16.12]  Primary osteoarthritis of left hip [M16.12]   PCP: Radha Armenta MD  Readmission Risk Score: 7.2%  Patient Goals/Plan/Treatment Preferences: Phone message left for Bhanu Kingston,  inquiring on status of pre-cert.

## 2022-10-02 NOTE — PLAN OF CARE
Problem: Discharge Planning  Goal: Discharge to home or other facility with appropriate resources  Outcome: Progressing  Flowsheets (Taken 10/2/2022 0246)  Discharge to home or other facility with appropriate resources:   Identify barriers to discharge with patient and caregiver   Arrange for needed discharge resources and transportation as appropriate   Identify discharge learning needs (meds, wound care, etc)  Note: Patient is planning to go to ECF. Referral to [employer].  and  working towards discharge. Problem: Chronic Conditions and Co-morbidities  Goal: Patient's chronic conditions and co-morbidity symptoms are monitored and maintained or improved  Outcome: Progressing  Flowsheets (Taken 10/2/2022 0246)  Care Plan - Patient's Chronic Conditions and Co-Morbidity Symptoms are Monitored and Maintained or Improved:   Monitor and assess patient's chronic conditions and comorbid symptoms for stability, deterioration, or improvement   Collaborate with multidisciplinary team to address chronic and comorbid conditions and prevent exacerbation or deterioration   Update acute care plan with appropriate goals if chronic or comorbid symptoms are exacerbated and prevent overall improvement and discharge  Note: Patient has past medical hx alcohol abuse, back pain, CAD, cancer of prostate, cerebral artery occlusion with cerebral infarction, head trauma, hyperlipidemia, HTN, depression, osteoarthritis of left hip, CABG x4, TIA, and bronchitis. Patient had left total hip with Dr. Odilia Burnett on 9/27.      Problem: Pain  Goal: Verbalizes/displays adequate comfort level or baseline comfort level  Outcome: Progressing  Flowsheets (Taken 10/2/2022 0246)  Verbalizes/displays adequate comfort level or baseline comfort level:   Encourage patient to monitor pain and request assistance   Assess pain using appropriate pain scale   Implement non-pharmacological measures as appropriate and evaluate response  Note: Pain Assessment: None - Denies Pain  Pain Level: 0   Patient's Stated Pain Goal: 0 - No pain   Is pain goal met at this time? Yes  Non-Pharmaceutical Pain Intervention(s): Rest, Repositioned      Problem: ABCDS Injury Assessment  Goal: Absence of physical injury  Outcome: Progressing  Flowsheets  Taken 10/2/2022 0246  Absence of Physical Injury: Implement safety measures based on patient assessment  Taken 10/2/2022 0223  Absence of Physical Injury: Implement safety measures based on patient assessment  Note: No injury has occurred this shift. Fall and safety precautions in place. Problem: Musculoskeletal - Adult  Goal: Return mobility to safest level of function  Outcome: Progressing  Flowsheets (Taken 10/2/2022 0246)  Return Mobility to Safest Level of Function:   Assess patient stability and activity tolerance for standing, transferring and ambulating with or without assistive devices   Assist with transfers and ambulation using safe patient handling equipment as needed   Ensure adequate protection for wounds/incisions during mobilization   Obtain physical therapy/occupational therapy consults as needed  Note: Patient is up x1 assist with walker. PT/OT on case. Problem: Genitourinary - Adult  Goal: Absence of urinary retention  Outcome: Progressing  Flowsheets (Taken 10/2/2022 0246)  Absence of urinary retention: Assess patients ability to void and empty bladder  Note: Patient has continuous bladder leakage. Incont. Pads in place. Frequent juan m care. Problem: Infection - Adult  Goal: Absence of infection during hospitalization  Outcome: Progressing  Flowsheets (Taken 10/2/2022 0246)  Absence of infection during hospitalization:   Assess and monitor for signs and symptoms of infection   Monitor lab/diagnostic results   Monitor all insertion sites i.e., indwelling lines, tubes and drains   [place] appropriate cooling/warming therapies per order  Note: Dressing dry and intact. Unable to visualize surgical incision due to surgical dressing. Dressing not to be removed today. No fevers, tachycardia, hypotension noted. Pt denies any complaints      Problem: Hematologic - Adult  Goal: Maintains hematologic stability  Outcome: Progressing  Flowsheets (Taken 10/2/2022 0246)  Maintains hematologic stability:   Assess for signs and symptoms of bleeding or hemorrhage   Monitor labs for bleeding or clotting disorders  Note: No s/s of bleeding noted. No labs completed since 9/28. Problem: Safety - Adult  Goal: Free from fall injury  Outcome: Progressing  Flowsheets  Taken 10/2/2022 0255  Free From Fall Injury: Instruct family/caregiver on patient safety  Taken 10/2/2022 0223  Free From Fall Injury: Instruct family/caregiver on patient safety  Note: Patient remained free of falls this shift. Fall precautions in place. Items within reach, call light within reach and side rails up x2. Bed alarm is on. Purposeful rounding in place. Patient verbalizes understanding of using call light to ask for assistance as needed. Problem: Skin/Tissue Integrity  Goal: Absence of new skin breakdown  Description: 1. Monitor for areas of redness and/or skin breakdown  2. Assess vascular access sites hourly  3. Every 4-6 hours minimum:  Change oxygen saturation probe site  4. Every 4-6 hours:  If on nasal continuous positive airway pressure, respiratory therapy assess nares and determine need for appliance change or resting period. Outcome: Progressing  Note: Intact Blisters noted on left hip near surgical dressing. These are new. Assisting patient to turn and reposition as needed. Care plan reviewed with patient. Patient verbalize understanding of the plan of care and contribute to goal setting.

## 2022-10-03 PROCEDURE — 6370000000 HC RX 637 (ALT 250 FOR IP): Performed by: ORTHOPAEDIC SURGERY

## 2022-10-03 PROCEDURE — 97110 THERAPEUTIC EXERCISES: CPT

## 2022-10-03 PROCEDURE — 97530 THERAPEUTIC ACTIVITIES: CPT

## 2022-10-03 PROCEDURE — 97116 GAIT TRAINING THERAPY: CPT

## 2022-10-03 PROCEDURE — 1200000000 HC SEMI PRIVATE

## 2022-10-03 RX ADMIN — ACETAMINOPHEN 650 MG: 325 TABLET ORAL at 08:32

## 2022-10-03 RX ADMIN — ASPIRIN 325 MG: 325 TABLET, COATED ORAL at 08:32

## 2022-10-03 RX ADMIN — MONTELUKAST SODIUM 10 MG: 10 TABLET ORAL at 20:58

## 2022-10-03 RX ADMIN — GABAPENTIN 300 MG: 300 CAPSULE ORAL at 08:32

## 2022-10-03 RX ADMIN — ACETAMINOPHEN 650 MG: 325 TABLET ORAL at 20:58

## 2022-10-03 RX ADMIN — POLYETHYLENE GLYCOL 3350 17 G: 17 POWDER, FOR SOLUTION ORAL at 08:32

## 2022-10-03 RX ADMIN — ACETAMINOPHEN 650 MG: 325 TABLET ORAL at 03:06

## 2022-10-03 RX ADMIN — GABAPENTIN 300 MG: 300 CAPSULE ORAL at 14:10

## 2022-10-03 RX ADMIN — METOPROLOL TARTRATE 25 MG: 25 TABLET, FILM COATED ORAL at 08:31

## 2022-10-03 RX ADMIN — ACETAMINOPHEN 650 MG: 325 TABLET ORAL at 14:10

## 2022-10-03 RX ADMIN — CLOPIDOGREL BISULFATE 75 MG: 75 TABLET ORAL at 08:32

## 2022-10-03 RX ADMIN — HYDROCODONE BITARTRATE AND ACETAMINOPHEN 1 TABLET: 5; 325 TABLET ORAL at 09:55

## 2022-10-03 RX ADMIN — HYDROCODONE BITARTRATE AND ACETAMINOPHEN 1 TABLET: 5; 325 TABLET ORAL at 15:14

## 2022-10-03 RX ADMIN — SERTRALINE 100 MG: 100 TABLET, FILM COATED ORAL at 08:32

## 2022-10-03 RX ADMIN — ATORVASTATIN CALCIUM 80 MG: 80 TABLET, FILM COATED ORAL at 08:32

## 2022-10-03 RX ADMIN — LOSARTAN POTASSIUM 25 MG: 25 TABLET, FILM COATED ORAL at 08:32

## 2022-10-03 RX ADMIN — METOPROLOL TARTRATE 25 MG: 25 TABLET, FILM COATED ORAL at 20:58

## 2022-10-03 RX ADMIN — DIPHENHYDRAMINE HYDROCHLORIDE 25 MG: 25 TABLET ORAL at 14:30

## 2022-10-03 RX ADMIN — GABAPENTIN 300 MG: 300 CAPSULE ORAL at 20:58

## 2022-10-03 ASSESSMENT — PAIN SCALES - GENERAL
PAINLEVEL_OUTOF10: 6
PAINLEVEL_OUTOF10: 4
PAINLEVEL_OUTOF10: 5
PAINLEVEL_OUTOF10: 6
PAINLEVEL_OUTOF10: 4
PAINLEVEL_OUTOF10: 2
PAINLEVEL_OUTOF10: 4

## 2022-10-03 ASSESSMENT — PAIN - FUNCTIONAL ASSESSMENT
PAIN_FUNCTIONAL_ASSESSMENT: ACTIVITIES ARE NOT PREVENTED
PAIN_FUNCTIONAL_ASSESSMENT: ACTIVITIES ARE NOT PREVENTED

## 2022-10-03 ASSESSMENT — PAIN DESCRIPTION - ORIENTATION
ORIENTATION: LEFT
ORIENTATION: LEFT

## 2022-10-03 ASSESSMENT — PAIN DESCRIPTION - ONSET: ONSET: ON-GOING

## 2022-10-03 ASSESSMENT — PAIN DESCRIPTION - LOCATION
LOCATION: HIP
LOCATION: HIP

## 2022-10-03 ASSESSMENT — PAIN DESCRIPTION - PAIN TYPE: TYPE: ACUTE PAIN

## 2022-10-03 ASSESSMENT — PAIN DESCRIPTION - DESCRIPTORS
DESCRIPTORS: ACHING
DESCRIPTORS: ACHING

## 2022-10-03 ASSESSMENT — PAIN DESCRIPTION - FREQUENCY: FREQUENCY: INTERMITTENT

## 2022-10-03 NOTE — DISCHARGE INSTRUCTIONS
Check operative dressing with vital signs, Unless dressing is saturated, leave dressing on for 10 days: 10/7/2022    Hip Replacement- Anterior Approach: Activity    Up as tolerated at least 3-4 times per day    Avoid jogging and other high-impact sports. Jogging and other high-impact sports can  increase wear on the joint, cause it to loosen, and cause pain. No driving until ok with Physician     Continue with the exercises given to you by the physical or occupational therapist       Incision Care    Keep hip incision clean and dry. Apply dry dressing to incision if drainage is present    Apply ice to surgery incision to help to prevent swelling    May shower when no drainage is coming from your incision unless otherwise directed per your physican. No tub baths or soaking of the surgical incision. Diet    Increase Fluid/Water intake, eat foods high in fiber, fruits and vegetables to help to prevent constipation. Examples of foods high in fiber   Vegetables      All vegetables, especially asparagus, bean sprouts, broccoli, Cardinal  sprouts, cabbage, carrots, cauliflower, celery, corn, greens, green beans,  green  pepper, onions, peas, potatoes (with skin), snow peas, spinach, squash, sweet  potatoes, tomatoes, zucchini     For maximum fiber intake, eat the peels of fruits and vegetables just be sure to wash them well first.      Fruits: All fruits, especially apples, berries, grapefruits, mangoes, nectarines,  oranges, peaches, pears, dried fruits (figs, dates, prunes, raisins)    Choose raw fruits and vegetables over juice, cooked, or canned raw fruit has                    more fiber. Dried fruit is also a good source of fiber. Preventing Blood Clots  Continue blood thinner as ordered by your physician. Examples of blood thinner usually prescribed is Lovenox, Xarelto, or Coumadin. Asprin or Plavix medication is held while taking prescribed blood thinners.     Prudencio hose compression stocking should be worn though the day and may be removed at bedtime        Prevention of surgical site infection    Family and friends who visit you should not touch the surgical wound or dressing. Friends and family should clean their hands with soap and water or alcohol-based hand rub before and after visiting you. If you do not see them clean their hands, ask them to clean their hands. Wash your hands frequently    If you are taking medications, follow these general guidelines:      Take your medication as directed. Do not change the amount or the schedule. Do not stop taking them without talking to your doctor. Do not share them. Know what the results and side effects. Report them to your doctor. Some drugs can be dangerous when mixed. Talk to a doctor or pharmacist if you are taking more than one drug. This includes over-the-counter medication and herb or dietary supplements. Plan ahead for refills so you do not run out. Call Your Doctor after you leave the hospital if the following occurs     Signs of infection such as fever, chills, redness, pus, or bad smelling  discharge from incision site. Excessive bleeding, swelling, increasing pain, or discharge around  incision site. The stitches or staples come apart at the incision site     Cough shortness of breath, or chest pain   Severe nausea or vomiting     Hip pain that you cannot control with the medications you have been given or pain becomes worse     Numbness, tingling, or loss of feeling in your leg, knee, or foot. Pain, burning, urgency, or frequency of urination.     In Case of Emergency Call 46

## 2022-10-03 NOTE — CARE COORDINATION
10/3/22, 9:45 AM EDT    DISCHARGE PLANNING EVALUATION    Spoke with [facility] admissions, facility will accept and has started precert.

## 2022-10-03 NOTE — PROGRESS NOTES
Progress Note    Subjective:     Post-Operative Day: 6 Status Post left Total Hip Arthroplasty  Systemic or Specific Complaints:No Complaints    Objective:   VITALS:  /60   Pulse 74   Temp 98.4 °F (36.9 °C) (Oral)   Resp 17   Ht 5' 3\" (1.6 m)   Wt 173 lb (78.5 kg)   SpO2 97%   BMI 30.65 kg/m²     General: alert, appears stated age, and cooperative   Wound: Dressing dry and intact   DVT Exam: No evidence of DVT seen on physical exam.   Abdomen soft and non tender  NVI in lower extremity. Thigh swollen but soft. Moving foot and ankle. Data Review  CBC:   Lab Results   Component Value Date/Time    WBC 8.7 09/28/2022 05:29 AM    RBC 3.71 09/28/2022 05:29 AM    RBC 0-2 07/24/2011 06:05 AM    HGB 11.1 09/28/2022 05:29 AM    HCT 34.0 09/28/2022 05:29 AM     09/28/2022 05:29 AM    INR 0.94 03/14/2019 12:53 PM     BMP:   Lab Results   Component Value Date/Time     02/10/2022 11:47 AM    K 4.6 02/10/2022 11:47 AM    K 3.6 08/30/2018 02:50 AM     02/10/2022 11:47 AM    CO2 22 02/10/2022 11:47 AM    BUN 18 02/10/2022 11:47 AM    CREATININE 0.95 02/10/2022 11:47 AM    GLUCOSE 111 02/10/2022 11:47 AM    GLUCOSE 105 07/25/2011 04:31 AM       Assessment:     Status Post left Total Hip Arthroplasty. Doing well postoperatively.      Plan:      1:  Continue Total Hip Replacement protocol   2:  Continue Deep venous thrombosis prophylaxis  3:  Continue physical therapy  4:  Continue Pain Control  5:  Monitor Labs  6:  Waiting [facility]    Jw Newell PA-C in collaboration with Dr. Tyshawn Longo

## 2022-10-03 NOTE — CARE COORDINATION
10/3/22, 3:02 PM EDT    DISCHARGE PLANNING EVALUATION    Precert approved for [facility] to accept tomorrow 10/4.

## 2022-10-03 NOTE — PROGRESS NOTES
[address] [address]  INPATIENT PHYSICAL THERAPY  DAILY NOTE  [facility] ORTHOPEDICS 7K - 9V-84/287-H    Time In: 0198  Time Out: 1834  Timed Code Treatment Minutes: 19 Minutes  Minutes: 19          Date: 10/3/2022  Patient Name: Will Arthur,  Gender:  male        MRN: [id number]  : 1945  (76 y.o.)     Referring Practitioner: Molly Meyer MD  Diagnosis: osteoarthritis of left hip, unspecified osteoarthrisis type  Additional Pertinent Hx: Per H&P : The patient is a pleasant 68-year-old  gentleman who came in complaining of pain and discomfort in his left  hip. The pain has been in his groin, worse with his activities of daily  living, including walking, going up and downstairs, and in and out of  chairs and cars. Pt is s/p Left direct anterior approach total hip  replacement. DOS . Prior Level of Function:  Lives With: Spouse  Type of Home: House  Home Layout: Two level, Performs ADL's on one level, Able to Live on Main level with bedroom/bathroom  Home Access: Level entry  Home Equipment: Carolene Angry, rolling   Bathroom Shower/Tub: Walk-in shower  Bathroom Toilet: Handicap height  Bathroom Accessibility: Accessible    Receives Help From: Family  ADL Assistance: Independent  Homemaking Assistance: Independent (Spouse completes all cooking, and laundry.)  Homemaking Responsibilities: Yes  Ambulation Assistance: Independent  Transfer Assistance: Independent  Active : Yes  Additional Comments: Pt owns horses and cares for them daily at home. *Unsure of accuracy of information d/t confusion. Pt states he is indep, his wife is with him during the day but they stay seperately. 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 Restrictions/Precautions:  Restrictions/Precautions: Weight Bearing, General Precautions  Left Lower Extremity Weight Bearing: Weight Bearing As Tolerated     SUBJECTIVE: RN approved session. Patient in chair upon arrival and agreeable to therapy.      PAIN: pt reported increased pain in hip and knee    Vitals: Vitals not assessed per clinical judgement, see nursing flowsheet    OBJECTIVE:  Bed Mobility:  Not Tested    Transfers:  Sit to Stand: Stand By Assistance, [employer], with verbal cues, x2 trials all from chair  Stand to Sit:Stand By Assistance    Ambulation:  Stand By Assistance, [employer], with increased time for completion  Distance: 2 x 17 ft  Surface: Level Tile  Device:Rolling Walker  Gait Deviations: Forward Flexed Posture, Slow Shirley, Decreased Step Length Bilaterally, and Decreased Gait Speed  **Prior to ambulation pt was taped at patellar tendon (X on patellar tendon) with KT tape. RN and pt instructed to remove tape on Wed or if pt is discharge to a facility. Exercise:  Not performed on this date    Functional Outcome Measures: Completed  AM-PAC Inpatient Mobility without Stair Climbing Raw Score : 15  AM-PAC Inpatient without Stair Climbing T-Scale Score : 43.03    ASSESSMENT:  Assessment: Patient progressing toward established goals. Activity Tolerance:  Patient tolerance of  treatment: good.       Equipment Recommendations:Equipment Needed: No  Discharge Recommendations: Subacte/Skilled Nursing Facility and Patient would benefit from continued PT at discharge  Plan: Current Treatment Recommendations: Strengthening, ROM, Balance training, Functional mobility training, Transfer training, Stair training, Gait training, Endurance training, Neuromuscular re-education, Home exercise program, Safety education & training, Patient/Caregiver education & training, Equipment evaluation, education, & procurement, Return to work related activity, Therapeutic activities  General Plan:  (6x BID, 1x QD)    Patient Education  Patient Education: Gait    Goals:  Patient Goals : no  Short Term Goals  Time Frame for Short Term Goals: by hospital d/c  Short Term Goal 1: Pt to demo supine <->sit with CGA for ease getting out of bed  Short Term Goal 2: Pt to complete sit <->stand with RW and CGA for improved safety  Short Term Goal 3: Pt to ambulate >=30' with RW and CGA for improved mobility  Short Term Goal 4: Pt to compelte 1 step with uni HR for access to his bathroom with CGA  Long Term Goals  Time Frame for Long Term Goals : NA due to short ELOS    Following session, patient left in safe position with all fall risk precautions in place.

## 2022-10-03 NOTE — CARE COORDINATION
10/3/22, 11:42 AM EDT    DISCHARGE ON GOING EVALUATION    [place] day: 0  Location: -24/024-A Reason for admit: Osteoarthritis of left hip, unspecified osteoarthritis type [M16.12]  Primary osteoarthritis of left hip [M16.12]   Procedure: 9/27   left Total Hip Arthroplasty by Dr Sin Heredia  Barriers to Discharge: POD 6 await precert for ECF     Patient Goals/Plan/Treatment Preferences: SW assisting with placement. Received a call UR nurse; pt now IP.

## 2022-10-03 NOTE — PLAN OF CARE
Problem: Discharge Planning  Goal: Discharge to home or other facility with appropriate resources  10/3/2022 1117 by Alcira Garber RN  Outcome: Progressing  Flowsheets (Taken 10/3/2022 1117)  Discharge to home or other facility with appropriate resources:   Identify barriers to discharge with patient and caregiver   Identify discharge learning needs (meds, wound care, etc)     Problem: Chronic Conditions and Co-morbidities  Goal: Patient's chronic conditions and co-morbidity symptoms are monitored and maintained or improved  10/3/2022 1117 by Alcira Garber RN  Outcome: Progressing  Flowsheets (Taken 10/3/2022 1117)  Care Plan - Patient's Chronic Conditions and Co-Morbidity Symptoms are Monitored and Maintained or Improved:   Monitor and assess patient's chronic conditions and comorbid symptoms for stability, deterioration, or improvement   Collaborate with multidisciplinary team to address chronic and comorbid conditions and prevent exacerbation or deterioration     Problem: Pain  Goal: Verbalizes/displays adequate comfort level or baseline comfort level  10/3/2022 1117 by Alcira Garber RN  Outcome: Progressing  Flowsheets (Taken 10/3/2022 1117)  Verbalizes/displays adequate comfort level or baseline comfort level:   Encourage patient to monitor pain and request assistance   Administer analgesics based on type and severity of pain and evaluate response   Assess pain using appropriate pain scale   Implement non-pharmacological measures as appropriate and evaluate response  Note: Patient taking pain medication on MAR as needed to control pain. Use of non-pharmacologic pain management including ice/repositioning. Patient pain goal is 2.       Problem: ABCDS Injury Assessment  Goal: Absence of physical injury  10/3/2022 1117 by Alcira Garber RN  Outcome: Progressing  Flowsheets (Taken 10/3/2022 1117)  Absence of Physical Injury: Implement safety measures based on patient assessment  Note: Patient up with staff assistance. Able to use call light. Patient has remained free of falls during this shift. Appropriate fall prevention measures in place. Problem: Musculoskeletal - Adult  Goal: Return mobility to safest level of function  10/3/2022 1117 by Meera Ramirez RN  Outcome: Progressing     Problem: Safety - Adult  Goal: Free from fall injury  10/3/2022 1117 by Meera Ramirez RN  Outcome: Progressing  Flowsheets (Taken 10/3/2022 1117)  Free From Fall Injury: Instruct family/caregiver on patient safety  Note: Patient up with staff assistance. Able to use call light. Patient has remained free of falls during this shift. Appropriate fall prevention measures in place. Problem: Skin/Tissue Integrity  Goal: Absence of new skin breakdown  Description: 1. Monitor for areas of redness and/or skin breakdown  2. Assess vascular access sites hourly  3. Every 4-6 hours minimum:  Change oxygen saturation probe site  4. Every 4-6 hours:  If on nasal continuous positive airway pressure, respiratory therapy assess nares and determine need for appliance change or resting period. 10/3/2022 1117 by Meera Ramirez RN  Outcome: Progressing  Note: Assess skin every 4 hours. Care plan reviewed with patient. Patient verbalized understanding of the plan of care and contribute to goal setting.

## 2022-10-03 NOTE — PROGRESS NOTES
[facility] PHYSICAL THERAPY  DAILY NOTE  [facility] ORTHOPEDICS 7K - 7V-67/884-D    Time In: 4959  Time Out: 9843  Timed Code Treatment Minutes: 45 Minutes  Minutes: 38          Date: 10/3/2022  Patient Name: Latonia Mccrary,  Gender:  male        MRN: [id number]  : 1945  (76 y.o.)     Referring Practitioner: Kelsey Estrada MD  Diagnosis: osteoarthritis of left hip, unspecified osteoarthrisis type  Additional Pertinent Hx: Per H&P : The patient is a pleasant 68-year-old  gentleman who came in complaining of pain and discomfort in his left  hip. The pain has been in his groin, worse with his activities of daily  living, including walking, going up and downstairs, and in and out of  chairs and cars. Pt is s/p Left direct anterior approach total hip  replacement. DOS . Prior Level of Function:  Lives With: Spouse  Type of Home: House  Home Layout: Two level, Performs ADL's on one level, Able to Live on Main level with bedroom/bathroom  Home Access: Level entry  Home Equipment: Laruth Commander, rolling   Bathroom Shower/Tub: Walk-in shower  Bathroom Toilet: Handicap height  Bathroom Accessibility: Accessible    Receives Help From: Family  ADL Assistance: Independent  Homemaking Assistance: Independent (Spouse completes all cooking, and laundry.)  Homemaking Responsibilities: Yes  Ambulation Assistance: Independent  Transfer Assistance: Independent  Active : Yes  Additional Comments: Pt owns horses and cares for them daily at home. *Unsure of accuracy of information d/t confusion. Pt states he is indep, his wife is with him during the day but they stay seperately. 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 Restrictions/Precautions:  Restrictions/Precautions: Weight Bearing, General Precautions  Left Lower Extremity Weight Bearing: Weight Bearing As Tolerated     SUBJECTIVE: RN approved session. Patient in chair upon arrival and agreeable to therapy.  Wife present to observe session. Pt stated that it still feels like he is walking up a step each time he steps with his L. PAIN: 4/10: L Hip in seated position. When up and ambulating pt reported increased pain in left knee as well. Vitals: Vitals not assessed per clinical judgement, see nursing flowsheet    OBJECTIVE:  Bed Mobility:  Not Tested    Transfers:  Sit to Stand: [employer], with increased time for completion, x3 trials all from chair  Stand to Sit:Contact Guard Assistance    Ambulation:  Contact Guard Assistance, Minimal Assistance, with verbal cues , with increased time for completion  Distance: 15 ft, 10 ft  Surface: Level Tile  Device:Rolling Walker  Gait Deviations: Forward Flexed Posture, Slow Shirley, Decreased Step Length Bilaterally, Decreased Weight Shift Bilaterally, Decreased Gait Speed, Unsteady Gait, and Decreased Terminal Knee Extension on L  -Slight lift placed in R shoe which improved gait pattern  -While ambulating pt had one occurrence of left knee giving out during first 15 ft then following taping to patellar tendon pt had reduced pain and no occurences of knee giving out  during 10 ft ambulation    Balance:  Dynamic Sitting Balance: Stand By Assistance, While performing exercises no back support  Static Standing Balance: Contact Guard Assistance, No UE support ~1 min  Dynamic Standing Balance: Contact Guard Assistance, Reaching out of HANNY to tap therapist hand    Exercise:  Patient was guided in 1 set(s) 15 reps of exercise to both lower extremities. Seated marches, Seated heel/toe raises, Long arc quads, and Seated isometric hip adduction. Exercises were completed for increased independence with functional mobility. Functional Outcome Measures: Completed  -PAC Inpatient Mobility without Stair Climbing Raw Score : 15  -PAC Inpatient without Stair Climbing T-Scale Score : 43.03    ASSESSMENT:  Assessment: Patient progressing toward established goals.  Next session trial KT tape for patellar taping  Activity Tolerance:  Patient tolerance of  treatment: good. Equipment Recommendations:Equipment Needed: No  Discharge Recommendations: Continue to assess pending progress, Subacte/Skilled Nursing Facility, and Patient would benefit from continued PT at discharge  Plan: Current Treatment Recommendations: Strengthening, ROM, Balance training, Functional mobility training, Transfer training, Stair training, Gait training, Endurance training, Neuromuscular re-education, Home exercise program, Safety education & training, Patient/Caregiver education & training, Equipment evaluation, education, & procurement, Return to work related activity, Therapeutic activities  General Plan:  (6x BID, 1x QD)    Patient Education  Patient Education: Plan of Care, Transfers, Gait, Up in Chair for All Meals, Verbal Exercise Instruction    Goals:  Patient Goals : no  Short Term Goals  Time Frame for Short Term Goals: by hospital d/c  Short Term Goal 1: Pt to demo supine <->sit with CGA for ease getting out of bed  Short Term Goal 2: Pt to complete sit <->stand with RW and CGA for improved safety  Short Term Goal 3: Pt to ambulate >=30' with RW and CGA for improved mobility  Short Term Goal 4: Pt to compelte 1 step with uni HR for access to his bathroom with CGA  Long Term Goals  Time Frame for Long Term Goals : NA due to short ELOS    Following session, patient left in safe position with all fall risk precautions in place.

## 2022-10-03 NOTE — DISCHARGE INSTR - COC
Continuity of Care Form    Patient Name: Bambi Bachelor   :    MRN:  [id number]    Admit date:  2022  Discharge date:  10/4/2022    Code Status Order: Full Code   Advance Directives:   [facility] Documentation       Date/Time Healthcare Directive Type of Healthcare Directive Copy in [address] Agent's Name Healthcare Agent's Phone Number    [phone] No, patient does not have an advance directive for healthcare treatment -- -- -- -- --            Admitting Physician:  Rhonda Helm MD  PCP: Zan Jean Baptiste MD    Discharging Nurse: [facility] Unit/Room#: 7K-24/024-A  Discharging Unit Phone Number: [phone]    Emergency Contact:   Extended Emergency Contact Information  Primary Emergency Contact: Milka Ward  Address: [address] Phone: [phone]  Relation: Spouse  Secondary Emergency Contact: Bessie Etienne   [address] Phone: [phone]  Relation: Other    Past Surgical History:  Past Surgical History:   Procedure Laterality Date    APPENDECTOMY      ARM SURGERY      CORONARY ARTERY BYPASS GRAFT  2016    Redo of prior CABG, Dr. Kirti Martinez. CORONARY ARTERY BYPASS GRAFT      1 vessel    CYSTOSCOPY N/A 2019    TRANSURETHRAL RESECTION PROSTATE, EXAM UNDER ANESTHESIA, PROSTATIC URETHROGRAM performed by Martha Irby MD at [place] [address] CATH LAB PROCEDURE      FRACTURE SURGERY Left     forearm fracture ; ORIF    SD OFFICE/OUTPT VISIT,PROCEDURE ONLY N/A 2018    SCALP EXPLORATION, WOUND CLOSURE performed by Alisha Harding MD at [facility] [address]  ?     [address] HIP ARTHROPLASTY Left 2022    Left Total Hip Anterior Approach performed by Rhonda Helm MD at [facility] [place]       Immunization History:   Immunization History   Administered Date(s) Administered    Influenza 12/13/2011, 12/11/2013    Influenza Virus Vaccine 01/05/2015, 12/21/2015    Influenza, FLUARIX, FLULAVAL, Jackidarwin Ramosfortunato (age 10 mo+) AND AFLURIA, (age 1 y+), PF, 0.5mL 09/15/2018    Influenza, High Dose (Fluzone 65 yrs and older) 11/02/2016    PPD Test 12/24/2016, 12/31/2016, 09/11/2018, 09/18/2018    Pneumococcal Conjugate 13-valent Sudeep Gonzalez) 12/11/2014       Active Problems:  Patient Active Problem List   Diagnosis Code    Coronary artery disease involving native coronary artery of native heart without angina pectoris I25.10    TIA (transient ischemic attack) G45.9    Mixed hyperlipidemia E78.2    Prostate cancer ([facility] C61    Back pain M54.9    S/P CABG (coronary artery bypass graft) Z95.1    Major depression in remission ([facility] F32.5    Alcohol abuse F10.10    Unstable angina (HCC) I20.0    Benign essential HTN I10    History of CVA (cerebrovascular accident) Z86.73    History of ischemic heart disease Z86.79    Macrocytic anemia D53.9    Leukocytosis D72.829    Postprocedural bulbous urethral stricture N99.111    S/P CABG x 4 Z95.1    NSTEMI (non-ST elevated myocardial infarction) ([facility] I21.4    Failed coronary artery bypass graft T82.218A    Claudication [facility]) I73.9    Chest pain R07.9    Episode of confusion R41.0    Tremor of left hand L88.9    Metabolic encephalopathy U50.91    Pulmonary nodules R91.8    History of prostate cancer Z85.46    Coronary artery disease of bypass graft of native heart with stable angina pectoris (HCC) I25.708    Familial hypercholesterolemia E78.01    Blunt head trauma S09. 8XXA    Scalp laceration S01. 01XA    Subarachnoid hemorrhage ([address] I60.9    Seizure ([facility] [address] [facility] R56.9    Subarachnoid hematoma S06. 6XAA    Multiple fractures of ribs, left side, initial encounter for closed fracture S22.42XA    Antiplatelet or antithrombotic long-term use Z79.02    Subdural hematoma, acute S06. 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    Overweight E66.3 Urinary retention R33.9    Bladder neck contracture N32.0    Acute urinary retention R33.8    BPH with obstruction/lower urinary tract symptoms N40.1, N13.8    Primary osteoarthritis of left hip M16.12    Osteoarthritis of left hip, unspecified osteoarthritis type M16.12       Isolation/Infection:   Isolation            No Isolation          Patient Infection Status       None to display            Nurse Assessment:  Last Vital Signs: /62   Pulse 70   Temp 98.2 °F (36.8 °C) (Oral)   Resp 17   Ht 5' 3\" (1.6 m)   Wt 173 lb (78.5 kg)   SpO2 95%   BMI 30.65 kg/m²     Last documented pain score (0-10 scale): Pain Level: 6  Last Weight:   Wt Readings from Last 1 Encounters:   09/27/22 173 lb (78.5 kg)     Mental Status:  oriented and alert    IV Access:  - None    Nursing Mobility/ADLs:  Walking   Assisted  Transfer  Assisted  Bathing  Assisted  Dressing  Assisted  Toileting  Assisted  Feeding  Assisted  Med Admin  Assisted takes meds 1 at a time. Med Delivery   whole    Wound Care Documentation and Therapy:  Incision 09/27/22 Groin Anterior;Left;Proximal (Active)   Dressing Status Clean;Dry; Intact 10/03/22 0845   Dressing/Treatment Dry dressing;Tegaderm/transparent film dressing 10/03/22 0845   Closure Sutures; Staples 09/27/22 1133   Margins Other (Comment) 10/01/22 1945   Incision Assessment Other (Comment) 10/03/22 0845   Drainage Amount None 10/03/22 0845   Drainage Description Sanguinous 09/30/22 2130   Odor None 10/01/22 1945   Sultana-incision Assessment Other (Comment) 10/02/22 2140   Number of days: 6      Do not remove dressing or change it for any reason until 10/8/2022. May reinforce dressing only as ordered. Elimination:  Continence: Bowel: Yes  Bladder: No constantly dribbles.    Urinary Catheter: None   Colostomy/Ileostomy/Ileal Conduit: No       Date of Last BM: 10/2/2022    Intake/Output Summary (Last 24 hours) at 10/3/2022 1348  Last data filed at 10/3/2022 1328  Gross per 24 hour Intake 1396 ml   Output --   Net 1396 ml     I/O last 3 completed shifts: In: 8435 [P.O.:3270; I.V.:15]  Out: -     Safety Concerns: At Risk for Falls    Impairments/Disabilities:      None    Nutrition Therapy:  Current Nutrition Therapy:   - Oral Diet:  General    Routes of Feeding: Oral  Liquids: Thin Liquids  Daily Fluid Restriction: no  Last Modified Barium Swallow with Video (Video Swallowing Test): not done    Treatments at the Time of Hospital Discharge:   Respiratory Treatments: n/a  Oxygen Therapy:  is not on home oxygen therapy.   Ventilator:    - No ventilator support    Rehab Therapies: Physical Therapy and Occupational Therapy  Weight Bearing Status/Restrictions: No weight bearing restrictions  Other Medical Equipment (for information only, NOT a DME order):  wheelchair, walker, bath bench, and hospital bed  Other Treatments: n/a    Patient's personal belongings (please select all that are sent with patient):  Glasses    RN SIGNATURE:  Electronically signed by Mumtaz Vargas RN on 10/4/22 at 10:43 AM EDT    CASE MANAGEMENT/SOCIAL WORK SECTION    Inpatient Status Date: 10/03/2022    Readmission Risk Assessment Score:  Readmission Risk              Risk of Unplanned Readmission:  0           Discharging to Facility/ Agency   Name: [facility]rze[facility] Chatman   Address: [address]  Phone: [phone]  Fax: [phone]    Dialysis Facility (if applicable)   Name:  Address:  Dialysis Schedule:  Phone:  Fax:    / signature: Electronically signed by GABRIELLA Butler on 10/4/22 at 8:04 AM EDT    PHYSICIAN SECTION    Prognosis: Good    Condition at Discharge: Stable    Rehab Potential (if transferring to Rehab): Good    Recommended Labs or Other Treatments After Discharge: PT    Physician Certification: I certify the above information and transfer of Shruti Bibiana  is necessary for the continuing treatment of the diagnosis listed and that he requires [place] for less 30 days.      Update Admission H&P: No change in H&P    PHYSICIAN SIGNATURE:  Electronically signed by JAREN Foy on 10/3/22 at 1:49 PM EDT

## 2022-10-03 NOTE — PLAN OF CARE
Problem: Discharge Planning  Goal: Discharge to home or other facility with appropriate resources  Outcome: Progressing  Flowsheets (Taken 10/3/2022 0030)  Discharge to home or other facility with appropriate resources:   Identify barriers to discharge with patient and caregiver   Arrange for needed discharge resources and transportation as appropriate   Identify discharge learning needs (meds, wound care, etc)   Refer to discharge planning if patient needs post-hospital services based on physician order or complex needs related to functional status, cognitive ability or social support system     Problem: Chronic Conditions and Co-morbidities  Goal: Patient's chronic conditions and co-morbidity symptoms are monitored and maintained or improved  Outcome: Progressing  Flowsheets (Taken 10/3/2022 0030)  Care Plan - Patient's Chronic Conditions and Co-Morbidity Symptoms are Monitored and Maintained or Improved:   Monitor and assess patient's chronic conditions and comorbid symptoms for stability, deterioration, or improvement   Collaborate with multidisciplinary team to address chronic and comorbid conditions and prevent exacerbation or deterioration   Update acute care plan with appropriate goals if chronic or comorbid symptoms are exacerbated and prevent overall improvement and discharge     Problem: Pain  Goal: Verbalizes/displays adequate comfort level or baseline comfort level  Outcome: Progressing  Flowsheets (Taken 10/3/2022 0030)  Verbalizes/displays adequate comfort level or baseline comfort level:   Encourage patient to monitor pain and request assistance   Assess pain using appropriate pain scale   Administer analgesics based on type and severity of pain and evaluate response   Implement non-pharmacological measures as appropriate and evaluate response     Problem: ABCDS Injury Assessment  Goal: Absence of physical injury  Outcome: Progressing  Flowsheets  Taken 10/3/2022 0030  Absence of Physical Injury: Implement safety measures based on patient assessment  Taken 10/3/2022 0028  Absence of Physical Injury: Implement safety measures based on patient assessment     Problem: Musculoskeletal - Adult  Goal: Return mobility to safest level of function  Outcome: Progressing  Flowsheets (Taken 10/3/2022 0030)  Return Mobility to Safest Level of Function:   Assess patient stability and activity tolerance for standing, transferring and ambulating with or without assistive devices   Assist with transfers and ambulation using safe patient handling equipment as needed   Ensure adequate protection for wounds/incisions during mobilization   Obtain physical therapy/occupational therapy consults as needed     Problem: Genitourinary - Adult  Goal: Absence of urinary retention  Outcome: Progressing  Flowsheets (Taken 10/3/2022 0030)  Absence of urinary retention:   Assess patients ability to void and empty bladder   Monitor intake/output and perform bladder scan as needed   Place urinary catheter per Licensed Independent Practitioner order if needed     Problem: Infection - Adult  Goal: Absence of infection during hospitalization  Outcome: Progressing  Flowsheets (Taken 10/3/2022 0030)  Absence of infection during hospitalization:   Assess and monitor for signs and symptoms of infection   Monitor lab/diagnostic results   Administer medications as ordered     Problem: Hematologic - Adult  Goal: Maintains hematologic stability  Outcome: Progressing  Flowsheets (Taken 10/3/2022 0030)  Maintains hematologic stability:   Assess for signs and symptoms of bleeding or hemorrhage   Monitor labs for bleeding or clotting disorders   Administer blood products/factors as ordered     Problem: Safety - Adult  Goal: Free from fall injury  Outcome: Progressing  Flowsheets  Taken 10/3/2022 0030  Free From Fall Injury: Instruct family/caregiver on patient safety  Taken 10/3/2022 0028  Free From Fall Injury: Instruct family/caregiver on patient safety     Problem: Skin/Tissue Integrity  Goal: Absence of new skin breakdown  Description: 1. Monitor for areas of redness and/or skin breakdown  2. Assess vascular access sites hourly  3. Every 4-6 hours minimum:  Change oxygen saturation probe site  4. Every 4-6 hours:  If on nasal continuous positive airway pressure, respiratory therapy assess nares and determine need for appliance change or resting period. Outcome: Progressing  Note: See flow sheets for skin integrity documentation. Care plan reviewed with patient. Patient verbalizes understanding of the plan of care and contribute to goal setting.

## 2022-10-03 NOTE — PROGRESS NOTES
[facility]  Occupational Therapy  Daily Note  Time:   Time In: 3506  Time Out: 4382  Timed Code Treatment Minutes: 24 Minutes  Minutes: 24          Date: 10/3/2022  Patient Name: Cora Barriga,   Gender: male      Room: [facility]24/024-A  MRN: [id number]  : 1945  (76 y.o.)  Referring Practitioner: Julito Mayo MD  Diagnosis: Primary Osteoathritis of Left Hip  Additional Pertinent Hx: This 76year old male is s/p Left Total Hip Anterior Approach by Dr. Billy Padilla on 22. Restrictions/Precautions:  Restrictions/Precautions: Weight Bearing, General Precautions  Left Lower Extremity Weight Bearing: Weight Bearing As Tolerated     SUBJECTIVE: Nurse Kaci damian session, In bed upon arrival, agreeable to OT session, cooperative    PAIN: 3/10: L hip    Vitals: Vitals not assessed per clinical judgement, see nursing flowsheet    COGNITION: WFL    ADL:   Able to slip on house slippers sitting at EOB. BED MOBILITY:  Supine to Sit: Stand By Assistance HOB elevated, used bedrail    TRANSFERS:  Sit to Stand:  Contact Guard Assistance. EOB  Stand to Sit: Contact Guard Assistance. Bedside chair    FUNCTIONAL MOBILITY:  Assistive Device: Rolling Walker  Assist Level:  Contact Guard Assistance. Distance:  EOB around bed to chair      ADDITIONAL ACTIVITIES:  Patient completed BUE strengthening exercises with skilled education on HEP: completed x15 reps x1 set with a yellow band in all joints and all planes in order to improve UE strength and activity tolerance required for BADL routine and toilet / shower transfers. Patient tolerated well, requiring minimal rest breaks. Patient also required minimal cues for technique. ASSESSMENT:     Activity Tolerance:  Patient tolerance of  treatment: good. Discharge Recommendations: ECF with OT  Equipment Recommendations: Equipment Needed: No  Other: Monitor needs .   Plan: Times Per Week: 6x  Current Treatment Recommendations: Strengthening, Endurance training, Functional mobility training, Equipment evaluation, education, & procurement, Home management training, Safety education & training, Patient/Caregiver education & training, Self-Care / ADL, Balance training    Patient Education  Patient Education: Home Exercise Program    Goals  Short Term Goals  Time Frame for Short Term Goals: Until discharge  Short Term Goal 1: Pt will complete BUE strengthening exercises with Shazia Miller for technique to increase indep and endurance with all self cares. Short Term Goal 2: Pt will complete standing tolerance x 4 minutes with SBA and min vcs for safety to increase indep and endurance within home environment. Short Term Goal 3: Pt will complete functional mobility to/from BR and HH distances with SBA and min vcs for safety to increase indep and endurance with self cares. Short Term Goal 4: Pt will complete LB dressing with LHAE PRN and min A to increase indep and endurance within home environment. Additional Goals?: No    Following session, patient left in safe position with all fall risk precautions in place.

## 2022-10-04 VITALS
HEIGHT: 63 IN | DIASTOLIC BLOOD PRESSURE: 76 MMHG | TEMPERATURE: 98.7 F | SYSTOLIC BLOOD PRESSURE: 122 MMHG | OXYGEN SATURATION: 94 % | WEIGHT: 173 LBS | BODY MASS INDEX: 30.65 KG/M2 | RESPIRATION RATE: 17 BRPM | HEART RATE: 95 BPM

## 2022-10-04 LAB — SARS-COV-2, PCR: NOT DETECTED

## 2022-10-04 PROCEDURE — 97116 GAIT TRAINING THERAPY: CPT

## 2022-10-04 PROCEDURE — 97535 SELF CARE MNGMENT TRAINING: CPT

## 2022-10-04 PROCEDURE — 6370000000 HC RX 637 (ALT 250 FOR IP): Performed by: ORTHOPAEDIC SURGERY

## 2022-10-04 PROCEDURE — 87635 SARS-COV-2 COVID-19 AMP PRB: CPT

## 2022-10-04 RX ADMIN — ATORVASTATIN CALCIUM 80 MG: 80 TABLET, FILM COATED ORAL at 09:29

## 2022-10-04 RX ADMIN — GABAPENTIN 300 MG: 300 CAPSULE ORAL at 09:29

## 2022-10-04 RX ADMIN — DIPHENHYDRAMINE HYDROCHLORIDE 25 MG: 25 TABLET ORAL at 01:07

## 2022-10-04 RX ADMIN — SERTRALINE 100 MG: 100 TABLET, FILM COATED ORAL at 09:29

## 2022-10-04 RX ADMIN — CLOPIDOGREL BISULFATE 75 MG: 75 TABLET ORAL at 09:29

## 2022-10-04 RX ADMIN — LOSARTAN POTASSIUM 25 MG: 25 TABLET, FILM COATED ORAL at 09:29

## 2022-10-04 RX ADMIN — METOPROLOL TARTRATE 25 MG: 25 TABLET, FILM COATED ORAL at 09:29

## 2022-10-04 RX ADMIN — POLYETHYLENE GLYCOL 3350 17 G: 17 POWDER, FOR SOLUTION ORAL at 09:29

## 2022-10-04 RX ADMIN — HYDROCODONE BITARTRATE AND ACETAMINOPHEN 1 TABLET: 5; 325 TABLET ORAL at 01:07

## 2022-10-04 RX ADMIN — ASPIRIN 325 MG: 325 TABLET, COATED ORAL at 09:29

## 2022-10-04 ASSESSMENT — PAIN DESCRIPTION - LOCATION: LOCATION: HIP

## 2022-10-04 ASSESSMENT — PAIN - FUNCTIONAL ASSESSMENT: PAIN_FUNCTIONAL_ASSESSMENT: ACTIVITIES ARE NOT PREVENTED

## 2022-10-04 ASSESSMENT — PAIN SCALES - GENERAL
PAINLEVEL_OUTOF10: 2
PAINLEVEL_OUTOF10: 4

## 2022-10-04 ASSESSMENT — PAIN DESCRIPTION - ORIENTATION: ORIENTATION: LEFT

## 2022-10-04 ASSESSMENT — PAIN DESCRIPTION - DESCRIPTORS: DESCRIPTORS: ACHING

## 2022-10-04 NOTE — CARE COORDINATION
10/4/22, 11:02 AM EDT    DISCHARGE PLANNING EVALUATION    Planning Jorge Cruz today, discussed with patient who is in agreement and requests ambulette transport, scheduled for 1230. Confirmed with Jorge Durand. 10/4/22, 11:02 AM EDT    Patient goals/plan/ treatment preferences discussed by  and . Patient goals/plan/ treatment preferences reviewed with patient/ family. Patient/ family verbalize understanding of discharge plan and are in agreement with goal/plan/treatment preferences. Understanding was demonstrated using the teach back method. AVS provided by RN at time of discharge, which includes all necessary medical information pertaining to the patients current course of illness, treatment, post-discharge goals of care, and treatment preferences. Services At/After Discharge: [facility] ([facility]) and In [facility]       IMM Letter  IMM Letter given to Patient/Family/Significant other/Guardian/POA/by[de-identified] CM: Ludwig Mcburney  IMM Letter date given[de-identified] 10/03/22  IMM Letter time given[de-identified] 4054  Observation Status Letter date given[de-identified] 09/29/22  Observation Status Letter time given[de-identified] 3508  Observation Status Letter given to Patient/Family/Significant other/Guardian/POA/by[de-identified] CM:  Ludwig Mcburney RN

## 2022-10-04 NOTE — PROGRESS NOTES
Pt discharged to [facility] ambulette. Blue packet given to staff. All personal belongings sent with pt. No visitors at pt side. Discharged/transported via wheelchair.

## 2022-10-04 NOTE — PROGRESS NOTES
[address] [address]  INPATIENT PHYSICAL THERAPY  DAILY NOTE  [facility] ORTHOPEDICS 7K - 2C-52/241-D    Time In: 7057  Time Out: 3909  Timed Code Treatment Minutes: 14 Minutes  Minutes: 14          Date: 10/4/2022  Patient Name: Jose Parrish,  Gender:  male        MRN: [id number]  : 1945  (76 y.o.)     Referring Practitioner: Nikolas Delgado MD  Diagnosis: osteoarthritis of left hip, unspecified osteoarthrisis type  Additional Pertinent Hx: Per H&P : The patient is a pleasant 75-year-old  gentleman who came in complaining of pain and discomfort in his left  hip. The pain has been in his groin, worse with his activities of daily  living, including walking, going up and downstairs, and in and out of  chairs and cars. Pt is s/p Left direct anterior approach total hip  replacement. DOS . Prior Level of Function:  Lives With: Spouse  Type of Home: House  Home Layout: Two level, Performs ADL's on one level, Able to Live on Main level with bedroom/bathroom  Home Access: Level entry  Home Equipment: [employer], rolling   Bathroom Shower/Tub: Walk-in shower  Bathroom Toilet: Handicap height  Bathroom Accessibility: Accessible    Receives Help From: Family  ADL Assistance: Independent  Homemaking Assistance: Independent (Spouse completes all cooking, and laundry.)  Homemaking Responsibilities: Yes  Ambulation Assistance: Independent  Transfer Assistance: Independent  Active : Yes  Additional Comments: Pt owns horses and cares for them daily at home. *Unsure of accuracy of information d/t confusion. Pt states he is indep, his wife is with him during the day but they stay seperately. 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 Restrictions/Precautions:  Restrictions/Precautions: Weight Bearing, General Precautions  Left Lower Extremity Weight Bearing: Weight Bearing As Tolerated     SUBJECTIVE: pt agreeable for PT session.  Pt incontinent of bladder and required change upon arrival.    PAIN: LLE not rated    Vitals: Vitals not assessed per clinical judgement, see nursing flowsheet    OBJECTIVE:  Bed Mobility:  Rolling to Right: Contact Guard Assistance   Supine to Sit: Minimal Assistance  Scooting: Contact Guard Assistance  HOB up 10 degrees, use BR  Transfers:  Sit to Stand: Contact Guard Assistance  Stand to Sit:Stand By Assistance    Ambulation:  Contact Guard Assistance  Distance: 40'x1  Surface: Level Tile  Device:Rolling Walker  Gait Deviations: Forward Flexed Posture, Slow Shirley, Decreased Weight Shift Left, and Wide Base of Support    Balance:  Static Sitting Balance:  Stand By Assistance  Static Standing Balance: Contact Guard Assistance with walker    Exercise:  Patient was guided in 1 set(s) 10 reps of exercise to both lower extremities. Ankle pumps, Glut sets, Quad sets, Seated hamstring curls, and Long arc quads. Exercises were completed for increased independence with functional mobility. Functional Outcome Measures: Completed  -PAC Inpatient Mobility without Stair Climbing Raw Score : 15  AM-PAC Inpatient without Stair Climbing T-Scale Score : 43.03    ASSESSMENT:  Assessment: Patient progressing toward established goals. and pt limited by left hip pain, inc distance amb with walker and therex tolerated fair. Activity Tolerance:  Patient tolerance of  treatment: fair.       Equipment Recommendations:Equipment Needed: No  Discharge Recommendations: Continue to assess pending progress, Patient would benefit from continued PT at discharge, and Inpatient Therapy Stay  Plan: Current Treatment Recommendations: Strengthening, ROM, Balance training, Functional mobility training, Transfer training, Stair training, Gait training, Endurance training, Neuromuscular re-education, Home exercise program, Safety education & training, Patient/Caregiver education & training, Equipment evaluation, education, & procurement, Return to work related activity, Therapeutic activities  General Plan: (6xO)    Patient Education  Patient Education: Home Exercise Program, Transfers, Gait    Goals:  Patient Goals : no  Short Term Goals  Time Frame for Short Term Goals: by hospital d/c  Short Term Goal 1: Pt to demo supine <->sit with CGA for ease getting out of bed  Short Term Goal 2: Pt to complete sit <->stand with RW and CGA for improved safety  Short Term Goal 3: Pt to ambulate >=30' with RW and CGA for improved mobility  Short Term Goal 4: Pt to compelte 1 step with uni HR for access to his bathroom with CGA  Long Term Goals  Time Frame for Long Term Goals : NA due to short ELOS    Following session, patient left in safe position with all fall risk precautions in place.

## 2022-10-04 NOTE — PLAN OF CARE
Problem: Discharge Planning  Goal: Discharge to home or other facility with appropriate resources  10/4/2022 1026 by Diego Davila LPN  Outcome: Progressing  Flowsheets (Taken 10/4/2022 1026)  Discharge to home or other facility with appropriate resources:   Identify barriers to discharge with patient and caregiver   Arrange for needed discharge resources and transportation as appropriate   Identify discharge learning needs (meds, wound care, etc)   Arrange for interpreters to assist at discharge as needed     Problem: Chronic Conditions and Co-morbidities  Goal: Patient's chronic conditions and co-morbidity symptoms are monitored and maintained or improved  10/4/2022 1026 by Deigo Davila LPN  Outcome: Progressing  Flowsheets (Taken 10/4/2022 1026)  Care Plan - Patient's Chronic Conditions and Co-Morbidity Symptoms are Monitored and Maintained or Improved:   Monitor and assess patient's chronic conditions and comorbid symptoms for stability, deterioration, or improvement   Collaborate with multidisciplinary team to address chronic and comorbid conditions and prevent exacerbation or deterioration     Problem: Pain  Goal: Verbalizes/displays adequate comfort level or baseline comfort level  10/4/2022 1026 by Diego Davila LPN  Outcome: Progressing  Flowsheets  Taken 10/4/2022 1026 by Diego Davila LPN  Verbalizes/displays adequate comfort level or baseline comfort level:   Encourage patient to monitor pain and request assistance   Assess pain using appropriate pain scale   Administer analgesics based on type and severity of pain and evaluate response   Implement non-pharmacological measures as appropriate and evaluate response    Problem: Genitourinary - Adult  Goal: Absence of urinary retention  10/4/2022 1026 by Diego Davila LPN  Outcome: Progressing  Flowsheets (Taken 10/4/2022 1026)  Absence of urinary retention: Assess patients ability to void and empty bladder     Problem: Infection - Adult  Goal: Absence of infection during hospitalization  10/4/2022 1026 by Nereida Sharma LPN  Outcome: Progressing  Flowsheets (Taken 10/4/2022 1026)  Absence of infection during hospitalization:   Assess and monitor for signs and symptoms of infection   Monitor lab/diagnostic results   Monitor all insertion sites i.e., indwelling lines, tubes and drains   Instruct and encourage patient and family to use good hand hygiene technique     Problem: Hematologic - Adult  Goal: Maintains hematologic stability  10/4/2022 1026 by Nereida Sharma LPN  Outcome: Progressing  Flowsheets (Taken 10/4/2022 1026)  Maintains hematologic stability: Assess for signs and symptoms of bleeding or hemorrhage     Problem: Safety - Adult  Goal: Free from fall injury  10/4/2022 1026 by Nereida Sharma LPN  Outcome: Progressing  Flowsheets (Taken 10/4/2022 1026)  Free From Fall Injury: Instruct family/caregiver on patient safety     Problem: Skin/Tissue Integrity  Goal: Absence of new skin breakdown  Description: 1. Monitor for areas of redness and/or skin breakdown  2. Assess vascular access sites hourly  3. Every 4-6 hours minimum:  Change oxygen saturation probe site  4. Every 4-6 hours:  If on nasal continuous positive airway pressure, respiratory therapy assess nares and determine need for appliance change or resting period.   10/4/2022 1026 by Nereida Sharma LPN  Outcome: Progressing  Note: Encouraged patient to change position frequently to prevent skin breakdown

## 2022-10-04 NOTE — PROGRESS NOTES
[facility]  Occupational Therapy  Daily Note  Time:   Time In: 1120  Time Out: 1134  Timed Code Treatment Minutes: 14 Minutes  Minutes: 14          Date: 10/4/2022  Patient Name: Piter Lopez,   Gender: male      Room: [facility]24/024-A  MRN: [id number]  : 1945  (76 y.o.)  Referring Practitioner: Lynn Ag MD  Diagnosis: Primary Osteoathritis of Left Hip  Additional Pertinent Hx: This 76year old male is s/p Left Total Hip Anterior Approach by Dr. Emerson Woodruff on 22. Restrictions/Precautions:  Restrictions/Precautions: Weight Bearing, General Precautions  Left Lower Extremity Weight Bearing: Weight Bearing As Tolerated     SUBJECTIVE: Nurse Esther Vega ok'd session, Up in chair upon arrival, agreeable to OT session, cooperative    PAIN: 0/10:     Vitals: Vitals not assessed per clinical judgement, see nursing flowsheet    COGNITION: WFL    ADL:   Grooming: with set-up. Combed hair sitting in bedside chair  Upper Extremity Dressing: with set-up. Donned sitting in bedside chair  Lower Extremity Dressing: Stand By Assistance. Donned sitting in bedside chair, increased time to thread L LE, has slip on shoes . Eating-Mod I, Able to open all containers  BED MOBILITY:  Not Tested    TRANSFERS:  Sit to Stand:  Stand By Assistance. Bedside chair  Stand to Sit: Stand By Assistance. ASSESSMENT:     Activity Tolerance:  Patient tolerance of  treatment: good. Discharge Recommendations: ECF with OT  Equipment Recommendations: Equipment Needed: No  Other: Monitor needs .   Plan: Times Per Week: 6x  Current Treatment Recommendations: Strengthening, Endurance training, Functional mobility training, Equipment evaluation, education, & procurement, Home management training, Safety education & training, Patient/Caregiver education & training, Self-Care / ADL, Balance training    Patient Education  Patient Education: ADL's    Goals  Short Term Goals  Time Frame for Short Term Goals: Until discharge  Short Term Goal 1: Pt will complete BUE strengthening exercises with Min vcs for technique to increase indep and endurance with all self cares. Short Term Goal 2: Pt will complete standing tolerance x 4 minutes with SBA and min vcs for safety to increase indep and endurance within home environment. Short Term Goal 3: Pt will complete functional mobility to/from BR and HH distances with SBA and min vcs for safety to increase indep and endurance with self cares. Short Term Goal 4: Pt will complete LB dressing with LHAE PRN and min A to increase indep and endurance within home environment. Additional Goals?: No    Following session, patient left in safe position with all fall risk precautions in place.

## 2022-10-04 NOTE — PLAN OF CARE
Problem: Discharge Planning  Goal: Discharge to home or other facility with appropriate resources  10/3/2022 2204 by Kelly Galarza RN  Outcome: Progressing  Flowsheets  Taken 10/3/2022 2204  Discharge to home or other facility with appropriate resources:   Identify barriers to discharge with patient and caregiver   Identify discharge learning needs (meds, wound care, etc)  Taken 10/3/2022 2045  Discharge to home or other facility with appropriate resources: Identify barriers to discharge with patient and caregiver  10/3/2022 1117 by Myriam Rosas RN  Outcome: Progressing  Flowsheets (Taken 10/3/2022 1117)  Discharge to home or other facility with appropriate resources:   Identify barriers to discharge with patient and caregiver   Identify discharge learning needs (meds, wound care, etc)     Problem: Chronic Conditions and Co-morbidities  Goal: Patient's chronic conditions and co-morbidity symptoms are monitored and maintained or improved  10/3/2022 2204 by Kelly Galarza RN  Outcome: Progressing  Flowsheets  Taken 10/3/2022 2204  Care Plan - Patient's Chronic Conditions and Co-Morbidity Symptoms are Monitored and Maintained or Improved:   Monitor and assess patient's chronic conditions and comorbid symptoms for stability, deterioration, or improvement   Collaborate with multidisciplinary team to address chronic and comorbid conditions and prevent exacerbation or deterioration  Taken 10/3/2022 2045  Care Plan - Patient's Chronic Conditions and Co-Morbidity Symptoms are Monitored and Maintained or Improved: Monitor and assess patient's chronic conditions and comorbid symptoms for stability, deterioration, or improvement  10/3/2022 1117 by Myriam Rosas RN  Outcome: Progressing  Flowsheets (Taken 10/3/2022 1117)  Care Plan - Patient's Chronic Conditions and Co-Morbidity Symptoms are Monitored and Maintained or Improved:   Monitor and assess patient's chronic conditions and comorbid symptoms for stability, deterioration, or improvement   Collaborate with multidisciplinary team to address chronic and comorbid conditions and prevent exacerbation or deterioration     Problem: Pain  Goal: Verbalizes/displays adequate comfort level or baseline comfort level  10/3/2022 2204 by Aguila Hameed RN  Outcome: Progressing  Flowsheets  Taken 10/3/2022 2204  Verbalizes/displays adequate comfort level or baseline comfort level:   Encourage patient to monitor pain and request assistance   Assess pain using appropriate pain scale  Taken 10/3/2022 2045  Verbalizes/displays adequate comfort level or baseline comfort level:   Encourage patient to monitor pain and request assistance   Assess pain using appropriate pain scale  10/3/2022 1117 by Jaxon Patten RN  Outcome: Progressing  Flowsheets (Taken 10/3/2022 1117)  Verbalizes/displays adequate comfort level or baseline comfort level:   Encourage patient to monitor pain and request assistance   Administer analgesics based on type and severity of pain and evaluate response   Assess pain using appropriate pain scale   Implement non-pharmacological measures as appropriate and evaluate response  Note: Patient taking pain medication on MAR as needed to control pain. Use of non-pharmacologic pain management including ice/repositioning. Patient pain goal is 2. Problem: ABCDS Injury Assessment  Goal: Absence of physical injury  10/3/2022 2204 by Aguila Hameed RN  Outcome: [place] Faye (Taken 10/3/2022 1117 by Jaxon Patten RN)  Absence of Physical Injury: Implement safety measures based on patient assessment  10/3/2022 1117 by Jaxon Patten RN  Outcome: Progressing  Flowsheets (Taken 10/3/2022 1117)  Absence of Physical Injury: Implement safety measures based on patient assessment  Note: Patient up with staff assistance. Able to use call light. Patient has remained free of falls during this shift. Appropriate fall prevention measures in place.        Problem: Musculoskeletal - Adult  Goal: Return mobility to safest level of function  10/3/2022 2204 by Arabella Calvillo RN  Outcome: Progressing  Flowsheets  Taken 10/3/2022 2204  Return Mobility to Safest Level of Function:   Assist with transfers and ambulation using safe patient handling equipment as needed   Assess patient stability and activity tolerance for standing, transferring and ambulating with or without assistive devices  Taken 10/3/2022 2045  Return Mobility to Safest Level of Function:   Assess patient stability and activity tolerance for standing, transferring and ambulating with or without assistive devices   Assist with transfers and ambulation using safe patient handling equipment as needed  10/3/2022 1117 by Petey Titus RN  Outcome: Progressing     Problem: Genitourinary - Adult  Goal: Absence of urinary retention  10/3/2022 2204 by Arabella Calvillo RN  Outcome: Progressing  Flowsheets  Taken 10/3/2022 2204  Absence of urinary retention:   Assess patients ability to void and empty bladder   Monitor intake/output and perform bladder scan as needed  Taken 10/3/2022 2045  Absence of urinary retention: Assess patients ability to void and empty bladder  10/3/2022 1117 by Petey Titus RN  Outcome: Progressing     Problem: Infection - Adult  Goal: Absence of infection during hospitalization  10/3/2022 2204 by Arabella Calvillo RN  Outcome: Progressing  Flowsheets  Taken 10/3/2022 2204  Absence of infection during hospitalization:   Assess and monitor for signs and symptoms of infection   Monitor lab/diagnostic results   Monitor all insertion sites i.e., indwelling lines, tubes and drains  Taken 10/3/2022 2045  Absence of infection during hospitalization:   Assess and monitor for signs and symptoms of infection   Monitor lab/diagnostic results  10/3/2022 1117 by Petey Titus RN  Outcome: Progressing     Problem: Hematologic - Adult  Goal: Maintains hematologic stability  10/3/2022 2204 by Arabella Calvillo RN  Outcome: Progressing  Flowsheets (Taken 10/3/2022 2045)  Maintains hematologic stability: Assess for signs and symptoms of bleeding or hemorrhage  10/3/2022 1117 by Petey Titus RN  Outcome: Progressing     Problem: Safety - Adult  Goal: Free from fall injury  10/3/2022 2204 by Arabella Calvillo RN  Outcome: Progressing  [address] (Taken 10/3/2022 2204)  Free From Fall Injury:   Rashmi Almanzar family/caregiver on patient safety   Based on caregiver fall risk screen, instruct family/caregiver to ask for assistance with transferring infant if caregiver noted to have fall risk factors  10/3/2022 1117 by Petey Titus RN  Outcome: Progressing  Flowsheets (Taken 10/3/2022 1117)  Free From Fall Injury: Instruct family/caregiver on patient safety  Note: Patient up with staff assistance. Able to use call light. Patient has remained free of falls during this shift. Appropriate fall prevention measures in place. Problem: Skin/Tissue Integrity  Goal: Absence of new skin breakdown  Description: 1. Monitor for areas of redness and/or skin breakdown  2. Assess vascular access sites hourly  3. Every 4-6 hours minimum:  Change oxygen saturation probe site  4. Every 4-6 hours:  If on nasal continuous positive airway pressure, respiratory therapy assess nares and determine need for appliance change or resting period. 10/3/2022 2204 by Arabella Calvillo RN  Outcome: Progressing  Note: No new skin breakdown noted at this time. 10/3/2022 1117 by Petey Titus RN  Outcome: Progressing  Note: Assess skin every 4 hours. Care plan reviewed with patient. Patient verbalizes understanding of the plan of care and contributes to goal setting.

## 2022-10-12 NOTE — DISCHARGE SUMMARY
[address] [place] [address]                               DISCHARGE SUMMARY    PATIENT NAME: Carrillo Hernandez                   :        1945  MED REC NO:   [id number]                           ROOM:       0024  ACCOUNT NO:   [de-identified]                           ADMIT DATE: 2022  PROVIDER:     COREY Birch             [address] [place] [address] DATE: 10/04/2022      PREOPERATIVE DIAGNOSIS:  Left hip degenerative joint disease. POSTOPERATIVE DIAGNOSIS:  Left hip degenerative joint disease. OPERATION PERFORMED:  Left direct anterior approach hip replacement. COMPLICATIONS:  None. HPI:  The patient is a pleasant gentleman, who came in complaining of  pain and discomfort in his left hip. He had increased pain and  problems, increased stiffness, and increased difficulties with his  activities of daily living including walking, going up and down stairs,  in and out of chairs, and in and out of cars. He has tried and failed  conservative measures including anti-inflammatory medications, activity  modifications, at-home physician-directed physical therapy. At this  point, he would like to move forth with more definitive treatment in the  form of left total hip replacement. The risks and benefits of surgery  were discussed with him in detail including but not limited to DVT; PE;  flare-up of medical problems; limb length inequality; stiffness;  infection; nerve, artery, vein, bone, and soft tissue damage; and he  would like to proceed. HOSPITAL COURSE:  The patient was brought to the operating room where he  had a left total hip replacement done. He had no complications. He was  then admitted to the general orthopedic floor for postoperative care  where he had an uneventful stay. He was seen by Physical Therapy where  he went on to mobilize well with the walker.   He was able to be  discharged in stable condition to a skilled nursing facility. CONDITION ON DISCHARGE:  Good. DISCHARGE INSTRUCTIONS:  The patient is to follow up with Dr. Don Newell  in two weeks. Weightbear as tolerated. He was discharged on DVT  prophylaxis, pain medication, and outpatient physical therapy and to  resume his home medications as prescribed. If he has any questions,  concerns or any issues, to give us a call. Otherwise, we will see him  back in three weeks' time to evaluate his incision. COREY Starr     D: 10/11/2022 10:36:35       T: 10/11/2022 13:10:42     /[email or web address]  Job#: [id number]     Doc#: [id number]    CC:

## 2023-04-03 ENCOUNTER — HOSPITAL ENCOUNTER (EMERGENCY)
Age: 78
Discharge: HOME | End: 2023-04-03
Payer: MEDICARE

## 2023-04-03 VITALS
BODY MASS INDEX: 31.89 KG/M2 | HEART RATE: 85 BPM | DIASTOLIC BLOOD PRESSURE: 74 MMHG | WEIGHT: 180 LBS | TEMPERATURE: 98.4 F | SYSTOLIC BLOOD PRESSURE: 168 MMHG | RESPIRATION RATE: 20 BRPM | HEIGHT: 63 IN | OXYGEN SATURATION: 98 %

## 2023-04-03 DIAGNOSIS — U07.1 COVID-19: Primary | ICD-10-CM

## 2023-04-03 LAB — SARS-COV-2 RDRP RESP QL NAA+PROBE: DETECTED

## 2023-04-03 PROCEDURE — 87635 SARS-COV-2 COVID-19 AMP PRB: CPT

## 2023-04-03 PROCEDURE — 99213 OFFICE O/P EST LOW 20 MIN: CPT | Performed by: NURSE PRACTITIONER

## 2023-04-03 PROCEDURE — 99213 OFFICE O/P EST LOW 20 MIN: CPT

## 2023-04-03 ASSESSMENT — ENCOUNTER SYMPTOMS
VOMITING: 0
RHINORRHEA: 1
DIARRHEA: 0
NAUSEA: 0
COUGH: 1
SORE THROAT: 1
SHORTNESS OF BREATH: 0
EYE DISCHARGE: 0
EYE REDNESS: 0
TROUBLE SWALLOWING: 0

## 2023-04-03 ASSESSMENT — PAIN - FUNCTIONAL ASSESSMENT: PAIN_FUNCTIONAL_ASSESSMENT: NONE - DENIES PAIN

## 2023-04-03 NOTE — ED TRIAGE NOTES
Pt to [facility] ambulatory with a cough, runny nose, and exposure to COVID-19.   Pt wants a COVID-19 test.
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
## 2023-05-09 ENCOUNTER — OFFICE VISIT (OUTPATIENT)
Dept: UROLOGY | Age: 78
End: 2023-05-09
Payer: MEDICARE

## 2023-05-09 VITALS
HEIGHT: 63 IN | SYSTOLIC BLOOD PRESSURE: 118 MMHG | WEIGHT: 177 LBS | DIASTOLIC BLOOD PRESSURE: 70 MMHG | BODY MASS INDEX: 31.36 KG/M2

## 2023-05-09 DIAGNOSIS — N39.498 OTHER URINARY INCONTINENCE: Primary | ICD-10-CM

## 2023-05-09 LAB
BILIRUBIN URINE: NEGATIVE
BLOOD URINE, POC: NEGATIVE
CHARACTER, URINE: CLEAR
COLOR, URINE: YELLOW
GLUCOSE URINE: NEGATIVE MG/DL
KETONES, URINE: NEGATIVE
LEUKOCYTE CLUMPS, URINE: ABNORMAL
NITRITE, URINE: NEGATIVE
PH, URINE: 6 (ref 5–9)
POST VOID RESIDUAL (PVR): 0 ML
PROTEIN, URINE: NEGATIVE MG/DL
SPECIFIC GRAVITY, URINE: 1.01 (ref 1–1.03)
UROBILINOGEN, URINE: 0.2 EU/DL (ref 0–1)

## 2023-05-09 PROCEDURE — 3078F DIAST BP <80 MM HG: CPT | Performed by: NURSE PRACTITIONER

## 2023-05-09 PROCEDURE — 1036F TOBACCO NON-USER: CPT | Performed by: NURSE PRACTITIONER

## 2023-05-09 PROCEDURE — G8417 CALC BMI ABV UP PARAM F/U: HCPCS | Performed by: NURSE PRACTITIONER

## 2023-05-09 PROCEDURE — 3074F SYST BP LT 130 MM HG: CPT | Performed by: NURSE PRACTITIONER

## 2023-05-09 PROCEDURE — 51798 US URINE CAPACITY MEASURE: CPT | Performed by: NURSE PRACTITIONER

## 2023-05-09 PROCEDURE — 81003 URINALYSIS AUTO W/O SCOPE: CPT | Performed by: NURSE PRACTITIONER

## 2023-05-09 PROCEDURE — 99213 OFFICE O/P EST LOW 20 MIN: CPT | Performed by: NURSE PRACTITIONER

## 2023-05-09 PROCEDURE — 1123F ACP DISCUSS/DSCN MKR DOCD: CPT | Performed by: NURSE PRACTITIONER

## 2023-05-09 PROCEDURE — G8427 DOCREV CUR MEDS BY ELIG CLIN: HCPCS | Performed by: NURSE PRACTITIONER

## 2023-05-09 ASSESSMENT — ENCOUNTER SYMPTOMS
NAUSEA: 0
ABDOMINAL PAIN: 0
BACK PAIN: 0
VOMITING: 0

## 2023-05-09 NOTE — PROGRESS NOTES
Uncomplicated alcohol dependence ([facility] [address] Past Surgical History  The patient  has a past surgical history that includes Appendectomy; Prostate surgery (2009?); Arm Surgery; Coronary artery bypass graft (12/14/2016); Diagnostic Cardiac Cath Lab Procedure; fracture surgery (Left, 1961); pr office/outpt visit,procedure only (N/A, 08/29/2018); Coronary artery bypass graft (1999); Cystoscopy (N/A, 03/14/2019); and Total hip arthroplasty (Left, 09/27/2022). Family History  This patient's family history includes Breast Cancer in his sister; Heart Disease in his father; Other in his mother; Prostate Cancer in his father. Social History  Duc Chew  reports that he has never smoked. He has never been exposed to tobacco smoke. He has never used smokeless tobacco. He reports that he does not currently use alcohol after a past usage of about 42.0 - 56.0 standard drinks per week. He reports that he does not use drugs. Subjective:      Review of Systems   Constitutional:  Negative for activity change, appetite change, chills, diaphoresis, fatigue, fever and unexpected weight change. Gastrointestinal:  Negative for abdominal pain, nausea and vomiting. Genitourinary:  Positive for frequency and urgency. Negative for decreased urine volume, difficulty urinating, dysuria, flank pain and hematuria. Musculoskeletal:  Negative for back pain. Objective:   /70   Ht 5' 3\" (1.6 m)   Wt 177 lb (80.3 kg)   BMI 31.35 kg/m²     Physical Exam  Vitals reviewed. Constitutional:       General: He is not in acute distress. Appearance: Normal appearance. He is well-developed. He is not ill-appearing or diaphoretic. HENT:      Head: Normocephalic and atraumatic. Right Ear: External ear normal.      Left Ear: External ear normal.      Nose: Nose normal.      Mouth/Throat:      Mouth: Mucous membranes are moist.   Eyes:      General: No scleral icterus. Right eye: No discharge.          Left eye: No

## 2023-07-14 ENCOUNTER — OFFICE VISIT (OUTPATIENT)
Dept: CARDIOLOGY CLINIC | Age: 78
End: 2023-07-14
Payer: MEDICARE

## 2023-07-14 VITALS
HEIGHT: 63 IN | WEIGHT: 177 LBS | HEART RATE: 58 BPM | DIASTOLIC BLOOD PRESSURE: 73 MMHG | BODY MASS INDEX: 31.36 KG/M2 | SYSTOLIC BLOOD PRESSURE: 136 MMHG

## 2023-07-14 DIAGNOSIS — I10 PRIMARY HYPERTENSION: ICD-10-CM

## 2023-07-14 DIAGNOSIS — R06.02 SOB (SHORTNESS OF BREATH): Primary | ICD-10-CM

## 2023-07-14 DIAGNOSIS — I25.810 CORONARY ARTERY DISEASE INVOLVING CORONARY BYPASS GRAFT OF NATIVE HEART WITHOUT ANGINA PECTORIS: ICD-10-CM

## 2023-07-14 PROCEDURE — 99213 OFFICE O/P EST LOW 20 MIN: CPT | Performed by: NUCLEAR MEDICINE

## 2023-07-14 PROCEDURE — 93000 ELECTROCARDIOGRAM COMPLETE: CPT | Performed by: NUCLEAR MEDICINE

## 2023-07-14 PROCEDURE — G8417 CALC BMI ABV UP PARAM F/U: HCPCS | Performed by: NUCLEAR MEDICINE

## 2023-07-14 PROCEDURE — 3078F DIAST BP <80 MM HG: CPT | Performed by: NUCLEAR MEDICINE

## 2023-07-14 PROCEDURE — G8427 DOCREV CUR MEDS BY ELIG CLIN: HCPCS | Performed by: NUCLEAR MEDICINE

## 2023-07-14 PROCEDURE — 1123F ACP DISCUSS/DSCN MKR DOCD: CPT | Performed by: NUCLEAR MEDICINE

## 2023-07-14 PROCEDURE — 1036F TOBACCO NON-USER: CPT | Performed by: NUCLEAR MEDICINE

## 2023-07-14 PROCEDURE — 3075F SYST BP GE 130 - 139MM HG: CPT | Performed by: NUCLEAR MEDICINE

## 2023-07-14 RX ORDER — ASPIRIN 81 MG/1
81 TABLET ORAL DAILY
COMMUNITY
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
## 2023-07-14 NOTE — PROGRESS NOTES
The patient presents for a 1-year follow-up. The patient denies chest pain, shortness of breath, dizziness, heart palpitations, and swelling of the lower extremities.

## 2023-07-20 ENCOUNTER — HOSPITAL ENCOUNTER (OUTPATIENT)
Age: 78
Setting detail: SPECIMEN
Discharge: HOME | End: 2023-07-20

## 2023-07-20 LAB
ALBUMIN SERPL-MCNC: 4.2 G/DL (ref 3.5–5.2)
ALBUMIN/GLOB SERPL: 1.6 {RATIO} (ref 1–2.5)
ALP SERPL-CCNC: 141 U/L (ref 40–129)
ALT SERPL-CCNC: 21 U/L (ref 5–41)
ANION GAP SERPL CALCULATED.3IONS-SCNC: 14 MMOL/L (ref 9–17)
AST SERPL-CCNC: 17 U/L
BILIRUB SERPL-MCNC: 0.4 MG/DL (ref 0.3–1.2)
BUN SERPL-MCNC: 15 MG/DL (ref 8–23)
CALCIUM SERPL-MCNC: 9.6 MG/DL (ref 8.6–10.4)
CHLORIDE SERPL-SCNC: 105 MMOL/L (ref 98–107)
CHOLEST SERPL-MCNC: 185 MG/DL
CHOLESTEROL/HDL RATIO: 4.3
CO2 SERPL-SCNC: 25 MMOL/L (ref 20–31)
CREAT SERPL-MCNC: 0.9 MG/DL (ref 0.7–1.2)
ERYTHROCYTE [DISTWIDTH] IN BLOOD BY AUTOMATED COUNT: 13.1 % (ref 11.8–14.4)
GFR SERPL CREATININE-BSD FRML MDRD: >60 ML/MIN/1.73M2
GLUCOSE SERPL-MCNC: 112 MG/DL (ref 70–99)
HCT VFR BLD AUTO: 41.9 % (ref 40.7–50.3)
HDLC SERPL-MCNC: 43 MG/DL
HGB BLD-MCNC: 13.9 G/DL (ref 13–17)
LDLC SERPL CALC-MCNC: 117 MG/DL (ref 0–130)
MCH RBC QN AUTO: 30.2 PG (ref 25.2–33.5)
MCHC RBC AUTO-ENTMCNC: 33.2 G/DL (ref 28.4–34.8)
MCV RBC AUTO: 90.9 FL (ref 82.6–102.9)
NRBC BLD-RTO: 0 PER 100 WBC
PLATELET # BLD AUTO: 221 K/UL (ref 138–453)
PMV BLD AUTO: 10.1 FL (ref 8.1–13.5)
POTASSIUM SERPL-SCNC: 5.1 MMOL/L (ref 3.7–5.3)
PROT SERPL-MCNC: 6.8 G/DL (ref 6.4–8.3)
PSA SERPL-MCNC: <0.02 NG/ML
RBC # BLD AUTO: 4.61 M/UL (ref 4.21–5.77)
SODIUM SERPL-SCNC: 144 MMOL/L (ref 135–144)
TRIGL SERPL-MCNC: 125 MG/DL
WBC OTHER # BLD: 6.6 K/UL (ref 3.5–11.3)

## 2023-08-01 ENCOUNTER — TELEPHONE (OUTPATIENT)
Dept: CARDIOLOGY CLINIC | Age: 78
End: 2023-08-01

## 2023-08-01 NOTE — TELEPHONE ENCOUNTER
Pre op Risk Assessment    Procedure MOHS on right cheek  Physician Dr. Klaudai Laura  Date of surgery/procedure 8/15/2023    Last OV 7/14/23  Last Stress 9-12-22  Last Echo 9-12-22  Last Cath 2-13-17  Last Stent   Is patient on blood thinners Plavix and ASA   Hold Meds/how many days 7     Route to [facility] on return

## 2023-09-05 NOTE — PROGRESS NOTES
2220: Called Dr. Lew Luu and received new orders for duoneb q4hrs, and Acapella. Patient stated he was feeling okay, but encouraged fluids. Will continue to monitor patient. MD Tenorio michelle Middleton

## 2023-09-22 ENCOUNTER — HOSPITAL ENCOUNTER (EMERGENCY)
Age: 78
Discharge: HOME | End: 2023-09-22
Attending: EMERGENCY MEDICINE
Payer: MEDICARE

## 2023-09-22 ENCOUNTER — APPOINTMENT (OUTPATIENT)
Dept: GENERAL RADIOLOGY | Age: 78
End: 2023-09-22
Payer: MEDICARE

## 2023-09-22 VITALS
SYSTOLIC BLOOD PRESSURE: 112 MMHG | RESPIRATION RATE: 18 BRPM | OXYGEN SATURATION: 95 % | TEMPERATURE: 97.5 F | HEART RATE: 66 BPM | DIASTOLIC BLOOD PRESSURE: 53 MMHG

## 2023-09-22 DIAGNOSIS — I95.1 ORTHOSTATIC HYPOTENSION: ICD-10-CM

## 2023-09-22 DIAGNOSIS — R05.1 ACUTE COUGH: ICD-10-CM

## 2023-09-22 DIAGNOSIS — R42 DIZZINESS: Primary | ICD-10-CM

## 2023-09-22 LAB
ANION GAP SERPL CALC-SCNC: 11 MEQ/L (ref 8–16)
BASOPHILS ABSOLUTE: 0.1 THOU/MM3 (ref 0–0.1)
BASOPHILS NFR BLD AUTO: 1.1 %
BUN SERPL-MCNC: 15 MG/DL (ref 7–22)
CALCIUM SERPL-MCNC: 9.1 MG/DL (ref 8.5–10.5)
CHLORIDE SERPL-SCNC: 102 MEQ/L (ref 98–111)
CO2 SERPL-SCNC: 27 MEQ/L (ref 23–33)
CREAT SERPL-MCNC: 1 MG/DL (ref 0.4–1.2)
DEPRECATED RDW RBC AUTO: 48.5 FL (ref 35–45)
EOSINOPHIL NFR BLD AUTO: 4.4 %
EOSINOPHILS ABSOLUTE: 0.2 THOU/MM3 (ref 0–0.4)
ERYTHROCYTE [DISTWIDTH] IN BLOOD BY AUTOMATED COUNT: 14 % (ref 11.5–14.5)
GFR SERPL CREATININE-BSD FRML MDRD: > 60 ML/MIN/1.73M2
GLUCOSE SERPL-MCNC: 114 MG/DL (ref 70–108)
HCT VFR BLD AUTO: 40.9 % (ref 42–52)
HGB BLD-MCNC: 13.3 GM/DL (ref 14–18)
IMM GRANULOCYTES # BLD AUTO: 0.01 THOU/MM3 (ref 0–0.07)
IMM GRANULOCYTES NFR BLD AUTO: 0.2 %
LYMPHOCYTES ABSOLUTE: 0.7 THOU/MM3 (ref 1–4.8)
LYMPHOCYTES NFR BLD AUTO: 14.1 %
MCH RBC QN AUTO: 30.6 PG (ref 26–33)
MCHC RBC AUTO-ENTMCNC: 32.5 GM/DL (ref 32.2–35.5)
MCV RBC AUTO: 94 FL (ref 80–94)
MONOCYTES ABSOLUTE: 0.8 THOU/MM3 (ref 0.4–1.3)
MONOCYTES NFR BLD AUTO: 15.8 %
NEUTROPHILS NFR BLD AUTO: 64.4 %
NRBC BLD AUTO-RTO: 0 /100 WBC
OSMOLALITY SERPL CALC.SUM OF ELEC: 281.1 MOSMOL/KG (ref 275–300)
PLATELET # BLD AUTO: 196 THOU/MM3 (ref 130–400)
PMV BLD AUTO: 9.5 FL (ref 9.4–12.4)
POTASSIUM SERPL-SCNC: 3.9 MEQ/L (ref 3.5–5.2)
PROCALCITONIN SERPL IA-MCNC: 0.13 NG/ML (ref 0.01–0.09)
RBC # BLD AUTO: 4.35 MILL/MM3 (ref 4.7–6.1)
SARS-COV-2 RDRP RESP QL NAA+PROBE: NOT  DETECTED
SEGMENTED NEUTROPHILS ABSOLUTE COUNT: 3.1 THOU/MM3 (ref 1.8–7.7)
SODIUM SERPL-SCNC: 140 MEQ/L (ref 135–145)
TROPONIN, HIGH SENSITIVITY: 15 NG/L (ref 0–12)
TROPONIN, HIGH SENSITIVITY: 18 NG/L (ref 0–12)
WBC # BLD AUTO: 4.8 THOU/MM3 (ref 4.8–10.8)

## 2023-09-22 PROCEDURE — 71045 X-RAY EXAM CHEST 1 VIEW: CPT

## 2023-09-22 PROCEDURE — 84145 PROCALCITONIN (PCT): CPT

## 2023-09-22 PROCEDURE — 85025 COMPLETE CBC W/AUTO DIFF WBC: CPT

## 2023-09-22 PROCEDURE — 36415 COLL VENOUS BLD VENIPUNCTURE: CPT

## 2023-09-22 PROCEDURE — 36000 PLACE NEEDLE IN VEIN: CPT

## 2023-09-22 PROCEDURE — 87635 SARS-COV-2 COVID-19 AMP PRB: CPT

## 2023-09-22 PROCEDURE — 96360 HYDRATION IV INFUSION INIT: CPT

## 2023-09-22 PROCEDURE — 2580000003 HC RX 258: Performed by: EMERGENCY MEDICINE

## 2023-09-22 PROCEDURE — 80048 BASIC METABOLIC PNL TOTAL CA: CPT

## 2023-09-22 PROCEDURE — 93005 ELECTROCARDIOGRAM TRACING: CPT | Performed by: EMERGENCY MEDICINE

## 2023-09-22 PROCEDURE — 96361 HYDRATE IV INFUSION ADD-ON: CPT

## 2023-09-22 PROCEDURE — 84484 ASSAY OF TROPONIN QUANT: CPT

## 2023-09-22 PROCEDURE — 99284 EMERGENCY DEPT VISIT MOD MDM: CPT

## 2023-09-22 PROCEDURE — 99215 OFFICE O/P EST HI 40 MIN: CPT

## 2023-09-22 RX ORDER — 0.9 % SODIUM CHLORIDE 0.9 %
1000 INTRAVENOUS SOLUTION INTRAVENOUS ONCE
Status: DISCONTINUED | OUTPATIENT
Start: 2023-09-22 | End: 2023-09-22 | Stop reason: HOSPADM

## 2023-09-22 RX ORDER — 0.9 % SODIUM CHLORIDE 0.9 %
1000 INTRAVENOUS SOLUTION INTRAVENOUS ONCE
Status: COMPLETED | OUTPATIENT
Start: 2023-09-22 | End: 2023-09-22

## 2023-09-22 RX ADMIN — SODIUM CHLORIDE 1000 ML: 9 INJECTION, SOLUTION INTRAVENOUS at 12:43

## 2023-09-22 ASSESSMENT — ENCOUNTER SYMPTOMS
SHORTNESS OF BREATH: 1
ABDOMINAL PAIN: 0
CHEST TIGHTNESS: 0
COUGH: 1
WHEEZING: 1

## 2023-09-22 NOTE — ED NOTES
In to complete orders, patient updated on plan of care. Patient requesting coffee at this time.      Sarah Woodward RN  09/22/23 2752

## 2023-09-22 NOTE — ED NOTES
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 Dr. Ekaterina Dsouza in to speak with patient, patient has decided to leave against medical advice. Transport arranged for patient back to [facility] where patient's vehicle is.      April Leyva RN  09/22/23 8906

## 2023-09-22 NOTE — ED NOTES
To [facility] with complaints of dizziness for 2 days. States he believes it started while working in yard. States it is constant and feels off balance. States he also has cough/congestion.      Orthostatic BPs  Layin/62, 69  Sittin/61, 74 (dizzy)  Standin/63, 78 (not much dizziness)       Justus Fuentes RN  [phone]

## 2023-09-22 NOTE — ED NOTES
Patient to ED via [facility] as a transfer patient from urgent care. Patient reports that he has been having intermittent dizziness along with a dry cough. Patient reports that his wife is currently out of town for work. Per report from urgent care, patient's orthostatics noted to be positive on standing. COVID swab pending, patient arrives with 20g IV in place. Vitals as documented, EKG completed at urgent care.      Dell Roy RN  09/22/23 0978

## 2023-09-23 LAB
EKG ATRIAL RATE: 78 BPM
EKG P AXIS: 22 DEGREES
EKG P-R INTERVAL: 210 MS
EKG Q-T INTERVAL: 410 MS
EKG QRS DURATION: 132 MS
EKG QTC CALCULATION (BAZETT): 467 MS
EKG R AXIS: 55 DEGREES
EKG T AXIS: 30 DEGREES
EKG VENTRICULAR RATE: 78 BPM

## 2023-09-23 PROCEDURE — 93010 ELECTROCARDIOGRAM REPORT: CPT | Performed by: NUCLEAR MEDICINE

## 2023-09-28 RX ORDER — CLOPIDOGREL BISULFATE 75 MG/1
TABLET ORAL
Qty: 90 TABLET | Refills: 3 | Status: SHIPPED | OUTPATIENT
Start: 2023-09-28

## 2023-11-10 NOTE — H&P
[address] [id number]                       PREOPERATIVE HISTORY AND PHYSICAL    PATIENT NAME: Rene Bailey                   :        1945  MED REC NO:   [id number]                           ROOM:  ACCOUNT NO:   [de-identified]                           ADMIT DATE: 2022  PROVIDER:     COREY Jorge PREOPERATIVE DIAGNOSIS:  Left hip degenerative joint disease. PAST MEDICAL HISTORY:  Includes coronary artery disease, prostate  cancer, hypertension, history of stroke. ALLERGIES:  PENICILLIN and TETANUS. SOCIAL HISTORY:  Denies tobacco or alcohol use. FAMILY HISTORY:  Positive for arthritis, cancer and heart trouble. Mother with stroke. REVIEW OF SYSTEMS:  Positive for joint pain, joint stiffness. Denies  nausea, vomiting, diarrhea or chest pain. PAST SURGICAL HISTORY:  Include three open heart surgeries,  appendectomy, prostate surgery, back surgery. CURRENT MEDICATIONS:  Meloxicam, trospium chloride, Montelukast, Plavix,  nitroglycerin, folic acid, aspirin, Losartan, sertraline, atorvastatin,  gabapentin, metoprolol tartrate. HISTORY OF PRESENT ILLNESS:  The patient is a pleasant 58-year-old  gentleman who came in complaining of pain and discomfort in his left  hip. The pain has been in his groin, worse with his activities of daily  living, including walking, going up and downstairs, and in and out of  chairs and cars. He has tried and failed conservative measures  including antiinflammatory medications, activity modifications, at home  direct physician-directed physical therapy. At this point, I elected to  move forward with more definitive treatment for left total hip  replacement. He did score 18/48 on [place] hip score.   The risks and  benefits of the surgery were discussed with him in detail including but  not limited to DVT; PE; flare-up of medical problems; limb length  inequality; stiffness; infection; nerve, artery, vein, bone and soft  tissue damage; and he would like to proceed. PHYSICAL EXAMINATION:  GENERAL:  Shows a well-developed, well-nourished male in no apparent  distress. Alert and oriented x3. He does walk with an antalgic gait  favoring his left hip. HEENT:  Normocephalic, atraumatic. EOMI. HEART:  Regular rate and rhythm. LUNGS:  Clear to auscultation bilaterally. ABDOMEN:  Soft, nontender, nondistended. EXTREMITIES:  Left hip shows decreased internal and external rotation  with discomfort in the groin. 4/5 strength. NEUROLOGIC:  He is neurologically intact, neurovascularly intact,  sensory intact, and reflexes are intact. ASSESSMENT:  Left hip arthritis. PLAN:  At this time, in collaboration with Dr. Shanna Saenz, he would like  to move forth with left total hip replacement. The risks and benefits  of surgery were discussed with him in detail and he would like to  proceed. COREY Hsieh     D: 09/19/2022 11:51:31       T: 09/19/2022 13:09:56     /[email or web address]  Job#: [id number]     Doc#: [id number]    CC: Detail Level: Detailed Hide Additional Notes?: No

## 2023-12-08 ENCOUNTER — HOSPITAL ENCOUNTER (EMERGENCY)
Age: 78
Discharge: HOME | End: 2023-12-08
Attending: EMERGENCY MEDICINE
Payer: MEDICARE

## 2023-12-08 ENCOUNTER — APPOINTMENT (OUTPATIENT)
Dept: CT IMAGING | Age: 78
End: 2023-12-08
Payer: MEDICARE

## 2023-12-08 ENCOUNTER — APPOINTMENT (OUTPATIENT)
Dept: GENERAL RADIOLOGY | Age: 78
End: 2023-12-08
Payer: MEDICARE

## 2023-12-08 VITALS
HEIGHT: 64 IN | SYSTOLIC BLOOD PRESSURE: 133 MMHG | OXYGEN SATURATION: 98 % | RESPIRATION RATE: 18 BRPM | HEART RATE: 80 BPM | TEMPERATURE: 99.3 F | WEIGHT: 180 LBS | DIASTOLIC BLOOD PRESSURE: 73 MMHG | BODY MASS INDEX: 30.73 KG/M2

## 2023-12-08 DIAGNOSIS — K02.9 DENTAL CARIES: Primary | ICD-10-CM

## 2023-12-08 DIAGNOSIS — I25.10 CORONARY ARTERY DISEASE INVOLVING NATIVE CORONARY ARTERY OF NATIVE HEART WITHOUT ANGINA PECTORIS: ICD-10-CM

## 2023-12-08 DIAGNOSIS — R51.9 ACUTE NONINTRACTABLE HEADACHE, UNSPECIFIED HEADACHE TYPE: ICD-10-CM

## 2023-12-08 DIAGNOSIS — R42 DIZZINESS: ICD-10-CM

## 2023-12-08 DIAGNOSIS — K08.89 PAIN, DENTAL: ICD-10-CM

## 2023-12-08 LAB
ANION GAP SERPL CALC-SCNC: 10 MEQ/L (ref 8–16)
BASOPHILS ABSOLUTE: 0 THOU/MM3 (ref 0–0.1)
BASOPHILS NFR BLD AUTO: 0.8 %
BUN SERPL-MCNC: 14 MG/DL (ref 7–22)
CALCIUM SERPL-MCNC: 9.1 MG/DL (ref 8.5–10.5)
CHLORIDE SERPL-SCNC: 106 MEQ/L (ref 98–111)
CO2 SERPL-SCNC: 25 MEQ/L (ref 23–33)
CREAT SERPL-MCNC: 0.9 MG/DL (ref 0.4–1.2)
DEPRECATED RDW RBC AUTO: 45.1 FL (ref 35–45)
EOSINOPHIL NFR BLD AUTO: 1 %
EOSINOPHILS ABSOLUTE: 0.1 THOU/MM3 (ref 0–0.4)
ERYTHROCYTE [DISTWIDTH] IN BLOOD BY AUTOMATED COUNT: 13.2 % (ref 11.5–14.5)
GFR SERPL CREATININE-BSD FRML MDRD: > 60 ML/MIN/1.73M2
GLUCOSE SERPL-MCNC: 122 MG/DL (ref 70–108)
HCT VFR BLD AUTO: 40 % (ref 42–52)
HGB BLD-MCNC: 12.9 GM/DL (ref 14–18)
IMM GRANULOCYTES # BLD AUTO: 0.01 THOU/MM3 (ref 0–0.07)
IMM GRANULOCYTES NFR BLD AUTO: 0.2 %
LACTATE SERPL-SCNC: 1 MMOL/L (ref 0.5–2)
LYMPHOCYTES ABSOLUTE: 0.5 THOU/MM3 (ref 1–4.8)
LYMPHOCYTES NFR BLD AUTO: 10.3 %
MCH RBC QN AUTO: 30.3 PG (ref 26–33)
MCHC RBC AUTO-ENTMCNC: 32.3 GM/DL (ref 32.2–35.5)
MCV RBC AUTO: 93.9 FL (ref 80–94)
MONOCYTES ABSOLUTE: 0.4 THOU/MM3 (ref 0.4–1.3)
MONOCYTES NFR BLD AUTO: 8.9 %
NEUTROPHILS NFR BLD AUTO: 78.8 %
NRBC BLD AUTO-RTO: 0 /100 WBC
NT-PROBNP SERPL IA-MCNC: 553.7 PG/ML (ref 0–449)
PLATELET # BLD AUTO: 160 THOU/MM3 (ref 130–400)
PMV BLD AUTO: 9.4 FL (ref 9.4–12.4)
POTASSIUM SERPL-SCNC: 3.9 MEQ/L (ref 3.5–5.2)
RBC # BLD AUTO: 4.26 MILL/MM3 (ref 4.7–6.1)
SEGMENTED NEUTROPHILS ABSOLUTE COUNT: 3.9 THOU/MM3 (ref 1.8–7.7)
SODIUM SERPL-SCNC: 141 MEQ/L (ref 135–145)
TROPONIN, HIGH SENSITIVITY: 59 NG/L (ref 0–12)
TROPONIN, HIGH SENSITIVITY: 59 NG/L (ref 0–12)
WBC # BLD AUTO: 5 THOU/MM3 (ref 4.8–10.8)

## 2023-12-08 PROCEDURE — 71045 X-RAY EXAM CHEST 1 VIEW: CPT

## 2023-12-08 PROCEDURE — 83880 ASSAY OF NATRIURETIC PEPTIDE: CPT

## 2023-12-08 PROCEDURE — 6360000004 HC RX CONTRAST MEDICATION

## 2023-12-08 PROCEDURE — NBSRV NON-BILLABLE SERVICE: Performed by: NURSE PRACTITIONER

## 2023-12-08 PROCEDURE — 83605 ASSAY OF LACTIC ACID: CPT

## 2023-12-08 PROCEDURE — 70487 CT MAXILLOFACIAL W/DYE: CPT

## 2023-12-08 PROCEDURE — 36415 COLL VENOUS BLD VENIPUNCTURE: CPT

## 2023-12-08 PROCEDURE — 99285 EMERGENCY DEPT VISIT HI MDM: CPT

## 2023-12-08 PROCEDURE — 84484 ASSAY OF TROPONIN QUANT: CPT

## 2023-12-08 PROCEDURE — 93005 ELECTROCARDIOGRAM TRACING: CPT | Performed by: NURSE PRACTITIONER

## 2023-12-08 PROCEDURE — 80048 BASIC METABOLIC PNL TOTAL CA: CPT

## 2023-12-08 PROCEDURE — 85025 COMPLETE CBC W/AUTO DIFF WBC: CPT

## 2023-12-08 PROCEDURE — 6370000000 HC RX 637 (ALT 250 FOR IP)

## 2023-12-08 PROCEDURE — 99215 OFFICE O/P EST HI 40 MIN: CPT

## 2023-12-08 PROCEDURE — 93005 ELECTROCARDIOGRAM TRACING: CPT

## 2023-12-08 RX ORDER — CLINDAMYCIN HYDROCHLORIDE 150 MG/1
300 CAPSULE ORAL ONCE
Status: COMPLETED | OUTPATIENT
Start: 2023-12-08 | End: 2023-12-08

## 2023-12-08 RX ORDER — HYDROCODONE BITARTRATE AND ACETAMINOPHEN 5; 325 MG/1; MG/1
1 TABLET ORAL ONCE
Status: COMPLETED | OUTPATIENT
Start: 2023-12-08 | End: 2023-12-08

## 2023-12-08 RX ORDER — MECLIZINE HYDROCHLORIDE 25 MG/1
25 TABLET ORAL 3 TIMES DAILY PRN
Qty: 15 TABLET | Refills: 0 | Status: SHIPPED | OUTPATIENT
Start: 2023-12-08 | End: 2023-12-13

## 2023-12-08 RX ORDER — CLINDAMYCIN HYDROCHLORIDE 300 MG/1
300 CAPSULE ORAL 3 TIMES DAILY
Qty: 30 CAPSULE | Refills: 0 | Status: SHIPPED | OUTPATIENT
Start: 2023-12-08 | End: 2023-12-18

## 2023-12-08 RX ADMIN — IOPAMIDOL 80 ML: 755 INJECTION, SOLUTION INTRAVENOUS at 14:42

## 2023-12-08 RX ADMIN — CLINDAMYCIN HYDROCHLORIDE 300 MG: 150 CAPSULE ORAL at 17:46

## 2023-12-08 RX ADMIN — HYDROCODONE BITARTRATE AND ACETAMINOPHEN 1 TABLET: 5; 325 TABLET ORAL at 17:46

## 2023-12-08 ASSESSMENT — PAIN - FUNCTIONAL ASSESSMENT
PAIN_FUNCTIONAL_ASSESSMENT: NONE - DENIES PAIN

## 2023-12-08 ASSESSMENT — PAIN SCALES - GENERAL: PAINLEVEL_OUTOF10: 7

## 2023-12-08 NOTE — ED NOTES
Patient medicated per [facility] and updated on plan of care.      Doni Rodriguez RN  12/08/23 8085

## 2023-12-08 NOTE — ED NOTES
Patient assisted to a standing position to attempt to provide urine sample at this time. Patient unable to provide urine sample and is assisted back into bed. Linens changed and patient updated on plan of care by provider at this time.       Alex Marrero RN  12/08/23 5412

## 2023-12-08 NOTE — ED PROVIDER NOTES
[facility] EMERGENCY DEPT  Emergency Department  Attending Physician Attestation       Patient was seen by  ER/IM Resident Dr. Kimberlee Curtis and I supervised/co-managed the case    I performed a history and physical examination of the patient and discussed management with the Resident/Trainee. I reviewed the Resident's note and agree with the documented findings and plan of care. Any areas of disagreement are noted on the chart. I was personally present for the key portions of any procedures. I have documented in the chart those procedures where I was not present during the key portions. I have reviewed the emergency nurses triage note. I agree with the chief complaint, past medical history, past surgical history, allergies, medications, social and family history as documented unless otherwise noted below. For Physician Assistant/ Nurse Practitioner cases I have personally evaluated this patient and have completed at least one if not all key elements of the E/M (history, physical exam, and MDM). Additional findings are as noted. Chief Complaint      Mao Aviles is a 78 y.o. male who presented to the emergency room due to headache and dizziness short of breath. The patient said that he woke up this morning got about 6 AM and the patient was getting up from bed and the patient was so dizzy that she has fell back in her bed 3 times. The headache is on the frontal area 6/10 however did not have any slurred speech no numbness no weakness or paralysis . Digna Pretty The the wife noted some facial swelling on the right for which the patient went to the urgent care and was sent here to be evaluated. The the patient denies any chest pain did not have any nausea vomiting palpitations no fever chills sore throat cough cold. No oral flu symptoms. The patient had history of bypass twice.   Headache at present is 6/10      System Problem List     Patient Active Problem List   Diagnosis    Coronary artery disease recognition technology. **      Final report electronically signed by Dr. Lemuel Kolb on 12/8/2023 3:16 PM      XR CHEST PORTABLE   Final Result      No acute intrathoracic process. **This report has been created using voice recognition software. It may contain minor errors which are inherent in voice recognition technology. **      Final report electronically signed by Dr. Hernando Chavez on 12/8/2023 2:31 PM      CT HEAD WO CONTRAST    (Results Pending)         LABS:  Labs Reviewed   CBC WITH AUTO DIFFERENTIAL - Abnormal; Notable for the following components:       Result Value    RBC 4.26 (*)     Hemoglobin 12.9 (*)     Hematocrit 40.0 (*)     RDW-SD 45.1 (*)     Lymphocytes Absolute 0.5 (*)     All other components within normal limits   BASIC METABOLIC PANEL - Abnormal; Notable for the following components:    Glucose 122 (*)     All other components within normal limits   TROPONIN - Abnormal; Notable for the following components:    Troponin, High Sensitivity 59 (*)     All other components within normal limits   BRAIN NATRIURETIC PEPTIDE - Abnormal; Notable for the following components:    Pro-.7 (*)     All other components within normal limits   TROPONIN - Abnormal; Notable for the following components:    Troponin, High Sensitivity 59 (*)     All other components within normal limits   LACTIC ACID   ANION GAP   GLOMERULAR FILTRATION RATE, ESTIMATED   URINALYSIS         EMERGENCY DEPARTMENT COURSE:     Vitals:    Vitals:    12/08/23 1557 12/08/23 1659 12/08/23 1815 12/08/23 1934   BP: 127/62 (!) 166/81 133/73    Pulse: 77 74 82 80   Resp: 16 18 18 18   Temp:       TempSrc:       SpO2: 95% 96% 96% 98%   Weight:       Height:           Medications   iopamidol (ISOVUE-370) 76 % injection 80 mL (80 mLs IntraVENous Given 12/8/23 1442)   clindamycin (CLEOCIN) capsule 300 mg (300 mg Oral Given 12/8/23 1746)   HYDROcodone-acetaminophen (NORCO) 5-325 MG per tablet 1 tablet (1 tablet Oral Given

## 2023-12-08 NOTE — ED TRIAGE NOTES
Pt ambulatory to room 4 with wife. Wife complains she made him come here because he has been having tooth pain for several days and is now having facial swelling. Pt dizzy walking back states he has fallen several times today. Pt is on plavix. He does complain of headache. He has had 2 open heart surgeries.

## 2023-12-08 NOTE — ED NOTES
Patient resting in bed and updated on plan of care, respirations even and unlabored at this time.      Doni Rodriguez RN  12/08/23 5171

## 2023-12-08 NOTE — ED PROVIDER NOTES
mouth at bedtime, Disp: , Rfl:     gabapentin (NEURONTIN) 300 MG capsule, Take 1 capsule by mouth 3 times daily. , Disp: , Rfl:     losartan (COZAAR) 25 MG tablet, Take 1 tablet by mouth daily, Disp: , Rfl:     folic acid (FOLVITE) 1 MG tablet, Take 1 tablet by mouth daily, Disp: , Rfl:     vitamin B-1 100 MG tablet, Take 1 tablet by mouth daily, Disp: , Rfl:     metoprolol tartrate (LOPRESSOR) 25 MG tablet, Take 1 tablet by mouth 2 times daily, Disp: 180 tablet, Rfl: 3    atorvastatin (LIPITOR) 80 MG tablet, Take 1 tablet by mouth daily, Disp: 90 tablet, Rfl: 0    nitroGLYCERIN (NITROSTAT) 0.4 MG SL tablet, Place 1 tablet under the tongue every 5 minutes as needed for Chest pain up to max of 3 total doses. If no relief after 1 dose, call 911., Disp: 25 tablet, Rfl: 3    sertraline (ZOLOFT) 100 MG tablet, Take 1 tablet by mouth daily, Disp: 90 tablet, Rfl: 3    Discharge Medication List as of 12/8/2023  7:56 PM        CONTINUE these medications which have NOT CHANGED    Details   clopidogrel (PLAVIX) 75 MG tablet TAKE 1 TABLET EVERY DAY, Disp-90 tablet, R-3Normal      aspirin 81 MG EC tablet Take 1 tablet by mouth dailyHistorical Med      montelukast (SINGULAIR) 10 MG tablet take 1 tablet by mouth at bedtimeHistorical Med      gabapentin (NEURONTIN) 300 MG capsule Take 1 capsule by mouth 3 times daily. Historical Med      losartan (COZAAR) 25 MG tablet Take 1 tablet by mouth dailyHistorical Med      folic acid (FOLVITE) 1 MG tablet Take 1 tablet by mouth dailyOTC      vitamin B-1 100 MG tablet Take 1 tablet by mouth dailyOTC      metoprolol tartrate (LOPRESSOR) 25 MG tablet Take 1 tablet by mouth 2 times daily, Disp-180 tablet, R-3Normal      atorvastatin (LIPITOR) 80 MG tablet Take 1 tablet by mouth daily, Disp-90 tablet, R-0Normal      nitroGLYCERIN (NITROSTAT) 0.4 MG SL tablet Place 1 tablet under the tongue every 5 minutes as needed for Chest pain up to max of 3 total doses.  If no relief after 1 dose, call 911., atraumatic. Mouth/Throat:      Mouth: Mucous membranes are moist.   Eyes:      Extraocular Movements: Extraocular movements intact. Pupils: Pupils are equal, round, and reactive to light. Neck:      Vascular: No carotid bruit. Comments: Mild right-sided tenderness to palpation from angle of mandible to TMJ  Cardiovascular:      Rate and Rhythm: Normal rate and regular rhythm. Pulses: Normal pulses. Heart sounds: Normal heart sounds. Pulmonary:      Effort: Pulmonary effort is normal.      Breath sounds: Normal breath sounds. Abdominal:      General: Bowel sounds are normal.      Palpations: Abdomen is soft. Musculoskeletal:         General: No swelling. Cervical back: Normal range of motion and neck supple. Tenderness present. No rigidity. Lymphadenopathy:      Cervical: Cervical adenopathy present. Skin:     General: Skin is warm and dry. Capillary Refill: Capillary refill takes less than 2 seconds. Neurological:      General: No focal deficit present. Mental Status: He is alert and oriented to person, place, and time.    Psychiatric:         Mood and Affect: Mood normal.         ED RESULTS   Laboratory results (none if blank):  Labs Reviewed   CBC WITH AUTO DIFFERENTIAL - Abnormal; Notable for the following components:       Result Value    RBC 4.26 (*)     Hemoglobin 12.9 (*)     Hematocrit 40.0 (*)     RDW-SD 45.1 (*)     Lymphocytes Absolute 0.5 (*)     All other components within normal limits   BASIC METABOLIC PANEL - Abnormal; Notable for the following components:    Glucose 122 (*)     All other components within normal limits   TROPONIN - Abnormal; Notable for the following components:    Troponin, High Sensitivity 59 (*)     All other components within normal limits   BRAIN NATRIURETIC PEPTIDE - Abnormal; Notable for the following components:    Pro-.7 (*)     All other components within normal limits   TROPONIN - Abnormal; Notable for the
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
## 2023-12-09 LAB
EKG ATRIAL RATE: 80 BPM
EKG ATRIAL RATE: 93 BPM
EKG P AXIS: 52 DEGREES
EKG P AXIS: 58 DEGREES
EKG P-R INTERVAL: 190 MS
EKG P-R INTERVAL: 204 MS
EKG Q-T INTERVAL: 400 MS
EKG Q-T INTERVAL: 410 MS
EKG QRS DURATION: 138 MS
EKG QRS DURATION: 138 MS
EKG QTC CALCULATION (BAZETT): 472 MS
EKG QTC CALCULATION (BAZETT): 497 MS
EKG R AXIS: 55 DEGREES
EKG R AXIS: 58 DEGREES
EKG T AXIS: 37 DEGREES
EKG T AXIS: 41 DEGREES
EKG VENTRICULAR RATE: 80 BPM
EKG VENTRICULAR RATE: 93 BPM

## 2023-12-09 PROCEDURE — 93010 ELECTROCARDIOGRAM REPORT: CPT | Performed by: INTERNAL MEDICINE

## 2023-12-09 NOTE — ED NOTES
Meal tray provided at this time. Patient resting in bed, respirations even and unlabored.       Marsha Guidry RN  12/08/23 1934

## 2024-01-20 ENCOUNTER — APPOINTMENT (OUTPATIENT)
Dept: CT IMAGING | Age: 79
DRG: 694 | End: 2024-01-20
Payer: MEDICARE

## 2024-01-20 ENCOUNTER — HOSPITAL ENCOUNTER (INPATIENT)
Age: 79
LOS: 1 days | Discharge: HOME | DRG: 694 | End: 2024-01-22
Attending: EMERGENCY MEDICINE | Admitting: STUDENT IN AN ORGANIZED HEALTH CARE EDUCATION/TRAINING PROGRAM
Payer: MEDICARE

## 2024-01-20 DIAGNOSIS — N30.90 CYSTITIS: Primary | ICD-10-CM

## 2024-01-20 DIAGNOSIS — R10.2 SUPRAPUBIC ABDOMINAL PAIN: ICD-10-CM

## 2024-01-20 PROBLEM — R31.9 HEMATURIA: Status: ACTIVE | Noted: 2024-01-20

## 2024-01-20 LAB
AMORPH SED URNS QL MICRO: ABNORMAL
ANION GAP SERPL CALC-SCNC: 7 MEQ/L (ref 8–16)
BACTERIA: ABNORMAL
BASOPHILS ABSOLUTE: 0 THOU/MM3 (ref 0–0.1)
BASOPHILS NFR BLD AUTO: 0.7 %
BILIRUB UR QL STRIP: NEGATIVE
BUN SERPL-MCNC: 19 MG/DL (ref 7–22)
CALCIUM SERPL-MCNC: 9 MG/DL (ref 8.5–10.5)
CASTS #/AREA URNS LPF: ABNORMAL /LPF
CASTS #/AREA URNS LPF: ABNORMAL /LPF
CHARACTER UR: ABNORMAL
CHARCOAL URNS QL MICRO: ABNORMAL
CHLORIDE SERPL-SCNC: 106 MEQ/L (ref 98–111)
CO2 SERPL-SCNC: 28 MEQ/L (ref 23–33)
COLOR UR: ABNORMAL
CREAT SERPL-MCNC: 0.9 MG/DL (ref 0.4–1.2)
CRYSTALS URNS QL MICRO: ABNORMAL
CRYSTALS URNS QL MICRO: ABNORMAL
DEPRECATED RDW RBC AUTO: 45.6 FL (ref 35–45)
EOSINOPHIL NFR BLD AUTO: 2.6 %
EOSINOPHILS ABSOLUTE: 0.2 THOU/MM3 (ref 0–0.4)
EPITHELIAL CELLS, UA: ABNORMAL /HPF
ERYTHROCYTE [DISTWIDTH] IN BLOOD BY AUTOMATED COUNT: 13.3 % (ref 11.5–14.5)
GFR SERPL CREATININE-BSD FRML MDRD: > 60 ML/MIN/1.73M2
GLUCOSE SERPL-MCNC: 131 MG/DL (ref 70–108)
GLUCOSE UR QL STRIP.AUTO: NEGATIVE MG/DL
HCT VFR BLD AUTO: 39.7 % (ref 42–52)
HGB BLD-MCNC: 13 GM/DL (ref 14–18)
HGB UR QL STRIP.AUTO: ABNORMAL
IMM GRANULOCYTES # BLD AUTO: 0.02 THOU/MM3 (ref 0–0.07)
IMM GRANULOCYTES NFR BLD AUTO: 0.3 %
KETONES UR QL STRIP.AUTO: NEGATIVE
LEUKOCYTE ESTERASE UR QL STRIP.AUTO: NEGATIVE
LYMPHOCYTES ABSOLUTE: 0.6 THOU/MM3 (ref 1–4.8)
LYMPHOCYTES NFR BLD AUTO: 8.8 %
MCH RBC QN AUTO: 30.2 PG (ref 26–33)
MCHC RBC AUTO-ENTMCNC: 32.7 GM/DL (ref 32.2–35.5)
MCV RBC AUTO: 92.1 FL (ref 80–94)
MISCELLANEOUS LAB TEST RESULT: ABNORMAL
MONOCYTES ABSOLUTE: 0.5 THOU/MM3 (ref 0.4–1.3)
MONOCYTES NFR BLD AUTO: 6.6 %
MUCOUS THREADS URNS QL MICRO: ABNORMAL
NEUTROPHILS NFR BLD AUTO: 81 %
NITRITE UR QL STRIP.AUTO: NEGATIVE
NRBC BLD AUTO-RTO: 0 /100 WBC
OSMOLALITY SERPL CALC.SUM OF ELEC: 285.3 MOSMOL/KG (ref 275–300)
PH UR STRIP.AUTO: 7 [PH] (ref 5–9)
PLATELET # BLD AUTO: 219 THOU/MM3 (ref 130–400)
PMV BLD AUTO: 9.6 FL (ref 9.4–12.4)
POTASSIUM SERPL-SCNC: 4.5 MEQ/L (ref 3.5–5.2)
PROT UR STRIP.AUTO-MCNC: ABNORMAL MG/DL
RBC # BLD AUTO: 4.31 MILL/MM3 (ref 4.7–6.1)
RBC #/AREA URNS HPF: > 200 /HPF
RENAL EPI CELLS #/AREA URNS HPF: ABNORMAL /[HPF]
SEGMENTED NEUTROPHILS ABSOLUTE COUNT: 5.6 THOU/MM3 (ref 1.8–7.7)
SODIUM SERPL-SCNC: 141 MEQ/L (ref 135–145)
SP GR UR REFRACT.AUTO: 1.02 (ref 1–1.03)
UROBILINOGEN UR QL STRIP.AUTO: 0.2 EU/DL (ref 0–1)
WBC # BLD AUTO: 6.9 THOU/MM3 (ref 4.8–10.8)
WBC #/AREA URNS HPF: ABNORMAL /HPF
YEAST LIKE FUNGI URNS QL MICRO: ABNORMAL

## 2024-01-20 PROCEDURE — 85025 COMPLETE CBC W/AUTO DIFF WBC: CPT

## 2024-01-20 PROCEDURE — 74178 CT ABD&PLV WO CNTR FLWD CNTR: CPT | Performed by: EMERGENCY MEDICINE

## 2024-01-20 PROCEDURE — 6360000004 HC RX CONTRAST MEDICATION: Performed by: EMERGENCY MEDICINE

## 2024-01-20 PROCEDURE — 2580000003 HC RX 258

## 2024-01-20 PROCEDURE — 80048 BASIC METABOLIC PNL TOTAL CA: CPT

## 2024-01-20 PROCEDURE — 81001 URINALYSIS AUTO W/SCOPE: CPT

## 2024-01-20 PROCEDURE — 96365 THER/PROPH/DIAG IV INF INIT: CPT

## 2024-01-20 PROCEDURE — 51798 US URINE CAPACITY MEASURE: CPT

## 2024-01-20 PROCEDURE — 99285 EMERGENCY DEPT VISIT HI MDM: CPT

## 2024-01-20 PROCEDURE — 36415 COLL VENOUS BLD VENIPUNCTURE: CPT

## 2024-01-20 PROCEDURE — 96375 TX/PRO/DX INJ NEW DRUG ADDON: CPT

## 2024-01-20 PROCEDURE — 93005 ELECTROCARDIOGRAM TRACING: CPT

## 2024-01-20 PROCEDURE — 6360000002 HC RX W HCPCS: Performed by: EMERGENCY MEDICINE

## 2024-01-20 PROCEDURE — 6370000000 HC RX 637 (ALT 250 FOR IP): Performed by: EMERGENCY MEDICINE

## 2024-01-20 PROCEDURE — 96376 TX/PRO/DX INJ SAME DRUG ADON: CPT

## 2024-01-20 PROCEDURE — G0378 HOSPITAL OBSERVATION PER HR: HCPCS

## 2024-01-20 PROCEDURE — 96374 THER/PROPH/DIAG INJ IV PUSH: CPT

## 2024-01-20 PROCEDURE — 93010 ELECTROCARDIOGRAM REPORT: CPT | Performed by: NUCLEAR MEDICINE

## 2024-01-20 PROCEDURE — 6370000000 HC RX 637 (ALT 250 FOR IP)

## 2024-01-20 PROCEDURE — 2580000003 HC RX 258: Performed by: EMERGENCY MEDICINE

## 2024-01-20 RX ORDER — GABAPENTIN 300 MG/1
300 CAPSULE ORAL 3 TIMES DAILY
Status: DISCONTINUED | OUTPATIENT
Start: 2024-01-20 | End: 2024-01-22 | Stop reason: HOSPADM

## 2024-01-20 RX ORDER — POTASSIUM CHLORIDE 7.45 MG/ML
10 INJECTION INTRAVENOUS PRN
Status: DISCONTINUED | OUTPATIENT
Start: 2024-01-20 | End: 2024-01-22 | Stop reason: HOSPADM

## 2024-01-20 RX ORDER — LANOLIN ALCOHOL/MO/W.PET/CERES
100 CREAM (GRAM) TOPICAL DAILY
Status: DISCONTINUED | OUTPATIENT
Start: 2024-01-21 | End: 2024-01-22 | Stop reason: HOSPADM

## 2024-01-20 RX ORDER — TAMSULOSIN HYDROCHLORIDE 0.4 MG/1
0.4 CAPSULE ORAL ONCE
Status: COMPLETED | OUTPATIENT
Start: 2024-01-20 | End: 2024-01-20

## 2024-01-20 RX ORDER — SODIUM CHLORIDE 9 MG/ML
INJECTION, SOLUTION INTRAVENOUS PRN
Status: DISCONTINUED | OUTPATIENT
Start: 2024-01-20 | End: 2024-01-22 | Stop reason: HOSPADM

## 2024-01-20 RX ORDER — SODIUM CHLORIDE 0.9 % (FLUSH) 0.9 %
10 SYRINGE (ML) INJECTION EVERY 12 HOURS SCHEDULED
Status: DISCONTINUED | OUTPATIENT
Start: 2024-01-20 | End: 2024-01-22 | Stop reason: HOSPADM

## 2024-01-20 RX ORDER — LOSARTAN POTASSIUM 25 MG/1
25 TABLET ORAL DAILY
Status: DISCONTINUED | OUTPATIENT
Start: 2024-01-20 | End: 2024-01-20

## 2024-01-20 RX ORDER — MORPHINE SULFATE 2 MG/ML
2 INJECTION, SOLUTION INTRAMUSCULAR; INTRAVENOUS ONCE
Status: COMPLETED | OUTPATIENT
Start: 2024-01-20 | End: 2024-01-20

## 2024-01-20 RX ORDER — SERTRALINE HYDROCHLORIDE 100 MG/1
100 TABLET, FILM COATED ORAL DAILY
Status: DISCONTINUED | OUTPATIENT
Start: 2024-01-21 | End: 2024-01-22 | Stop reason: HOSPADM

## 2024-01-20 RX ORDER — POLYETHYLENE GLYCOL 3350 17 G/17G
17 POWDER, FOR SOLUTION ORAL DAILY PRN
Status: DISCONTINUED | OUTPATIENT
Start: 2024-01-20 | End: 2024-01-22 | Stop reason: HOSPADM

## 2024-01-20 RX ORDER — POTASSIUM CHLORIDE 20 MEQ/1
40 TABLET, EXTENDED RELEASE ORAL PRN
Status: DISCONTINUED | OUTPATIENT
Start: 2024-01-20 | End: 2024-01-22 | Stop reason: HOSPADM

## 2024-01-20 RX ORDER — ACETAMINOPHEN 325 MG/1
650 TABLET ORAL EVERY 6 HOURS PRN
Status: DISCONTINUED | OUTPATIENT
Start: 2024-01-20 | End: 2024-01-22 | Stop reason: HOSPADM

## 2024-01-20 RX ORDER — FOLIC ACID 1 MG/1
1 TABLET ORAL DAILY
Status: DISCONTINUED | OUTPATIENT
Start: 2024-01-20 | End: 2024-01-22 | Stop reason: HOSPADM

## 2024-01-20 RX ORDER — FENTANYL CITRATE 50 UG/ML
25 INJECTION, SOLUTION INTRAMUSCULAR; INTRAVENOUS ONCE
Status: COMPLETED | OUTPATIENT
Start: 2024-01-20 | End: 2024-01-20

## 2024-01-20 RX ORDER — ACETAMINOPHEN 650 MG/1
650 SUPPOSITORY RECTAL EVERY 6 HOURS PRN
Status: DISCONTINUED | OUTPATIENT
Start: 2024-01-20 | End: 2024-01-22 | Stop reason: HOSPADM

## 2024-01-20 RX ORDER — FENTANYL CITRATE 50 UG/ML
50 INJECTION, SOLUTION INTRAMUSCULAR; INTRAVENOUS ONCE
Status: COMPLETED | OUTPATIENT
Start: 2024-01-20 | End: 2024-01-20

## 2024-01-20 RX ORDER — 0.9 % SODIUM CHLORIDE 0.9 %
500 INTRAVENOUS SOLUTION INTRAVENOUS ONCE
Status: COMPLETED | OUTPATIENT
Start: 2024-01-20 | End: 2024-01-20

## 2024-01-20 RX ORDER — LIDOCAINE HYDROCHLORIDE 20 MG/ML
JELLY TOPICAL PRN
Status: DISCONTINUED | OUTPATIENT
Start: 2024-01-20 | End: 2024-01-20

## 2024-01-20 RX ORDER — MONTELUKAST SODIUM 10 MG/1
10 TABLET ORAL NIGHTLY
Status: DISCONTINUED | OUTPATIENT
Start: 2024-01-20 | End: 2024-01-22 | Stop reason: HOSPADM

## 2024-01-20 RX ORDER — ATORVASTATIN CALCIUM 80 MG/1
80 TABLET, FILM COATED ORAL DAILY
Status: DISCONTINUED | OUTPATIENT
Start: 2024-01-20 | End: 2024-01-22 | Stop reason: HOSPADM

## 2024-01-20 RX ORDER — MORPHINE SULFATE 2 MG/ML
2 INJECTION, SOLUTION INTRAMUSCULAR; INTRAVENOUS EVERY 4 HOURS PRN
Status: DISCONTINUED | OUTPATIENT
Start: 2024-01-20 | End: 2024-01-22 | Stop reason: HOSPADM

## 2024-01-20 RX ORDER — ONDANSETRON 2 MG/ML
4 INJECTION INTRAMUSCULAR; INTRAVENOUS EVERY 6 HOURS PRN
Status: DISCONTINUED | OUTPATIENT
Start: 2024-01-20 | End: 2024-01-22 | Stop reason: HOSPADM

## 2024-01-20 RX ORDER — MAGNESIUM SULFATE IN WATER 40 MG/ML
2000 INJECTION, SOLUTION INTRAVENOUS PRN
Status: DISCONTINUED | OUTPATIENT
Start: 2024-01-20 | End: 2024-01-22 | Stop reason: HOSPADM

## 2024-01-20 RX ORDER — SODIUM CHLORIDE 0.9 % (FLUSH) 0.9 %
10 SYRINGE (ML) INJECTION PRN
Status: DISCONTINUED | OUTPATIENT
Start: 2024-01-20 | End: 2024-01-22 | Stop reason: HOSPADM

## 2024-01-20 RX ORDER — LIDOCAINE HYDROCHLORIDE 20 MG/ML
JELLY TOPICAL PRN
Status: DISCONTINUED | OUTPATIENT
Start: 2024-01-20 | End: 2024-01-22 | Stop reason: HOSPADM

## 2024-01-20 RX ORDER — HYDROCODONE BITARTRATE AND ACETAMINOPHEN 5; 325 MG/1; MG/1
1 TABLET ORAL EVERY 6 HOURS PRN
Status: DISCONTINUED | OUTPATIENT
Start: 2024-01-20 | End: 2024-01-22 | Stop reason: HOSPADM

## 2024-01-20 RX ORDER — ONDANSETRON 4 MG/1
4 TABLET, ORALLY DISINTEGRATING ORAL EVERY 8 HOURS PRN
Status: DISCONTINUED | OUTPATIENT
Start: 2024-01-20 | End: 2024-01-22 | Stop reason: HOSPADM

## 2024-01-20 RX ADMIN — FOLIC ACID 1 MG: 1 TABLET ORAL at 17:56

## 2024-01-20 RX ADMIN — CEFTRIAXONE SODIUM 1000 MG: 1 INJECTION, POWDER, FOR SOLUTION INTRAMUSCULAR; INTRAVENOUS at 14:33

## 2024-01-20 RX ADMIN — SODIUM CHLORIDE 500 ML: 9 INJECTION, SOLUTION INTRAVENOUS at 10:13

## 2024-01-20 RX ADMIN — LIDOCAINE HYDROCHLORIDE: 20 JELLY TOPICAL at 10:06

## 2024-01-20 RX ADMIN — MORPHINE SULFATE 2 MG: 2 INJECTION, SOLUTION INTRAMUSCULAR; INTRAVENOUS at 14:31

## 2024-01-20 RX ADMIN — GABAPENTIN 300 MG: 300 CAPSULE ORAL at 23:21

## 2024-01-20 RX ADMIN — GABAPENTIN 300 MG: 300 CAPSULE ORAL at 17:56

## 2024-01-20 RX ADMIN — ATORVASTATIN CALCIUM 80 MG: 80 TABLET, FILM COATED ORAL at 17:56

## 2024-01-20 RX ADMIN — SODIUM CHLORIDE, PRESERVATIVE FREE 10 ML: 5 INJECTION INTRAVENOUS at 23:13

## 2024-01-20 RX ADMIN — METOPROLOL TARTRATE 25 MG: 25 TABLET, FILM COATED ORAL at 23:21

## 2024-01-20 RX ADMIN — TAMSULOSIN HYDROCHLORIDE 0.4 MG: 0.4 CAPSULE ORAL at 14:32

## 2024-01-20 RX ADMIN — FENTANYL CITRATE 50 MCG: 50 INJECTION INTRAMUSCULAR; INTRAVENOUS at 11:36

## 2024-01-20 RX ADMIN — FENTANYL CITRATE 25 MCG: 50 INJECTION INTRAMUSCULAR; INTRAVENOUS at 10:14

## 2024-01-20 RX ADMIN — IOPAMIDOL 80 ML: 755 INJECTION, SOLUTION INTRAVENOUS at 13:13

## 2024-01-20 RX ADMIN — MONTELUKAST SODIUM 10 MG: 10 TABLET ORAL at 23:21

## 2024-01-20 ASSESSMENT — PAIN SCALES - GENERAL
PAINLEVEL_OUTOF10: 7
PAINLEVEL_OUTOF10: 2
PAINLEVEL_OUTOF10: 3
PAINLEVEL_OUTOF10: 8

## 2024-01-20 ASSESSMENT — PAIN - FUNCTIONAL ASSESSMENT
PAIN_FUNCTIONAL_ASSESSMENT: 0-10

## 2024-01-20 ASSESSMENT — PAIN DESCRIPTION - FREQUENCY: FREQUENCY: INTERMITTENT

## 2024-01-20 NOTE — ED TRIAGE NOTES
Pt to ED from home with c/o urinary retention. Pt states the last time he voided was yesterday evening. Pt reports that this has happened before due to scar tissue build up. States years ago he had brachytherapy for his prostate and that causes scar tissue to build up over time. Bladder scan completed during triage. Showed 174 mL.
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
## 2024-01-20 NOTE — ED NOTES
Pt transported to [facility] in stable condition. Floor contacted before transport,spoke to Josiah.
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
## 2024-01-20 NOTE — H&P
Medicine Admission History & Physical      Patient:  Swapnil Ward  YOB: 1945  Date of Service: 1/20/2024  MRN: [id number]   Acct: [id number]   Primary Care Physician: Gorge Milton MD    Admitting Faculty MD: Jean-Paul Mi MD    Code Status: Prior No additional code details    Date of Service: Pt seen/examined in consultation on 1/20/2024     Assessment / Plan     Briefly, pt Swapnil Ward is a 78 y.o. male with a PMH of CAD, prostate cancer, HTN, HLD, depression who presented with urinary retention/suprapubic pain.    #Suprapubic abdominal pain/urinary retention:Last UO evening pta. Px suprapubic abdominal pain with radiation down penile shaft. Known hx ureteral stricture with previous episodes of urinary retention. Hgb stable on admission, will repeat x1 in PM to ensure stability. Urology consulted in ED. Will continue with pain control, NPO midnight, possible cysto in AM.  #Hx prostate cancer: S/p brachytherapy 2000, TURP 3/19 for urinary retention. Follows with urology. PSAs normal in the OP setting  #CAD: S/p CABG 1999 with revision 2016. Does have chronic SOB, occasional chest pressure. Patient states this is chronic problem and has not progressed. Will obtain EKG for further evaluation. ASA/plavix held 2/2 above. Will monitor   #HTN: Noted, continue home medications with hold parameters  #HLD: Statin  #Depression:  Home zoloft  #Hx CVA: Noted     Electrolyte: Replete as needed   Nutrition: Adult, NPO midnight  DVT ppx:  SCDs    Admit to: Med/Surg    General Medicine History and Physical     Chief Complaint with Duration:  Urinary retention, suprapubic pain    History of Present Illness:  Swapnil Ward is a 78 y.o. male with a PMH of CAD, prostate cancer, HTN, HLD, depression who presented with urinary retention.    HPI  Patient presents for evaluation of suprapubic abdominal pain, urinary retention. He has noted hx prostate cancer s/p brachytherapy and TURB, with

## 2024-01-21 ENCOUNTER — ANESTHESIA EVENT (OUTPATIENT)
Dept: OPERATING ROOM | Age: 79
End: 2024-01-21
Payer: MEDICARE

## 2024-01-21 ENCOUNTER — ANESTHESIA (OUTPATIENT)
Dept: OPERATING ROOM | Age: 79
End: 2024-01-21
Payer: MEDICARE

## 2024-01-21 PROBLEM — N30.90 CYSTITIS: Status: ACTIVE | Noted: 2024-01-21

## 2024-01-21 LAB
EKG ATRIAL RATE: 65 BPM
EKG ATRIAL RATE: 69 BPM
EKG P AXIS: 57 DEGREES
EKG P AXIS: 66 DEGREES
EKG P-R INTERVAL: 210 MS
EKG P-R INTERVAL: 214 MS
EKG Q-T INTERVAL: 444 MS
EKG Q-T INTERVAL: 448 MS
EKG QRS DURATION: 140 MS
EKG QRS DURATION: 142 MS
EKG QTC CALCULATION (BAZETT): 465 MS
EKG QTC CALCULATION (BAZETT): 475 MS
EKG R AXIS: -30 DEGREES
EKG R AXIS: 1 DEGREES
EKG T AXIS: 21 DEGREES
EKG T AXIS: 36 DEGREES
EKG VENTRICULAR RATE: 65 BPM
EKG VENTRICULAR RATE: 69 BPM

## 2024-01-21 PROCEDURE — 93010 ELECTROCARDIOGRAM REPORT: CPT | Performed by: NUCLEAR MEDICINE

## 2024-01-21 PROCEDURE — 6370000000 HC RX 637 (ALT 250 FOR IP)

## 2024-01-21 PROCEDURE — 0TFDXZZ FRAGMENTATION IN URETHRA, EXTERNAL APPROACH: ICD-10-PCS | Performed by: UROLOGY

## 2024-01-21 PROCEDURE — 6360000002 HC RX W HCPCS

## 2024-01-21 PROCEDURE — 3700000001 HC ADD 15 MINUTES (ANESTHESIA): Performed by: UROLOGY

## 2024-01-21 PROCEDURE — 2720000010 HC SURG SUPPLY STERILE: Performed by: UROLOGY

## 2024-01-21 PROCEDURE — 0T9B80Z DRAINAGE OF BLADDER WITH DRAINAGE DEVICE, VIA NATURAL OR ARTIFICIAL OPENING ENDOSCOPIC: ICD-10-PCS | Performed by: UROLOGY

## 2024-01-21 PROCEDURE — 6360000002 HC RX W HCPCS: Performed by: ANESTHESIOLOGY

## 2024-01-21 PROCEDURE — C1769 GUIDE WIRE: HCPCS | Performed by: UROLOGY

## 2024-01-21 PROCEDURE — 6370000000 HC RX 637 (ALT 250 FOR IP): Performed by: NURSE PRACTITIONER

## 2024-01-21 PROCEDURE — 7100000001 HC PACU RECOVERY - ADDTL 15 MIN: Performed by: UROLOGY

## 2024-01-21 PROCEDURE — 2500000003 HC RX 250 WO HCPCS: Performed by: ANESTHESIOLOGY

## 2024-01-21 PROCEDURE — 82365 CALCULUS SPECTROSCOPY: CPT

## 2024-01-21 PROCEDURE — 3600000013 HC SURGERY LEVEL 3 ADDTL 15MIN: Performed by: UROLOGY

## 2024-01-21 PROCEDURE — 3600000003 HC SURGERY LEVEL 3 BASE: Performed by: UROLOGY

## 2024-01-21 PROCEDURE — 99222 1ST HOSP IP/OBS MODERATE 55: CPT | Performed by: NURSE PRACTITIONER

## 2024-01-21 PROCEDURE — 2580000003 HC RX 258

## 2024-01-21 PROCEDURE — G0378 HOSPITAL OBSERVATION PER HR: HCPCS

## 2024-01-21 PROCEDURE — 2709999900 HC NON-CHARGEABLE SUPPLY: Performed by: UROLOGY

## 2024-01-21 PROCEDURE — 93005 ELECTROCARDIOGRAM TRACING: CPT | Performed by: PHYSICIAN ASSISTANT

## 2024-01-21 PROCEDURE — 99223 1ST HOSP IP/OBS HIGH 75: CPT | Performed by: INTERNAL MEDICINE

## 2024-01-21 PROCEDURE — 7100000000 HC PACU RECOVERY - FIRST 15 MIN: Performed by: UROLOGY

## 2024-01-21 PROCEDURE — 2580000003 HC RX 258: Performed by: ANESTHESIOLOGY

## 2024-01-21 PROCEDURE — 99232 SBSQ HOSP IP/OBS MODERATE 35: CPT | Performed by: PHYSICIAN ASSISTANT

## 2024-01-21 PROCEDURE — 3700000000 HC ANESTHESIA ATTENDED CARE: Performed by: UROLOGY

## 2024-01-21 RX ORDER — EPHEDRINE SULFATE/0.9% NACL/PF 50 MG/5 ML
SYRINGE (ML) INTRAVENOUS PRN
Status: DISCONTINUED | OUTPATIENT
Start: 2024-01-21 | End: 2024-01-21 | Stop reason: SDUPTHER

## 2024-01-21 RX ORDER — CEFAZOLIN SODIUM 1 G/3ML
INJECTION, POWDER, FOR SOLUTION INTRAMUSCULAR; INTRAVENOUS PRN
Status: DISCONTINUED | OUTPATIENT
Start: 2024-01-21 | End: 2024-01-21 | Stop reason: SDUPTHER

## 2024-01-21 RX ORDER — PROPOFOL 10 MG/ML
INJECTION, EMULSION INTRAVENOUS PRN
Status: DISCONTINUED | OUTPATIENT
Start: 2024-01-21 | End: 2024-01-21 | Stop reason: SDUPTHER

## 2024-01-21 RX ORDER — OXYBUTYNIN CHLORIDE 5 MG/1
5 TABLET ORAL EVERY 8 HOURS PRN
Status: DISCONTINUED | OUTPATIENT
Start: 2024-01-21 | End: 2024-01-22 | Stop reason: HOSPADM

## 2024-01-21 RX ORDER — PHENYLEPHRINE HCL IN 0.9% NACL 1 MG/10 ML
SYRINGE (ML) INTRAVENOUS PRN
Status: DISCONTINUED | OUTPATIENT
Start: 2024-01-21 | End: 2024-01-21 | Stop reason: SDUPTHER

## 2024-01-21 RX ORDER — LIDOCAINE HCL/PF 100 MG/5ML
SYRINGE (ML) INJECTION PRN
Status: DISCONTINUED | OUTPATIENT
Start: 2024-01-21 | End: 2024-01-21 | Stop reason: SDUPTHER

## 2024-01-21 RX ORDER — IBUPROFEN 200 MG
TABLET ORAL 2 TIMES DAILY
Status: DISCONTINUED | OUTPATIENT
Start: 2024-01-21 | End: 2024-01-22 | Stop reason: HOSPADM

## 2024-01-21 RX ORDER — SODIUM CHLORIDE, SODIUM LACTATE, POTASSIUM CHLORIDE, CALCIUM CHLORIDE 600; 310; 30; 20 MG/100ML; MG/100ML; MG/100ML; MG/100ML
INJECTION, SOLUTION INTRAVENOUS CONTINUOUS PRN
Status: DISCONTINUED | OUTPATIENT
Start: 2024-01-21 | End: 2024-01-21 | Stop reason: SDUPTHER

## 2024-01-21 RX ADMIN — SERTRALINE 100 MG: 100 TABLET, FILM COATED ORAL at 10:02

## 2024-01-21 RX ADMIN — Medication 200 MCG: at 08:07

## 2024-01-21 RX ADMIN — BACITRACIN ZINC NEOMYCIN SULFATE POLYMYXIN B SULFATE: 400; 3.5; 5 OINTMENT TOPICAL at 21:40

## 2024-01-21 RX ADMIN — GABAPENTIN 300 MG: 300 CAPSULE ORAL at 10:02

## 2024-01-21 RX ADMIN — HYDROCODONE BITARTRATE AND ACETAMINOPHEN 1 TABLET: 5; 325 TABLET ORAL at 19:34

## 2024-01-21 RX ADMIN — PROPOFOL 50 MG: 10 INJECTION, EMULSION INTRAVENOUS at 07:47

## 2024-01-21 RX ADMIN — BACITRACIN ZINC NEOMYCIN SULFATE POLYMYXIN B SULFATE: 400; 3.5; 5 OINTMENT TOPICAL at 12:39

## 2024-01-21 RX ADMIN — OXYBUTYNIN CHLORIDE 5 MG: 5 TABLET ORAL at 16:26

## 2024-01-21 RX ADMIN — SODIUM CHLORIDE, POTASSIUM CHLORIDE, SODIUM LACTATE AND CALCIUM CHLORIDE: 600; 310; 30; 20 INJECTION, SOLUTION INTRAVENOUS at 07:47

## 2024-01-21 RX ADMIN — METOPROLOL TARTRATE 25 MG: 25 TABLET, FILM COATED ORAL at 21:40

## 2024-01-21 RX ADMIN — MONTELUKAST SODIUM 10 MG: 10 TABLET ORAL at 21:40

## 2024-01-21 RX ADMIN — ATORVASTATIN CALCIUM 80 MG: 80 TABLET, FILM COATED ORAL at 10:01

## 2024-01-21 RX ADMIN — Medication 100 MG: at 07:47

## 2024-01-21 RX ADMIN — Medication 20 MG: at 08:21

## 2024-01-21 RX ADMIN — Medication 200 MCG: at 08:03

## 2024-01-21 RX ADMIN — Medication 10 MG: at 08:24

## 2024-01-21 RX ADMIN — MORPHINE SULFATE 2 MG: 2 INJECTION, SOLUTION INTRAMUSCULAR; INTRAVENOUS at 15:48

## 2024-01-21 RX ADMIN — FOLIC ACID 1 MG: 1 TABLET ORAL at 10:02

## 2024-01-21 RX ADMIN — Medication 100 MG: at 10:02

## 2024-01-21 RX ADMIN — GABAPENTIN 300 MG: 300 CAPSULE ORAL at 15:20

## 2024-01-21 RX ADMIN — GABAPENTIN 300 MG: 300 CAPSULE ORAL at 21:40

## 2024-01-21 RX ADMIN — METOPROLOL TARTRATE 25 MG: 25 TABLET, FILM COATED ORAL at 10:02

## 2024-01-21 RX ADMIN — SODIUM CHLORIDE, PRESERVATIVE FREE 10 ML: 5 INJECTION INTRAVENOUS at 21:41

## 2024-01-21 RX ADMIN — Medication 10 MG: at 08:35

## 2024-01-21 RX ADMIN — HYDROCODONE BITARTRATE AND ACETAMINOPHEN 1 TABLET: 5; 325 TABLET ORAL at 04:33

## 2024-01-21 RX ADMIN — Medication 10 MG: at 08:27

## 2024-01-21 RX ADMIN — CEFAZOLIN 2 G: 1 INJECTION, POWDER, FOR SOLUTION INTRAMUSCULAR; INTRAVENOUS at 08:02

## 2024-01-21 RX ADMIN — HYDROCODONE BITARTRATE AND ACETAMINOPHEN 1 TABLET: 5; 325 TABLET ORAL at 12:39

## 2024-01-21 ASSESSMENT — PAIN DESCRIPTION - ORIENTATION
ORIENTATION: DISTAL
ORIENTATION: LOWER
ORIENTATION: INNER

## 2024-01-21 ASSESSMENT — PAIN SCALES - GENERAL
PAINLEVEL_OUTOF10: 5
PAINLEVEL_OUTOF10: 2
PAINLEVEL_OUTOF10: 7
PAINLEVEL_OUTOF10: 7
PAINLEVEL_OUTOF10: 10
PAINLEVEL_OUTOF10: 7

## 2024-01-21 ASSESSMENT — PAIN - FUNCTIONAL ASSESSMENT
PAIN_FUNCTIONAL_ASSESSMENT: ACTIVITIES ARE NOT PREVENTED
PAIN_FUNCTIONAL_ASSESSMENT: ACTIVITIES ARE NOT PREVENTED
PAIN_FUNCTIONAL_ASSESSMENT: PREVENTS OR INTERFERES SOME ACTIVE ACTIVITIES AND ADLS
PAIN_FUNCTIONAL_ASSESSMENT: ACTIVITIES ARE NOT PREVENTED

## 2024-01-21 ASSESSMENT — PAIN DESCRIPTION - LOCATION
LOCATION: PENIS
LOCATION: PENIS;PELVIS
LOCATION: PENIS;PELVIS
LOCATION: PENIS

## 2024-01-21 ASSESSMENT — PAIN DESCRIPTION - PAIN TYPE: TYPE: ACUTE PAIN

## 2024-01-21 ASSESSMENT — PAIN DESCRIPTION - DESCRIPTORS
DESCRIPTORS: SORE
DESCRIPTORS: SHARP;SPASM
DESCRIPTORS: SHOOTING;SPASM
DESCRIPTORS: TENDER;BURNING

## 2024-01-21 ASSESSMENT — PAIN DESCRIPTION - ONSET: ONSET: SUDDEN

## 2024-01-21 ASSESSMENT — PAIN DESCRIPTION - FREQUENCY: FREQUENCY: INTERMITTENT

## 2024-01-21 NOTE — PROGRESS NOTES
Hospitalist Progress Note    Patient:  Swapnil Ward      Unit/Bed:7K-20/020-A    YOB: 1945    MRN: [id number]       Acct: [id number]     PCP: Gorge Milton MD    Date of Admission: 1/20/2024    Assessment/Plan:    Suprapubic abdominal pain/urinary retention:  -Urology consulted, patient taken for cystoscopy with urethral stone lithotripsy  1/21/24 by Dr. Luciano  -Ford continues  -Analgesics and anti-emetics PRN    Hx prostate cancer: S/p brachytherapy 2000, TURP 3/19 for urinary retention. Follows with urology    CAD: S/p CABG 1999 with revision 2016. Does have chronic SOB, occasional chest pressure. Patient states this is chronic problem and has not progressed.   -Resume ASA/Plavix when okay with Urology    HTN:   Continue home medications with hold parameters    HLD:   -Continue Statin    Depression:    -Continue home dose Zoloft    Hx CVA:     Electrolyte: Replete as needed   Nutrition: Adult, NPO midnight  DVT ppx:  SCDs    Chief Complaint: Urinary retention, suprapubic pain       Subjective: 78 y.o. male admitted to the hospitalist service for hematuria and suprapubic pain. Patient s/p cystoscopy this AM. Patient reports feeling much better. He denies nausea or vomiting.  He denies chest pain.  He denies shortness of breath.  Patient continues with Ford in place. Bloody appearing urine output.    Medications:    Infusion Medications    sodium chloride       Scheduled Medications    neomycin-bacitracin-polymyxin   Topical BID    atorvastatin  80 mg Oral Daily    folic acid  1 mg Oral Daily    gabapentin  300 mg Oral TID    metoprolol tartrate  25 mg Oral BID    montelukast  10 mg Oral Nightly    sertraline  100 mg Oral Daily    thiamine  100 mg Oral Daily    sodium chloride flush  10 mL IntraVENous 2 times per day     PRN Meds: lidocaine, HYDROcodone 5 mg - acetaminophen, morphine, sodium chloride flush, sodium chloride, potassium chloride **OR** potassium alternative  oral replacement **OR** potassium chloride, magnesium sulfate, ondansetron **OR** ondansetron, polyethylene glycol, acetaminophen **OR** acetaminophen      Intake/Output Summary (Last 24 hours) at 1/21/2024 1340  Last data filed at 1/21/2024 0845  Gross per 24 hour   Intake 1100 ml   Output 150 ml   Net 950 ml       Diet:  ADULT DIET; Regular    Exam:  BP (!) 131/57   Pulse 67   Temp 98.1 °F (36.7 °C)   Resp 18   Ht 1.6 m (5' 2.99\")   Wt 75.3 kg (166 lb 1.6 oz)   SpO2 98%   BMI 29.43 kg/m²     Physical Exam  Constitutional:       Interventions: He is not intubated.  Cardiovascular:      Heart sounds: Murmur heard.   Pulmonary:      Effort: He is not intubated.   Abdominal:      Tenderness: There is no abdominal tenderness.   Genitourinary:     Comments: Ford catheter in place, bloody appearing urine output noted in collection container  Neurological:      Mental Status: He is alert.   Psychiatric:         Speech: He is communicative.        Labs:   Recent Labs     01/20/24  1025   WBC 6.9   HGB 13.0*   HCT 39.7*        Recent Labs     01/20/24  1025      K 4.5      CO2 28   BUN 19   CREATININE 0.9   CALCIUM 9.0     No results for input(s): \"AST\", \"ALT\", \"BILIDIR\", \"BILITOT\", \"ALKPHOS\" in the last 72 hours.  No results for input(s): \"INR\" in the last 72 hours.  No results for input(s): \"CKTOTAL\", \"TROPONINI\" in the last 72 hours.    Urinalysis:      Lab Results   Component Value Date/Time    NITRU NEGATIVE 01/20/2024 11:20 AM    WBCUA 2-4 01/20/2024 11:20 AM    BACTERIA MODERATE 01/20/2024 11:20 AM    RBCUA > 200 01/20/2024 11:20 AM    BLOODU LARGE 01/20/2024 11:20 AM    SPECGRAV 1.020 01/20/2024 11:20 AM    GLUCOSEU Negative 05/09/2023 10:09 AM       Radiology:  CT UROGRAM   Final Result       1. No acute findings.   2. No hydronephrosis or stones.   3. Small low-density lesions associated with the left kidney are stable. No further follow-up is needed.               **This report has been

## 2024-01-21 NOTE — PLAN OF CARE
Problem: Discharge Planning  Goal: Discharge to home or other facility with appropriate resources  Outcome: Progressing  Flowsheets (Taken 1/21/2024 1613)  Discharge to home or other facility with appropriate resources: Identify barriers to discharge with patient and caregiver     Problem: Safety - Adult  Goal: Free from fall injury  Outcome: Progressing  Flowsheets (Taken 1/21/2024 1613)  Free From Fall Injury: Instruct family/caregiver on patient safety     Problem: ABCDS Injury Assessment  Goal: Absence of physical injury  Outcome: Progressing  Flowsheets (Taken 1/21/2024 1613)  Absence of Physical Injury: Implement safety measures based on patient assessment     Problem: Skin/Tissue Integrity - Adult  Goal: Skin integrity remains intact  Outcome: Progressing  Flowsheets (Taken 1/21/2024 1613)  Skin Integrity Remains Intact: Monitor for areas of redness and/or skin breakdown     Problem: Gastrointestinal - Adult  Goal: Maintains or returns to baseline bowel function  Outcome: Progressing  Flowsheets (Taken 1/21/2024 1613)  Maintains or returns to baseline bowel function: Encourage oral fluids to ensure adequate hydration     Problem: Genitourinary - Adult  Goal: Absence of urinary retention  Outcome: Progressing  Flowsheets (Taken 1/21/2024 1613)  Absence of urinary retention: Monitor intake/output and perform bladder scan as needed     Problem: Genitourinary - Adult  Goal: Urinary catheter remains patent  Outcome: Progressing  Flowsheets (Taken 1/21/2024 1613)  Urinary catheter remains patent: Assess patency of urinary catheter     Problem: Pain  Goal: Verbalizes/displays adequate comfort level or baseline comfort level  Outcome: Progressing  Flowsheets (Taken 1/21/2024 1613)  Verbalizes/displays adequate comfort level or baseline comfort level:   Encourage patient to monitor pain and request assistance   Assess pain using appropriate pain scale   Administer analgesics based on type and severity of pain and

## 2024-01-21 NOTE — PROGRESS NOTES
0847- pt to pacu. Drowsy, VSS. Pt denies pain,nausea. Pt appears in no acute distress.    0856- pt resting in bed eyes open. VSS. Continues to deny pain, nausea.    0904- pt continues to deny pain,nausea.    0912- report called to 7 nurse Liya.    0915- Call to spouse Alejandrina with update on pt doing well, returning to 7K.    0917- pt meets criteria for discharge from pacu. Awaiting transport    0924- pt remains resting in bed awake. VSS    0937- pt continues to deny complaint, VSS, await transport.    0940-Transport arrives to return pt to 7K 20

## 2024-01-21 NOTE — CONSULTS
Urology Consult Note      Reason for Consult:  Urinary retention, hx of urethral stricture  Requesting Physician:  Dr. Minor    History Obtained From:  patient    Chief Complaint: urinary retention, dysuria, hematuria    HISTORY OF PRESENT ILLNESS:                The patient is a 78 y.o. male with hx of prostate cancer s/p brachytherapy, urethral strictures, false passage, and prior TURP.      Pt underwent TURP using bipolar loop w/ Dr. Stroud on 03/14/19 for urinary retention. 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 Surgical pathology is consistent with benign fibromuscular tissue and urothelial mucosa, negative for malignancy, no prosthetic epithelium present. He was evaluated by Dr. Obando, plan was to reassess symptoms after catheter was removed. Pt declined further surgery.  Denies any significant fecal incontinence or diarrhea.      Has chronic urinary urge and stress incontinence since TURP 3/2019 requiring 7-8 briefs per day. Previously failed oxybutynin, trospium, pelvic floor therapy and has not wanted to pursue further procedures/interventions.      Pt presented to [facility] on 1/20/24 secondary to impaired emptying, significant pain with urination causing him to flail his arms and legs when voiding, and a feeling like his \"urine was getting stuck\".  Bladder scan 174 mls.  ER unable to place lopez catheter x multiple attempts.      UA >200 RBcs, 2-4 WBCs, moderate bacteria.  CRT 0.9.  WBC 6.9.  Afebrile.     Mr. Kraft underwent cystoscopy with urethral stone lithotripsy, and lopez catheter placement earlier this am by Dr. Luciano.  Lopez draining nery light red/yellow urine.     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  Had urethral stone.  Continue lopez catheter for 3-5 days prior to voiding trial.      PRN lidocaine for urethral meatus discomfort.      Ok for diet.  Advance activity.  Lopez teaching.     Pt prefers to stay today and plan for possible discharge tomorrow.      Thank you for including us in the care of Swapnil Vargas, MARC - CNP  01/21/24 11:01 AM  Urology

## 2024-01-21 NOTE — PROCEDURES
12 lead EKG completed. Results handed to Dawna Scott CET  
12 lead EKG completed. Results handed to Josiah Scott CET  
written identification. The patient was taken to the operating room and placed on the table in supine position. Anesthesia was administered. The patient was then placed in relaxed dorsal lithotomy position with care taken to avoid pressure points. The lower abdomen and genitalia were prepped and draped in standard fashion. Cystourethroscopy was performed with a 22 Japanese sheath and 30° lens. There was a large obstructing stone in the prostatic fossa.  At this point, using a 550 micron laser fiber at the maximum energy of 22.5 reed of power, the stone was pulverized into dust and small fragments. Couple stone fragments were irrigated out and sent to the lab for stone analysis. The cystoscope was advanced all the way into the bladder, the bladder outlet was not occlusive, bladder capacity was adequate, ureteral orifices were in good position and morphology, trabeculation was 2/4 and mucosa inspection showed no evidence of erythematous or papillary lesions suggestive of malignancy. A sensor wire was advanced all the way into the bladder, the cystoscope was removed, an 18 F [place] tip Ford catheter was placed over the wire, the wire was removed, Ford started to drain adelina urine. The patient was taken out of lithotomy position, awakened in the operating room, and taken to the recovery room in good and stable condition with no immediate complications.      Zina Luciano MD  1/21/2024

## 2024-01-21 NOTE — PLAN OF CARE
Problem: Genitourinary - Adult  Goal: Urinary catheter remains patent  Outcome: Not Progressing  Note: No lopez in place. Plan for lopez placement in OR today 1/21.      Problem: Discharge Planning  Goal: Discharge to home or other facility with appropriate resources  Outcome: Progressing  Flowsheets (Taken 1/21/2024 0038)  Discharge to home or other facility with appropriate resources:   Identify barriers to discharge with patient and caregiver   Arrange for needed discharge resources and transportation as appropriate   Identify discharge learning needs (meds, wound care, etc)     Problem: Safety - Adult  Goal: Free from fall injury  Outcome: Progressing  Flowsheets  Taken 1/21/2024 0038  Free From Fall Injury: Instruct family/caregiver on patient safety  Taken 1/20/2024 1924  Free From Fall Injury: Instruct family/caregiver on patient safety     Problem: ABCDS Injury Assessment  Goal: Absence of physical injury  Outcome: Progressing  Flowsheets (Taken 1/21/2024 0038)  Absence of Physical Injury: Implement safety measures based on patient assessment     Problem: Skin/Tissue Integrity - Adult  Goal: Skin integrity remains intact  Outcome: Progressing  Flowsheets (Taken 1/21/2024 0038)  Skin Integrity Remains Intact:   Monitor for areas of redness and/or skin breakdown   Assess vascular access sites hourly     Problem: Gastrointestinal - Adult  Goal: Maintains or returns to baseline bowel function  Outcome: Progressing  Flowsheets (Taken 1/21/2024 0038)  Maintains or returns to baseline bowel function:   Assess bowel function   Encourage mobilization and activity   Encourage oral fluids to ensure adequate hydration   Administer ordered medications as needed     Problem: Genitourinary - Adult  Goal: Absence of urinary retention  Outcome: Progressing  Flowsheets (Taken 1/21/2024 0038)  Absence of urinary retention:   Assess patient’s ability to void and empty bladder   Monitor intake/output and perform bladder scan

## 2024-01-22 ENCOUNTER — TELEPHONE (OUTPATIENT)
Dept: UROLOGY | Age: 79
End: 2024-01-22

## 2024-01-22 VITALS
OXYGEN SATURATION: 97 % | RESPIRATION RATE: 18 BRPM | SYSTOLIC BLOOD PRESSURE: 94 MMHG | HEIGHT: 63 IN | BODY MASS INDEX: 29.43 KG/M2 | HEART RATE: 90 BPM | DIASTOLIC BLOOD PRESSURE: 56 MMHG | TEMPERATURE: 97.5 F | WEIGHT: 166.1 LBS

## 2024-01-22 PROCEDURE — G0378 HOSPITAL OBSERVATION PER HR: HCPCS

## 2024-01-22 PROCEDURE — 6370000000 HC RX 637 (ALT 250 FOR IP)

## 2024-01-22 PROCEDURE — 6370000000 HC RX 637 (ALT 250 FOR IP): Performed by: NURSE PRACTITIONER

## 2024-01-22 PROCEDURE — 2580000003 HC RX 258

## 2024-01-22 PROCEDURE — 1200000000 HC SEMI PRIVATE

## 2024-01-22 PROCEDURE — 99238 HOSP IP/OBS DSCHRG MGMT 30/<: CPT | Performed by: PHYSICIAN ASSISTANT

## 2024-01-22 PROCEDURE — 6360000002 HC RX W HCPCS

## 2024-01-22 PROCEDURE — 99231 SBSQ HOSP IP/OBS SF/LOW 25: CPT | Performed by: UROLOGY

## 2024-01-22 RX ORDER — HYDROCODONE BITARTRATE AND ACETAMINOPHEN 5; 325 MG/1; MG/1
1 TABLET ORAL EVERY 6 HOURS PRN
Qty: 28 TABLET | Refills: 0 | Status: SHIPPED | OUTPATIENT
Start: 2024-01-22 | End: 2024-01-29

## 2024-01-22 RX ORDER — KETOROLAC TROMETHAMINE 30 MG/ML
15 INJECTION, SOLUTION INTRAMUSCULAR; INTRAVENOUS EVERY 6 HOURS PRN
Status: DISCONTINUED | OUTPATIENT
Start: 2024-01-22 | End: 2024-01-22 | Stop reason: HOSPADM

## 2024-01-22 RX ADMIN — OXYBUTYNIN CHLORIDE 5 MG: 5 TABLET ORAL at 14:34

## 2024-01-22 RX ADMIN — Medication 100 MG: at 08:39

## 2024-01-22 RX ADMIN — GABAPENTIN 300 MG: 300 CAPSULE ORAL at 08:40

## 2024-01-22 RX ADMIN — BACITRACIN ZINC NEOMYCIN SULFATE POLYMYXIN B SULFATE: 400; 3.5; 5 OINTMENT TOPICAL at 08:40

## 2024-01-22 RX ADMIN — FOLIC ACID 1 MG: 1 TABLET ORAL at 08:40

## 2024-01-22 RX ADMIN — KETOROLAC TROMETHAMINE 15 MG: 30 INJECTION, SOLUTION INTRAMUSCULAR; INTRAVENOUS at 15:23

## 2024-01-22 RX ADMIN — ATORVASTATIN CALCIUM 80 MG: 80 TABLET, FILM COATED ORAL at 08:39

## 2024-01-22 RX ADMIN — HYDROCODONE BITARTRATE AND ACETAMINOPHEN 1 TABLET: 5; 325 TABLET ORAL at 10:22

## 2024-01-22 RX ADMIN — HYDROCODONE BITARTRATE AND ACETAMINOPHEN 1 TABLET: 5; 325 TABLET ORAL at 03:32

## 2024-01-22 RX ADMIN — SERTRALINE 100 MG: 100 TABLET, FILM COATED ORAL at 08:40

## 2024-01-22 RX ADMIN — GABAPENTIN 300 MG: 300 CAPSULE ORAL at 14:34

## 2024-01-22 RX ADMIN — SODIUM CHLORIDE, PRESERVATIVE FREE 10 ML: 5 INJECTION INTRAVENOUS at 08:42

## 2024-01-22 RX ADMIN — OXYBUTYNIN CHLORIDE 5 MG: 5 TABLET ORAL at 06:34

## 2024-01-22 ASSESSMENT — PAIN DESCRIPTION - DESCRIPTORS
DESCRIPTORS: SPASM;SHARP
DESCRIPTORS: SPASM
DESCRIPTORS: DISCOMFORT

## 2024-01-22 ASSESSMENT — PAIN SCALES - GENERAL
PAINLEVEL_OUTOF10: 7
PAINLEVEL_OUTOF10: 0
PAINLEVEL_OUTOF10: 5
PAINLEVEL_OUTOF10: 7
PAINLEVEL_OUTOF10: 9

## 2024-01-22 ASSESSMENT — PAIN DESCRIPTION - LOCATION
LOCATION: PENIS;PELVIS
LOCATION: PENIS
LOCATION: PENIS;PELVIS

## 2024-01-22 ASSESSMENT — PAIN - FUNCTIONAL ASSESSMENT
PAIN_FUNCTIONAL_ASSESSMENT: PREVENTS OR INTERFERES SOME ACTIVE ACTIVITIES AND ADLS
PAIN_FUNCTIONAL_ASSESSMENT: ACTIVITIES ARE NOT PREVENTED
PAIN_FUNCTIONAL_ASSESSMENT: ACTIVITIES ARE NOT PREVENTED

## 2024-01-22 ASSESSMENT — PAIN DESCRIPTION - ORIENTATION
ORIENTATION: LOWER
ORIENTATION: INNER
ORIENTATION: DISTAL

## 2024-01-22 NOTE — PLAN OF CARE
Problem: Discharge Planning  Goal: Discharge to home or other facility with appropriate resources  1/22/2024 0237 by Leslie Hamlin RN  Outcome: Progressing  Flowsheets (Taken 1/22/2024 0237)  Discharge to home or other facility with appropriate resources:   Identify barriers to discharge with patient and caregiver   Arrange for needed discharge resources and transportation as appropriate   Identify discharge learning needs (meds, wound care, etc)   Refer to discharge planning if patient needs post-hospital services based on physician order or complex needs related to functional status, cognitive ability or social support system       Problem: Safety - Adult  Goal: Free from fall injury  1/22/2024 0237 by Leslie Hamlin RN  Outcome: Progressing  Flowsheets (Taken 1/22/2024 0237)  Free From Fall Injury:   Instruct family/caregiver on patient safety   Based on caregiver fall risk screen, instruct family/caregiver to ask for assistance with transferring infant if caregiver noted to have fall risk factors     Problem: ABCDS Injury Assessment  Goal: Absence of physical injury  1/22/2024 0237 by Leslie Hamlin RN  Outcome: Progressing  Flowsheets (Taken 1/22/2024 0237)  Absence of Physical Injury: Implement safety measures based on patient assessment    Problem: Skin/Tissue Integrity - Adult  Goal: Skin integrity remains intact  1/22/2024 0237 by Leslie Hamlin RN  Outcome: Progressing  Flowsheets (Taken 1/22/2024 0237)  Skin Integrity Remains Intact:   Monitor for areas of redness and/or skin breakdown   Assess vascular access sites hourly    Problem: Gastrointestinal - Adult  Goal: Maintains or returns to baseline bowel function  1/22/2024 0237 by Leslie Hamlin RN  Outcome: Progressing  Flowsheets (Taken 1/22/2024 0237)  Maintains or returns to baseline bowel function:   Assess bowel function   Administer IV fluids as ordered to ensure adequate hydration   Encourage mobilization and activity   Encourage oral  Collaborate with multidisciplinary team to address chronic and comorbid conditions and prevent exacerbation or deterioration   Update acute care plan with appropriate goals if chronic or comorbid symptoms are exacerbated and prevent overall improvement and discharge

## 2024-01-22 NOTE — PROGRESS NOTES
This writer discussed discharge instructions with patient, who stated understanding. Patient transported via wheelchair to private vehicle with all personal belongings including but not limited to filled rx for norco.

## 2024-01-22 NOTE — DISCHARGE INSTRUCTIONS
Void trial in clinic in 3-5 days Schedule:    [facility]  [address] [id number] [phone]

## 2024-01-22 NOTE — PROGRESS NOTES
Urology Progress Note    Reason for Consult:  Urinary retention, hx of urethral stricture  Requesting Physician:  Dr. Minor     History Obtained From:  patient     Chief Complaint: urinary retention, dysuria, hematuria    Subjective: \"    Patient is resting in bed, voiding yellow urine via lopez, +flatus, -BM, ambulating with assistance, tolerating regular diet, denies any nausea or vomiting. There are complaints of controlled pain at this time.    Urologic impression and plan:   Urethral stone--s/p laser lithotripsy 24 by Dr Luciano  Acute on chronic urinary retention--lopez placed by Dr. 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  Had urethral stone.  Continue lopez catheter for 3-5 days prior to voiding trial.       PRN lidocaine for urethral meatus discomfort.       Ok for diet.  Advance activity.  Lopez teaching.     URO to follow peripherally, call with questions        Vitals:  BP (!) 94/56   Pulse 90   Temp 97.5 °F (36.4 °C)   Resp 16   Ht 1.6 m (5' 2.99\")   Wt 75.3 kg (166 lb 1.6 oz)   SpO2 97%   BMI 29.43 kg/m²   Temp  Av.3 °F (36.8 °C)  Min: 97.5 °F (36.4 °C)  Max: 99 °F (37.2 °C)    Intake/Output Summary (Last 24 hours) at 2024 0859  Last data filed at 2024 0636  Gross per 24 hour   Intake 420 ml   Output 1000 ml   Net -580 ml       Social History     Socioeconomic History    Marital status:      Spouse name: Not on file    Number of children: 1    Years of education: 15    Highest education level: Not on file   Occupational History    Occupation: disabled   Tobacco Use    Smoking status: Never     Passive exposure: Never    Smokeless tobacco: Never   Vaping Use    Vaping Use: Never used   Substance and Sexual Activity    Alcohol use: Not Currently     Alcohol/week: 42.0 -

## 2024-01-22 NOTE — TELEPHONE ENCOUNTER
Had urethral stone  S/p lithotripsy by Turkeenanman over the wkend  Needs scheduled for lopez removal in 3-5 days and post op check with ALEXIS per Savita's note

## 2024-01-22 NOTE — CARE COORDINATION
Case Management Assessment  Initial Evaluation    Date/Time of Evaluation: 1/22/2024 12:55 PM  Assessment Completed by: Shea Bueno RN    If patient is discharged prior to next notation, then this note serves as note for discharge by case management.    Patient Name: Swapnil Ward                   YOB: 1945  Diagnosis: Cystitis [N30.90]  Hematuria [R31.9]  Suprapubic abdominal pain [R10.2]                   Date / Time: 1/20/2024  9:26 AM  Location: [facility]     Patient Admission Status: Observation   Readmission Risk Low 0-14, Mod 15-19), High > 20: No data recorded  Current PCP: Gorge Milton MD  PCP verified by CM? Yes    Chart Reviewed: Yes      History Provided by: Patient  Patient Orientation: Alert and Oriented    Patient Cognition:      Hospitalization in the last 30 days (Readmission):  No    If yes, Readmission Assessment in CM Navigator will be completed.    Advance Directives:      Code Status: Full Code   Patient's Primary Decision Maker is: Legal Next of Kin wife/confirmed      Discharge Planning:    Patient lives with: Alone Type of Home: Apartment  Primary Care Giver: Self  Patient Support Systems include: Spouse/Significant Other   Current Financial resources: Medicare  Current community resources: None  Current services prior to admission: Durable Medical Equipment, None            Current DME:  none            Type of Home Care services:  None    ADLS  Prior functional level: Independent in ADLs/IADLs  Current functional level: Independent in ADLs/IADLs    Family can provide assistance at DC: Yes  Would you like Case Management to discuss the discharge plan with any other family members/significant others, and if so, who? No  Plans to Return to Present Housing: Yes  Other Identified Issues/Barriers to RETURNING to current housing: unsure  Potential Assistance needed at discharge: N/A            Potential DME:    Patient expects to discharge to:

## 2024-01-25 ENCOUNTER — NURSE ONLY (OUTPATIENT)
Dept: UROLOGY | Age: 79
End: 2024-01-25

## 2024-01-25 LAB — STONE ANALYSIS: NORMAL

## 2024-01-25 PROCEDURE — NBSRV NON-BILLABLE SERVICE: Performed by: NURSE PRACTITIONER

## 2024-01-25 RX ORDER — KETOROLAC TROMETHAMINE 10 MG/1
TABLET, FILM COATED ORAL
COMMUNITY
Start: 2024-01-22

## 2024-01-25 NOTE — PROGRESS NOTES
Patient has given me verbal consent to perform lopez removal  Yes    10 cc of water deflated from lopez balloon. 18 Fr lopez removed without difficulty.    Foreskin reduced back down?  Yes      Pt will drink fluids and report to ER in 6-8 hours if patient unable to urinate.      F/u with provider MARC Juarez-PATSY.

## 2024-01-26 NOTE — DISCHARGE SUMMARY
Hospital Medicine Discharge Summary      Patient Identification:   Swapnil Ward   : 1945  MRN: [id number]   Account: [id number]      Patient's PCP: Gorge Milton MD    Admit Date: 2024     Discharge Date: 2024    Admitting Physician: Rustam Dias MD     Discharge Physician: Rajeev May PA-C     Swapnil Ward is a 78 y.o. male admitted to [facility] on 2024.      HPI On Admission From H&P:    \"Patient presents for evaluation of suprapubic abdominal pain, urinary retention. He has noted hx prostate cancer s/p brachytherapy and TURB, with known urethral strictures. Patient states pain onset was around 3am morning of presentation, 10/10. Pain started in suprapubic area with radiation down the penile shaft. He reports similar previous episodes that were not as severe. Denies SOB, headache, fevers, chills. Does endorse some mild chronic, non-changing intermittent chest pressure. He presented to [facility] where urology was contacted and recommended admission for pain control and possible cystoscopy.\"    Assessment/Plan With Discharge Diagnoses:    Suprapubic abdominal pain/urinary retention:  -Urology consulted, patient taken for cystoscopy with urethral stone lithotripsy  24 by Dr. Luciano  -Ford continued upon d/c  -Prescribed Norco upon discharge     Hx prostate cancer: S/p brachytherapy , TURP 3/19 for urinary retention. Follows with urology     CAD: S/p CABG  with revision . Does have chronic SOB, occasional chest pressure. Patient states this is chronic problem and has not progressed.              -Resume ASA/Plavix when okay with Urology     HTN:   Continue home medications     HLD:   -Continue Statin     Depression:    -Continue home dose Zoloft     Hx CVA:    Exam:     Vitals:  Vitals:    24 0332 24 0836 24 1022 24 1052   BP: (!) 146/61 (!) 94/56     Pulse: 61 90     Resp: 18 16 18 18   Temp: 98.1 °F (36.7  °C) 97.5 °F (36.4 °C)     TempSrc: Oral      SpO2: 95% 97%     Weight:       Height:         Weight: Weight - Scale: 75.3 kg (166 lb 1.6 oz)     24 hour intake/output:No intake or output data in the 24 hours ending 01/26/24 1026      Labs: For convenience and continuity at follow-up the following most recent labs are provided:    CBC:    Lab Results   Component Value Date/Time    WBC 6.9 01/20/2024 10:25 AM    HGB 13.0 01/20/2024 10:25 AM    HCT 39.7 01/20/2024 10:25 AM     01/20/2024 10:25 AM       Renal:    Lab Results   Component Value Date/Time     01/20/2024 10:25 AM    K 4.5 01/20/2024 10:25 AM    K 3.6 08/30/2018 02:50 AM     01/20/2024 10:25 AM    CO2 28 01/20/2024 10:25 AM    BUN 19 01/20/2024 10:25 AM    CREATININE 0.9 01/20/2024 10:25 AM    CALCIUM 9.0 01/20/2024 10:25 AM         Significant Diagnostic Studies    Radiology:   CT UROGRAM   Final Result       1. No acute findings.   2. No hydronephrosis or stones.   3. Small low-density lesions associated with the left kidney are stable. No further follow-up is needed.               **This report has been created using voice recognition software. It may contain minor errors which are inherent in voice recognition technology.**      Final report electronically signed by Dr. Kathleen Macdonald on 1/20/2024 2:12 PM             Consults:     IP CONSULT TO UROLOGY    Disposition: Home  Condition at Discharge: Stable    Code Status:  Prior    Patient Instructions:    Discharge lab work:   Activity: activity as tolerated  Diet: No diet orders on file      Follow-up visits:   Gorge Milton MD  [facility] [address] [phone]    Follow up on 1/29/2024  appointment time 1:30pm         Discharge Medications:        Medication List        START taking these medications      HYDROcodone-acetaminophen 5-325 MG per tablet  Commonly known as: NORCO  Take 1 tablet by mouth every 6 hours as needed for Pain for up to 7 days. Max

## 2024-02-07 ENCOUNTER — OFFICE VISIT (OUTPATIENT)
Dept: UROLOGY | Age: 79
End: 2024-02-07
Payer: MEDICARE

## 2024-02-07 VITALS — RESPIRATION RATE: 16 BRPM | HEIGHT: 63 IN | BODY MASS INDEX: 32.07 KG/M2 | WEIGHT: 181 LBS

## 2024-02-07 DIAGNOSIS — N39.498 OTHER URINARY INCONTINENCE: Primary | ICD-10-CM

## 2024-02-07 DIAGNOSIS — N21.1 URETHRAL STONE: ICD-10-CM

## 2024-02-07 DIAGNOSIS — Z85.46 HISTORY OF PROSTATE CANCER: ICD-10-CM

## 2024-02-07 DIAGNOSIS — N39.3 STRESS INCONTINENCE: ICD-10-CM

## 2024-02-07 LAB — POST VOID RESIDUAL (PVR): 41 ML

## 2024-02-07 PROCEDURE — 99214 OFFICE O/P EST MOD 30 MIN: CPT | Performed by: UROLOGY

## 2024-02-07 PROCEDURE — G8484 FLU IMMUNIZE NO ADMIN: HCPCS | Performed by: UROLOGY

## 2024-02-07 PROCEDURE — 1111F DSCHRG MED/CURRENT MED MERGE: CPT | Performed by: UROLOGY

## 2024-02-07 PROCEDURE — G8417 CALC BMI ABV UP PARAM F/U: HCPCS | Performed by: UROLOGY

## 2024-02-07 PROCEDURE — 51798 US URINE CAPACITY MEASURE: CPT | Performed by: UROLOGY

## 2024-02-07 PROCEDURE — 1123F ACP DISCUSS/DSCN MKR DOCD: CPT | Performed by: UROLOGY

## 2024-02-07 PROCEDURE — 1036F TOBACCO NON-USER: CPT | Performed by: UROLOGY

## 2024-02-07 PROCEDURE — G8427 DOCREV CUR MEDS BY ELIG CLIN: HCPCS | Performed by: UROLOGY
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
## 2024-04-01 ENCOUNTER — TELEPHONE (OUTPATIENT)
Dept: CARDIOLOGY CLINIC | Age: 79
End: 2024-04-01

## 2024-04-01 NOTE — TELEPHONE ENCOUNTER
Pre op Risk Assessment    Procedure MOHS right cheek and scalp  Physician Dr. Castrejon  Date of surgery/procedure 4/22/2024 and 5/6/2024    Last OV 7/14/2023  Last Stress 9/12/2022  Last Echo 9/12/2022  Last Cath 12/9/2016  Last Stent Per last office note-known CABG and stents  Is patient on blood thinners Plavix, ASA  Hold Meds/how many days Plavix x 5 days, ASA x 7 days.     Fax to [phone]

## 2024-05-03 ENCOUNTER — HOSPITAL ENCOUNTER (OUTPATIENT)
Age: 79
Discharge: HOME | End: 2024-05-03
Payer: MEDICARE

## 2024-05-03 DIAGNOSIS — Z85.46 HISTORY OF PROSTATE CANCER: ICD-10-CM

## 2024-05-03 DIAGNOSIS — Z85.46 HISTORY OF PROSTATE CANCER: Primary | ICD-10-CM

## 2024-05-03 PROCEDURE — 36415 COLL VENOUS BLD VENIPUNCTURE: CPT

## 2024-05-03 PROCEDURE — 84153 ASSAY OF PSA TOTAL: CPT

## 2024-05-04 LAB — PSA SERPL-MCNC: < 0.02 NG/ML (ref 0–1)

## 2024-05-09 ENCOUNTER — OFFICE VISIT (OUTPATIENT)
Dept: UROLOGY | Age: 79
End: 2024-05-09
Payer: MEDICARE

## 2024-05-09 VITALS
BODY MASS INDEX: 32.07 KG/M2 | SYSTOLIC BLOOD PRESSURE: 102 MMHG | HEIGHT: 63 IN | WEIGHT: 181 LBS | DIASTOLIC BLOOD PRESSURE: 70 MMHG

## 2024-05-09 DIAGNOSIS — N21.1 URETHRAL STONE: ICD-10-CM

## 2024-05-09 DIAGNOSIS — N39.498 OTHER URINARY INCONTINENCE: Primary | ICD-10-CM

## 2024-05-09 DIAGNOSIS — N39.3 STRESS INCONTINENCE: ICD-10-CM

## 2024-05-09 DIAGNOSIS — Z85.46 HISTORY OF PROSTATE CANCER: ICD-10-CM

## 2024-05-09 LAB — POST VOID RESIDUAL (PVR): 16 ML

## 2024-05-09 PROCEDURE — 1123F ACP DISCUSS/DSCN MKR DOCD: CPT | Performed by: NURSE PRACTITIONER

## 2024-05-09 PROCEDURE — 99213 OFFICE O/P EST LOW 20 MIN: CPT | Performed by: NURSE PRACTITIONER

## 2024-05-09 PROCEDURE — G8417 CALC BMI ABV UP PARAM F/U: HCPCS | Performed by: NURSE PRACTITIONER

## 2024-05-09 PROCEDURE — G8427 DOCREV CUR MEDS BY ELIG CLIN: HCPCS | Performed by: NURSE PRACTITIONER

## 2024-05-09 PROCEDURE — 1036F TOBACCO NON-USER: CPT | Performed by: NURSE PRACTITIONER

## 2024-05-09 PROCEDURE — 3078F DIAST BP <80 MM HG: CPT | Performed by: NURSE PRACTITIONER

## 2024-05-09 PROCEDURE — 51798 US URINE CAPACITY MEASURE: CPT | Performed by: NURSE PRACTITIONER

## 2024-05-09 PROCEDURE — 3074F SYST BP LT 130 MM HG: CPT | Performed by: NURSE PRACTITIONER

## 2024-05-09 RX ORDER — MELOXICAM 15 MG/1
TABLET ORAL
COMMUNITY
Start: 2024-03-12

## 2024-05-09 ASSESSMENT — ENCOUNTER SYMPTOMS
ABDOMINAL PAIN: 0
NAUSEA: 0
BACK PAIN: 0
VOMITING: 0

## 2024-05-09 NOTE — PROGRESS NOTES
[facility] [place] [facility]  [facility]  [address]  [address]  [facility] [address]  Dept: [phone]  Loc: [phone]    Visit Date: 5/9/2024        HPI:     Swapnil Ward is a 79 y.o. male who presents today for:  Chief Complaint   Patient presents with    Follow-up    Elevated PSA       HPI    Pt seen in follow up for prostate cancer, urinary incontinence, urethral strictures, prior TURP     Pt with hx of prostate cancer s/p brachytherapy 2000.  PSA 5/3/24 <0.02.      Pt underwent TURP using bipolar loop w/ Dr. Stroud on 03/14/19 for urinary retention. 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 Surgical pathology is consistent with benign fibromuscular tissue and urothelial mucosa, negative for malignancy, no prosthetic epithelium present. He was evaluated by Dr. Obando, plan was to reassess symptoms after catheter was removed. Pt reports he really doesn't have symptoms or issues from the fistula.  Denies any significant fecal incontinence or diarrhea.  Reports he prefers not to undergo further surgery at this time and as such hasn't been back to see general surgery.       He has hx of urethral stricture, Dr. Stroud noted possible false passage.   He developed \"urethral scar tissue which was operated by Dr Bragg in the past\"      In January 2024 pt had increased issues urinating.  Bladder scan 174 mls and ER unable to place lopez x multiple attempts.  He underwent cystoscopy with urethral stone lithotripsy and lopez placement by Dr. Luciano.      Pt has hx of chronic urinary urge and stress incontinence since TURP 3/2019 requiring 7-8 briefs per day. Previously failed oxybutynin, trospium,

## 2024-07-19 ENCOUNTER — OFFICE VISIT (OUTPATIENT)
Dept: CARDIOLOGY CLINIC | Age: 79
End: 2024-07-19
Payer: MEDICARE

## 2024-07-19 VITALS
HEIGHT: 63 IN | SYSTOLIC BLOOD PRESSURE: 174 MMHG | WEIGHT: 178.2 LBS | DIASTOLIC BLOOD PRESSURE: 78 MMHG | BODY MASS INDEX: 31.57 KG/M2 | HEART RATE: 57 BPM

## 2024-07-19 DIAGNOSIS — G45.9 TIA (TRANSIENT ISCHEMIC ATTACK): ICD-10-CM

## 2024-07-19 DIAGNOSIS — R09.89 BRUIT: ICD-10-CM

## 2024-07-19 DIAGNOSIS — R09.89 ARTERIAL BRUIT: ICD-10-CM

## 2024-07-19 DIAGNOSIS — E78.01 FAMILIAL HYPERCHOLESTEROLEMIA: ICD-10-CM

## 2024-07-19 DIAGNOSIS — I25.810 CORONARY ARTERY DISEASE INVOLVING CORONARY BYPASS GRAFT OF NATIVE HEART WITHOUT ANGINA PECTORIS: Primary | ICD-10-CM

## 2024-07-19 DIAGNOSIS — R06.02 SOB (SHORTNESS OF BREATH): ICD-10-CM

## 2024-07-19 DIAGNOSIS — I10 PRIMARY HYPERTENSION: ICD-10-CM

## 2024-07-19 PROCEDURE — 3078F DIAST BP <80 MM HG: CPT | Performed by: NUCLEAR MEDICINE

## 2024-07-19 PROCEDURE — 99213 OFFICE O/P EST LOW 20 MIN: CPT | Performed by: NUCLEAR MEDICINE

## 2024-07-19 PROCEDURE — 1036F TOBACCO NON-USER: CPT | Performed by: NUCLEAR MEDICINE

## 2024-07-19 PROCEDURE — G8417 CALC BMI ABV UP PARAM F/U: HCPCS | Performed by: NUCLEAR MEDICINE

## 2024-07-19 PROCEDURE — 3077F SYST BP >= 140 MM HG: CPT | Performed by: NUCLEAR MEDICINE

## 2024-07-19 PROCEDURE — G8427 DOCREV CUR MEDS BY ELIG CLIN: HCPCS | Performed by: NUCLEAR MEDICINE

## 2024-07-19 PROCEDURE — 1123F ACP DISCUSS/DSCN MKR DOCD: CPT | Performed by: NUCLEAR MEDICINE
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
## 2024-07-23 ENCOUNTER — HOSPITAL ENCOUNTER (OUTPATIENT)
Dept: INTERVENTIONAL RADIOLOGY/VASCULAR | Age: 79
Discharge: HOME | End: 2024-07-25
Attending: NUCLEAR MEDICINE
Payer: MEDICARE

## 2024-07-23 DIAGNOSIS — G45.9 TIA (TRANSIENT ISCHEMIC ATTACK): ICD-10-CM

## 2024-07-23 DIAGNOSIS — I25.810 CORONARY ARTERY DISEASE INVOLVING CORONARY BYPASS GRAFT OF NATIVE HEART WITHOUT ANGINA PECTORIS: ICD-10-CM

## 2024-07-23 DIAGNOSIS — R09.89 BRUIT: ICD-10-CM

## 2024-07-23 PROCEDURE — 93880 EXTRACRANIAL BILAT STUDY: CPT

## 2024-10-23 ENCOUNTER — HOSPITAL ENCOUNTER (OUTPATIENT)
Age: 79
Discharge: HOME | End: 2024-10-23
Payer: MEDICARE

## 2024-10-23 ENCOUNTER — HOSPITAL ENCOUNTER (OUTPATIENT)
Dept: GENERAL RADIOLOGY | Age: 79
Discharge: HOME | End: 2024-10-23
Payer: MEDICARE

## 2024-10-23 DIAGNOSIS — N21.1 URETHRAL STONE: ICD-10-CM

## 2024-10-23 PROCEDURE — 74018 RADEX ABDOMEN 1 VIEW: CPT

## 2024-11-05 ENCOUNTER — OFFICE VISIT (OUTPATIENT)
Dept: UROLOGY | Age: 79
End: 2024-11-05
Payer: MEDICARE

## 2024-11-05 VITALS
HEIGHT: 63 IN | DIASTOLIC BLOOD PRESSURE: 60 MMHG | WEIGHT: 172 LBS | BODY MASS INDEX: 30.48 KG/M2 | SYSTOLIC BLOOD PRESSURE: 106 MMHG

## 2024-11-05 DIAGNOSIS — N21.1 URETHRAL STONE: ICD-10-CM

## 2024-11-05 DIAGNOSIS — N39.3 STRESS INCONTINENCE: Primary | ICD-10-CM

## 2024-11-05 LAB — POST VOID RESIDUAL (PVR): 0 ML

## 2024-11-05 PROCEDURE — 3074F SYST BP LT 130 MM HG: CPT | Performed by: NURSE PRACTITIONER

## 2024-11-05 PROCEDURE — 1159F MED LIST DOCD IN RCRD: CPT | Performed by: NURSE PRACTITIONER

## 2024-11-05 PROCEDURE — G8484 FLU IMMUNIZE NO ADMIN: HCPCS | Performed by: NURSE PRACTITIONER

## 2024-11-05 PROCEDURE — 1036F TOBACCO NON-USER: CPT | Performed by: NURSE PRACTITIONER

## 2024-11-05 PROCEDURE — 51798 US URINE CAPACITY MEASURE: CPT | Performed by: NURSE PRACTITIONER

## 2024-11-05 PROCEDURE — 3078F DIAST BP <80 MM HG: CPT | Performed by: NURSE PRACTITIONER

## 2024-11-05 PROCEDURE — G8427 DOCREV CUR MEDS BY ELIG CLIN: HCPCS | Performed by: NURSE PRACTITIONER

## 2024-11-05 PROCEDURE — 1123F ACP DISCUSS/DSCN MKR DOCD: CPT | Performed by: NURSE PRACTITIONER

## 2024-11-05 PROCEDURE — 99214 OFFICE O/P EST MOD 30 MIN: CPT | Performed by: NURSE PRACTITIONER

## 2024-11-05 PROCEDURE — G8417 CALC BMI ABV UP PARAM F/U: HCPCS | Performed by: NURSE PRACTITIONER

## 2024-11-05 RX ORDER — CIPROFLOXACIN 500 MG/1
500 TABLET, FILM COATED ORAL 2 TIMES DAILY
Qty: 28 TABLET | Refills: 0 | Status: SHIPPED | OUTPATIENT
Start: 2024-11-05 | End: 2024-11-19

## 2024-11-05 ASSESSMENT — ENCOUNTER SYMPTOMS
NAUSEA: 0
ABDOMINAL PAIN: 0
BACK PAIN: 0
VOMITING: 0

## 2024-11-05 NOTE — PROGRESS NOTES
[facility] [place] [facility]  [facility]  [address]  [address]  [facility] [address]  Dept: [phone]  Loc: [phone]    Visit Date: 11/5/2024        HPI:     Swapnil Ward is a 79 y.o. male who presents today for:  Chief Complaint   Patient presents with    Incontinence       HPI  Pt seen in follow up for prostate cancer, urinary incontinence, urethral strictures, prior TURP     Pt with hx of prostate cancer s/p brachytherapy 2000.  PSA 5/3/24 <0.02.       Pt underwent TURP using bipolar loop w/ Dr. Stroud on 03/14/19 for urinary retention. 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 Surgical pathology is consistent with benign fibromuscular tissue and urothelial mucosa, negative for malignancy, no prosthetic epithelium present. He was evaluated by Dr. Obando, plan was to reassess symptoms after catheter was removed. Pt reports he really doesn't have symptoms or issues from the fistula.  Denies any significant fecal incontinence or diarrhea.  Reports he prefers not to undergo further surgery at this time and as such hasn't been back to see general surgery.       He has hx of urethral stricture, Dr. Stroud noted possible false passage.   He developed \"urethral scar tissue which was operated by Dr Bragg in the past\"      In January 2024 pt had increased issues urinating.  Bladder scan 174 mls and ER unable to place lopez x multiple attempts.  He underwent cystoscopy with urethral stone lithotripsy and lopez placement by Dr. Lcuiano.       Pt has hx of chronic urinary urge and stress incontinence since TURP 3/2019 requiring 7-8 briefs per day. Previously failed oxybutynin, trospium, pelvic

## 2024-11-11 LAB — STONE ANALYSIS: NORMAL

## 2024-11-12 ENCOUNTER — TELEPHONE (OUTPATIENT)
Dept: UROLOGY | Age: 79
End: 2024-11-12

## 2024-11-12 NOTE — TELEPHONE ENCOUNTER
Patient scheduled for CT ABDOMEN PELVIS WO  at [facility]  on 12/2/24.  Arrival of 315 PM for a 340 PM scan time.  Order mailed with instructions or given to the patient in the office    Please see refill request 11/21/2022 for Dr. Painting in hematology.

## 2024-12-02 ENCOUNTER — HOSPITAL ENCOUNTER (OUTPATIENT)
Dept: CT IMAGING | Age: 79
Discharge: HOME | End: 2024-12-02
Payer: MEDICARE

## 2024-12-02 DIAGNOSIS — N21.1 URETHRAL STONE: ICD-10-CM

## 2024-12-02 PROCEDURE — 74176 CT ABD & PELVIS W/O CONTRAST: CPT

## 2024-12-12 ENCOUNTER — OFFICE VISIT (OUTPATIENT)
Dept: UROLOGY | Age: 79
End: 2024-12-12

## 2024-12-12 VITALS
BODY MASS INDEX: 30.65 KG/M2 | HEART RATE: 70 BPM | HEIGHT: 63 IN | OXYGEN SATURATION: 98 % | SYSTOLIC BLOOD PRESSURE: 112 MMHG | DIASTOLIC BLOOD PRESSURE: 78 MMHG | RESPIRATION RATE: 20 BRPM | WEIGHT: 173 LBS

## 2024-12-12 DIAGNOSIS — N21.1 URETHRAL STONE: Primary | ICD-10-CM

## 2024-12-12 DIAGNOSIS — R30.0 DYSURIA: ICD-10-CM

## 2024-12-12 DIAGNOSIS — N39.498 OTHER URINARY INCONTINENCE: ICD-10-CM

## 2024-12-12 DIAGNOSIS — C61 PROSTATE CANCER (HCC): ICD-10-CM

## 2024-12-12 LAB
BILIRUBIN, POC: NORMAL
BLOOD URINE, POC: NORMAL
CLARITY, POC: CLEAR
COLOR, POC: NORMAL
GLUCOSE URINE, POC: NORMAL MG/DL
KETONES, POC: NORMAL MG/DL
LEUKOCYTE EST, POC: NORMAL
NITRITE, POC: NEGATIVE
PH, POC: 7
POST VOID RESIDUAL (PVR): 0 ML
PROTEIN, POC: 30 MG/DL
SPECIFIC GRAVITY, POC: NORMAL
UROBILINOGEN, POC: 0.2 MG/DL

## 2024-12-12 NOTE — PROGRESS NOTES
since TURP 3/2019 requiring 7-8 briefs per day. Previously failed oxybutynin, trospium, pelvic floor therapy and has not wanted to pursue further procedures/interventions.      Recently reported passing stones with urination.  Noted intermittent episodes of pain/discomfort in prostate area when he sits.  Notes it feels like he is sitting on a ball.  Reports he will then get the urge to urinate, attempt to urinate but be unable to void, then has episode of uncontrolled incontinence and will again find that debris in his depends that he feels he passed via urethra.  Brought in debris and stone analysis performed noted magnesium ammonium phosphate (struvite), calcium phosphate, and ammonium acid urate in composition.     Completed a course of Cipro.  Pt reports since last visit he has stopped passing debris/stones.  Episodes of pain/discomfort improved.  Completed CT and LItholink.  Notes he has significant incontinence while performing the Litholink and know it was likely inaccurate.     Current Outpatient Medications   Medication Sig Dispense Refill    Magnesium Citrate 200 MG TABS Take 250 mg by mouth in the morning and at bedtime      meloxicam (MOBIC) 15 MG tablet       ketorolac (TORADOL) 10 MG tablet take 1 tablet by mouth every 6 hours if needed      clopidogrel (PLAVIX) 75 MG tablet TAKE 1 TABLET EVERY DAY 90 tablet 3    aspirin 81 MG EC tablet Take 1 tablet by mouth daily      montelukast (SINGULAIR) 10 MG tablet take 1 tablet by mouth at bedtime      gabapentin (NEURONTIN) 300 MG capsule Take 1 capsule by mouth 3 times daily.      folic acid (FOLVITE) 1 MG tablet Take 1 tablet by mouth daily      vitamin B-1 100 MG tablet Take 1 tablet by mouth daily      metoprolol tartrate (LOPRESSOR) 25 MG tablet Take 1 tablet by mouth 2 times daily 180 tablet 3    atorvastatin (LIPITOR) 80 MG tablet Take 1 tablet by mouth daily 90 tablet 0    nitroGLYCERIN (NITROSTAT) 0.4 MG SL tablet Place 1 tablet under the tongue

## 2024-12-15 ENCOUNTER — TELEPHONE (OUTPATIENT)
Dept: UROLOGY | Age: 79
End: 2024-12-15

## 2024-12-15 LAB
BACTERIA UR CULT: ABNORMAL
ORGANISM: ABNORMAL

## 2024-12-16 ENCOUNTER — LAB (OUTPATIENT)
Dept: UROLOGY | Age: 79
End: 2024-12-16

## 2024-12-16 DIAGNOSIS — R32 URINARY INCONTINENCE, UNSPECIFIED TYPE: Primary | ICD-10-CM

## 2024-12-16 PROCEDURE — NBSRV NON-BILLABLE SERVICE

## 2024-12-16 NOTE — PROGRESS NOTES
Urine collected and sent for guidance testing. Order form filled out and send via [employer]. See attached order.

## 2024-12-20 RX ORDER — NITROFURANTOIN 25; 75 MG/1; MG/1
100 CAPSULE ORAL 2 TIMES DAILY
Qty: 28 CAPSULE | Refills: 0 | Status: SHIPPED | OUTPATIENT
Start: 2024-12-20 | End: 2025-01-03

## 2024-12-20 RX ORDER — NITROFURANTOIN 25; 75 MG/1; MG/1
100 CAPSULE ORAL 2 TIMES DAILY
Qty: 20 CAPSULE | Refills: 0 | Status: SHIPPED | OUTPATIENT
Start: 2024-12-20 | End: 2024-12-20

## 2024-12-20 RX ORDER — DOXYCYCLINE HYCLATE 100 MG
100 TABLET ORAL 2 TIMES DAILY
Qty: 28 TABLET | Refills: 0 | Status: SHIPPED | OUTPATIENT
Start: 2024-12-20 | End: 2024-12-20

## 2024-12-20 ASSESSMENT — ENCOUNTER SYMPTOMS
NAUSEA: 0
BACK PAIN: 0
VOMITING: 0
ABDOMINAL PAIN: 0

## 2024-12-20 NOTE — PROGRESS NOTES
Patient advised of the Guidance results and macrobid was sent for treatment. He voiced understanding.

## 2024-12-20 NOTE — TELEPHONE ENCOUNTER
Urine guidance with two organisms (see report).  Script for doxycyline sent to pharmacy.      Patient should take medication with food and drink at least 8 ounces (240 mls) of water when swallowing the medication.  Pt should also sit up at least 30 minutes after taking medication to reduce the risk of esophageal irritation.   Medication can make pt more likely to experience severe sunburn.  Avoid prolonged unprotected sun exposure while taking medication.

## 2025-01-20 ENCOUNTER — OFFICE VISIT (OUTPATIENT)
Dept: UROLOGY | Age: 80
End: 2025-01-20
Payer: MEDICARE

## 2025-01-20 VITALS — WEIGHT: 173 LBS | HEIGHT: 63 IN | RESPIRATION RATE: 20 BRPM | BODY MASS INDEX: 30.65 KG/M2

## 2025-01-20 DIAGNOSIS — N39.0 URINARY TRACT INFECTION WITHOUT HEMATURIA, SITE UNSPECIFIED: Primary | ICD-10-CM

## 2025-01-20 DIAGNOSIS — N48.89 PENILE PAIN: ICD-10-CM

## 2025-01-20 LAB
BILIRUBIN, URINE: NEGATIVE
BILIRUBIN, URINE: NORMAL
BLOOD URINE, POC: ABNORMAL
BLOOD URINE, POC: NORMAL
CHARACTER, URINE: CLEAR
CHARACTER, URINE: NORMAL
COLOR, UA: NORMAL
COLOR, UA: YELLOW
GLUCOSE URINE: NEGATIVE MG/DL
GLUCOSE URINE: NORMAL MG/DL
KETONES, URINE: NEGATIVE
KETONES, URINE: NORMAL
LEUKOCYTE CLUMPS, URINE: ABNORMAL
LEUKOCYTE CLUMPS, URINE: NORMAL
NITRITE, URINE: NEGATIVE
NITRITE, URINE: NORMAL
PH, URINE: 7 (ref 5–9)
PH, URINE: NORMAL (ref 5–9)
PROTEIN, URINE: 100 MG/DL
PROTEIN, URINE: NORMAL MG/DL
SPECIFIC GRAVITY UA: 1.01 (ref 1–1.03)
SPECIFIC GRAVITY UA: NORMAL (ref 1–1.03)
UROBILINOGEN, URINE: 0.2 EU/DL (ref 0–1)
UROBILINOGEN, URINE: NORMAL EU/DL (ref 0–1)

## 2025-01-20 PROCEDURE — 1036F TOBACCO NON-USER: CPT | Performed by: NURSE PRACTITIONER

## 2025-01-20 PROCEDURE — 81003 URINALYSIS AUTO W/O SCOPE: CPT | Performed by: NURSE PRACTITIONER

## 2025-01-20 PROCEDURE — 99213 OFFICE O/P EST LOW 20 MIN: CPT | Performed by: NURSE PRACTITIONER

## 2025-01-20 PROCEDURE — 1123F ACP DISCUSS/DSCN MKR DOCD: CPT | Performed by: NURSE PRACTITIONER

## 2025-01-20 PROCEDURE — G8417 CALC BMI ABV UP PARAM F/U: HCPCS | Performed by: NURSE PRACTITIONER

## 2025-01-20 PROCEDURE — G8428 CUR MEDS NOT DOCUMENT: HCPCS | Performed by: NURSE PRACTITIONER

## 2025-01-20 ASSESSMENT — ENCOUNTER SYMPTOMS
ABDOMINAL PAIN: 0
BACK PAIN: 0
VOMITING: 0
NAUSEA: 0

## 2025-01-20 NOTE — PROGRESS NOTES
[facility] [place] [facility]  [facility] UROLOGY  [address]  [address]  [facility] [address]  Dept: [phone]  Loc: [phone]    Visit Date: 1/20/2025        HPI:     Swapnil Ward is a 79 y.o. male who presents today for:  Chief Complaint   Patient presents with    Follow-up     Discuss uti and penile pain        HPI    Pt seen in follow up for prostate cancer, urinary incontinence, urethral strictures, prior TURP     Pt with hx of prostate cancer s/p brachytherapy 2000.  PSA 5/3/24 <0.02.       Pt underwent TURP using bipolar loop w/ Dr. Stroud on 03/14/19 for urinary retention. 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 Surgical pathology is consistent with benign fibromuscular tissue and urothelial mucosa, negative for malignancy, no prosthetic epithelium present. He was evaluated by Dr. Obando, plan was to reassess symptoms after catheter was removed. Pt reports he really doesn't have symptoms or issues from the fistula.  Denies any significant fecal incontinence or diarrhea.  Reports he prefers not to undergo further surgery at this time and as such hasn't been back to see general surgery.       He has hx of urethral stricture, Dr. Stroud noted possible false passage.   He developed \"urethral scar tissue which was operated by Dr Bragg in the past\"      In January 2024 pt had increased issues urinating.  Bladder scan 174 mls and ER unable to place lopez x multiple attempts.  He underwent cystoscopy with urethral stone lithotripsy and lopez placement by Dr. Luciano.       Pt has hx of chronic urinary urge and stress incontinence since TURP 3/2019 requiring 7-8 briefs per day. Previously failed

## 2025-02-03 RX ORDER — CLOPIDOGREL BISULFATE 75 MG/1
TABLET ORAL
Qty: 90 TABLET | Refills: 2 | Status: SHIPPED | OUTPATIENT
Start: 2025-02-03

## 2025-05-29 LAB — PROSTATE SPECIFIC ANTIGEN: 0.1 NG/ML

## 2025-06-12 ENCOUNTER — OFFICE VISIT (OUTPATIENT)
Dept: UROLOGY | Age: 80
End: 2025-06-12
Payer: MEDICARE

## 2025-06-12 VITALS
BODY MASS INDEX: 32.07 KG/M2 | SYSTOLIC BLOOD PRESSURE: 124 MMHG | HEIGHT: 63 IN | WEIGHT: 181 LBS | DIASTOLIC BLOOD PRESSURE: 68 MMHG

## 2025-06-12 DIAGNOSIS — N20.0 KIDNEY STONE: ICD-10-CM

## 2025-06-12 DIAGNOSIS — C61 PROSTATE CANCER (HCC): Primary | ICD-10-CM

## 2025-06-12 PROCEDURE — 3078F DIAST BP <80 MM HG: CPT | Performed by: NURSE PRACTITIONER

## 2025-06-12 PROCEDURE — 1036F TOBACCO NON-USER: CPT | Performed by: NURSE PRACTITIONER

## 2025-06-12 PROCEDURE — 3074F SYST BP LT 130 MM HG: CPT | Performed by: NURSE PRACTITIONER

## 2025-06-12 PROCEDURE — G8427 DOCREV CUR MEDS BY ELIG CLIN: HCPCS | Performed by: NURSE PRACTITIONER

## 2025-06-12 PROCEDURE — 1123F ACP DISCUSS/DSCN MKR DOCD: CPT | Performed by: NURSE PRACTITIONER

## 2025-06-12 PROCEDURE — G8417 CALC BMI ABV UP PARAM F/U: HCPCS | Performed by: NURSE PRACTITIONER

## 2025-06-12 PROCEDURE — 99213 OFFICE O/P EST LOW 20 MIN: CPT | Performed by: NURSE PRACTITIONER

## 2025-06-12 PROCEDURE — 1159F MED LIST DOCD IN RCRD: CPT | Performed by: NURSE PRACTITIONER

## 2025-06-12 ASSESSMENT — ENCOUNTER SYMPTOMS
NAUSEA: 0
ABDOMINAL PAIN: 0
BACK PAIN: 0
VOMITING: 0

## 2025-06-12 NOTE — PROGRESS NOTES
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  Pt prefers to hold off on further interventions.     PSA undetectable showing excellent control of prostate cancer at this time.      No further stones.  Feels the magnesium citrate he is taking has \"dissolved\" them.      PSA and KUB in 1 year with appt to review results.

## 2025-07-01 ENCOUNTER — HOSPITAL ENCOUNTER (EMERGENCY)
Age: 80
Discharge: HOME | End: 2025-07-01
Attending: EMERGENCY MEDICINE
Payer: MEDICARE

## 2025-07-01 ENCOUNTER — APPOINTMENT (OUTPATIENT)
Dept: GENERAL RADIOLOGY | Age: 80
End: 2025-07-01
Payer: MEDICARE

## 2025-07-01 VITALS
DIASTOLIC BLOOD PRESSURE: 87 MMHG | RESPIRATION RATE: 20 BRPM | OXYGEN SATURATION: 97 % | TEMPERATURE: 99.5 F | HEART RATE: 75 BPM | SYSTOLIC BLOOD PRESSURE: 174 MMHG

## 2025-07-01 DIAGNOSIS — S83.92XA SPRAIN OF LEFT KNEE, UNSPECIFIED LIGAMENT, INITIAL ENCOUNTER: Primary | ICD-10-CM

## 2025-07-01 PROCEDURE — 73562 X-RAY EXAM OF KNEE 3: CPT

## 2025-07-01 PROCEDURE — 6370000000 HC RX 637 (ALT 250 FOR IP): Performed by: EMERGENCY MEDICINE

## 2025-07-01 PROCEDURE — 99283 EMERGENCY DEPT VISIT LOW MDM: CPT

## 2025-07-01 RX ORDER — ACETAMINOPHEN 500 MG
1000 TABLET ORAL EVERY 6 HOURS PRN
Qty: 20 TABLET | Refills: 0 | Status: SHIPPED | OUTPATIENT
Start: 2025-07-01

## 2025-07-01 RX ORDER — ACETAMINOPHEN 500 MG
1000 TABLET ORAL ONCE
Status: COMPLETED | OUTPATIENT
Start: 2025-07-01 | End: 2025-07-01

## 2025-07-01 RX ADMIN — ACETAMINOPHEN 1000 MG: 500 TABLET ORAL at 11:33

## 2025-07-01 ASSESSMENT — PAIN - FUNCTIONAL ASSESSMENT: PAIN_FUNCTIONAL_ASSESSMENT: 0-10

## 2025-07-01 ASSESSMENT — PAIN SCALES - GENERAL: PAINLEVEL_OUTOF10: 4

## 2025-07-01 NOTE — ED TRIAGE NOTES
Patient presents to the ED with complaints of left knee pain. Patient states that he slipped on the wet grass this morning. Patient was able to get up and ambulate inside and then felt a 'pop' Patient states that he has never injured this knee before. Patient does not have any other symptoms. Patient is alert and oriented.

## 2025-07-01 NOTE — ED PROVIDER NOTES
Rfl:     metoprolol tartrate (LOPRESSOR) 25 MG tablet, Take 1 tablet by mouth 2 times daily, Disp: 180 tablet, Rfl: 3    atorvastatin (LIPITOR) 80 MG tablet, Take 1 tablet by mouth daily, Disp: 90 tablet, Rfl: 0    nitroGLYCERIN (NITROSTAT) 0.4 MG SL tablet, Place 1 tablet under the tongue every 5 minutes as needed for Chest pain up to max of 3 total doses. If no relief after 1 dose, call 911. (Patient not taking: Reported on 6/12/2025), Disp: 25 tablet, Rfl: 3    sertraline (ZOLOFT) 100 MG tablet, Take 1 tablet by mouth daily, Disp: 90 tablet, Rfl: 3    Previous Medications    ASPIRIN 81 MG EC TABLET    Take 1 tablet by mouth daily    ATORVASTATIN (LIPITOR) 80 MG TABLET    Take 1 tablet by mouth daily    CLOPIDOGREL (PLAVIX) 75 MG TABLET    TAKE 1 TABLET EVERY DAY    FOLIC ACID (FOLVITE) 1 MG TABLET    Take 1 tablet by mouth daily    GABAPENTIN (NEURONTIN) 300 MG CAPSULE    Take 1 capsule by mouth 3 times daily.    KETOROLAC (TORADOL) 10 MG TABLET    take 1 tablet by mouth every 6 hours if needed    MAGNESIUM CITRATE 200 MG TABS    Take 250 mg by mouth in the morning and at bedtime    MELOXICAM (MOBIC) 15 MG TABLET        METOPROLOL TARTRATE (LOPRESSOR) 25 MG TABLET    Take 1 tablet by mouth 2 times daily    MONTELUKAST (SINGULAIR) 10 MG TABLET    take 1 tablet by mouth at bedtime    NITROGLYCERIN (NITROSTAT) 0.4 MG SL TABLET    Place 1 tablet under the tongue every 5 minutes as needed for Chest pain up to max of 3 total doses. If no relief after 1 dose, call 911.    SERTRALINE (ZOLOFT) 100 MG TABLET    Take 1 tablet by mouth daily    VITAMIN B-1 100 MG TABLET    Take 1 tablet by mouth daily         SOCIAL HISTORY     Social History     Social History Narrative    Not on file     Social History     Tobacco Use    Smoking status: Never     Passive exposure: Never    Smokeless tobacco: Never   Vaping Use    Vaping status: Never Used   Substance Use Topics    Alcohol use: Not Currently     Alcohol/week: 42.0 - 56.0

## 2025-07-01 NOTE — ED NOTES
Patient placed in gown and pants removed. No obvious deformity noted and not swelling appreciated. Patient denies any needs at this time.

## 2025-07-11 ENCOUNTER — OFFICE VISIT (OUTPATIENT)
Dept: CARDIOLOGY CLINIC | Age: 80
End: 2025-07-11
Payer: MEDICARE

## 2025-07-11 VITALS
DIASTOLIC BLOOD PRESSURE: 78 MMHG | HEART RATE: 66 BPM | WEIGHT: 171 LBS | SYSTOLIC BLOOD PRESSURE: 142 MMHG | HEIGHT: 63 IN | BODY MASS INDEX: 30.3 KG/M2

## 2025-07-11 DIAGNOSIS — I10 PRIMARY HYPERTENSION: ICD-10-CM

## 2025-07-11 DIAGNOSIS — E78.01 FAMILIAL HYPERCHOLESTEROLEMIA: ICD-10-CM

## 2025-07-11 DIAGNOSIS — R06.02 SOB (SHORTNESS OF BREATH): Primary | ICD-10-CM

## 2025-07-11 DIAGNOSIS — I25.709 CORONARY ARTERY DISEASE INVOLVING CORONARY BYPASS GRAFT OF NATIVE HEART WITH ANGINA PECTORIS: ICD-10-CM

## 2025-07-11 PROCEDURE — G8417 CALC BMI ABV UP PARAM F/U: HCPCS | Performed by: NUCLEAR MEDICINE

## 2025-07-11 PROCEDURE — 1036F TOBACCO NON-USER: CPT | Performed by: NUCLEAR MEDICINE

## 2025-07-11 PROCEDURE — 99214 OFFICE O/P EST MOD 30 MIN: CPT | Performed by: NUCLEAR MEDICINE

## 2025-07-11 PROCEDURE — 3077F SYST BP >= 140 MM HG: CPT | Performed by: NUCLEAR MEDICINE

## 2025-07-11 PROCEDURE — 93000 ELECTROCARDIOGRAM COMPLETE: CPT | Performed by: NUCLEAR MEDICINE

## 2025-07-11 PROCEDURE — 1159F MED LIST DOCD IN RCRD: CPT | Performed by: NUCLEAR MEDICINE

## 2025-07-11 PROCEDURE — 1123F ACP DISCUSS/DSCN MKR DOCD: CPT | Performed by: NUCLEAR MEDICINE

## 2025-07-11 PROCEDURE — 3078F DIAST BP <80 MM HG: CPT | Performed by: NUCLEAR MEDICINE

## 2025-07-11 PROCEDURE — G8427 DOCREV CUR MEDS BY ELIG CLIN: HCPCS | Performed by: NUCLEAR MEDICINE
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
## 2025-08-13 ENCOUNTER — OFFICE VISIT (OUTPATIENT)
Dept: SURGERY | Age: 80
End: 2025-08-13
Payer: MEDICARE

## 2025-08-13 VITALS
BODY MASS INDEX: 30.92 KG/M2 | OXYGEN SATURATION: 97 % | SYSTOLIC BLOOD PRESSURE: 138 MMHG | TEMPERATURE: 97.3 F | DIASTOLIC BLOOD PRESSURE: 70 MMHG | HEART RATE: 55 BPM | HEIGHT: 63 IN | WEIGHT: 174.5 LBS

## 2025-08-13 DIAGNOSIS — R22.2 MASS OF SUBCUTANEOUS TISSUE OF BACK: Primary | ICD-10-CM

## 2025-08-13 PROBLEM — L98.9 BENIGN SKIN LESION OF FOREHEAD: Status: ACTIVE | Noted: 2025-08-13

## 2025-08-13 PROBLEM — L98.9 SKIN LESION OF BACK: Status: ACTIVE | Noted: 2025-08-13

## 2025-08-13 PROCEDURE — 99203 OFFICE O/P NEW LOW 30 MIN: CPT | Performed by: SURGERY

## 2025-08-13 PROCEDURE — G8417 CALC BMI ABV UP PARAM F/U: HCPCS | Performed by: SURGERY

## 2025-08-13 PROCEDURE — 1036F TOBACCO NON-USER: CPT | Performed by: SURGERY

## 2025-08-13 PROCEDURE — 3075F SYST BP GE 130 - 139MM HG: CPT | Performed by: SURGERY

## 2025-08-13 PROCEDURE — 1123F ACP DISCUSS/DSCN MKR DOCD: CPT | Performed by: SURGERY

## 2025-08-13 PROCEDURE — G8427 DOCREV CUR MEDS BY ELIG CLIN: HCPCS | Performed by: SURGERY

## 2025-08-13 PROCEDURE — 3078F DIAST BP <80 MM HG: CPT | Performed by: SURGERY

## 2025-08-14 ENCOUNTER — TELEPHONE (OUTPATIENT)
Dept: SURGERY | Age: 80
End: 2025-08-14

## 2025-08-15 ENCOUNTER — TELEPHONE (OUTPATIENT)
Dept: SURGERY | Age: 80
End: 2025-08-15

## 2025-08-21 LAB
HCT VFR BLD CALC: 41.5 % (ref 41–53)
HEMOGLOBIN: 13 G/DL (ref 13.5–17.5)

## 2025-08-28 ENCOUNTER — HOSPITAL ENCOUNTER (OUTPATIENT)
Age: 80
Setting detail: OUTPATIENT SURGERY
Discharge: HOME | End: 2025-08-28
Attending: SURGERY | Admitting: SURGERY
Payer: MEDICARE

## 2025-08-28 ENCOUNTER — ANESTHESIA (OUTPATIENT)
Dept: OPERATING ROOM | Age: 80
End: 2025-08-28
Payer: MEDICARE

## 2025-08-28 ENCOUNTER — ANESTHESIA EVENT (OUTPATIENT)
Dept: OPERATING ROOM | Age: 80
End: 2025-08-28
Payer: MEDICARE

## 2025-08-28 VITALS
HEIGHT: 63 IN | TEMPERATURE: 97.8 F | HEART RATE: 75 BPM | DIASTOLIC BLOOD PRESSURE: 70 MMHG | OXYGEN SATURATION: 95 % | SYSTOLIC BLOOD PRESSURE: 145 MMHG | BODY MASS INDEX: 30.48 KG/M2 | RESPIRATION RATE: 16 BRPM | WEIGHT: 172 LBS

## 2025-08-28 DIAGNOSIS — L98.9 SKIN LESION OF BACK: ICD-10-CM

## 2025-08-28 DIAGNOSIS — Z09 S/P EXCISION OF SKIN LESION, FOLLOW-UP EXAM: Primary | ICD-10-CM

## 2025-08-28 DIAGNOSIS — L98.9 BENIGN SKIN LESION OF FOREHEAD: ICD-10-CM

## 2025-08-28 PROBLEM — L72.0 EPIDERMOID CYST OF SKIN OF BACK: Status: ACTIVE | Noted: 2025-08-28

## 2025-08-28 PROBLEM — D17.1 LIPOMA OF BACK: Status: ACTIVE | Noted: 2025-08-28

## 2025-08-28 LAB
ANION GAP SERPL CALC-SCNC: 9 MEQ/L (ref 8–16)
APTT PPP: 33.3 SECONDS (ref 22–38)
BUN SERPL-MCNC: 13 MG/DL (ref 8–23)
CALCIUM SERPL-MCNC: 9.3 MG/DL (ref 8.5–10.5)
CHLORIDE SERPL-SCNC: 107 MEQ/L (ref 98–111)
CO2 SERPL-SCNC: 26 MEQ/L (ref 22–29)
CREAT SERPL-MCNC: 0.9 MG/DL (ref 0.7–1.2)
GFR SERPL CREATININE-BSD FRML MDRD: 85 ML/MIN/1.73M2
GLUCOSE SERPL-MCNC: 118 MG/DL (ref 74–109)
INR PPP: 0.92 (ref 0.85–1.13)
POTASSIUM SERPL-SCNC: 4.5 MEQ/L (ref 3.5–5.2)
PROTHROMBIN TIME: 10.8 SECONDS (ref 10–13.5)
SODIUM SERPL-SCNC: 142 MEQ/L (ref 135–145)

## 2025-08-28 PROCEDURE — 7100000011 HC PHASE II RECOVERY - ADDTL 15 MIN: Performed by: SURGERY

## 2025-08-28 PROCEDURE — 6360000002 HC RX W HCPCS: Performed by: NURSE ANESTHETIST, CERTIFIED REGISTERED

## 2025-08-28 PROCEDURE — 88304 TISSUE EXAM BY PATHOLOGIST: CPT

## 2025-08-28 PROCEDURE — 2709999900 HC NON-CHARGEABLE SUPPLY: Performed by: SURGERY

## 2025-08-28 PROCEDURE — 3700000001 HC ADD 15 MINUTES (ANESTHESIA): Performed by: SURGERY

## 2025-08-28 PROCEDURE — 2580000003 HC RX 258: Performed by: NURSE PRACTITIONER

## 2025-08-28 PROCEDURE — 21931 EXC BACK LES SC 3 CM/>: CPT | Performed by: SURGERY

## 2025-08-28 PROCEDURE — 7100000000 HC PACU RECOVERY - FIRST 15 MIN: Performed by: SURGERY

## 2025-08-28 PROCEDURE — 2500000003 HC RX 250 WO HCPCS: Performed by: NURSE ANESTHETIST, CERTIFIED REGISTERED

## 2025-08-28 PROCEDURE — 3600000002 HC SURGERY LEVEL 2 BASE: Performed by: SURGERY

## 2025-08-28 PROCEDURE — 7100000010 HC PHASE II RECOVERY - FIRST 15 MIN: Performed by: SURGERY

## 2025-08-28 PROCEDURE — 80048 BASIC METABOLIC PNL TOTAL CA: CPT

## 2025-08-28 PROCEDURE — 3700000000 HC ANESTHESIA ATTENDED CARE: Performed by: SURGERY

## 2025-08-28 PROCEDURE — 85610 PROTHROMBIN TIME: CPT

## 2025-08-28 PROCEDURE — 36415 COLL VENOUS BLD VENIPUNCTURE: CPT

## 2025-08-28 PROCEDURE — 3600000012 HC SURGERY LEVEL 2 ADDTL 15MIN: Performed by: SURGERY

## 2025-08-28 PROCEDURE — 85730 THROMBOPLASTIN TIME PARTIAL: CPT

## 2025-08-28 PROCEDURE — 7100000001 HC PACU RECOVERY - ADDTL 15 MIN: Performed by: SURGERY

## 2025-08-28 PROCEDURE — 2500000003 HC RX 250 WO HCPCS: Performed by: NURSE PRACTITIONER

## 2025-08-28 PROCEDURE — 6360000002 HC RX W HCPCS: Performed by: NURSE PRACTITIONER

## 2025-08-28 PROCEDURE — 2580000003 HC RX 258: Performed by: NURSE ANESTHETIST, CERTIFIED REGISTERED

## 2025-08-28 PROCEDURE — 6370000000 HC RX 637 (ALT 250 FOR IP): Performed by: NURSE PRACTITIONER

## 2025-08-28 RX ORDER — ONDANSETRON 4 MG/1
4 TABLET, ORALLY DISINTEGRATING ORAL EVERY 8 HOURS PRN
Status: CANCELLED | OUTPATIENT
Start: 2025-08-28

## 2025-08-28 RX ORDER — SODIUM CHLORIDE 9 MG/ML
INJECTION, SOLUTION INTRAVENOUS PRN
Status: DISCONTINUED | OUTPATIENT
Start: 2025-08-28 | End: 2025-08-28 | Stop reason: HOSPADM

## 2025-08-28 RX ORDER — SODIUM CHLORIDE 9 MG/ML
INJECTION, SOLUTION INTRAVENOUS
Status: DISCONTINUED | OUTPATIENT
Start: 2025-08-28 | End: 2025-08-28 | Stop reason: SDUPTHER

## 2025-08-28 RX ORDER — ONDANSETRON 2 MG/ML
INJECTION INTRAMUSCULAR; INTRAVENOUS
Status: DISCONTINUED | OUTPATIENT
Start: 2025-08-28 | End: 2025-08-28 | Stop reason: SDUPTHER

## 2025-08-28 RX ORDER — EPHEDRINE SULFATE/0.9% NACL/PF 25 MG/5 ML
SYRINGE (ML) INTRAVENOUS
Status: DISCONTINUED | OUTPATIENT
Start: 2025-08-28 | End: 2025-08-28 | Stop reason: SDUPTHER

## 2025-08-28 RX ORDER — SODIUM CHLORIDE 0.9 % (FLUSH) 0.9 %
5-40 SYRINGE (ML) INJECTION PRN
Status: CANCELLED | OUTPATIENT
Start: 2025-08-28

## 2025-08-28 RX ORDER — LIDOCAINE HYDROCHLORIDE 20 MG/ML
INJECTION, SOLUTION INTRAVENOUS
Status: DISCONTINUED | OUTPATIENT
Start: 2025-08-28 | End: 2025-08-28 | Stop reason: SDUPTHER

## 2025-08-28 RX ORDER — DIPHENHYDRAMINE HYDROCHLORIDE 50 MG/ML
12.5 INJECTION, SOLUTION INTRAMUSCULAR; INTRAVENOUS
Status: CANCELLED | OUTPATIENT
Start: 2025-08-28

## 2025-08-28 RX ORDER — PROPOFOL 10 MG/ML
INJECTION, EMULSION INTRAVENOUS
Status: DISCONTINUED | OUTPATIENT
Start: 2025-08-28 | End: 2025-08-28 | Stop reason: SDUPTHER

## 2025-08-28 RX ORDER — ONDANSETRON 2 MG/ML
4 INJECTION INTRAMUSCULAR; INTRAVENOUS EVERY 6 HOURS PRN
Status: CANCELLED | OUTPATIENT
Start: 2025-08-28

## 2025-08-28 RX ORDER — ROCURONIUM BROMIDE 10 MG/ML
INJECTION, SOLUTION INTRAVENOUS
Status: DISCONTINUED | OUTPATIENT
Start: 2025-08-28 | End: 2025-08-28 | Stop reason: SDUPTHER

## 2025-08-28 RX ORDER — FENTANYL CITRATE 50 UG/ML
INJECTION, SOLUTION INTRAMUSCULAR; INTRAVENOUS
Status: DISCONTINUED | OUTPATIENT
Start: 2025-08-28 | End: 2025-08-28 | Stop reason: SDUPTHER

## 2025-08-28 RX ORDER — TRAMADOL HYDROCHLORIDE 50 MG/1
50 TABLET ORAL EVERY 6 HOURS PRN
Qty: 20 TABLET | Refills: 0 | Status: SHIPPED | OUTPATIENT
Start: 2025-08-28 | End: 2025-09-02

## 2025-08-28 RX ORDER — PHENYLEPHRINE HCL IN 0.9% NACL 1 MG/10 ML
SYRINGE (ML) INTRAVENOUS
Status: DISCONTINUED | OUTPATIENT
Start: 2025-08-28 | End: 2025-08-28 | Stop reason: SDUPTHER

## 2025-08-28 RX ORDER — SODIUM CHLORIDE 9 MG/ML
INJECTION, SOLUTION INTRAVENOUS CONTINUOUS
Status: DISCONTINUED | OUTPATIENT
Start: 2025-08-28 | End: 2025-08-28 | Stop reason: HOSPADM

## 2025-08-28 RX ORDER — SODIUM CHLORIDE 0.9 % (FLUSH) 0.9 %
5-40 SYRINGE (ML) INJECTION PRN
Status: DISCONTINUED | OUTPATIENT
Start: 2025-08-28 | End: 2025-08-28 | Stop reason: HOSPADM

## 2025-08-28 RX ORDER — SODIUM CHLORIDE 9 MG/ML
INJECTION, SOLUTION INTRAVENOUS PRN
Status: CANCELLED | OUTPATIENT
Start: 2025-08-28

## 2025-08-28 RX ORDER — SODIUM CHLORIDE 0.9 % (FLUSH) 0.9 %
5-40 SYRINGE (ML) INJECTION EVERY 12 HOURS SCHEDULED
Status: DISCONTINUED | OUTPATIENT
Start: 2025-08-28 | End: 2025-08-28 | Stop reason: HOSPADM

## 2025-08-28 RX ORDER — SODIUM CHLORIDE 0.9 % (FLUSH) 0.9 %
5-40 SYRINGE (ML) INJECTION EVERY 12 HOURS SCHEDULED
Status: CANCELLED | OUTPATIENT
Start: 2025-08-28

## 2025-08-28 RX ORDER — ONDANSETRON 2 MG/ML
4 INJECTION INTRAMUSCULAR; INTRAVENOUS
Status: CANCELLED | OUTPATIENT
Start: 2025-08-28

## 2025-08-28 RX ORDER — TRAMADOL HYDROCHLORIDE 50 MG/1
50 TABLET ORAL EVERY 6 HOURS PRN
Status: DISCONTINUED | OUTPATIENT
Start: 2025-08-28 | End: 2025-08-28 | Stop reason: HOSPADM

## 2025-08-28 RX ORDER — MIDAZOLAM HYDROCHLORIDE 1 MG/ML
INJECTION, SOLUTION INTRAMUSCULAR; INTRAVENOUS
Status: DISCONTINUED | OUTPATIENT
Start: 2025-08-28 | End: 2025-08-28 | Stop reason: SDUPTHER

## 2025-08-28 RX ORDER — FENTANYL CITRATE 50 UG/ML
50 INJECTION, SOLUTION INTRAMUSCULAR; INTRAVENOUS EVERY 5 MIN PRN
Refills: 0 | Status: CANCELLED | OUTPATIENT
Start: 2025-08-28

## 2025-08-28 RX ADMIN — SUGAMMADEX 200 MG: 100 INJECTION, SOLUTION INTRAVENOUS at 12:35

## 2025-08-28 RX ADMIN — LIDOCAINE HYDROCHLORIDE 60 MG: 20 INJECTION, SOLUTION INTRAVENOUS at 11:34

## 2025-08-28 RX ADMIN — PROPOFOL 150 MG: 10 INJECTION, EMULSION INTRAVENOUS at 11:34

## 2025-08-28 RX ADMIN — MIDAZOLAM 2 MG: 1 INJECTION INTRAMUSCULAR; INTRAVENOUS at 11:26

## 2025-08-28 RX ADMIN — WATER 2000 MG: 1 INJECTION, SOLUTION INTRAVENOUS at 11:26

## 2025-08-28 RX ADMIN — EPHEDRINE SULFATE 5 MG: 5 INJECTION INTRAVENOUS at 12:07

## 2025-08-28 RX ADMIN — EPHEDRINE SULFATE 10 MG: 5 INJECTION INTRAVENOUS at 12:13

## 2025-08-28 RX ADMIN — ONDANSETRON 4 MG: 2 INJECTION INTRAMUSCULAR; INTRAVENOUS at 12:13

## 2025-08-28 RX ADMIN — FENTANYL CITRATE 100 MCG: 50 INJECTION, SOLUTION INTRAMUSCULAR; INTRAVENOUS at 11:34

## 2025-08-28 RX ADMIN — ROCURONIUM BROMIDE 30 MG: 10 INJECTION INTRAVENOUS at 11:34

## 2025-08-28 RX ADMIN — Medication 200 MCG: at 11:44

## 2025-08-28 RX ADMIN — SODIUM CHLORIDE: 0.9 INJECTION, SOLUTION INTRAVENOUS at 10:16

## 2025-08-28 RX ADMIN — TRAMADOL HYDROCHLORIDE 50 MG: 50 TABLET, COATED ORAL at 13:40

## 2025-08-28 RX ADMIN — EPHEDRINE SULFATE 10 MG: 5 INJECTION INTRAVENOUS at 11:49

## 2025-08-28 RX ADMIN — SODIUM CHLORIDE: 9 INJECTION, SOLUTION INTRAVENOUS at 11:26

## 2025-08-28 ASSESSMENT — PAIN SCALES - GENERAL
PAINLEVEL_OUTOF10: 0
PAINLEVEL_OUTOF10: 3
PAINLEVEL_OUTOF10: 4
PAINLEVEL_OUTOF10: 7

## 2025-08-28 ASSESSMENT — PAIN DESCRIPTION - DESCRIPTORS
DESCRIPTORS: BURNING
DESCRIPTORS: ACHING

## 2025-08-28 ASSESSMENT — PAIN DESCRIPTION - LOCATION
LOCATION: BACK
LOCATION: BACK

## 2025-08-28 ASSESSMENT — PAIN DESCRIPTION - PAIN TYPE
TYPE: SURGICAL PAIN
TYPE: SURGICAL PAIN

## 2025-08-28 ASSESSMENT — PAIN - FUNCTIONAL ASSESSMENT
PAIN_FUNCTIONAL_ASSESSMENT: WONG-BAKER FACES
PAIN_FUNCTIONAL_ASSESSMENT: 0-10
PAIN_FUNCTIONAL_ASSESSMENT: ACTIVITIES ARE NOT PREVENTED

## 2025-08-28 ASSESSMENT — PAIN SCALES - WONG BAKER: WONGBAKER_NUMERICALRESPONSE: NO HURT

## 2025-08-28 ASSESSMENT — PAIN DESCRIPTION - ORIENTATION: ORIENTATION: MID

## 2025-08-28 ASSESSMENT — PAIN DESCRIPTION - FREQUENCY
FREQUENCY: CONTINUOUS
FREQUENCY: CONTINUOUS

## 2025-08-28 ASSESSMENT — PAIN DESCRIPTION - ONSET: ONSET: ON-GOING

## 2025-08-29 ENCOUNTER — TELEPHONE (OUTPATIENT)
Dept: SURGERY | Age: 80
End: 2025-08-29

## (undated) DEVICE — ADHESIVE SKIN CLSR 0.7ML TOP DERMBND ADV

## (undated) DEVICE — PREP SOL PVP IODINE 4%  4 OZ/BTL

## (undated) DEVICE — Z DISCONTINUED BY MEDLINE USE 2711682 TRAY SKIN PREP DRY W/ PREM GLV

## (undated) DEVICE — SYRINGE MED 10ML LUERLOCK TIP W/O SFTY DISP

## (undated) DEVICE — GUIDEWIRE ENDOSCP L150CM DIA0.035IN TIP 3CM PTFE NIT

## (undated) DEVICE — SUTURE ABSORBABLE MONOFILAMENT 1-0 OS8 14 IN STRATAFIX SPRL SXPD2B202

## (undated) DEVICE — INTENDED FOR TISSUE SEPARATION, AND OTHER PROCEDURES THAT REQUIRE A SHARP SURGICAL BLADE TO PUNCTURE OR CUT.: Brand: BARD-PARKER ® CARBON RIB-BACK BLADES

## (undated) DEVICE — Device

## (undated) DEVICE — 450 ML BOTTLE OF 0.05% CHLORHEXIDINE GLUCONATE IN 99.95% STERILE WATER FOR IRRIGATION, USP AND APPLICATOR.: Brand: IRRISEPT ANTIMICROBIAL WOUND LAVAGE

## (undated) DEVICE — TOTAL TRAY, DB, 100% SILI FOLEY, 16FR 10: Brand: MEDLINE

## (undated) DEVICE — SOLUTION IRRIG 3000ML 0.9% SOD CHL USP UROMATIC PLAS CONT

## (undated) DEVICE — DRAPE,U/SHT,SPLIT,FILM,60X84,STERILE: Brand: MEDLINE

## (undated) DEVICE — CATHETER FOL 2 W 20 FR 5 CC URETH COUNCL TIP SIL LUBRI-SIL

## (undated) DEVICE — SPONGE GZ W4XL4IN COT 12 PLY TYP VII WVN C FLD DSGN

## (undated) DEVICE — SUTURE VICRYL + SZ 3-0 L27IN ABSRB UD L26MM SH 1/2 CIR VCP416H

## (undated) DEVICE — CATHETER URETH 16FR BLLN 5CC 2 W F SPEC M OLV COUDE TIP

## (undated) DEVICE — GLOVE ORANGE PI 8   MSG9080

## (undated) DEVICE — 4-PORT MANIFOLD: Brand: NEPTUNE 2

## (undated) DEVICE — SOLUTION PREP PAINT POV IOD FOR SKIN MUCOUS MEM

## (undated) DEVICE — DRAINBAG,ANTI-REFLUX TOWER,L/F,2000ML,LL: Brand: MEDLINE

## (undated) DEVICE — GAUZE,SPONGE,8"X4",12PLY,XRAY,STRL,LF: Brand: MEDLINE

## (undated) DEVICE — BASIC SINGLE BASIN BTC-LF: Brand: MEDLINE INDUSTRIES, INC.

## (undated) DEVICE — SUTURE VCRL + SZ 2-0 L27IN ABSRB UD CP-1 1/2 CIR REV CUT VCP266H

## (undated) DEVICE — DRESSING TRNSPAR W8XL12IN FLM SURESITE 123

## (undated) DEVICE — PENCIL SMK EVAC ALL IN 1 DSGN ENH VISIBILITY IMPROVED AIR

## (undated) DEVICE — SOLUTION SURG PREP POV IOD 7.5% 4 OZ

## (undated) DEVICE — SURGICAL SCRUB TRAY PREM DRY SKIN VLY LF

## (undated) DEVICE — HYPODERMIC SAFETY NEEDLE: Brand: MAGELLAN

## (undated) DEVICE — ADAPTER URO SCP UROLOK LL

## (undated) DEVICE — GAUZE,SPONGE,3"X3",12PLY,STERILE,LF,2'S: Brand: MEDLINE

## (undated) DEVICE — SKIN AFFIX SURG ADHESIVE 72/CS 0.55ML: Brand: MEDLINE

## (undated) DEVICE — SUTURE ABSRB BRAID COAT UD CP NO 2 27IN VCRL J195H

## (undated) DEVICE — DRESSING ALG W3XL4IN TRNSPAR ANTIMIC WND CNTCT LAYR W/

## (undated) DEVICE — GOWN,SIRUS,NONRNF,SETINSLV,XL,20/CS: Brand: MEDLINE

## (undated) DEVICE — HF-RESECTION ELECTRODE PLASMALOOP LOOP, MEDIUM, 24 FR., 12°-30°, ESG TURIS: Brand: OLYMPUS

## (undated) DEVICE — GLOVE SURG SZ 75 L12IN FNGR THK94MIL TRNSLUC YEL LTX

## (undated) DEVICE — SOLUTION SCRB 4OZ 7.5% POVIDONE IOD ANTIMIC BTL

## (undated) DEVICE — SUTURE VICRYL + SZ 4-0 L27IN ABSRB WHT FS-2 3/8 CIR REV CUT VCP422H

## (undated) DEVICE — 3M™ IOBAN™ 2 ANTIMICROBIAL INCISE DRAPE 6651EZ: Brand: IOBAN™ 2

## (undated) DEVICE — DEVICE CLOSURE SKIN SURG

## (undated) DEVICE — DUAL CUT SAGITTAL BLADE

## (undated) DEVICE — PACK-MAJOR

## (undated) DEVICE — BAG,BANDED,W/RUBBERBAND,STERILE,30X36: Brand: MEDLINE

## (undated) DEVICE — SOLUTION IV 1000ML 0.9% SOD CHL PH 5 INJ USP VIAFLX PLAS

## (undated) DEVICE — GLOVE SURG SZ 65 THK91MIL LTX FREE SYN POLYISOPRENE

## (undated) DEVICE — ADHESIVE SKIN CLOSURE TOP 0.8 CC PREM PUR LIQUIBAND RAPID LF

## (undated) DEVICE — NEEDLE SPNL L3.5IN PNK HUB S STL REG WALL FIT STYL W/ QNCKE

## (undated) DEVICE — BLADE CLP TAPR HD WET DRY CAPABILITY GTT IN CHARGING USE

## (undated) DEVICE — Z INACTIVE USE 2660664 SOLUTION IRRIG 3000ML 0.9% SOD CHL USP UROMATIC PLAS CONT

## (undated) DEVICE — DRAPE,TOP,102X53,STERILE: Brand: MEDLINE

## (undated) DEVICE — C-ARM: Brand: UNBRANDED

## (undated) DEVICE — GLOVE ORANGE PI 7   MSG9070